# Patient Record
Sex: FEMALE | Race: BLACK OR AFRICAN AMERICAN | Employment: UNEMPLOYED | URBAN - METROPOLITAN AREA
[De-identification: names, ages, dates, MRNs, and addresses within clinical notes are randomized per-mention and may not be internally consistent; named-entity substitution may affect disease eponyms.]

---

## 2017-10-08 ENCOUNTER — ED HISTORICAL/CONVERTED ENCOUNTER (OUTPATIENT)
Dept: OTHER | Age: 52
End: 2017-10-08

## 2017-10-31 ENCOUNTER — ED HISTORICAL/CONVERTED ENCOUNTER (OUTPATIENT)
Dept: OTHER | Age: 52
End: 2017-10-31

## 2019-02-26 ENCOUNTER — OP HISTORICAL/CONVERTED ENCOUNTER (OUTPATIENT)
Dept: OTHER | Age: 54
End: 2019-02-26

## 2019-05-26 ENCOUNTER — ED HISTORICAL/CONVERTED ENCOUNTER (OUTPATIENT)
Dept: OTHER | Age: 54
End: 2019-05-26

## 2020-03-14 ENCOUNTER — ED HISTORICAL/CONVERTED ENCOUNTER (OUTPATIENT)
Dept: OTHER | Age: 55
End: 2020-03-14

## 2020-08-04 ENCOUNTER — OFFICE VISIT (OUTPATIENT)
Dept: ENDOCRINOLOGY | Age: 55
End: 2020-08-04
Payer: COMMERCIAL

## 2020-08-04 VITALS
RESPIRATION RATE: 16 BRPM | WEIGHT: 260 LBS | HEART RATE: 81 BPM | SYSTOLIC BLOOD PRESSURE: 145 MMHG | TEMPERATURE: 96.6 F | HEIGHT: 65 IN | DIASTOLIC BLOOD PRESSURE: 80 MMHG | OXYGEN SATURATION: 97 % | BODY MASS INDEX: 43.32 KG/M2

## 2020-08-04 DIAGNOSIS — E04.9 NODULAR GOITER: ICD-10-CM

## 2020-08-04 DIAGNOSIS — E03.9 ACQUIRED HYPOTHYROIDISM: Primary | ICD-10-CM

## 2020-08-04 PROCEDURE — 99204 OFFICE O/P NEW MOD 45 MIN: CPT | Performed by: INTERNAL MEDICINE

## 2020-08-04 RX ORDER — AMLODIPINE BESYLATE 5 MG/1
5 TABLET ORAL DAILY
COMMUNITY

## 2020-08-04 RX ORDER — ERGOCALCIFEROL 1.25 MG/1
50000 CAPSULE ORAL
COMMUNITY

## 2020-08-04 RX ORDER — ARIPIPRAZOLE 5 MG/1
TABLET ORAL DAILY
COMMUNITY

## 2020-08-04 RX ORDER — CYANOCOBALAMIN 1000 UG/ML
1000 INJECTION, SOLUTION INTRAMUSCULAR; SUBCUTANEOUS
COMMUNITY

## 2020-08-04 RX ORDER — TRAZODONE HYDROCHLORIDE 100 MG/1
100 TABLET ORAL
COMMUNITY

## 2020-08-04 RX ORDER — FERROUS SULFATE 325(65) MG
325 TABLET, DELAYED RELEASE (ENTERIC COATED) ORAL
COMMUNITY

## 2020-08-04 RX ORDER — DEXTROAMPHETAMINE SACCHARATE, AMPHETAMINE ASPARTATE, DEXTROAMPHETAMINE SULFATE AND AMPHETAMINE SULFATE 2.5; 2.5; 2.5; 2.5 MG/1; MG/1; MG/1; MG/1
10 TABLET ORAL DAILY
COMMUNITY

## 2020-08-04 RX ORDER — OMEPRAZOLE 20 MG/1
20 CAPSULE, DELAYED RELEASE ORAL DAILY
COMMUNITY

## 2020-08-04 NOTE — PROGRESS NOTES
Maxine Ying MD        Patient Information  Date:8/4/2020  Name : La Nena Landaverde 48 y.o.     YOB: 1965         Referred by: Sherice Castillo NP         Chief Complaint   Patient presents with   [address] [place] [address] Patient     referred for Thyroid       History of present illness    La Nena Landaverde is a 48 y.o. female  here for evaluation of thyroid nodule. A year ago she was told to have thyroid nodule on CT scan/MRI of the neck and no follow-up , several years ago she was diagnosed with hypothyroidism and was on levothyroxine which was discontinued later. 9 years ago had gastric bypass lost 50 pounds and now has been gradually gaining weight. She saw Dr. Miguel Ángel Faustin who did the gastric bypass, was recommended to quit smoking so he could do revision? .  She is continuing to smoke, complains of constipation, hot flashes  Limited activity due to severe shortness of breath    No dysphagia,dysphonia or dyspnea. No FH of thyroid cancer     Past Medical History:   Diagnosis Date    Anxiety     Arthritis     Asthma     COPD     Depression     GERD (gastroesophageal reflux disease)     Hypothyroidism     Obesity     Smoker        Current Outpatient Medications   Medication Sig    amLODIPine (NORVASC) 5 mg tablet Take 5 mg by mouth daily.  omeprazole (PRILOSEC) 20 mg capsule Take 20 mg by mouth daily.  ARIPiprazole (Abilify) 5 mg tablet Take  by mouth daily.  traZODone (DESYREL) 100 mg tablet Take 100 mg by mouth nightly.  cyanocobalamin (Vitamin B-12) 1,000 mcg/mL injection 1,000 mcg by IntraMUSCular route every thirty (30) days.  ferrous sulfate (IRON) 325 mg (65 mg iron) EC tablet Take 325 mg by mouth three (3) times daily (with meals).  ergocalciferol (Vitamin D2) 1,250 mcg (50,000 unit) capsule Take 50,000 Units by mouth every seven (7) days.  dextroamphetamine-amphetamine (AdderalL) 10 mg tablet Take 10 mg by mouth daily.  oxyCODONE-acetaminophen (PERCOCET) 5-325 mg per tablet Take 1-2 Tabs by mouth every four (4) hours as needed for Pain.  SERTRALINE HCL (ZOLOFT PO) Take  by mouth daily. 200MG    MOMETASONE/FORMOTEROL (DULERA IN) Take  by inhalation. 2 PUFFS DAILY     LEVOTHYROXINE SODIUM (LEVOTHYROXINE PO) Take  by mouth daily.  ESOMEPRAZOLE MAGNESIUM (NEXIUM PO) Take  by mouth.  ALPRAZOLAM (XANAX PO) Take  by mouth. 2 MG 3 TIMES DAILY AS NEEDED    RISPERIDONE (RISPERDAL PO) Take  by mouth nightly. No current facility-administered medications for this visit. Review of Systems:  - Constitutional Symptoms: no fevers, chills, weight loss  - Eyes: no blurry vision or double vision  - Cardiovascular: no chest pain or palpitations  - Respiratory: no cough + shortness of breath  - Gastrointestinal: no  abdominal pain  - Musculoskeletal: + Joint pains + weakness  - Integumentary: no rashes  - Neurological: no numbness, tingling, + headaches  - Psychiatric: + Depression or anxiety  - Endocrine: no cold intolerance, no polyuria or polydipsia    Physical Examination:  Blood pressure 145/80, pulse 81, temperature (!) 96.6 °F (35.9 °C), temperature source Oral, resp. rate 16, height 5' 5\" (1.651 m), weight 260 lb (117.9 kg), SpO2 97 %. - Body mass index is 43.27 kg/m².   - General: pleasant, no distress, good eye contact  - HEENT: no exopthalmos, no periorbital edema, no scleral/conjunctival injection, EOMI, no lid lag or stare  - Neck: supple, no thyromegaly, nodules, lymph nodes,   - Cardiovascular: regular,  normal S1 and S2, no murmurs  - Respiratory: clear to auscultation bilaterally  - Gastrointestinal: soft, nontender, nondistended, BS +  - Musculoskeletal: no proximal muscle weakness in upper or lower extremities  - Integumentary: no tremors, no edema  - Neurological: alert and oriented   - Psychiatric: normal mood and affect  - Skin - normal turgor      Assessment/Plan:     Nodular goiter  We discussed the natural history of thyroid nodules,most of the nodules are benign and a small percentage ( 5%) can be malignant . Clinically could not appreciate any nodule  Thyroid ultrasound ordered    Hypothyroidism: Not on replacement  Check TSH    Morbid obesity: Prior history of gastric bypass  Behavioral modification and lifestyle changes stressed          Thank you for allowing me to participate in the care of this patient.     Ronel Yun MD    Patient verbalized understanding

## 2020-08-04 NOTE — PROGRESS NOTES
Mis Walker is a 48 y.o. female here for   Chief Complaint   Patient presents with    New Patient     referred for Thyroid       1. Have you been to the ER, urgent care clinic since your last visit? Hospitalized since your last visit? -n/a    2. Have you seen or consulted any other health care providers outside of the [address] [place] [address] since your last visit?   Include any pap smears or colon screening.-n/a

## 2020-08-04 NOTE — LETTER
8/5/20 Patient: Rhea Beltrán YOB: 1965 Date of Visit: 8/4/2020 Bertram Adam, LEXI 
[id number] [id number] VIA Facsimile: [phone] Dear Bertram Adam, LEXI, Thank you for referring Ms. Rebecca Rajan to [facility] for evaluation. My notes for this consultation are attached. If you have questions, please do not hesitate to call me. I look forward to following your patient along with you. Sincerely, Arabella Aguilera MD

## 2020-08-05 DIAGNOSIS — E03.9 ACQUIRED HYPOTHYROIDISM: ICD-10-CM

## 2020-08-05 DIAGNOSIS — E04.9 NODULAR GOITER: ICD-10-CM

## 2020-08-09 LAB
T4 FREE SERPL-MCNC: 1.16 NG/DL (ref 0.82–1.77)
TSH SERPL DL<=0.005 MIU/L-ACNC: 0.59 UIU/ML (ref 0.45–4.5)

## 2020-08-12 ENCOUNTER — HOSPITAL ENCOUNTER (OUTPATIENT)
Dept: ULTRASOUND IMAGING | Age: 55
Discharge: HOME | End: 2020-08-12
Attending: INTERNAL MEDICINE
Payer: MEDICAID

## 2020-08-12 DIAGNOSIS — E04.9 NODULAR GOITER: ICD-10-CM

## 2020-08-12 PROCEDURE — 76536 US EXAM OF HEAD AND NECK: CPT

## 2020-08-13 ENCOUNTER — TELEPHONE (OUTPATIENT)
Dept: ENDOCRINOLOGY | Age: 55
End: 2020-08-13

## 2020-08-13 NOTE — TELEPHONE ENCOUNTER
Per Dr. Tino Sarabia, informed pt of result note, as noted above. Transferred pt to the  to schedule.

## 2020-08-13 NOTE — TELEPHONE ENCOUNTER
----- Message from Jorge L Hebert MD sent at 8/13/2020  3:32 PM EDT -----  She has thyroid nodules, left measuring 1.7 cm.   Need left thyroid needle biopsy,

## 2020-08-20 ENCOUNTER — OFFICE VISIT (OUTPATIENT)
Dept: ENDOCRINOLOGY | Age: 55
End: 2020-08-20
Payer: COMMERCIAL

## 2020-08-20 VITALS
BODY MASS INDEX: 44.15 KG/M2 | HEART RATE: 91 BPM | HEIGHT: 65 IN | WEIGHT: 265 LBS | OXYGEN SATURATION: 93 % | DIASTOLIC BLOOD PRESSURE: 103 MMHG | SYSTOLIC BLOOD PRESSURE: 139 MMHG | RESPIRATION RATE: 18 BRPM | TEMPERATURE: 97.5 F

## 2020-08-20 DIAGNOSIS — E04.9 NODULAR GOITER: Primary | ICD-10-CM

## 2020-08-20 PROCEDURE — 99213 OFFICE O/P EST LOW 20 MIN: CPT | Performed by: INTERNAL MEDICINE

## 2020-08-20 RX ORDER — LEVOFLOXACIN 500 MG/1
TABLET, FILM COATED ORAL
COMMUNITY
Start: 2020-08-13 | End: 2021-02-16 | Stop reason: ALTCHOICE

## 2020-08-20 RX ORDER — KETOCONAZOLE 200 MG/1
TABLET ORAL
Status: ON HOLD | COMMUNITY
Start: 2020-08-19 | End: 2022-05-27

## 2020-08-20 NOTE — PATIENT INSTRUCTIONS
I have ordered scan/test and if you do not hear from the hospital in 3- 4 business days  please call the number [phone] to speak with the scheduling team directly.

## 2020-08-20 NOTE — PROGRESS NOTES
Mis Walker is a 48 y.o. female here for   Chief Complaint   Patient presents with    Thyroid Problem    Biopsy       1. Have you been to the ER, urgent care clinic since your last visit? Hospitalized since your last visit? -no    2. Have you seen or consulted any other health care providers outside of the [address] [place] [address] since your last visit? Include any pap smears or colon screening. -PCP on Wed - WBC high

## 2020-08-22 NOTE — PROGRESS NOTES
Irma Moss MD           Patient Information  Date:8/22/2020  Name : Gosia Lafleur 48 y.o.     YOB: 1965         Referred by: Jean Martines NP         Chief Complaint   Patient presents with    Thyroid Problem    Biopsy       History of present illness    Gosia Lafleur is a 48 y.o. female   She had ultrasound thyroid  Multinodular goiter  Biopsy of the left 1.7 cm nodule was planned and attempted    She has posterior nodule. Thick neck, due to the positioning of the nodule we will arrange for FNA at the hospital  Discussed the procedure      Past Medical History:   Diagnosis Date    Anxiety     Arthritis     Asthma     COPD     Depression     GERD (gastroesophageal reflux disease)     Hypothyroidism     Obesity     MEL (obstructive sleep apnea)     Smoker        Current Outpatient Medications   Medication Sig    levoFLOXacin (LEVAQUIN) 500 mg tablet TAKE ONE TABLET BY MOUTH EVERY DAY FOR 10 DAYS    ketoconazole (NIZORAL) 200 mg tablet     amLODIPine (NORVASC) 5 mg tablet Take 5 mg by mouth daily.  omeprazole (PRILOSEC) 20 mg capsule Take 20 mg by mouth daily.  ARIPiprazole (Abilify) 5 mg tablet Take  by mouth daily.  traZODone (DESYREL) 100 mg tablet Take 100 mg by mouth nightly.  cyanocobalamin (Vitamin B-12) 1,000 mcg/mL injection 1,000 mcg by IntraMUSCular route every thirty (30) days.  ferrous sulfate (IRON) 325 mg (65 mg iron) EC tablet Take 325 mg by mouth three (3) times daily (with meals).  ergocalciferol (Vitamin D2) 1,250 mcg (50,000 unit) capsule Take 50,000 Units by mouth every seven (7) days.  dextroamphetamine-amphetamine (AdderalL) 10 mg tablet Take 10 mg by mouth daily.  oxyCODONE-acetaminophen (PERCOCET) 5-325 mg per tablet Take 1-2 Tabs by mouth every four (4) hours as needed for Pain.  SERTRALINE HCL (ZOLOFT PO) Take  by mouth daily.  200MG     No current facility-administered medications for this visit. Review of Systems:  -     Physical Examination:  Blood pressure (!) 139/103, pulse 91, temperature 97.5 °F (36.4 °C), temperature source Oral, resp. rate 18, height 5' 5\" (1.651 m), weight 265 lb (120.2 kg), SpO2 93 %. - Body mass index is 44.1 kg/m². Data Reviewed:       Assessment/Plan:     Multinodular goiter      We discussed the natural history of thyroid nodules,most of the nodules are benign and a small percentage ( 5%) can be malignant and thyroid biopsy is the next step to exclude the possibility. We will schedule for ultrasound guided FNA biopsy. Explained the procedure to the patient. If FNA biopsy is normal the nodule can be followed by serial neck exams and TSH . If there any compressive symptoms then surgery to be considered. Follow-up and Dispositions    · Return in about 6 months (around 2/20/2021) for [address] [place] [address],[address] and f/u. Thank you for allowing me to participate in the care of this patient.     Padilla Goldberg MD

## 2020-08-28 ENCOUNTER — HOSPITAL ENCOUNTER (OUTPATIENT)
Dept: ULTRASOUND IMAGING | Age: 55
Discharge: HOME | End: 2020-08-28
Attending: INTERNAL MEDICINE
Payer: COMMERCIAL

## 2020-08-28 DIAGNOSIS — E04.9 NODULAR GOITER: ICD-10-CM

## 2020-08-28 PROCEDURE — 77030014115

## 2020-08-28 PROCEDURE — 88172 CYTP DX EVAL FNA 1ST EA SITE: CPT

## 2020-08-28 PROCEDURE — 88173 CYTOPATH EVAL FNA REPORT: CPT

## 2020-08-28 PROCEDURE — 10005 FNA BX W/US GDN 1ST LES: CPT

## 2020-08-28 RX ORDER — LIDOCAINE HYDROCHLORIDE 10 MG/ML
10 INJECTION, SOLUTION EPIDURAL; INFILTRATION; INTRACAUDAL; PERINEURAL
Status: COMPLETED | OUTPATIENT
Start: 2020-08-28 | End: 2020-08-28

## 2020-08-28 RX ADMIN — LIDOCAINE HYDROCHLORIDE 5 ML: 10 INJECTION, SOLUTION EPIDURAL; INFILTRATION; INTRACAUDAL; PERINEURAL at 09:01

## 2020-09-01 ENCOUNTER — TELEPHONE (OUTPATIENT)
Dept: ENDOCRINOLOGY | Age: 55
End: 2020-09-01

## 2020-09-01 NOTE — TELEPHONE ENCOUNTER
----- Message from Brayden Sarabia sent at 9/1/2020  4:10 PM EDT -----  Regarding: Dr. Saleem Rodríguez  Pt would like a call back regarding her test results.  Contract [phone]

## 2020-09-02 NOTE — TELEPHONE ENCOUNTER
Pt came by the office for results of biopsy, explained that once reviewed by the physician, a nurse will contact her with results.

## 2020-09-03 NOTE — TELEPHONE ENCOUNTER
Informed pt of the results below.  Pt verbalized understanding.       ----- Message from Patel Lisa MD sent at 9/2/2020  5:13 PM EDT -----  No thyroid cancer seen

## 2020-11-05 ENCOUNTER — TRANSCRIBE ORDER (OUTPATIENT)
Dept: SCHEDULING | Age: 55
End: 2020-11-05

## 2020-11-05 DIAGNOSIS — R10.9 STOMACH ACHE: ICD-10-CM

## 2020-11-05 DIAGNOSIS — R06.02 SHORTNESS OF BREATH: Primary | ICD-10-CM

## 2020-11-05 DIAGNOSIS — R53.83 FATIGUE: ICD-10-CM

## 2020-12-22 ENCOUNTER — HOSPITAL ENCOUNTER (OUTPATIENT)
Dept: CT IMAGING | Age: 55
Discharge: HOME | End: 2020-12-22
Attending: INTERNAL MEDICINE
Payer: COMMERCIAL

## 2020-12-22 DIAGNOSIS — R06.02 SOB (SHORTNESS OF BREATH): ICD-10-CM

## 2020-12-22 DIAGNOSIS — R10.9 STOMACH ACHE: ICD-10-CM

## 2020-12-22 DIAGNOSIS — R53.83 FATIGUE: ICD-10-CM

## 2020-12-22 DIAGNOSIS — R06.02 SHORTNESS OF BREATH: ICD-10-CM

## 2020-12-22 LAB
BUN SERPL-MCNC: 15 MG/DL (ref 6–20)
CREAT SERPL-MCNC: 0.31 MG/DL (ref 0.55–1.02)

## 2020-12-22 PROCEDURE — 84520 ASSAY OF UREA NITROGEN: CPT

## 2020-12-22 PROCEDURE — 36415 COLL VENOUS BLD VENIPUNCTURE: CPT

## 2020-12-22 PROCEDURE — 74011000636 HC RX REV CODE- 636: Performed by: INTERNAL MEDICINE

## 2020-12-22 PROCEDURE — 74177 CT ABD & PELVIS W/CONTRAST: CPT

## 2020-12-22 PROCEDURE — 71260 CT THORAX DX C+: CPT

## 2020-12-22 PROCEDURE — 82565 ASSAY OF CREATININE: CPT

## 2020-12-22 RX ADMIN — IOPAMIDOL 100 ML: 755 INJECTION, SOLUTION INTRAVENOUS at 08:40

## 2021-02-16 ENCOUNTER — OFFICE VISIT (OUTPATIENT)
Dept: ENDOCRINOLOGY | Age: 56
End: 2021-02-16
Payer: COMMERCIAL

## 2021-02-16 VITALS
WEIGHT: 276 LBS | SYSTOLIC BLOOD PRESSURE: 133 MMHG | OXYGEN SATURATION: 96 % | BODY MASS INDEX: 45.98 KG/M2 | DIASTOLIC BLOOD PRESSURE: 77 MMHG | RESPIRATION RATE: 16 BRPM | HEART RATE: 80 BPM | TEMPERATURE: 96.7 F | HEIGHT: 65 IN

## 2021-02-16 DIAGNOSIS — E04.2 MULTINODULAR GOITER: Primary | ICD-10-CM

## 2021-02-16 PROCEDURE — 99213 OFFICE O/P EST LOW 20 MIN: CPT | Performed by: INTERNAL MEDICINE

## 2021-02-16 PROCEDURE — 76536 US EXAM OF HEAD AND NECK: CPT | Performed by: INTERNAL MEDICINE

## 2021-02-16 RX ORDER — MONTELUKAST SODIUM 4 MG/1
1 TABLET, CHEWABLE ORAL DAILY
COMMUNITY

## 2021-02-16 NOTE — PROGRESS NOTES
Calvin Duque MD           Patient Information  Name : Chloe Hamman 50 y.o.   YOB: 1965         Referred by: Miguel Ferris NP         Chief Complaint   Patient presents with    Thyroid Problem    Ultrasound       History of present illness    Chloe Hamman is a 50 y.o. female here for follow-up  Multinodular goiter: She was found to have left 1.7 cm thyroid nodule, s/p FNA 2020 which was benign  No change in the size    Generalized pain, has gained weight        Past Medical History:   Diagnosis Date    Anxiety     Arthritis     Asthma     COPD     Depression     GERD (gastroesophageal reflux disease)     Hypothyroidism     Obesity     EML (obstructive sleep apnea)     Smoker        Current Outpatient Medications   Medication Sig    colestipoL (Colestid) 1 gram tablet Take 1 g by mouth daily.  ketoconazole (NIZORAL) 200 mg tablet     amLODIPine (NORVASC) 5 mg tablet Take 5 mg by mouth daily.  omeprazole (PRILOSEC) 20 mg capsule Take 20 mg by mouth daily.  ARIPiprazole (Abilify) 5 mg tablet Take  by mouth daily.  traZODone (DESYREL) 100 mg tablet Take 100 mg by mouth nightly.  cyanocobalamin (Vitamin B-12) 1,000 mcg/mL injection 1,000 mcg by IntraMUSCular route every thirty (30) days.  ferrous sulfate (IRON) 325 mg (65 mg iron) EC tablet Take 325 mg by mouth three (3) times daily (with meals).  ergocalciferol (Vitamin D2) 1,250 mcg (50,000 unit) capsule Take 50,000 Units by mouth every seven (7) days.  dextroamphetamine-amphetamine (AdderalL) 10 mg tablet Take 10 mg by mouth daily.  oxyCODONE-acetaminophen (PERCOCET) 5-325 mg per tablet Take 1-2 Tabs by mouth every four (4) hours as needed for Pain.  SERTRALINE HCL (ZOLOFT PO) Take  by mouth daily. 200MG     No current facility-administered medications for this visit.           Review of Systems:  -     Physical Examination:  Blood pressure 133/77, pulse 80, temperature (!) 96.7 °F (35.9 °C), temperature source Oral, resp. rate 16, height 5' 5\" (1.651 m), weight 276 lb (125.2 kg), SpO2 96 %. Body mass index is 45.93 kg/m². General: pleasant, no distress, good eye contact  HEENT: no exophthalmos, no periorbital edema, EOMI    RS: Normal respiratory effort  Musculoskeletal: no tremors  Neurological: alert and oriented  Psychiatric: normal mood and affect  Skin: Normal color      Data Reviewed:       Assessment/Plan:     Multinodular goiter  Left thyroid nodule 1.7 cm, s/p FNA 2020, benign          Ultrasound thyroid February 2021  Right lobe measures 3.9 x 1.9 x 1.4 cm. Left lobe measures 3.7 x 1.5 x 1.5 cm. Isthmus 0.3 cm. Right lobe has 2 nodules measuring 1.2 x 0.8 x 1.1 cm and another nodule measuring 0.9 x 0.7 x 0.6 cm with peripheral vascularization, no microcalcifications. Left lobe has a dominant nodule measuring 1.6 x 1.1 x 1.1 cm with no microcalcifications    Impression  Multinodular goiter. Left dominant nodule measuring 1.6 cm which has not changed in size. This nodule was biopsied in the past and was benign      Thank you for allowing me to participate in the care of this patient.     Roman Lewis MD

## 2021-02-16 NOTE — LETTER
2/20/2021 Patient: Ariella Hylton YOB: 1965 Date of Visit: 2/16/2021 Clarisa Crowe NP 
[address] [phone] Via Fax: [phone] Dear Clarisa Fong., NP, Thank you for referring Ms. Tacos Riley to [facility] for evaluation. My notes for this consultation are attached. If you have questions, please do not hesitate to call me. I look forward to following your patient along with you. Sincerely, Mckenzie Oh MD

## 2021-02-16 NOTE — PROGRESS NOTES
Ariel Harding is a 50 y.o. female here for   Chief Complaint   Patient presents with    Thyroid Problem    Ultrasound       1. Have you been to the ER, urgent care clinic since your last visit? Hospitalized since your last visit? -no    2. Have you seen or consulted any other health care providers outside of the [address] [place] [address] since your last visit?   Include any pap smears or colon screening.-oncologist

## 2022-03-29 ENCOUNTER — APPOINTMENT (OUTPATIENT)
Dept: GENERAL RADIOLOGY | Age: 57
End: 2022-03-29
Attending: STUDENT IN AN ORGANIZED HEALTH CARE EDUCATION/TRAINING PROGRAM
Payer: MEDICAID

## 2022-03-29 ENCOUNTER — HOSPITAL ENCOUNTER (EMERGENCY)
Age: 57
Discharge: HOME | End: 2022-03-29
Attending: STUDENT IN AN ORGANIZED HEALTH CARE EDUCATION/TRAINING PROGRAM
Payer: MEDICAID

## 2022-03-29 VITALS
RESPIRATION RATE: 22 BRPM | WEIGHT: 250 LBS | TEMPERATURE: 97.6 F | SYSTOLIC BLOOD PRESSURE: 148 MMHG | DIASTOLIC BLOOD PRESSURE: 100 MMHG | HEIGHT: 65 IN | HEART RATE: 81 BPM | OXYGEN SATURATION: 98 % | BODY MASS INDEX: 41.65 KG/M2

## 2022-03-29 DIAGNOSIS — M54.50 ACUTE RIGHT-SIDED LOW BACK PAIN, UNSPECIFIED WHETHER SCIATICA PRESENT: Primary | ICD-10-CM

## 2022-03-29 DIAGNOSIS — J44.1 COPD EXACERBATION (HCC): ICD-10-CM

## 2022-03-29 LAB
ATRIAL RATE: 74 BPM
CALCULATED P AXIS, ECG09: 53 DEGREES
CALCULATED R AXIS, ECG10: -10 DEGREES
CALCULATED T AXIS, ECG11: 20 DEGREES
DIAGNOSIS, 93000: NORMAL
P-R INTERVAL, ECG05: 162 MS
Q-T INTERVAL, ECG07: 386 MS
QRS DURATION, ECG06: 86 MS
QTC CALCULATION (BEZET), ECG08: 428 MS
VENTRICULAR RATE, ECG03: 74 BPM

## 2022-03-29 PROCEDURE — 74011000250 HC RX REV CODE- 250: Performed by: STUDENT IN AN ORGANIZED HEALTH CARE EDUCATION/TRAINING PROGRAM

## 2022-03-29 PROCEDURE — 94640 AIRWAY INHALATION TREATMENT: CPT

## 2022-03-29 PROCEDURE — 74011250637 HC RX REV CODE- 250/637: Performed by: STUDENT IN AN ORGANIZED HEALTH CARE EDUCATION/TRAINING PROGRAM

## 2022-03-29 PROCEDURE — 99284 EMERGENCY DEPT VISIT MOD MDM: CPT

## 2022-03-29 PROCEDURE — 71045 X-RAY EXAM CHEST 1 VIEW: CPT

## 2022-03-29 PROCEDURE — 93005 ELECTROCARDIOGRAM TRACING: CPT

## 2022-03-29 PROCEDURE — 74011636637 HC RX REV CODE- 636/637: Performed by: STUDENT IN AN ORGANIZED HEALTH CARE EDUCATION/TRAINING PROGRAM

## 2022-03-29 RX ORDER — ACETAMINOPHEN 325 MG/1
650 TABLET ORAL
Qty: 20 TABLET | Refills: 0 | Status: SHIPPED | OUTPATIENT
Start: 2022-03-29 | End: 2022-03-29 | Stop reason: SDUPTHER

## 2022-03-29 RX ORDER — ACETAMINOPHEN 325 MG/1
650 TABLET ORAL
Qty: 20 TABLET | Refills: 0 | Status: SHIPPED | OUTPATIENT
Start: 2022-03-29

## 2022-03-29 RX ORDER — DOXYCYCLINE HYCLATE 100 MG
100 TABLET ORAL 2 TIMES DAILY
Qty: 10 TABLET | Refills: 0 | Status: SHIPPED | OUTPATIENT
Start: 2022-03-29 | End: 2022-03-29 | Stop reason: SDUPTHER

## 2022-03-29 RX ORDER — METHOCARBAMOL 500 MG/1
1500 TABLET, FILM COATED ORAL ONCE
Status: COMPLETED | OUTPATIENT
Start: 2022-03-29 | End: 2022-03-29

## 2022-03-29 RX ORDER — METHOCARBAMOL 750 MG/1
750 TABLET, FILM COATED ORAL
Qty: 20 TABLET | Refills: 0 | Status: ON HOLD | OUTPATIENT
Start: 2022-03-29 | End: 2022-05-27

## 2022-03-29 RX ORDER — PREDNISONE 20 MG/1
40 TABLET ORAL DAILY
Qty: 8 TABLET | Refills: 0 | Status: SHIPPED | OUTPATIENT
Start: 2022-03-30 | End: 2022-03-29 | Stop reason: SDUPTHER

## 2022-03-29 RX ORDER — ACETAMINOPHEN 500 MG
1000 TABLET ORAL ONCE
Status: COMPLETED | OUTPATIENT
Start: 2022-03-29 | End: 2022-03-29

## 2022-03-29 RX ORDER — DOXYCYCLINE HYCLATE 100 MG
100 TABLET ORAL 2 TIMES DAILY
Qty: 10 TABLET | Refills: 0 | Status: SHIPPED | OUTPATIENT
Start: 2022-03-29 | End: 2022-04-03

## 2022-03-29 RX ORDER — METHOCARBAMOL 750 MG/1
750 TABLET, FILM COATED ORAL
Qty: 20 TABLET | Refills: 0 | Status: SHIPPED | OUTPATIENT
Start: 2022-03-29 | End: 2022-03-29 | Stop reason: SDUPTHER

## 2022-03-29 RX ORDER — PREDNISONE 20 MG/1
40 TABLET ORAL ONCE
Status: COMPLETED | OUTPATIENT
Start: 2022-03-29 | End: 2022-03-29

## 2022-03-29 RX ORDER — IPRATROPIUM BROMIDE AND ALBUTEROL SULFATE 2.5; .5 MG/3ML; MG/3ML
3 SOLUTION RESPIRATORY (INHALATION)
Status: COMPLETED | OUTPATIENT
Start: 2022-03-29 | End: 2022-03-29

## 2022-03-29 RX ORDER — PREDNISONE 20 MG/1
40 TABLET ORAL DAILY
Qty: 8 TABLET | Refills: 0 | Status: SHIPPED | OUTPATIENT
Start: 2022-03-30 | End: 2022-04-03

## 2022-03-29 RX ADMIN — METHOCARBAMOL TABLETS 1500 MG: 500 TABLET, COATED ORAL at 03:03

## 2022-03-29 RX ADMIN — PREDNISONE 40 MG: 20 TABLET ORAL at 03:03

## 2022-03-29 RX ADMIN — IPRATROPIUM BROMIDE AND ALBUTEROL SULFATE 3 ML: .5; 3 SOLUTION RESPIRATORY (INHALATION) at 03:04

## 2022-03-29 RX ADMIN — IPRATROPIUM BROMIDE AND ALBUTEROL SULFATE 3 ML: .5; 3 SOLUTION RESPIRATORY (INHALATION) at 03:06

## 2022-03-29 RX ADMIN — IPRATROPIUM BROMIDE AND ALBUTEROL SULFATE 3 ML: .5; 3 SOLUTION RESPIRATORY (INHALATION) at 03:03

## 2022-03-29 RX ADMIN — ACETAMINOPHEN 1000 MG: 500 TABLET ORAL at 03:04

## 2022-03-29 NOTE — ED PROVIDER NOTES
[id number]  EMERGENCY DEPARTMENT ENCOUNTER NOTE    Date: 3/29/2022  Patient Name: Eris Vora    History of Presenting Illness     Chief Complaint   Patient presents with    Shortness of Breath    Back Pain     HPI: Eris Vora, 49 y.o. female with a past medical history and outpatient medications as listed and reviewed below  presents for shortness of breath. The patient has a history of COPD and asthma and reports that she has been feeling more short of breath for the past few days. She got 1 dose of prednisone and breathing treatments without improvement. She also reports chronic lower back pain that exacerbates that she fell down at a doctor's office. She had an MRI afterwards that showed a bulging disc but no fractures. The pain right now is in the lower paravertebral right lumbar area. No weakness or numbness in the lower extremity and the patient is ambulating freely.     Medical History   I reviewed the medical, surgical, family, and social history, as well as allergies:    PCP: Alfredo Silva NP    Past Medical History:  Past Medical History:   Diagnosis Date    Anxiety     Arthritis     Asthma     COPD     Depression     GERD (gastroesophageal reflux disease)     Hypothyroidism     Obesity     MEL (obstructive sleep apnea)     Smoker      Past Surgical History:  Past Surgical History:   Procedure Laterality Date    HX GYN      LAPAROSCOPY    HX OTHER SURGICAL      lapband removal    HX TUBAL LIGATION      KS LAP GASTRIC BYPASS/SAEED-EN-Y      KS LAP, PLACE ADJUST GASTR BAND       Current Outpatient Medications:  Current Outpatient Medications   Medication Instructions    acetaminophen (TYLENOL) 650 mg, Oral, EVERY 6 HOURS AS NEEDED    albuterol sulfate 90 mcg/actuation aebs 1-2 Puffs, Inhalation, EVERY 4 HOURS AS NEEDED    amLODIPine (NORVASC) 5 mg, Oral, DAILY    ARIPiprazole (Abilify) 5 mg tablet Oral, DAILY    colestipoL (COLESTID) 1 g, Oral, DAILY    cyanocobalamin (VITAMIN B-12) 1,000 mcg, IntraMUSCular, EVERY 30 DAYS    dextroamphetamine-amphetamine (AdderalL) 10 mg tablet 10 mg, Oral, DAILY    doxycycline (VIBRA-TABS) 100 mg, Oral, 2 TIMES DAILY    ergocalciferol (VITAMIN D2) 50,000 Units, Oral, EVERY 7 DAYS    ferrous sulfate (IRON) 325 mg, Oral, 3 TIMES DAILY WITH MEALS    ketoconazole (NIZORAL) 200 mg tablet No dose, route, or frequency recorded.  methocarbamoL (ROBAXIN-750) 750 mg, Oral, 3 TIMES DAILY AS NEEDED    omeprazole (PRILOSEC) 20 mg, Oral, DAILY    oxyCODONE-acetaminophen (PERCOCET) 5-325 mg per tablet 1-2 Tablets, Oral, EVERY 4 HOURS AS NEEDED    [START ON 3/30/2022] predniSONE (DELTASONE) 40 mg, Oral, DAILY, With Breakfast    SERTRALINE HCL (ZOLOFT PO) DAILY    traZODone (DESYREL) 100 mg, Oral, EVERY BEDTIME      Family History:  Family History   Problem Relation Age of Onset    Cancer Mother         LUNG    Cancer Father         COLON    Heart Disease Brother      Social History:  Social History     Tobacco Use    Smoking status: Current Every Day Smoker     Packs/day: 0.50    Smokeless tobacco: Never Used   Substance Use Topics    Alcohol use: Not Currently     Comment: RARE    Drug use: No     Allergies: Allergies   Allergen Reactions    Adhesive Other (comments)     BURNS SKIN    Nsaids (Non-Steroidal Anti-Inflammatory Drug) Nausea and Vomiting and Other (comments)     BURNS STOMACH       Review of Systems     Review of Systems  Negative: All other systems negative. Physical Exam and Vital Signs   Vital Signs - Reviewed the patient's vital signs.     Patient Vitals for the past 12 hrs:   Temp Pulse Resp BP SpO2   03/29/22 0313     98 %   03/29/22 0249 97.6 °F (36.4 °C) 81 22 (!) 148/100 98 %     Physical Exam:    GENERAL: awake, alert, cooperative, not in distress  HEENT:  * Pupils equal, EOMI  * Head atraumatic  CV:  * audible heart sounds  * warm and perfused extremities bilaterally  PULMONARY: Good air movement, noted wheezes, no crackles  ABDOMEN/: soft, no distension, no guarding, no suprapubic tenderness, no abdominal tenderness  EXTREMITIES/BACK: warm and perfused, no tenderness, no edema  BACK  * No obvious trauma to the back, no ecchymosis, scoliosis, lacerations, or abrasions  * No midline back tenderness  * Noted R lumbar paravertebral back tenderness  * No stepoffs or deformities  * Normal power and sensation in bilateral lower extremities  * Normal bilateral hip and knee ROM. No tenderness. SKIN: no rashes or signs of trauma  NEURO:  * Speech clear  * Moves U&LE to command    Medical Decision Making   - I am the first and primary provider for this patient and am the primary provider of record. - I reviewed the vital signs, available nursing notes, past medical history, past surgical history, family history and social history. - Initial assessment performed. The patients presenting problems have been discussed, and the staff are in agreement with the care plan formulated and outlined with them. I have encouraged them to ask questions as they arise throughout their visit. - Available medical records, nursing notes, old EKGs, and EMS run sheets (if patient was EMS transported) were reviewed    MDM:   Patient is a 49 y.o. female presenting for wheezing. Vitals reveal no significant abnormalities and physical exam reveals wheezes. Based on the history, physical exam, risk factors, and vitals signs, I favor the following differential diagnoses: COPD exacerbation, pneumonia, chronic back pain, musculoskeletal pain. I doubt cauda equina syndrome due to lack of neurologic findings or red flags. I doubt UTI or pyelonephritis due to absence of urinary symptoms. I doubt aortic pathology due to stable vitals, no red flags on history, and exam consistent with muscular back pain. I doubt ureterolithiasis due to non-suggestive history and examination.     Will provider symptomatic treatment in the ED. Imaging studies for back pain are not indicated given the absence of any red flags on history taking or physical examination. Will give symptomatic treatment and reassess. Will initiate workup and symptomatic treatment. See ED Course and Reassessment for results and interpretations. Results     Labs:  Recent Results (from the past 12 hour(s))   EKG, 12 LEAD, INITIAL    Collection Time: 03/29/22  3:01 AM   Result Value Ref Range    Ventricular Rate 74 BPM    Atrial Rate 74 BPM    P-R Interval 162 ms    QRS Duration 86 ms    Q-T Interval 386 ms    QTC Calculation (Bezet) 428 ms    Calculated P Axis 53 degrees    Calculated R Axis -10 degrees    Calculated T Axis 20 degrees    Diagnosis       Normal sinus rhythm  Possible Anterior infarct (cited on or before 29-MAR-2022)  Abnormal ECG  When compared with ECG of 14-MAR-2020 11:24,  No significant change was found       Radiologic Studies:  CT Results  (Last 48 hours)    None        CXR Results  (Last 48 hours)    None        Medications ordered:  Medications   albuterol-ipratropium (DUO-NEB) 2.5 MG-0.5 MG/3 ML (3 mL Nebulization Given 3/29/22 0306)   predniSONE (DELTASONE) tablet 40 mg (40 mg Oral Given 3/29/22 0303)   acetaminophen (TYLENOL) tablet 1,000 mg (1,000 mg Oral Given 3/29/22 0304)   methocarbamoL (ROBAXIN) tablet 1,500 mg (1,500 mg Oral Given 3/29/22 0303)     ED Course and Reassessment     ED Course:          Reassessment:    Patient presents with apparent COPD exacerbation. The workup was not significant for any other malignant process as per abovementioned workup. I doubt the patient has any other process requiring admission and further management including pneumonia, pneumothorax, pulmonary edema, or significant pleural effusion. The patient had good symptomatic relief in the emergency department and there is no evidence for a more malignant underlying infectious or other process.  Reexamination demonstrates improvement of wheezing and vitals have improved. The patient is a good candidate for outpatient therapy with no respiratory distress upon discharge. The patient will be treated for COPD exacerbation. After completion of evaluation and discussion of findings with the patient, all the patient's questions were answered. If required, all follow up appointments and treatments were discussed and explained. The patient sounded understanding and agrees with the plan. The patient understands that at this time there is no evidence for a more malignant underlying process, but the patient also understands that early in the process of an illness, an emergency department workup can be falsely reassuring. Routine discharge counseling was given to the patient and the patient understands that worsening, changing or persistent symptoms should prompt an immediate call or follow up with their primary physician or the emergency department. The importance of appropriate follow up was also discussed with the patient. More extensive discharge instructions were given in the patient's discharge paperwork. Final Disposition     Discharge: DISCHARGED FROM EMERGENCY DEPARTMENT    Patient will be discharged from the Emergency Department in stable condition. All of the diagnostic tests were reviewed and any questions were answered. Diagnosis, results, follow up if applicable, and return precautions were discussed. I have also put together printed discharge instructions for them that include: 1) educational information regarding their diagnosis, 2) how to care for their diagnosis at home, as well a 3) list of reasons why they would want to return to the ED prior to their follow-up appointment, should their condition change. Any labs or imaging done in the ED will be either printed with the discharge paperwork or available through [address] DISCHARGE PLAN:  1.    Current Discharge Medication List      START taking these medications Details   acetaminophen (TYLENOL) 325 mg tablet Take 2 Tablets by mouth every six (6) hours as needed for Pain. Qty: 20 Tablet, Refills: 0      predniSONE (DELTASONE) 20 mg tablet Take 40 mg by mouth daily for 4 days. With Breakfast  Qty: 8 Tablet, Refills: 0      methocarbamoL (Robaxin-750) 750 mg tablet Take 1 Tablet by mouth three (3) times daily as needed for Muscle Spasm(s). Qty: 20 Tablet, Refills: 0      albuterol sulfate 90 mcg/actuation aebs Take 1-2 Puffs by inhalation every four (4) hours as needed for Wheezing or Shortness of Breath. Qty: 1 Each, Refills: 0      doxycycline (VIBRA-TABS) 100 mg tablet Take 1 Tablet by mouth two (2) times a day for 5 days. Qty: 10 Tablet, Refills: 0         CONTINUE these medications which have NOT CHANGED    Details   colestipoL (Colestid) 1 gram tablet Take 1 g by mouth daily. ketoconazole (NIZORAL) 200 mg tablet       amLODIPine (NORVASC) 5 mg tablet Take 5 mg by mouth daily. omeprazole (PRILOSEC) 20 mg capsule Take 20 mg by mouth daily. ARIPiprazole (Abilify) 5 mg tablet Take  by mouth daily. traZODone (DESYREL) 100 mg tablet Take 100 mg by mouth nightly. cyanocobalamin (Vitamin B-12) 1,000 mcg/mL injection 1,000 mcg by IntraMUSCular route every thirty (30) days. ferrous sulfate (IRON) 325 mg (65 mg iron) EC tablet Take 325 mg by mouth three (3) times daily (with meals). ergocalciferol (Vitamin D2) 1,250 mcg (50,000 unit) capsule Take 50,000 Units by mouth every seven (7) days. dextroamphetamine-amphetamine (AdderalL) 10 mg tablet Take 10 mg by mouth daily. oxyCODONE-acetaminophen (PERCOCET) 5-325 mg per tablet Take 1-2 Tabs by mouth every four (4) hours as needed for Pain. Qty: 30 Tab, Refills: 0      SERTRALINE HCL (ZOLOFT PO) Take  by mouth daily. 200MG            2.    Follow-up Information     Follow up With Specialties Details Why Contact Info    Clement Rider., NP Nurse Practitioner Schedule an appointment as soon as possible for a visit in 3 days  [address] [facility] [address]      [address] [facility] Emergency Medicine Go to  If symptoms worsen [address]  [phone]        3. Return to ED if worse    4. Current Discharge Medication List      START taking these medications    Details   acetaminophen (TYLENOL) 325 mg tablet Take 2 Tablets by mouth every six (6) hours as needed for Pain. Qty: 20 Tablet, Refills: 0  Start date: 3/29/2022      predniSONE (DELTASONE) 20 mg tablet Take 40 mg by mouth daily for 4 days. With Breakfast  Qty: 8 Tablet, Refills: 0  Start date: 3/30/2022, End date: 4/3/2022      methocarbamoL (Robaxin-750) 750 mg tablet Take 1 Tablet by mouth three (3) times daily as needed for Muscle Spasm(s). Qty: 20 Tablet, Refills: 0  Start date: 3/29/2022      albuterol sulfate 90 mcg/actuation aebs Take 1-2 Puffs by inhalation every four (4) hours as needed for Wheezing or Shortness of Breath. Qty: 1 Each, Refills: 0  Start date: 3/29/2022      doxycycline (VIBRA-TABS) 100 mg tablet Take 1 Tablet by mouth two (2) times a day for 5 days. Qty: 10 Tablet, Refills: 0  Start date: 3/29/2022, End date: 4/3/2022             ED Procedures   Performed by: Jazzmine Reeves MD  Procedures     EKG interpretation (Preliminary):  Rhythm: normal sinus rhythm; and regular . Rate (approx.): 74. Axis: normal;  DE interval: normal;  QRS interval: normal ;  ST/T wave: normal;  Other findings: normal.    Diagnosis     Clinical Impression:   1. Acute right-sided low back pain, unspecified whether sciatica present    2. COPD exacerbation ([facility] [address]      Attestations:    Jazzmine Reeves MD    Please note that this dictation was completed with [email or web address], the [employer] voice recognition software. Quite often unanticipated grammatical, syntax, homophones, and other interpretive errors are inadvertently transcribed by the computer software.   Please disregard these errors. Please excuse any errors that have escaped final proofreading. Thank you.

## 2022-03-29 NOTE — ED TRIAGE NOTES
Pt here for shortness of breath and a cough  Also here for back pain secondary to a fall over a week ago, that she has already been evaluated for and treated for. States her Percocet is not working. Audible wheeze heard from the doorway  Ambulates without assistance.   AO x's 4

## 2022-03-29 NOTE — DISCHARGE INSTRUCTIONS
Thank you! Thank you for allowing me to care for you in the emergency department. I sincerely hope that you are satisfied with your visit today. It is my goal to provide you with excellent care. Below you will find a list of your labs and imaging from your visit today. Should you have any questions regarding these results please do not hesitate to call the emergency department. Labs -     Recent Results (from the past 12 hour(s))   EKG, 12 LEAD, INITIAL    Collection Time: 03/29/22  3:01 AM   Result Value Ref Range    Ventricular Rate 74 BPM    Atrial Rate 74 BPM    P-R Interval 162 ms    QRS Duration 86 ms    Q-T Interval 386 ms    QTC Calculation (Bezet) 428 ms    Calculated P Axis 53 degrees    Calculated R Axis -10 degrees    Calculated T Axis 20 degrees    Diagnosis       Normal sinus rhythm  Possible Anterior infarct (cited on or before 29-MAR-2022)  Abnormal ECG  When compared with ECG of 14-MAR-2020 11:24,  No significant change was found         Radiologic Studies -   XR CHEST PORT    (Results Pending)     CT Results  (Last 48 hours)      None          CXR Results  (Last 48 hours)      None               If you feel that you have not received excellent quality care or timely care, please ask to speak to the nurse manager. Please choose us in the future for your continued health care needs. ------------------------------------------------------------------------------------------------------------  The exam and treatment you received in the Emergency Department were for an urgent problem and are not intended as complete care. It is important that you follow-up with a doctor, nurse practitioner, or physician assistant to:  (1) confirm your diagnosis,  (2) re-evaluation of changes in your illness and treatment, and  (3) for ongoing care.   If your symptoms become worse or you do not improve as expected and you are unable to reach your usual health care provider, you should return to the Emergency Department. We are available 24 hours a day. Please take your discharge instructions with you when you go to your follow-up appointment. If a prescription has been provided, please have it filled as soon as possible to prevent a delay in treatment. Read the entire medication instruction sheet provided to you by the pharmacy. If you have any questions or reservations about taking the medication due to side effects or interactions with other medications, please call your primary care physician or contact the ER to speak with the charge nurse. Make an appointment with your family doctor or the physician you were referred to for follow-up of this visit as instructed on your discharge paperwork, as this is a mandatory follow-up. Return to the ER if you are unable to be seen or if you are unable to be seen in a timely manner. If you have any problem arranging the follow-up visit, contact the Emergency Department immediately.

## 2022-05-24 RX ORDER — FAMOTIDINE 20 MG/1
20 TABLET, FILM COATED ORAL DAILY
COMMUNITY

## 2022-05-24 RX ORDER — SUCRALFATE 1 G/1
1 TABLET ORAL 3 TIMES DAILY
COMMUNITY

## 2022-05-27 ENCOUNTER — ANESTHESIA EVENT (OUTPATIENT)
Dept: ENDOSCOPY | Age: 57
End: 2022-05-27
Payer: MEDICAID

## 2022-05-27 ENCOUNTER — APPOINTMENT (OUTPATIENT)
Dept: ENDOSCOPY | Age: 57
End: 2022-05-27
Attending: INTERNAL MEDICINE
Payer: MEDICAID

## 2022-05-27 ENCOUNTER — ANESTHESIA (OUTPATIENT)
Dept: ENDOSCOPY | Age: 57
End: 2022-05-27
Payer: MEDICAID

## 2022-05-27 ENCOUNTER — HOSPITAL ENCOUNTER (OUTPATIENT)
Age: 57
Setting detail: OUTPATIENT SURGERY
Discharge: HOME | End: 2022-05-27
Attending: INTERNAL MEDICINE | Admitting: INTERNAL MEDICINE
Payer: MEDICAID

## 2022-05-27 VITALS
RESPIRATION RATE: 16 BRPM | TEMPERATURE: 97 F | HEIGHT: 65 IN | BODY MASS INDEX: 41.65 KG/M2 | DIASTOLIC BLOOD PRESSURE: 84 MMHG | WEIGHT: 250 LBS | OXYGEN SATURATION: 99 % | SYSTOLIC BLOOD PRESSURE: 136 MMHG | HEART RATE: 84 BPM

## 2022-05-27 PROCEDURE — 88305 TISSUE EXAM BY PATHOLOGIST: CPT

## 2022-05-27 PROCEDURE — 2709999900 HC NON-CHARGEABLE SUPPLY: Performed by: INTERNAL MEDICINE

## 2022-05-27 PROCEDURE — 77030021593 HC FCPS BIOP ENDOSC BSC -A: Performed by: INTERNAL MEDICINE

## 2022-05-27 PROCEDURE — 74011000250 HC RX REV CODE- 250: Performed by: NURSE ANESTHETIST, CERTIFIED REGISTERED

## 2022-05-27 PROCEDURE — 76060000033 HC ANESTHESIA 1 TO 1.5 HR: Performed by: INTERNAL MEDICINE

## 2022-05-27 PROCEDURE — 74011250636 HC RX REV CODE- 250/636: Performed by: NURSE ANESTHETIST, CERTIFIED REGISTERED

## 2022-05-27 PROCEDURE — 76040000008: Performed by: INTERNAL MEDICINE

## 2022-05-27 PROCEDURE — 74011250636 HC RX REV CODE- 250/636: Performed by: INTERNAL MEDICINE

## 2022-05-27 PROCEDURE — 77030009426 HC FCPS BIOP ENDOSC BSC -B: Performed by: INTERNAL MEDICINE

## 2022-05-27 RX ORDER — PROPOFOL 10 MG/ML
INJECTION, EMULSION INTRAVENOUS
Status: COMPLETED
Start: 2022-05-27 | End: 2022-05-27

## 2022-05-27 RX ORDER — PROPOFOL 10 MG/ML
INJECTION, EMULSION INTRAVENOUS AS NEEDED
Status: DISCONTINUED | OUTPATIENT
Start: 2022-05-27 | End: 2022-05-27 | Stop reason: HOSPADM

## 2022-05-27 RX ORDER — GLYCOPYRROLATE 0.2 MG/ML
INJECTION INTRAMUSCULAR; INTRAVENOUS
Status: COMPLETED
Start: 2022-05-27 | End: 2022-05-27

## 2022-05-27 RX ORDER — LIDOCAINE HYDROCHLORIDE 20 MG/ML
INJECTION, SOLUTION EPIDURAL; INFILTRATION; INTRACAUDAL; PERINEURAL AS NEEDED
Status: DISCONTINUED | OUTPATIENT
Start: 2022-05-27 | End: 2022-05-27 | Stop reason: HOSPADM

## 2022-05-27 RX ORDER — SODIUM CHLORIDE, SODIUM LACTATE, POTASSIUM CHLORIDE, CALCIUM CHLORIDE 600; 310; 30; 20 MG/100ML; MG/100ML; MG/100ML; MG/100ML
50 INJECTION, SOLUTION INTRAVENOUS CONTINUOUS
Status: DISCONTINUED | OUTPATIENT
Start: 2022-05-27 | End: 2022-05-27 | Stop reason: HOSPADM

## 2022-05-27 RX ORDER — SODIUM CHLORIDE, SODIUM LACTATE, POTASSIUM CHLORIDE, CALCIUM CHLORIDE 600; 310; 30; 20 MG/100ML; MG/100ML; MG/100ML; MG/100ML
INJECTION, SOLUTION INTRAVENOUS
Status: DISCONTINUED | OUTPATIENT
Start: 2022-05-27 | End: 2022-05-27 | Stop reason: HOSPADM

## 2022-05-27 RX ORDER — LIDOCAINE HCL/PF 100 MG/5ML
SYRINGE (ML) INTRAVENOUS
Status: DISCONTINUED
Start: 2022-05-27 | End: 2022-05-27 | Stop reason: HOSPADM

## 2022-05-27 RX ORDER — GLYCOPYRROLATE 0.2 MG/ML
INJECTION INTRAMUSCULAR; INTRAVENOUS AS NEEDED
Status: DISCONTINUED | OUTPATIENT
Start: 2022-05-27 | End: 2022-05-27 | Stop reason: HOSPADM

## 2022-05-27 RX ADMIN — SODIUM CHLORIDE, POTASSIUM CHLORIDE, SODIUM LACTATE AND CALCIUM CHLORIDE 50 ML/HR: 600; 310; 30; 20 INJECTION, SOLUTION INTRAVENOUS at 07:14

## 2022-05-27 RX ADMIN — LIDOCAINE HYDROCHLORIDE 60 MG: 20 INJECTION, SOLUTION EPIDURAL; INFILTRATION; INTRACAUDAL; PERINEURAL at 08:46

## 2022-05-27 RX ADMIN — PROPOFOL 100 MG: 10 INJECTION, EMULSION INTRAVENOUS at 08:53

## 2022-05-27 RX ADMIN — PROPOFOL 100 MG: 10 INJECTION, EMULSION INTRAVENOUS at 08:59

## 2022-05-27 RX ADMIN — SODIUM CHLORIDE, POTASSIUM CHLORIDE, SODIUM LACTATE AND CALCIUM CHLORIDE: 600; 310; 30; 20 INJECTION, SOLUTION INTRAVENOUS at 08:42

## 2022-05-27 RX ADMIN — GLYCOPYRROLATE 0.2 MG: 0.2 INJECTION INTRAMUSCULAR; INTRAVENOUS at 08:47

## 2022-05-27 RX ADMIN — PROPOFOL 100 MG: 10 INJECTION, EMULSION INTRAVENOUS at 08:49

## 2022-05-27 RX ADMIN — PROPOFOL 100 MG: 10 INJECTION, EMULSION INTRAVENOUS at 09:04

## 2022-05-27 RX ADMIN — PROPOFOL 100 MG: 10 INJECTION, EMULSION INTRAVENOUS at 08:46

## 2022-05-27 RX ADMIN — PROPOFOL 100 MG: 10 INJECTION, EMULSION INTRAVENOUS at 09:07

## 2022-05-27 NOTE — ANESTHESIA POSTPROCEDURE EVALUATION
Procedure(s):  COLONOSCOPY, EGD  ESOPHAGOGASTRODUODENOSCOPY (EGD)  ESOPHAGOGASTRODUODENAL (EGD) BIOPSY.     MAC    Anesthesia Post Evaluation      Multimodal analgesia: multimodal analgesia used between 6 hours prior to anesthesia start to PACU discharge  Patient location during evaluation: PACU  Patient participation: complete - patient participated  Level of consciousness: awake and alert  Pain score: 0  Pain management: adequate  Airway patency: patent  Anesthetic complications: no  Cardiovascular status: acceptable  Respiratory status: acceptable  Hydration status: acceptable  Post anesthesia nausea and vomiting:  none  Final Post Anesthesia Temperature Assessment:  Normothermia (36.0-37.5 degrees C)      INITIAL Post-op Vital signs:   Vitals Value Taken Time   /91 05/27/22 0908   Temp 36 °C (96.8 °F) 05/27/22 0908   Pulse 81 05/27/22 0908   Resp 21 05/27/22 0908   SpO2 99 % 05/27/22 0908

## 2022-05-27 NOTE — ROUTINE PROCESS
Pt  States \"need to leave  Ride is  Here  Advised  Would  Like  Her to wait  For md   States \"gotta  Go \" , alert and oriented  ,  Discharge  Instructions  Given  Verbal and  Written  Call for  Follow up  Appointment for  2 weeks

## 2022-05-27 NOTE — ANESTHESIA PREPROCEDURE EVALUATION
Relevant Problems   No relevant active problems       Anesthetic History   No history of anesthetic complications            Review of Systems / Medical History  Patient summary reviewed, nursing notes reviewed and pertinent labs reviewed    Pulmonary    COPD    Sleep apnea: CPAP  Smoker  Asthma        Neuro/Psych         Psychiatric history     Cardiovascular    Hypertension          CAD, PAD and hyperlipidemia    Exercise tolerance: >4 METS     GI/Hepatic/Renal     GERD           Endo/Other        Morbid obesity and arthritis     Other Findings              Physical Exam    Airway  Mallampati: II  TM Distance: 4 - 6 cm  Neck ROM: normal range of motion        Cardiovascular    Rhythm: regular  Rate: normal         Dental    Dentition: Edentulous     Pulmonary  Breath sounds clear to auscultation               Abdominal         Other Findings            Anesthetic Plan    ASA: 4  Anesthesia type: MAC          Induction: Intravenous  Anesthetic plan and risks discussed with: Patient

## 2023-06-22 ENCOUNTER — HOSPITAL ENCOUNTER (OUTPATIENT)
Facility: HOSPITAL | Age: 58
Discharge: HOME | End: 2023-06-22
Payer: MEDICAID

## 2023-06-22 DIAGNOSIS — M50.30 DDD (DEGENERATIVE DISC DISEASE), CERVICAL: ICD-10-CM

## 2023-06-22 DIAGNOSIS — M54.12 CERVICAL RADICULOPATHY: ICD-10-CM

## 2023-06-22 PROCEDURE — 72141 MRI NECK SPINE W/O DYE: CPT

## 2023-10-30 ENCOUNTER — HOSPITAL ENCOUNTER (EMERGENCY)
Facility: HOSPITAL | Age: 58
Discharge: ANOTHER HOSPITAL | End: 2023-10-30
Attending: EMERGENCY MEDICINE
Payer: MEDICAID

## 2023-10-30 ENCOUNTER — ANESTHESIA (OUTPATIENT)
Facility: HOSPITAL | Age: 58
End: 2023-10-30
Payer: MEDICAID

## 2023-10-30 ENCOUNTER — ANESTHESIA EVENT (OUTPATIENT)
Facility: HOSPITAL | Age: 58
End: 2023-10-30
Payer: MEDICAID

## 2023-10-30 ENCOUNTER — HOSPITAL ENCOUNTER (INPATIENT)
Facility: HOSPITAL | Age: 58
LOS: 27 days | Discharge: NURSING, REHAB OR LONG-TERM CARE | DRG: 021 | End: 2023-11-26
Attending: STUDENT IN AN ORGANIZED HEALTH CARE EDUCATION/TRAINING PROGRAM | Admitting: PSYCHIATRY & NEUROLOGY
Payer: MEDICAID

## 2023-10-30 ENCOUNTER — APPOINTMENT (OUTPATIENT)
Facility: HOSPITAL | Age: 58
DRG: 021 | End: 2023-10-30
Payer: MEDICAID

## 2023-10-30 ENCOUNTER — APPOINTMENT (OUTPATIENT)
Facility: HOSPITAL | Age: 58
End: 2023-10-30
Payer: MEDICAID

## 2023-10-30 ENCOUNTER — HOSPITAL ENCOUNTER (INPATIENT)
Facility: HOSPITAL | Age: 58
Discharge: HOME | DRG: 021 | End: 2023-11-02
Payer: MEDICAID

## 2023-10-30 VITALS
BODY MASS INDEX: 51.91 KG/M2 | HEART RATE: 124 BPM | WEIGHT: 293 LBS | HEIGHT: 63 IN | TEMPERATURE: 97.8 F | SYSTOLIC BLOOD PRESSURE: 184 MMHG | DIASTOLIC BLOOD PRESSURE: 95 MMHG | OXYGEN SATURATION: 96 % | RESPIRATION RATE: 20 BRPM

## 2023-10-30 DIAGNOSIS — I60.9 SUBARACHNOID HEMORRHAGE (HCC): ICD-10-CM

## 2023-10-30 DIAGNOSIS — I73.9 VASOSPASM (HCC): ICD-10-CM

## 2023-10-30 DIAGNOSIS — J96.00 ACUTE RESPIRATORY FAILURE, UNSPECIFIED WHETHER WITH HYPOXIA OR HYPERCAPNIA (HCC): ICD-10-CM

## 2023-10-30 DIAGNOSIS — F44.4: ICD-10-CM

## 2023-10-30 DIAGNOSIS — I60.9 SAH (SUBARACHNOID HEMORRHAGE) (HCC): Primary | ICD-10-CM

## 2023-10-30 DIAGNOSIS — R60.9 SWELLING: ICD-10-CM

## 2023-10-30 DIAGNOSIS — I60.9 SUBARACHNOID HEMORRHAGE (HCC): Primary | ICD-10-CM

## 2023-10-30 DIAGNOSIS — I16.1 HYPERTENSIVE EMERGENCY: ICD-10-CM

## 2023-10-30 LAB
ALBUMIN SERPL-MCNC: 3.3 G/DL (ref 3.5–5)
ALBUMIN/GLOB SERPL: 0.9 (ref 1.1–2.2)
ALP SERPL-CCNC: 68 U/L (ref 45–117)
ALT SERPL-CCNC: 25 U/L (ref 12–78)
AMPHET UR QL SCN: NEGATIVE
ANION GAP SERPL CALC-SCNC: 7 MMOL/L (ref 5–15)
APPEARANCE UR: CLEAR
APTT PPP: 31.2 SEC (ref 21.2–34.1)
ARTERIAL PATENCY WRIST A: POSITIVE
ARTERIAL PATENCY WRIST A: YES
AST SERPL W P-5'-P-CCNC: 21 U/L (ref 15–37)
BACTERIA URNS QL MICRO: NEGATIVE /HPF
BARBITURATES UR QL SCN: NEGATIVE
BASE DEFICIT BLD-SCNC: 1.6 MMOL/L
BASE DEFICIT BLDA-SCNC: 1.8 MMOL/L
BASOPHILS # BLD: 0 K/UL (ref 0–0.1)
BASOPHILS NFR BLD: 0 % (ref 0–1)
BDY SITE: ABNORMAL
BDY SITE: ABNORMAL
BENZODIAZ UR QL: POSITIVE
BILIRUB SERPL-MCNC: 0.2 MG/DL (ref 0.2–1)
BILIRUB UR QL: NEGATIVE
BODY TEMPERATURE: 98.7
BUN SERPL-MCNC: 10 MG/DL (ref 6–20)
BUN/CREAT SERPL: 16 (ref 12–20)
CA-I BLD-MCNC: 9.2 MG/DL (ref 8.5–10.1)
CANNABINOIDS UR QL SCN: NEGATIVE
CHLORIDE SERPL-SCNC: 106 MMOL/L (ref 97–108)
CO2 SERPL-SCNC: 26 MMOL/L (ref 21–32)
COCAINE UR QL SCN: NEGATIVE
COHGB MFR BLD: 2.6 % (ref 1–2)
COLOR UR: ABNORMAL
CREAT SERPL-MCNC: 0.63 MG/DL (ref 0.55–1.02)
DIFFERENTIAL METHOD BLD: ABNORMAL
EKG ATRIAL RATE: 101 BPM
EKG DIAGNOSIS: NORMAL
EKG P AXIS: 47 DEGREES
EKG P-R INTERVAL: 176 MS
EKG Q-T INTERVAL: 380 MS
EKG QRS DURATION: 82 MS
EKG QTC CALCULATION (BAZETT): 550 MS
EKG R AXIS: -9 DEGREES
EKG T AXIS: 45 DEGREES
EKG VENTRICULAR RATE: 126 BPM
EOSINOPHIL # BLD: 0.1 K/UL (ref 0–0.4)
EOSINOPHIL NFR BLD: 1 % (ref 0–7)
EPITH CASTS URNS QL MICRO: ABNORMAL /LPF
ERYTHROCYTE [DISTWIDTH] IN BLOOD BY AUTOMATED COUNT: 15.7 % (ref 11.5–14.5)
ERYTHROCYTE [SEDIMENTATION RATE] IN BLOOD: 39 MM/HR (ref 0–30)
FIO2 ON VENT: 70 %
GAS FLOW.O2 O2 DELIVERY SYS: ABNORMAL
GAS FLOW.O2 SETTING OXYMISER: 14 BPM
GAS FLOW.O2 SETTING OXYMISER: 20
GLOBULIN SER CALC-MCNC: 3.8 G/DL (ref 2–4)
GLUCOSE SERPL-MCNC: 153 MG/DL (ref 65–100)
GLUCOSE UR STRIP.AUTO-MCNC: >500 MG/DL
HCO3 BLD-SCNC: 23.6 MMOL/L (ref 22–26)
HCO3 BLDA-SCNC: 24 MMOL/L (ref 22–26)
HCT VFR BLD AUTO: 37.2 % (ref 35–47)
HGB BLD-MCNC: 11.8 G/DL (ref 11.5–16)
HGB UR QL STRIP: ABNORMAL
IMM GRANULOCYTES # BLD AUTO: 0.1 K/UL (ref 0–0.04)
IMM GRANULOCYTES NFR BLD AUTO: 1 % (ref 0–0.5)
INR PPP: 1 (ref 0.9–1.1)
KETONES UR QL STRIP.AUTO: NEGATIVE MG/DL
LEUKOCYTE ESTERASE UR QL STRIP.AUTO: NEGATIVE
LYMPHOCYTES # BLD: 1.9 K/UL (ref 0.8–3.5)
LYMPHOCYTES NFR BLD: 20 % (ref 12–49)
Lab: ABNORMAL
MAGNESIUM SERPL-MCNC: 2 MG/DL (ref 1.6–2.4)
MCH RBC QN AUTO: 26.8 PG (ref 26–34)
MCHC RBC AUTO-ENTMCNC: 31.7 G/DL (ref 30–36.5)
MCV RBC AUTO: 84.4 FL (ref 80–99)
METHADONE UR QL: NEGATIVE
METHGB MFR BLD: 0.3 % (ref 0–1.4)
MONOCYTES # BLD: 0.7 K/UL (ref 0–1)
MONOCYTES NFR BLD: 8 % (ref 5–13)
NEUTS SEG # BLD: 6.7 K/UL (ref 1.8–8)
NEUTS SEG NFR BLD: 70 % (ref 32–75)
NITRITE UR QL STRIP.AUTO: NEGATIVE
NRBC # BLD: 0 K/UL (ref 0–0.01)
NRBC BLD-RTO: 0 PER 100 WBC
O2/TOTAL GAS SETTING VFR VENT: 100 %
OPIATES UR QL: POSITIVE
OXYHGB MFR BLD: 92.2 % (ref 95–99)
PCO2 BLD: 40.5 MMHG (ref 35–45)
PCO2 BLDA: 44 MMHG (ref 35–45)
PCP UR QL: NEGATIVE
PEEP RESPIRATORY: 7
PERFORMED BY:: ABNORMAL
PH BLD: 7.37 (ref 7.35–7.45)
PH BLDA: 7.35 (ref 7.35–7.45)
PH UR STRIP: 7 (ref 5–8)
PLATELET # BLD AUTO: 400 K/UL (ref 150–400)
PMV BLD AUTO: 11 FL (ref 8.9–12.9)
PO2 BLD: 111 MMHG (ref 80–100)
PO2 BLDA: 77 MMHG (ref 80–100)
POTASSIUM SERPL-SCNC: 3.6 MMOL/L (ref 3.5–5.1)
PROT SERPL-MCNC: 7.1 G/DL (ref 6.4–8.2)
PROT UR STRIP-MCNC: 30 MG/DL
PROTHROMBIN TIME: 13.2 SEC (ref 11.9–14.6)
RBC # BLD AUTO: 4.41 M/UL (ref 3.8–5.2)
RBC #/AREA URNS HPF: ABNORMAL /HPF (ref 0–5)
SAO2 % BLD: 95 % (ref 95–99)
SAO2 % BLD: 98.2 % (ref 92–97)
SAO2% DEVICE SAO2% SENSOR NAME: ABNORMAL
SODIUM SERPL-SCNC: 139 MMOL/L (ref 136–145)
SP GR UR REFRACTOMETRY: 1.01 (ref 1–1.03)
SPECIMEN SITE: ABNORMAL
SPECIMEN TYPE: ABNORMAL
THERAPEUTIC RANGE: NORMAL SEC (ref 82–109)
TROPONIN I SERPL HS-MCNC: 12 NG/L (ref 0–51)
TSH SERPL DL<=0.05 MIU/L-ACNC: 0.94 UIU/ML (ref 0.36–3.74)
URINE CULTURE IF INDICATED: ABNORMAL
UROBILINOGEN UR QL STRIP.AUTO: 0.1 EU/DL (ref 0.1–1)
VENTILATION MODE VENT: ABNORMAL
VT SETTING VENT: 450 ML
WBC # BLD AUTO: 9.6 K/UL (ref 3.6–11)
WBC URNS QL MICRO: ABNORMAL /HPF (ref 0–4)

## 2023-10-30 PROCEDURE — 96365 THER/PROPH/DIAG IV INF INIT: CPT

## 2023-10-30 PROCEDURE — 71045 X-RAY EXAM CHEST 1 VIEW: CPT

## 2023-10-30 PROCEDURE — 6370000000 HC RX 637 (ALT 250 FOR IP): Performed by: NURSE PRACTITIONER

## 2023-10-30 PROCEDURE — 2580000003 HC RX 258: Performed by: PSYCHIATRY & NEUROLOGY

## 2023-10-30 PROCEDURE — 94762 N-INVAS EAR/PLS OXIMTRY CONT: CPT

## 2023-10-30 PROCEDURE — 5A1945Z RESPIRATORY VENTILATION, 24-96 CONSECUTIVE HOURS: ICD-10-PCS | Performed by: FAMILY MEDICINE

## 2023-10-30 PROCEDURE — 93005 ELECTROCARDIOGRAM TRACING: CPT | Performed by: EMERGENCY MEDICINE

## 2023-10-30 PROCEDURE — 99285 EMERGENCY DEPT VISIT HI MDM: CPT

## 2023-10-30 PROCEDURE — 83735 ASSAY OF MAGNESIUM: CPT

## 2023-10-30 PROCEDURE — 84484 ASSAY OF TROPONIN QUANT: CPT

## 2023-10-30 PROCEDURE — 2500000003 HC RX 250 WO HCPCS: Performed by: STUDENT IN AN ORGANIZED HEALTH CARE EDUCATION/TRAINING PROGRAM

## 2023-10-30 PROCEDURE — 2580000003 HC RX 258: Performed by: NURSE ANESTHETIST, CERTIFIED REGISTERED

## 2023-10-30 PROCEDURE — 03LG3DZ OCCLUSION OF INTRACRANIAL ARTERY WITH INTRALUMINAL DEVICE, PERCUTANEOUS APPROACH: ICD-10-PCS | Performed by: PSYCHIATRY & NEUROLOGY

## 2023-10-30 PROCEDURE — 86900 BLOOD TYPING SEROLOGIC ABO: CPT

## 2023-10-30 PROCEDURE — 80053 COMPREHEN METABOLIC PANEL: CPT

## 2023-10-30 PROCEDURE — 31500 INSERT EMERGENCY AIRWAY: CPT | Performed by: ANESTHESIOLOGY

## 2023-10-30 PROCEDURE — 82803 BLOOD GASES ANY COMBINATION: CPT

## 2023-10-30 PROCEDURE — 6360000002 HC RX W HCPCS

## 2023-10-30 PROCEDURE — 96366 THER/PROPH/DIAG IV INF ADDON: CPT

## 2023-10-30 PROCEDURE — 36600 WITHDRAWAL OF ARTERIAL BLOOD: CPT

## 2023-10-30 PROCEDURE — 6360000002 HC RX W HCPCS: Performed by: PSYCHIATRY & NEUROLOGY

## 2023-10-30 PROCEDURE — 96375 TX/PRO/DX INJ NEW DRUG ADDON: CPT

## 2023-10-30 PROCEDURE — 2580000003 HC RX 258: Performed by: NURSE PRACTITIONER

## 2023-10-30 PROCEDURE — 96374 THER/PROPH/DIAG INJ IV PUSH: CPT

## 2023-10-30 PROCEDURE — 2709999900 IR ARCH UNI CAR CERV CEREBRAL

## 2023-10-30 PROCEDURE — 2580000003 HC RX 258: Performed by: STUDENT IN AN ORGANIZED HEALTH CARE EDUCATION/TRAINING PROGRAM

## 2023-10-30 PROCEDURE — 2500000003 HC RX 250 WO HCPCS: Performed by: ANESTHESIOLOGY

## 2023-10-30 PROCEDURE — 86901 BLOOD TYPING SEROLOGIC RH(D): CPT

## 2023-10-30 PROCEDURE — 70450 CT HEAD/BRAIN W/O DYE: CPT

## 2023-10-30 PROCEDURE — B3141ZZ FLUOROSCOPY OF LEFT COMMON CAROTID ARTERY USING LOW OSMOLAR CONTRAST: ICD-10-PCS | Performed by: PSYCHIATRY & NEUROLOGY

## 2023-10-30 PROCEDURE — B3171ZZ FLUOROSCOPY OF LEFT INTERNAL CAROTID ARTERY USING LOW OSMOLAR CONTRAST: ICD-10-PCS | Performed by: PSYCHIATRY & NEUROLOGY

## 2023-10-30 PROCEDURE — 84443 ASSAY THYROID STIM HORMONE: CPT

## 2023-10-30 PROCEDURE — 86850 RBC ANTIBODY SCREEN: CPT

## 2023-10-30 PROCEDURE — 6360000002 HC RX W HCPCS: Performed by: EMERGENCY MEDICINE

## 2023-10-30 PROCEDURE — 94002 VENT MGMT INPAT INIT DAY: CPT

## 2023-10-30 PROCEDURE — 2580000003 HC RX 258: Performed by: EMERGENCY MEDICINE

## 2023-10-30 PROCEDURE — 31500 INSERT EMERGENCY AIRWAY: CPT

## 2023-10-30 PROCEDURE — 99291 CRITICAL CARE FIRST HOUR: CPT | Performed by: PSYCHIATRY & NEUROLOGY

## 2023-10-30 PROCEDURE — 6360000002 HC RX W HCPCS: Performed by: NURSE PRACTITIONER

## 2023-10-30 PROCEDURE — 74018 RADEX ABDOMEN 1 VIEW: CPT

## 2023-10-30 PROCEDURE — 2000000000 HC ICU R&B

## 2023-10-30 PROCEDURE — 36215 PLACE CATHETER IN ARTERY: CPT

## 2023-10-30 PROCEDURE — APPNB15 APP NON BILLABLE TIME 0-15 MINS: Performed by: NURSE PRACTITIONER

## 2023-10-30 PROCEDURE — 2500000003 HC RX 250 WO HCPCS: Performed by: EMERGENCY MEDICINE

## 2023-10-30 PROCEDURE — 85730 THROMBOPLASTIN TIME PARTIAL: CPT

## 2023-10-30 PROCEDURE — 6360000002 HC RX W HCPCS: Performed by: STUDENT IN AN ORGANIZED HEALTH CARE EDUCATION/TRAINING PROGRAM

## 2023-10-30 PROCEDURE — 81001 URINALYSIS AUTO W/SCOPE: CPT

## 2023-10-30 PROCEDURE — 2500000003 HC RX 250 WO HCPCS: Performed by: PSYCHIATRY & NEUROLOGY

## 2023-10-30 PROCEDURE — 2500000003 HC RX 250 WO HCPCS: Performed by: NURSE ANESTHETIST, CERTIFIED REGISTERED

## 2023-10-30 PROCEDURE — 36415 COLL VENOUS BLD VENIPUNCTURE: CPT

## 2023-10-30 PROCEDURE — 85610 PROTHROMBIN TIME: CPT

## 2023-10-30 PROCEDURE — 80307 DRUG TEST PRSMV CHEM ANLYZR: CPT

## 2023-10-30 PROCEDURE — 009630Z DRAINAGE OF CEREBRAL VENTRICLE WITH DRAINAGE DEVICE, PERCUTANEOUS APPROACH: ICD-10-PCS | Performed by: NEUROLOGICAL SURGERY

## 2023-10-30 PROCEDURE — 6360000002 HC RX W HCPCS: Performed by: NURSE ANESTHETIST, CERTIFIED REGISTERED

## 2023-10-30 PROCEDURE — 6360000004 HC RX CONTRAST MEDICATION: Performed by: PSYCHIATRY & NEUROLOGY

## 2023-10-30 PROCEDURE — 85652 RBC SED RATE AUTOMATED: CPT

## 2023-10-30 PROCEDURE — 6360000002 HC RX W HCPCS: Performed by: ANESTHESIOLOGY

## 2023-10-30 PROCEDURE — 99291 CRITICAL CARE FIRST HOUR: CPT

## 2023-10-30 PROCEDURE — 85025 COMPLETE CBC W/AUTO DIFF WBC: CPT

## 2023-10-30 RX ORDER — ONDANSETRON 4 MG/1
4 TABLET, ORALLY DISINTEGRATING ORAL EVERY 8 HOURS PRN
Status: DISCONTINUED | OUTPATIENT
Start: 2023-10-30 | End: 2023-11-26 | Stop reason: HOSPADM

## 2023-10-30 RX ORDER — ACETAMINOPHEN 325 MG/1
650 TABLET ORAL EVERY 6 HOURS PRN
Status: DISCONTINUED | OUTPATIENT
Start: 2023-10-30 | End: 2023-10-30 | Stop reason: SDUPTHER

## 2023-10-30 RX ORDER — FENTANYL CITRATE 50 UG/ML
INJECTION, SOLUTION INTRAMUSCULAR; INTRAVENOUS PRN
Status: DISCONTINUED | OUTPATIENT
Start: 2023-10-30 | End: 2023-10-30 | Stop reason: SDUPTHER

## 2023-10-30 RX ORDER — LABETALOL HYDROCHLORIDE 5 MG/ML
20 INJECTION, SOLUTION INTRAVENOUS EVERY 4 HOURS PRN
Status: DISCONTINUED | OUTPATIENT
Start: 2023-10-30 | End: 2023-10-31

## 2023-10-30 RX ORDER — SODIUM CHLORIDE 0.9 % (FLUSH) 0.9 %
5-40 SYRINGE (ML) INJECTION PRN
Status: DISCONTINUED | OUTPATIENT
Start: 2023-10-30 | End: 2023-11-26 | Stop reason: HOSPADM

## 2023-10-30 RX ORDER — FENTANYL CITRATE 50 UG/ML
INJECTION, SOLUTION INTRAMUSCULAR; INTRAVENOUS PRN
Status: DISCONTINUED | OUTPATIENT
Start: 2023-10-30 | End: 2023-10-30

## 2023-10-30 RX ORDER — SODIUM CHLORIDE 0.9 % (FLUSH) 0.9 %
5-40 SYRINGE (ML) INJECTION EVERY 12 HOURS SCHEDULED
Status: DISCONTINUED | OUTPATIENT
Start: 2023-10-30 | End: 2023-11-26 | Stop reason: HOSPADM

## 2023-10-30 RX ORDER — PROPOFOL 10 MG/ML
INJECTION, EMULSION INTRAVENOUS
Status: COMPLETED
Start: 2023-10-30 | End: 2023-10-30

## 2023-10-30 RX ORDER — LEVETIRACETAM 500 MG/5ML
1000 INJECTION, SOLUTION, CONCENTRATE INTRAVENOUS
Status: COMPLETED | OUTPATIENT
Start: 2023-10-30 | End: 2023-10-30

## 2023-10-30 RX ORDER — HEPARIN SODIUM 1000 [USP'U]/ML
INJECTION, SOLUTION INTRAVENOUS; SUBCUTANEOUS PRN
Status: COMPLETED | OUTPATIENT
Start: 2023-10-30 | End: 2023-10-30

## 2023-10-30 RX ORDER — SODIUM BICARBONATE 42 MG/ML
INJECTION, SOLUTION INTRAVENOUS PRN
Status: COMPLETED | OUTPATIENT
Start: 2023-10-30 | End: 2023-10-30

## 2023-10-30 RX ORDER — ETOMIDATE 2 MG/ML
10 INJECTION INTRAVENOUS
Status: COMPLETED | OUTPATIENT
Start: 2023-10-30 | End: 2023-10-30

## 2023-10-30 RX ORDER — MANNITOL 20 G/100ML
1 INJECTION, SOLUTION INTRAVENOUS ONCE
Status: DISCONTINUED | OUTPATIENT
Start: 2023-10-30 | End: 2023-10-30 | Stop reason: HOSPADM

## 2023-10-30 RX ORDER — ONDANSETRON 2 MG/ML
4 INJECTION INTRAMUSCULAR; INTRAVENOUS EVERY 6 HOURS PRN
Status: DISCONTINUED | OUTPATIENT
Start: 2023-10-30 | End: 2023-11-26 | Stop reason: HOSPADM

## 2023-10-30 RX ORDER — MAGNESIUM SULFATE 1 G/100ML
1000 INJECTION INTRAVENOUS PRN
Status: DISCONTINUED | OUTPATIENT
Start: 2023-10-30 | End: 2023-11-26 | Stop reason: HOSPADM

## 2023-10-30 RX ORDER — VECURONIUM BROMIDE 1 MG/ML
10 INJECTION, POWDER, LYOPHILIZED, FOR SOLUTION INTRAVENOUS
Status: COMPLETED | OUTPATIENT
Start: 2023-10-30 | End: 2023-10-30

## 2023-10-30 RX ORDER — SODIUM CHLORIDE 9 MG/ML
INJECTION, SOLUTION INTRAVENOUS CONTINUOUS PRN
Status: DISCONTINUED | OUTPATIENT
Start: 2023-10-30 | End: 2023-10-30 | Stop reason: SDUPTHER

## 2023-10-30 RX ORDER — LEVETIRACETAM 500 MG/5ML
500 INJECTION, SOLUTION, CONCENTRATE INTRAVENOUS EVERY 12 HOURS
Status: DISCONTINUED | OUTPATIENT
Start: 2023-10-30 | End: 2023-11-01 | Stop reason: ALTCHOICE

## 2023-10-30 RX ORDER — SODIUM CHLORIDE 0.9 % (FLUSH) 0.9 %
5-40 SYRINGE (ML) INJECTION PRN
Status: DISCONTINUED | OUTPATIENT
Start: 2023-10-30 | End: 2023-10-30 | Stop reason: SDUPTHER

## 2023-10-30 RX ORDER — POLYETHYLENE GLYCOL 3350 17 G/17G
17 POWDER, FOR SOLUTION ORAL DAILY PRN
Status: DISCONTINUED | OUTPATIENT
Start: 2023-10-30 | End: 2023-11-26 | Stop reason: HOSPADM

## 2023-10-30 RX ORDER — PROPOFOL 10 MG/ML
5-50 INJECTION, EMULSION INTRAVENOUS CONTINUOUS
Status: DISCONTINUED | OUTPATIENT
Start: 2023-10-30 | End: 2023-10-30 | Stop reason: HOSPADM

## 2023-10-30 RX ORDER — SODIUM CHLORIDE 9 MG/ML
INJECTION, SOLUTION INTRAVENOUS CONTINUOUS
Status: DISCONTINUED | OUTPATIENT
Start: 2023-10-30 | End: 2023-11-09

## 2023-10-30 RX ORDER — MIDAZOLAM IN NACL,ISO-OSMOT/PF 50 MG/50ML
0-10 INFUSION BOTTLE (ML) INTRAVENOUS
Status: DISCONTINUED | OUTPATIENT
Start: 2023-10-30 | End: 2023-10-30 | Stop reason: HOSPADM

## 2023-10-30 RX ORDER — CEFAZOLIN SODIUM 1 G/3ML
INJECTION, POWDER, FOR SOLUTION INTRAMUSCULAR; INTRAVENOUS PRN
Status: DISCONTINUED | OUTPATIENT
Start: 2023-10-30 | End: 2023-10-30 | Stop reason: SDUPTHER

## 2023-10-30 RX ORDER — FENTANYL CITRATE 50 UG/ML
100 INJECTION, SOLUTION INTRAMUSCULAR; INTRAVENOUS
Status: COMPLETED | OUTPATIENT
Start: 2023-10-30 | End: 2023-10-30

## 2023-10-30 RX ORDER — MANNITOL 250 MG/ML
1 INJECTION, SOLUTION INTRAVENOUS ONCE
Status: DISCONTINUED | OUTPATIENT
Start: 2023-10-30 | End: 2023-10-30 | Stop reason: HOSPADM

## 2023-10-30 RX ORDER — MANNITOL 20 G/100ML
150 INJECTION, SOLUTION INTRAVENOUS ONCE
Status: COMPLETED | OUTPATIENT
Start: 2023-10-30 | End: 2023-10-30

## 2023-10-30 RX ORDER — FENTANYL CITRATE 50 UG/ML
INJECTION, SOLUTION INTRAMUSCULAR; INTRAVENOUS
Status: COMPLETED
Start: 2023-10-30 | End: 2023-10-30

## 2023-10-30 RX ORDER — MIDAZOLAM HYDROCHLORIDE 2 MG/2ML
4 INJECTION, SOLUTION INTRAMUSCULAR; INTRAVENOUS ONCE
Status: COMPLETED | OUTPATIENT
Start: 2023-10-30 | End: 2023-10-30

## 2023-10-30 RX ORDER — ONDANSETRON 2 MG/ML
4 INJECTION INTRAMUSCULAR; INTRAVENOUS EVERY 6 HOURS PRN
Status: DISCONTINUED | OUTPATIENT
Start: 2023-10-30 | End: 2023-10-30 | Stop reason: SDUPTHER

## 2023-10-30 RX ORDER — ATORVASTATIN CALCIUM 20 MG/1
20 TABLET, FILM COATED ORAL NIGHTLY
Status: DISCONTINUED | OUTPATIENT
Start: 2023-10-30 | End: 2023-11-23

## 2023-10-30 RX ORDER — ONDANSETRON 4 MG/1
4 TABLET, ORALLY DISINTEGRATING ORAL EVERY 8 HOURS PRN
Status: DISCONTINUED | OUTPATIENT
Start: 2023-10-30 | End: 2023-10-30 | Stop reason: SDUPTHER

## 2023-10-30 RX ORDER — SODIUM CHLORIDE 9 MG/ML
INJECTION, SOLUTION INTRAVENOUS PRN
Status: DISCONTINUED | OUTPATIENT
Start: 2023-10-30 | End: 2023-11-26 | Stop reason: HOSPADM

## 2023-10-30 RX ORDER — FENTANYL CITRATE-0.9 % NACL/PF 10 MCG/ML
25-200 PLASTIC BAG, INJECTION (ML) INTRAVENOUS CONTINUOUS
Status: DISCONTINUED | OUTPATIENT
Start: 2023-10-30 | End: 2023-10-30

## 2023-10-30 RX ORDER — ONDANSETRON 2 MG/ML
INJECTION INTRAMUSCULAR; INTRAVENOUS
Status: COMPLETED
Start: 2023-10-30 | End: 2023-10-30

## 2023-10-30 RX ORDER — ACETAMINOPHEN 650 MG/1
650 SUPPOSITORY RECTAL EVERY 6 HOURS PRN
Status: DISCONTINUED | OUTPATIENT
Start: 2023-10-30 | End: 2023-11-26 | Stop reason: HOSPADM

## 2023-10-30 RX ORDER — NIMODIPINE 30 MG/1
60 CAPSULE, LIQUID FILLED ORAL
Status: DISCONTINUED | OUTPATIENT
Start: 2023-10-30 | End: 2023-10-30

## 2023-10-30 RX ORDER — MIDAZOLAM HYDROCHLORIDE 2 MG/2ML
5 INJECTION, SOLUTION INTRAMUSCULAR; INTRAVENOUS ONCE
Status: COMPLETED | OUTPATIENT
Start: 2023-10-30 | End: 2023-10-30

## 2023-10-30 RX ORDER — MIDAZOLAM HYDROCHLORIDE 2 MG/2ML
2 INJECTION, SOLUTION INTRAMUSCULAR; INTRAVENOUS ONCE
Status: COMPLETED | OUTPATIENT
Start: 2023-10-30 | End: 2023-10-30

## 2023-10-30 RX ORDER — MIDAZOLAM HYDROCHLORIDE 1 MG/ML
INJECTION INTRAMUSCULAR; INTRAVENOUS
Status: COMPLETED
Start: 2023-10-30 | End: 2023-10-30

## 2023-10-30 RX ORDER — FENTANYL CITRATE 50 UG/ML
50 INJECTION, SOLUTION INTRAMUSCULAR; INTRAVENOUS
Status: DISCONTINUED | OUTPATIENT
Start: 2023-10-30 | End: 2023-10-31

## 2023-10-30 RX ORDER — LIDOCAINE HYDROCHLORIDE 10 MG/ML
INJECTION, SOLUTION EPIDURAL; INFILTRATION; INTRACAUDAL; PERINEURAL PRN
Status: COMPLETED | OUTPATIENT
Start: 2023-10-30 | End: 2023-10-30

## 2023-10-30 RX ORDER — ROCURONIUM BROMIDE 10 MG/ML
INJECTION, SOLUTION INTRAVENOUS PRN
Status: DISCONTINUED | OUTPATIENT
Start: 2023-10-30 | End: 2023-10-30 | Stop reason: SDUPTHER

## 2023-10-30 RX ORDER — PROPOFOL 10 MG/ML
0-50 INJECTION, EMULSION INTRAVENOUS CONTINUOUS
Status: DISCONTINUED | OUTPATIENT
Start: 2023-10-30 | End: 2023-10-31

## 2023-10-30 RX ORDER — MORPHINE SULFATE 2 MG/ML
2 INJECTION, SOLUTION INTRAMUSCULAR; INTRAVENOUS
Status: COMPLETED | OUTPATIENT
Start: 2023-10-30 | End: 2023-10-30

## 2023-10-30 RX ORDER — VERAPAMIL HYDROCHLORIDE 2.5 MG/ML
INJECTION, SOLUTION INTRAVENOUS PRN
Status: COMPLETED | OUTPATIENT
Start: 2023-10-30 | End: 2023-10-30

## 2023-10-30 RX ORDER — ROCURONIUM BROMIDE 10 MG/ML
50 INJECTION, SOLUTION INTRAVENOUS
Status: COMPLETED | OUTPATIENT
Start: 2023-10-30 | End: 2023-10-30

## 2023-10-30 RX ORDER — ACETAMINOPHEN 325 MG/1
650 TABLET ORAL EVERY 4 HOURS PRN
Status: DISCONTINUED | OUTPATIENT
Start: 2023-10-30 | End: 2023-11-26 | Stop reason: HOSPADM

## 2023-10-30 RX ORDER — SODIUM CHLORIDE 9 MG/ML
INJECTION, SOLUTION INTRAVENOUS PRN
Status: DISCONTINUED | OUTPATIENT
Start: 2023-10-30 | End: 2023-10-30 | Stop reason: SDUPTHER

## 2023-10-30 RX ADMIN — PROPOFOL 50 MCG/KG/MIN: 10 INJECTION, EMULSION INTRAVENOUS at 10:51

## 2023-10-30 RX ADMIN — HEPARIN SODIUM 2000 UNITS: 1000 INJECTION INTRAVENOUS; SUBCUTANEOUS at 14:28

## 2023-10-30 RX ADMIN — SODIUM BICARBONATE 0.5 MEQ: 42 INJECTION, SOLUTION INTRAVENOUS at 14:28

## 2023-10-30 RX ADMIN — Medication 60 MG: at 19:10

## 2023-10-30 RX ADMIN — FENTANYL CITRATE 50 MCG: 50 INJECTION, SOLUTION INTRAMUSCULAR; INTRAVENOUS at 13:54

## 2023-10-30 RX ADMIN — VERAPAMIL HYDROCHLORIDE 2.5 MG: 2.5 INJECTION, SOLUTION INTRAVENOUS at 14:28

## 2023-10-30 RX ADMIN — FENTANYL CITRATE 100 MCG: 0.05 INJECTION, SOLUTION INTRAMUSCULAR; INTRAVENOUS at 11:07

## 2023-10-30 RX ADMIN — IOPAMIDOL 82 ML: 612 INJECTION, SOLUTION INTRAVENOUS at 15:13

## 2023-10-30 RX ADMIN — CEFAZOLIN 3 G: 330 INJECTION, POWDER, FOR SOLUTION INTRAMUSCULAR; INTRAVENOUS at 13:37

## 2023-10-30 RX ADMIN — MIDAZOLAM 2 MG: 1 INJECTION INTRAMUSCULAR; INTRAVENOUS at 12:05

## 2023-10-30 RX ADMIN — ROCURONIUM BROMIDE 50 MG: 10 SOLUTION INTRAVENOUS at 13:34

## 2023-10-30 RX ADMIN — PHENYLEPHRINE HYDROCHLORIDE 40 MCG/MIN: 10 INJECTION INTRAVENOUS at 13:56

## 2023-10-30 RX ADMIN — MIDAZOLAM HYDROCHLORIDE 5 MG: 1 INJECTION, SOLUTION INTRAMUSCULAR; INTRAVENOUS at 09:36

## 2023-10-30 RX ADMIN — SODIUM CHLORIDE, PRESERVATIVE FREE 10 ML: 5 INJECTION INTRAVENOUS at 20:49

## 2023-10-30 RX ADMIN — FENTANYL CITRATE 25 MCG: 50 INJECTION, SOLUTION INTRAMUSCULAR; INTRAVENOUS at 14:34

## 2023-10-30 RX ADMIN — SODIUM CHLORIDE 5 MG/HR: 9 INJECTION, SOLUTION INTRAVENOUS at 08:53

## 2023-10-30 RX ADMIN — PROPOFOL 40 MG: 10 INJECTION, EMULSION INTRAVENOUS at 14:34

## 2023-10-30 RX ADMIN — Medication 100 MCG: at 14:28

## 2023-10-30 RX ADMIN — LEVETIRACETAM 500 MG: 100 INJECTION, SOLUTION INTRAVENOUS at 20:48

## 2023-10-30 RX ADMIN — FENTANYL CITRATE 25 MCG: 50 INJECTION, SOLUTION INTRAMUSCULAR; INTRAVENOUS at 15:36

## 2023-10-30 RX ADMIN — PROPOFOL 20 MCG/KG/MIN: 10 INJECTION, EMULSION INTRAVENOUS at 09:07

## 2023-10-30 RX ADMIN — VECURONIUM BROMIDE 10 MG: 1 INJECTION, POWDER, LYOPHILIZED, FOR SOLUTION INTRAVENOUS at 09:42

## 2023-10-30 RX ADMIN — HEPARIN SODIUM: 1000 INJECTION INTRAVENOUS; SUBCUTANEOUS at 14:24

## 2023-10-30 RX ADMIN — ROCURONIUM BROMIDE 20 MG: 10 SOLUTION INTRAVENOUS at 14:26

## 2023-10-30 RX ADMIN — ATORVASTATIN CALCIUM 20 MG: 20 TABLET, FILM COATED ORAL at 20:49

## 2023-10-30 RX ADMIN — MIDAZOLAM HYDROCHLORIDE 2 MG: 2 INJECTION, SOLUTION INTRAMUSCULAR; INTRAVENOUS at 12:05

## 2023-10-30 RX ADMIN — LEVETIRACETAM 1000 MG: 100 INJECTION, SOLUTION INTRAVENOUS at 09:22

## 2023-10-30 RX ADMIN — ONDANSETRON: 2 INJECTION INTRAMUSCULAR; INTRAVENOUS at 08:25

## 2023-10-30 RX ADMIN — SODIUM CHLORIDE 5 MG/HR: 9 INJECTION, SOLUTION INTRAVENOUS at 14:33

## 2023-10-30 RX ADMIN — SODIUM CHLORIDE: 9 INJECTION, SOLUTION INTRAVENOUS at 16:27

## 2023-10-30 RX ADMIN — Medication 60 MG: at 23:00

## 2023-10-30 RX ADMIN — MIDAZOLAM HYDROCHLORIDE 4 MG: 1 INJECTION, SOLUTION INTRAMUSCULAR; INTRAVENOUS at 08:35

## 2023-10-30 RX ADMIN — FENTANYL CITRATE 100 MCG: 50 INJECTION, SOLUTION INTRAMUSCULAR; INTRAVENOUS at 11:07

## 2023-10-30 RX ADMIN — PROPOFOL 50 MCG/KG/MIN: 10 INJECTION, EMULSION INTRAVENOUS at 16:46

## 2023-10-30 RX ADMIN — PROPOFOL 50 MCG/KG/MIN: 10 INJECTION, EMULSION INTRAVENOUS at 23:10

## 2023-10-30 RX ADMIN — PROPOFOL 50 MCG/KG/MIN: 10 INJECTION, EMULSION INTRAVENOUS at 13:16

## 2023-10-30 RX ADMIN — LIDOCAINE HYDROCHLORIDE 4 ML: 10 INJECTION, SOLUTION EPIDURAL; INFILTRATION; INTRACAUDAL; PERINEURAL at 14:23

## 2023-10-30 RX ADMIN — SODIUM CHLORIDE 3000 MG: 900 INJECTION INTRAVENOUS at 21:58

## 2023-10-30 RX ADMIN — ETOMIDATE INJECTION 10 MG: 2 SOLUTION INTRAVENOUS at 08:35

## 2023-10-30 RX ADMIN — PROPOFOL 80 MG: 10 INJECTION, EMULSION INTRAVENOUS at 13:53

## 2023-10-30 RX ADMIN — ROCURONIUM BROMIDE 30 MG: 10 SOLUTION INTRAVENOUS at 15:18

## 2023-10-30 RX ADMIN — Medication 1 MG/HR: at 10:44

## 2023-10-30 RX ADMIN — SODIUM CHLORIDE: 9 INJECTION, SOLUTION INTRAVENOUS at 13:28

## 2023-10-30 RX ADMIN — PROPOFOL 50 MCG/KG/MIN: 10 INJECTION, EMULSION INTRAVENOUS at 20:56

## 2023-10-30 RX ADMIN — MANNITOL 150 G: 20 INJECTION, SOLUTION INTRAVENOUS at 12:21

## 2023-10-30 RX ADMIN — SODIUM CHLORIDE: 9 INJECTION, SOLUTION INTRAVENOUS at 21:57

## 2023-10-30 RX ADMIN — PROPOFOL 50 MCG/KG/MIN: 10 INJECTION, EMULSION INTRAVENOUS at 18:50

## 2023-10-30 RX ADMIN — Medication 50 MCG/HR: at 12:40

## 2023-10-30 RX ADMIN — MIDAZOLAM HYDROCHLORIDE 5 MG: 2 INJECTION, SOLUTION INTRAMUSCULAR; INTRAVENOUS at 09:36

## 2023-10-30 RX ADMIN — ROCURONIUM BROMIDE 50 MG: 10 INJECTION, SOLUTION INTRAVENOUS at 08:35

## 2023-10-30 RX ADMIN — MORPHINE SULFATE 2 MG: 2 INJECTION, SOLUTION INTRAMUSCULAR; INTRAVENOUS at 09:19

## 2023-10-30 ASSESSMENT — PULMONARY FUNCTION TESTS
PIF_VALUE: 30
PIF_VALUE: 33
PIF_VALUE: 29
PIF_VALUE: 34

## 2023-10-30 ASSESSMENT — LIFESTYLE VARIABLES
HOW OFTEN DO YOU HAVE A DRINK CONTAINING ALCOHOL: NEVER
HOW MANY STANDARD DRINKS CONTAINING ALCOHOL DO YOU HAVE ON A TYPICAL DAY: PATIENT DOES NOT DRINK

## 2023-10-30 ASSESSMENT — PAIN DESCRIPTION - LOCATION: LOCATION: HEAD;NECK

## 2023-10-30 ASSESSMENT — PAIN SCALES - GENERAL
PAINLEVEL_OUTOF10: 0
PAINLEVEL_OUTOF10: 9

## 2023-10-30 ASSESSMENT — PAIN - FUNCTIONAL ASSESSMENT: PAIN_FUNCTIONAL_ASSESSMENT: 0-10

## 2023-10-30 NOTE — BRIEF OP NOTE
Brief Postoperative Note      Patient: Mane Whitlock  YOB: 1965  MRN: [id number]    Date of Procedure: 10/30/2023    Pre-Op Diagnosis: SAH with hydrocephalus    Post-Op Diagnosis: Same       Procedure:  Intraventricular catheter placement    Surgeon:  Bo Lockhart MD, PhD    Assistant:  Kim Bain RN    Anesthesia: General    Estimated Blood Loss (mL): Minimal    Complications: None    Specimens:   none    Implants:  none      Drains:   Urinary Catheter 10/30/23 Arnold (Active)   Output (mL) 150 mL 10/30/23 1248       Findings: blood ti=nged CSF under pressure      Electronically signed by Trace Clark MD on 10/30/2023 at 2:48 PM

## 2023-10-30 NOTE — BRIEF OP NOTE
Brief Procedure Note      Patient: Kiara Carcamo MRN: [id number]     YOB: 1965  Age: 56 y.o.   Sex: female      Service: Neurointerventional Surgery    Date of Procedure: 10/30/2023    Pre-Procedure Diagnosis: SAH    Post-Procedure Diagnosis: SAME    : Renetta Horne DO    Assistant(s): N/A    Procedure(s):   Diagnostic cerebral angiogram  Aneurysm embolization    Vessels Studied:   Left CCA  Left ICA      Puncture Site: Right radial artery--> TR band    Preliminary Findings:   6mm L PCOM aneurysm--> coil embo    Plan:   - SBP<160, CPP>60  - CTH in am  - PICC line      Specimens: None    Implants: see op report    Drains: None    Anesthesia: General    Estimated Blood Loss: 5 cc      Apparent Intraoperative Complications: None immediate    Patient Condition: Stable    Disposition: ICU      Signed:   Renetta Horne DO  [facility] [address] [facility]

## 2023-10-30 NOTE — OR NURSING
1352 10mls of 1% lido with epi to right side of head by LUIS ANGEL Gonzalez MD    [phone] EVD in place. Apox 10mls drained by LUIS ANGEL Gonzalez MD.     4537 EVD procedure completed. Patient being prepped and draped for cerebral angiogram with coil embo.

## 2023-10-30 NOTE — ANESTHESIA PRE PROCEDURE
Answered  Counseling given: Not Answered      Vital Signs (Current): There were no vitals filed for this visit. BP Readings from Last 3 Encounters:   10/30/23 126/82   10/30/23 (!) 184/95       NPO Status:                                                                                 BMI:   Wt Readings from Last 3 Encounters:   10/30/23 (!) 154.4 kg (340 lb 6.2 oz)   10/30/23 (!) 154.2 kg (340 lb)     There is no height or weight on file to calculate BMI.    CBC:   Lab Results   Component Value Date/Time    WBC 9.6 10/30/2023 08:21 AM    RBC 4.41 10/30/2023 08:21 AM    HGB 11.8 10/30/2023 08:21 AM    HCT 37.2 10/30/2023 08:21 AM    MCV 84.4 10/30/2023 08:21 AM    RDW 15.7 10/30/2023 08:21 AM     10/30/2023 08:21 AM       CMP:   Lab Results   Component Value Date/Time     10/30/2023 08:21 AM    K 3.6 10/30/2023 08:21 AM     10/30/2023 08:21 AM    CO2 26 10/30/2023 08:21 AM    BUN 10 10/30/2023 08:21 AM    CREATININE 0.63 10/30/2023 08:21 AM    GFRAA >60 12/22/2020 07:50 AM    LABGLOM >60 10/30/2023 08:21 AM    GLUCOSE 153 10/30/2023 08:21 AM    PROT 7.1 10/30/2023 08:21 AM    CALCIUM 9.2 10/30/2023 08:21 AM    BILITOT 0.2 10/30/2023 08:21 AM    ALKPHOS 68 10/30/2023 08:21 AM    AST 21 10/30/2023 08:21 AM    ALT 25 10/30/2023 08:21 AM       POC Tests: No results for input(s): \"POCGLU\", \"POCNA\", \"POCK\", \"POCCL\", \"POCBUN\", \"POCHEMO\", \"POCHCT\" in the last 72 hours.     Coags:   Lab Results   Component Value Date/Time    PROTIME 13.2 10/30/2023 08:19 AM    INR 1.0 10/30/2023 08:19 AM    APTT 31.2 10/30/2023 08:19 AM       HCG (If Applicable): No results found for: \"PREGTESTUR\", \"PREGSERUM\", \"HCG\", \"HCGQUANT\"     ABGs:   Lab Results   Component Value Date/Time    PHART 7.35 10/30/2023 09:53 AM    PO2ART 77 10/30/2023 09:53 AM    HEO2WNI 44 10/30/2023 09:53 AM    QTN4KRK 24 10/30/2023 09:53 AM    Q5BNQAPA 95 10/30/2023 09:53 AM        Type & Screen (If

## 2023-10-30 NOTE — OR NURSING
Patient placed on angio table with assistance. IR Staff and anesthesia present at bedside. Anesthesia to remain at bedside to manage pt airway, medications, VS and pt status.

## 2023-10-30 NOTE — OP NOTE
[facility]  OPERATIVE REPORT    Name:  Una Martinez  MR#:  [id number]  :  1965  ACCOUNT #:  [de-identified]  DATE OF SERVICE:  10/30/2023    PREOPERATIVE DIAGNOSIS:  Subarachnoid hemorrhage with hydrocephalus. POSTOPERATIVE DIAGNOSIS:  Subarachnoid hemorrhage with hydrocephalus. PROCEDURE PERFORMED:  intraventricular catheter placement. SURGEON:  Doris Polo MD    ASSISTANT:  Ghislaine Henry RN    ANESTHESIA:  General.    COMPLICATIONS:  None. SPECIMENS REMOVED:  None. IMPLANTS:  None. ESTIMATED BLOOD LOSS:  Minimal.    DRAINS:  Intraventricular catheter. FINDINGS:  Blood-tinged CSF under pressure. INDICATIONS:  This is a 72-year-old woman who called EMS this morning when she felt that she was having a stroke. She then called her sister, but was unable to speak. Initially she was awake and following some commands, but she became obtunded at the outside hospital.  She was emergently intubated. Head CT revealed diffuse subarachnoid hemorrhage predominantly on the left. She was transported emergently to [facility].  Risks and benefits of the procedure were discussed with the patient's son and other family members and they agreed to proceed. PROCEDURE:  The patient was in the angio suite preparing for angiogram.  I shaved the right side of her head. I cleaned her head with alcohol and marked a 3 cm incision in midpupillary line approximately 5 cm off the midline. She was sterilely prepped and draped in standard fashion. I infiltrated the proposed incision with lidocaine with epinephrine. I used a 10-blade to incise the skin. Hemostasis was obtained with Bovie cautery. I placed a self-retaining retractor. I used a handheld drill and made a bur hole. I incised the dura and then passed an intraventricular catheter to 6 cm with brisk flow of CSF. Initially it was clear and then it turned blood tinged. It was under significant pressure.   The

## 2023-10-30 NOTE — ANESTHESIA PROCEDURE NOTES
Arterial Line:    An arterial line was placed using surface landmarks, in the pre-op for the following indication(s): continuous blood pressure monitoring and blood sampling needed. A 20 gauge (size) (length), Arrow (type) catheter was placed, Seldinger technique used, into the left radial artery, secured by Tegaderm. Anesthesia type: Local  Local infiltration: Injection    Events:  patient tolerated procedure well with no complications.     Additional notes:  US guided  Anesthesiologist: Aleisha Lee MD  Performed: Anesthesiologist   Preanesthetic Checklist  Completed: patient identified, IV checked, site marked, risks and benefits discussed, surgical/procedural consents, equipment checked, pre-op evaluation, timeout performed, anesthesia consent given, oxygen available, monitors applied/VS acknowledged and fire risk safety assessment completed and verbalized

## 2023-10-30 NOTE — ED NOTES
TRANSFER - OUT REPORT:    Verbal report given to CATIE Pool on Philippe Danvers  being transferred to ICU/Angio for urgent transfer       Report consisted of patient's Situation, Background, Assessment and   Recommendations(SBAR). Information from the following report(s) ED SBAR was reviewed with the receiving nurse. [place] Fall Assessment:                           Lines:   Peripheral IV 10/30/23 Right;Ventral Cephalic (Active)       Peripheral IV 10/30/23 Left;Posterior Hand (Active)       Peripheral IV 10/30/23 Left;Posterior Hand (Active)        Opportunity for questions and clarification was provided.       Patient transported with:  Monitor and Registered Lynn Santos RN  10/30/23 0016

## 2023-10-30 NOTE — ANESTHESIA POSTPROCEDURE EVALUATION
Post-Anesthesia Evaluation and Assessment    Patient: Ravinder Slater MRN: [id number]  SSN: [id number]    YOB: 1965  Age: 56 y.o. Sex: female      I have evaluated the patient and they are stable in the ICU. Cardiovascular Function/Vital Signs  There were no vitals taken for this visit. Patient is status post * No anesthesia type entered * anesthesia for * No procedures listed *. Nausea/Vomiting: None    Postoperative hydration reviewed and adequate. Pain:  Managed    Neurological Status:   Intubated and sedated     Pulmonary Status:   Intubated with adequate ventilation and oxygenation     Complications related to anesthesia: None    Post-anesthesia assessment completed.  No concerns    Signed By: Karley Arriola MD     October 30, 2023

## 2023-10-30 NOTE — ED PROVIDER NOTES
[facility] EMERGENCY [address] ENCOUNTER      Patient Name:  Mane Whitlock  MRN:   [id number]  :   1965  Date of Evaluation: 10/30/2023  Physician:  Nanette Fam MD    Chief Complaint   Patient presents with    Stroke       HISTORY OF PRESENT ILLNESS    This is a 79-year-old female with obesity and asthma presenting as a transfer from [facility] for a subarachnoid hemorrhage suspected due to aneurysmal rupture. Apparently she had reported pain behind her right eye for the last 3-4 days and did not report a headache at the time of arrival, while in the department she was observed to have a dilated left pupil and was sent for a head CT which indicated diffuse subarachnoid hemorrhage in the basal cisterns and sylvian fissures. Due to worsening encephalopathy she was intubated for airway protection, received a dose of Keppra for seizure prophylaxis, and started on a Cardene infusion. REVIEW OF SYSTEMS   A review of systems was performed and was negative except as documented in the HPI.      PAST MEDICAL HISTORY     Past Medical History:   Diagnosis Date    Anxiety     Arthritis     Asthma     Depression     GERD (gastroesophageal reflux disease)     Obesity     SONIA (obstructive sleep apnea)     Smoker        PAST SURGICAL HISTORY     Past Surgical History:   Procedure Laterality Date    COLONOSCOPY N/A 2022    COLONOSCOPY, EGD performed by Catie Larsen MD at [address]    LAP GASTRIC BYPASS/PHAN-EN-Y      LAP, PLACE ADJUST GASTR BAND      OTHER SURGICAL HISTORY      lapband removal    TUBAL LIGATION         ALLERGIES   Adhesive tape and Nsaids    FAMILY HISTORY     Family History   Problem Relation Age of Onset    Cancer Father         COLON    Cancer Mother         LUNG    Heart Disease Brother        SOCIAL HISTORY     Social History     Socioeconomic History    Marital status: Single   Tobacco Use    Smoking status: Every Day     Packs/day: .5

## 2023-10-30 NOTE — ED TRIAGE NOTES
Summoned EMS for HA which began last Friday. Reports on Friday her L eye socket began to hurt  while she was on the toilet. Reports she went to eye doctor on Friday who gave meds but not better. Also reports the entire back of neck and head is hurting. Denies injury.  Denies f/c, no n/v.

## 2023-10-30 NOTE — ED TRIAGE NOTES
Patient arrives to ED as transfer from [facility] for head bleed. Patient intubated, not fully sedated up on arrival to ED.

## 2023-10-30 NOTE — ANESTHESIA PROCEDURE NOTES
Airway  Date/Time: 10/30/2023 8:40 AM  Urgency: emergent    Airway not difficult    General Information and Staff    Patient location during procedure: ED  Anesthesiologist: Chucky Tomas MD  Performed: anesthesiologist   Performed by: Chucky Tomas MD  Authorized by: Chucky Tomas MD      Consent for Airway (if performed for an anesthetic, see related documentation for consents)  Patient identity confirmed: per hospital policy  Consent: The procedure was performed in an emergent situation. Verbal consent not obtained. Written consent not obtained.   Risks and benefits: risks, benefits and alternatives were not discussed  Consent given by: other (add comment)    no  Indications and Patient Condition  Indications for airway management: anesthesia, airway protection, cardio/pulmonary arrest, hypercapnia, hypoxemia, respiratory failure and cardiovascular instability  Spontaneous Ventilation: absent  Sedation level: no sedation  Preoxygenated: yes  Patient position: sniffing  MILS maintained throughout  Mask difficulty assessment: vent by bag mask    Final Airway Details  Final airway type: endotracheal airway      Successful airway: ETT  Cuffed: yes   Successful intubation technique: direct laryngoscopy  Endotracheal tube insertion site: oral  Blade: Bao  Blade size: #4  ETT size (mm): 7.5  Cormack-Lehane Classification: grade I - full view of glottis  Placement verified by: chest auscultation, capnometry and radiography   Inital cuff pressure (cm H2O): 2  Measured from: gums  ETT to gums (cm): 23  Number of attempts at approach: 1  Ventilation between attempts: bag mask  Number of other approaches attempted: 0

## 2023-10-30 NOTE — ED PROVIDER NOTES
ITA Anthony    DISPOSITION/PLAN   DISPOSITION Decision To Transfer 10/30/2023 09:18:18 AM      Transferred to [facility] ER. PATIENT REFERRED TO:  No follow-up provider specified. DISCHARGE MEDICATIONS:     Medication List        ASK your doctor about these medications      acetaminophen 325 MG tablet  Commonly known as: TYLENOL     amLODIPine 5 MG tablet  Commonly known as: NORVASC     amphetamine-dextroamphetamine 10 MG tablet  Commonly known as: ADDERALL     ARIPiprazole 5 MG tablet  Commonly known as: ABILIFY     colestipol 1 g tablet  Commonly known as: COLESTID     cyanocobalamin 1000 MCG/ML injection     ergocalciferol 1.25 MG (24083 UT) capsule  Commonly known as: ERGOCALCIFEROL     famotidine 20 MG tablet  Commonly known as: PEPCID     ferrous sulfate 325 (65 Fe) MG EC tablet  Commonly known as: FE TABS 325     omeprazole 20 MG delayed release capsule  Commonly known as: PRILOSEC     sucralfate 1 GM tablet  Commonly known as: CARAFATE     traZODone 100 MG tablet  Commonly known as: DESYREL              DISCONTINUED MEDICATIONS:  Current Discharge Medication List          ED FINAL IMPRESSION     1. Subarachnoid hemorrhage ([address])    2. Acute respiratory failure, unspecified whether with hypoxia or hypercapnia ([address])    3. Hypertensive emergency         I am the primary provider of record. Livan Sandhu DO (electronically signed)    (Please note that parts of this dictation were completed with voice recognition software. Quite often unanticipated grammatical, syntax, homophones, and other interpretive errors are inadvertently transcribed by the computer software. Please disregards these errors.  Please excuse any errors that have escaped final proofreading.)        Livan Sandhu DO  10/30/23 8231

## 2023-10-30 NOTE — CONSULTS
[address]    Name:  Homer Albrecht  MR#:  [id number]  :  1965  ACCOUNT #:  [de-identified]  DATE OF SERVICE:  10/30/2023    NEUROSURGERY CONSULTATION    REASON FOR CONSULTATION:  Subarachnoid hemorrhage. HISTORY OF PRESENT ILLNESS:  This is a 56-year-old woman who has been feeling well early this morning according to her boyfriend with whom she lives. Later that day, she had acute onset of severe headache. She called emergency medical services and then called her sister. She was taken directly to an outside hospital.  She deteriorated there and was emergently intubated. Head CT revealed diffuse subarachnoid hemorrhage. According to her family member, she has been complaining of headache and right eye pain for several days. She did see an ophthalmologist who did not find any abnormality on exam.  She has never had any history of stroke or hemorrhage in the past.    PAST MEDICAL HISTORY:  1. Anxiety. 2.  Arthritis. 3.  Asthma. 4.  Depression. 5.  GERD. 6.  Morbid obesity. PAST SURGICAL HISTORY:  Gastric bypass, Lap-Band placement and removal of Lap-Band, colonoscopy, and tubal ligation. CURRENT MEDICATIONS:  1. Cardene drip for blood pressure control. 2.  Propofol infusion. 3.  She did receive mannitol 154 g at the outside hospital.  4.  She received vecuronium 10 mg for intubation. HOME MEDICATIONS:  Include:  1. Norvasc 5 mg daily. 2.  Adderall 10 mg daily. 3.  Abilify 5 mg daily. 4.  Colestid 1 g daily. 5.  B12 injections monthly. 6.  Pepcid 20 mg daily. 7.  Iron six times daily with meals. 8.  Prilosec 20 mg daily. 9.  Carafate 1 g three times a day. ALLERGIES:  NSAIDS - STOMACH PAIN. ADHESIVE TAPE - SKIN BURNS. SOCIAL HISTORY:  She smokes half a pack of cigarettes a day. She is not . She does have a significant other with whom she lives. She is not employed outside home.     FAMILY HISTORY:  Positive for colon cancer,

## 2023-10-31 ENCOUNTER — APPOINTMENT (OUTPATIENT)
Facility: HOSPITAL | Age: 58
DRG: 021 | End: 2023-10-31
Attending: PSYCHIATRY & NEUROLOGY
Payer: MEDICAID

## 2023-10-31 ENCOUNTER — APPOINTMENT (OUTPATIENT)
Facility: HOSPITAL | Age: 58
DRG: 021 | End: 2023-10-31
Payer: MEDICAID

## 2023-10-31 LAB
ABO + RH BLD: NORMAL
ANION GAP SERPL CALC-SCNC: 4 MMOL/L (ref 5–15)
ANION GAP SERPL CALC-SCNC: 6 MMOL/L (ref 5–15)
BLOOD GROUP ANTIBODIES SERPL: NORMAL
BUN SERPL-MCNC: 5 MG/DL (ref 6–20)
BUN SERPL-MCNC: 5 MG/DL (ref 6–20)
BUN/CREAT SERPL: 10 (ref 12–20)
BUN/CREAT SERPL: 10 (ref 12–20)
CALCIUM SERPL-MCNC: 7.9 MG/DL (ref 8.5–10.1)
CALCIUM SERPL-MCNC: 8 MG/DL (ref 8.5–10.1)
CHLORIDE SERPL-SCNC: 106 MMOL/L (ref 97–108)
CHLORIDE SERPL-SCNC: 109 MMOL/L (ref 97–108)
CHOLEST SERPL-MCNC: 170 MG/DL
CO2 SERPL-SCNC: 26 MMOL/L (ref 21–32)
CO2 SERPL-SCNC: 27 MMOL/L (ref 21–32)
CREAT SERPL-MCNC: 0.48 MG/DL (ref 0.55–1.02)
CREAT SERPL-MCNC: 0.5 MG/DL (ref 0.55–1.02)
ECHO AO ROOT DIAM: 3 CM
ECHO AO ROOT INDEX: 1.27 CM/M2
ECHO AV MEAN GRADIENT: 9 MMHG
ECHO AV MEAN VELOCITY: 1.5 M/S
ECHO AV PEAK GRADIENT: 14 MMHG
ECHO AV PEAK VELOCITY: 1.9 M/S
ECHO AV VELOCITY RATIO: 0.84
ECHO AV VTI: 37.2 CM
ECHO BSA: 2.56 M2
ECHO LA DIAMETER INDEX: 1.81 CM/M2
ECHO LA DIAMETER: 4.3 CM
ECHO LA TO AORTIC ROOT RATIO: 1.43
ECHO LA VOL A-L A2C: 28 ML (ref 22–52)
ECHO LA VOL A-L A2C: 30 ML (ref 22–52)
ECHO LA VOL A-L A4C: 57 ML (ref 22–52)
ECHO LA VOL A-L A4C: 59 ML (ref 22–52)
ECHO LA VOLUME AREA LENGTH: 45 ML
ECHO LA VOLUME INDEX AREA LENGTH: 19 ML/M2 (ref 16–34)
ECHO LV E' LATERAL VELOCITY: 10 CM/S
ECHO LV E' SEPTAL VELOCITY: 8 CM/S
ECHO LV FRACTIONAL SHORTENING: 40 % (ref 28–44)
ECHO LV INTERNAL DIMENSION DIASTOLE INDEX: 1.77 CM/M2
ECHO LV INTERNAL DIMENSION DIASTOLIC: 4.2 CM (ref 3.9–5.3)
ECHO LV INTERNAL DIMENSION SYSTOLIC INDEX: 1.05 CM/M2
ECHO LV INTERNAL DIMENSION SYSTOLIC: 2.5 CM
ECHO LV IVSD: 1.3 CM (ref 0.6–0.9)
ECHO LV MASS 2D: 200.6 G (ref 67–162)
ECHO LV MASS INDEX 2D: 84.6 G/M2 (ref 43–95)
ECHO LV POSTERIOR WALL DIASTOLIC: 1.3 CM (ref 0.6–0.9)
ECHO LV RELATIVE WALL THICKNESS RATIO: 0.62
ECHO LVOT PEAK GRADIENT: 10 MMHG
ECHO LVOT PEAK VELOCITY: 1.6 M/S
ECHO MV A VELOCITY: 1 M/S
ECHO MV AREA PHT: 2.4 CM2
ECHO MV E DECELERATION TIME (DT): 316.1 MS
ECHO MV E VELOCITY: 0.79 M/S
ECHO MV E/A RATIO: 0.79
ECHO MV E/E' LATERAL: 7.9
ECHO MV E/E' RATIO (AVERAGED): 8.89
ECHO MV E/E' SEPTAL: 9.88
ECHO MV PRESSURE HALF TIME (PHT): 91.7 MS
ECHO PV MAX VELOCITY: 1.3 M/S
ECHO PV PEAK GRADIENT: 7 MMHG
ECHO RV TAPSE: 1.8 CM (ref 1.7–?)
ECHO TV REGURGITANT MAX VELOCITY: 3.1 M/S
ECHO TV REGURGITANT PEAK GRADIENT: 38 MMHG
ERYTHROCYTE [DISTWIDTH] IN BLOOD BY AUTOMATED COUNT: 16.1 % (ref 11.5–14.5)
EST. AVERAGE GLUCOSE BLD GHB EST-MCNC: 131 MG/DL
GLUCOSE SERPL-MCNC: 132 MG/DL (ref 65–100)
GLUCOSE SERPL-MCNC: 148 MG/DL (ref 65–100)
HBA1C MFR BLD: 6.2 % (ref 4–5.6)
HCT VFR BLD AUTO: 33.2 % (ref 35–47)
HDLC SERPL-MCNC: 84 MG/DL
HDLC SERPL: 2 (ref 0–5)
HGB BLD-MCNC: 10.2 G/DL (ref 11.5–16)
LDLC SERPL CALC-MCNC: 66.8 MG/DL (ref 0–100)
MAGNESIUM SERPL-MCNC: 1.9 MG/DL (ref 1.6–2.4)
MCH RBC QN AUTO: 26.6 PG (ref 26–34)
MCHC RBC AUTO-ENTMCNC: 30.7 G/DL (ref 30–36.5)
MCV RBC AUTO: 86.5 FL (ref 80–99)
NRBC # BLD: 0 K/UL (ref 0–0.01)
NRBC BLD-RTO: 0 PER 100 WBC
PHOSPHATE SERPL-MCNC: 3.1 MG/DL (ref 2.6–4.7)
PLATELET # BLD AUTO: 351 K/UL (ref 150–400)
PMV BLD AUTO: 10 FL (ref 8.9–12.9)
POTASSIUM SERPL-SCNC: 3.1 MMOL/L (ref 3.5–5.1)
POTASSIUM SERPL-SCNC: 3.6 MMOL/L (ref 3.5–5.1)
RBC # BLD AUTO: 3.84 M/UL (ref 3.8–5.2)
SODIUM SERPL-SCNC: 137 MMOL/L (ref 136–145)
SODIUM SERPL-SCNC: 141 MMOL/L (ref 136–145)
SPECIMEN EXP DATE BLD: NORMAL
TRIGL SERPL-MCNC: 96 MG/DL
VLDLC SERPL CALC-MCNC: 19.2 MG/DL
WBC # BLD AUTO: 12 K/UL (ref 3.6–11)

## 2023-10-31 PROCEDURE — 70450 CT HEAD/BRAIN W/O DYE: CPT

## 2023-10-31 PROCEDURE — 93888 INTRACRANIAL LIMITED STUDY: CPT

## 2023-10-31 PROCEDURE — 2580000003 HC RX 258: Performed by: NURSE PRACTITIONER

## 2023-10-31 PROCEDURE — 85027 COMPLETE CBC AUTOMATED: CPT

## 2023-10-31 PROCEDURE — 6360000002 HC RX W HCPCS: Performed by: INTERNAL MEDICINE

## 2023-10-31 PROCEDURE — 6370000000 HC RX 637 (ALT 250 FOR IP): Performed by: NURSE PRACTITIONER

## 2023-10-31 PROCEDURE — 6370000000 HC RX 637 (ALT 250 FOR IP): Performed by: INTERNAL MEDICINE

## 2023-10-31 PROCEDURE — 6360000002 HC RX W HCPCS: Performed by: NURSE PRACTITIONER

## 2023-10-31 PROCEDURE — 93306 TTE W/DOPPLER COMPLETE: CPT

## 2023-10-31 PROCEDURE — A4216 STERILE WATER/SALINE, 10 ML: HCPCS | Performed by: NURSE PRACTITIONER

## 2023-10-31 PROCEDURE — C1769 GUIDE WIRE: HCPCS

## 2023-10-31 PROCEDURE — 94002 VENT MGMT INPAT INIT DAY: CPT

## 2023-10-31 PROCEDURE — 02HV33Z INSERTION OF INFUSION DEVICE INTO SUPERIOR VENA CAVA, PERCUTANEOUS APPROACH: ICD-10-PCS | Performed by: FAMILY MEDICINE

## 2023-10-31 PROCEDURE — 99233 SBSQ HOSP IP/OBS HIGH 50: CPT | Performed by: NURSE PRACTITIONER

## 2023-10-31 PROCEDURE — 2580000003 HC RX 258: Performed by: PSYCHIATRY & NEUROLOGY

## 2023-10-31 PROCEDURE — 2580000003 HC RX 258: Performed by: INTERNAL MEDICINE

## 2023-10-31 PROCEDURE — 83036 HEMOGLOBIN GLYCOSYLATED A1C: CPT

## 2023-10-31 PROCEDURE — 6360000002 HC RX W HCPCS: Performed by: PSYCHIATRY & NEUROLOGY

## 2023-10-31 PROCEDURE — 83735 ASSAY OF MAGNESIUM: CPT

## 2023-10-31 PROCEDURE — 99024 POSTOP FOLLOW-UP VISIT: CPT | Performed by: NURSE PRACTITIONER

## 2023-10-31 PROCEDURE — 93886 INTRACRANIAL COMPLETE STUDY: CPT

## 2023-10-31 PROCEDURE — 94640 AIRWAY INHALATION TREATMENT: CPT

## 2023-10-31 PROCEDURE — 80048 BASIC METABOLIC PNL TOTAL CA: CPT

## 2023-10-31 PROCEDURE — 2000000000 HC ICU R&B

## 2023-10-31 PROCEDURE — C1751 CATH, INF, PER/CENT/MIDLINE: HCPCS

## 2023-10-31 PROCEDURE — 36415 COLL VENOUS BLD VENIPUNCTURE: CPT

## 2023-10-31 PROCEDURE — 6360000002 HC RX W HCPCS: Performed by: STUDENT IN AN ORGANIZED HEALTH CARE EDUCATION/TRAINING PROGRAM

## 2023-10-31 PROCEDURE — 2709999900 HC NON-CHARGEABLE SUPPLY

## 2023-10-31 PROCEDURE — 80061 LIPID PANEL: CPT

## 2023-10-31 PROCEDURE — C9113 INJ PANTOPRAZOLE SODIUM, VIA: HCPCS | Performed by: NURSE PRACTITIONER

## 2023-10-31 PROCEDURE — 84100 ASSAY OF PHOSPHORUS: CPT

## 2023-10-31 RX ORDER — LABETALOL HYDROCHLORIDE 5 MG/ML
20 INJECTION, SOLUTION INTRAVENOUS EVERY 4 HOURS PRN
Status: DISCONTINUED | OUTPATIENT
Start: 2023-10-31 | End: 2023-11-26 | Stop reason: HOSPADM

## 2023-10-31 RX ORDER — IPRATROPIUM BROMIDE AND ALBUTEROL SULFATE 2.5; .5 MG/3ML; MG/3ML
1 SOLUTION RESPIRATORY (INHALATION) EVERY 4 HOURS PRN
Status: DISCONTINUED | OUTPATIENT
Start: 2023-10-31 | End: 2023-11-01

## 2023-10-31 RX ORDER — HEPARIN SODIUM 5000 [USP'U]/ML
5000 INJECTION, SOLUTION INTRAVENOUS; SUBCUTANEOUS EVERY 8 HOURS SCHEDULED
Status: DISCONTINUED | OUTPATIENT
Start: 2023-10-31 | End: 2023-11-26 | Stop reason: HOSPADM

## 2023-10-31 RX ORDER — MORPHINE SULFATE 2 MG/ML
2 INJECTION, SOLUTION INTRAMUSCULAR; INTRAVENOUS
Status: COMPLETED | OUTPATIENT
Start: 2023-10-31 | End: 2023-10-31

## 2023-10-31 RX ORDER — BUTALBITAL, ACETAMINOPHEN AND CAFFEINE 50; 325; 40 MG/1; MG/1; MG/1
1 TABLET ORAL EVERY 4 HOURS PRN
Status: DISCONTINUED | OUTPATIENT
Start: 2023-10-31 | End: 2023-11-26 | Stop reason: HOSPADM

## 2023-10-31 RX ORDER — LANOLIN ALCOHOL/MO/W.PET/CERES
400 CREAM (GRAM) TOPICAL ONCE
Status: COMPLETED | OUTPATIENT
Start: 2023-10-31 | End: 2023-10-31

## 2023-10-31 RX ORDER — MORPHINE SULFATE 2 MG/ML
2 INJECTION, SOLUTION INTRAMUSCULAR; INTRAVENOUS
Status: DISCONTINUED | OUTPATIENT
Start: 2023-10-31 | End: 2023-11-06

## 2023-10-31 RX ORDER — CALCIUM GLUCONATE 94 MG/ML
1000 INJECTION, SOLUTION INTRAVENOUS ONCE
Status: DISCONTINUED | OUTPATIENT
Start: 2023-10-31 | End: 2023-10-31

## 2023-10-31 RX ORDER — CHLORHEXIDINE GLUCONATE ORAL RINSE 1.2 MG/ML
15 SOLUTION DENTAL 2 TIMES DAILY
Status: DISCONTINUED | OUTPATIENT
Start: 2023-10-31 | End: 2023-11-02

## 2023-10-31 RX ORDER — FENTANYL CITRATE-0.9 % NACL/PF 10 MCG/ML
0-200 PLASTIC BAG, INJECTION (ML) INTRAVENOUS CONTINUOUS
Status: DISCONTINUED | OUTPATIENT
Start: 2023-10-31 | End: 2023-10-31

## 2023-10-31 RX ADMIN — HEPARIN SODIUM 5000 UNITS: 5000 INJECTION INTRAVENOUS; SUBCUTANEOUS at 13:33

## 2023-10-31 RX ADMIN — FENTANYL CITRATE 75 MCG/HR: 0.05 INJECTION, SOLUTION INTRAMUSCULAR; INTRAVENOUS at 09:42

## 2023-10-31 RX ADMIN — SODIUM CHLORIDE 3000 MG: 900 INJECTION INTRAVENOUS at 14:12

## 2023-10-31 RX ADMIN — SODIUM CHLORIDE, PRESERVATIVE FREE 10 ML: 5 INJECTION INTRAVENOUS at 07:55

## 2023-10-31 RX ADMIN — LEVETIRACETAM 500 MG: 100 INJECTION, SOLUTION INTRAVENOUS at 20:10

## 2023-10-31 RX ADMIN — MORPHINE SULFATE 2 MG: 2 INJECTION, SOLUTION INTRAMUSCULAR; INTRAVENOUS at 14:46

## 2023-10-31 RX ADMIN — PROPOFOL 50 MCG/KG/MIN: 10 INJECTION, EMULSION INTRAVENOUS at 01:08

## 2023-10-31 RX ADMIN — CHLORHEXIDINE GLUCONATE 15 ML: 1.2 RINSE ORAL at 20:08

## 2023-10-31 RX ADMIN — ATORVASTATIN CALCIUM 20 MG: 20 TABLET, FILM COATED ORAL at 20:09

## 2023-10-31 RX ADMIN — CHLORHEXIDINE GLUCONATE 15 ML: 1.2 RINSE ORAL at 12:29

## 2023-10-31 RX ADMIN — SODIUM CHLORIDE, PRESERVATIVE FREE 10 ML: 5 INJECTION INTRAVENOUS at 20:09

## 2023-10-31 RX ADMIN — IPRATROPIUM BROMIDE AND ALBUTEROL SULFATE 1 DOSE: .5; 3 SOLUTION RESPIRATORY (INHALATION) at 13:27

## 2023-10-31 RX ADMIN — SODIUM CHLORIDE 3000 MG: 900 INJECTION INTRAVENOUS at 06:03

## 2023-10-31 RX ADMIN — FENTANYL CITRATE 50 MCG: 0.05 INJECTION, SOLUTION INTRAMUSCULAR; INTRAVENOUS at 00:41

## 2023-10-31 RX ADMIN — MAGNESIUM OXIDE TAB 400 MG (241.3 MG ELEMENTAL MG) 400 MG: 400 (241.3 MG) TAB at 06:03

## 2023-10-31 RX ADMIN — SODIUM CHLORIDE, PRESERVATIVE FREE 10 ML: 5 INJECTION INTRAVENOUS at 07:54

## 2023-10-31 RX ADMIN — Medication 60 MG: at 14:20

## 2023-10-31 RX ADMIN — SODIUM CHLORIDE 3000 MG: 900 INJECTION INTRAVENOUS at 21:53

## 2023-10-31 RX ADMIN — Medication 60 MG: at 02:49

## 2023-10-31 RX ADMIN — PROPOFOL 50 MCG/KG/MIN: 10 INJECTION, EMULSION INTRAVENOUS at 05:30

## 2023-10-31 RX ADMIN — SODIUM CHLORIDE: 9 INJECTION, SOLUTION INTRAVENOUS at 08:02

## 2023-10-31 RX ADMIN — HEPARIN SODIUM 5000 UNITS: 5000 INJECTION INTRAVENOUS; SUBCUTANEOUS at 21:53

## 2023-10-31 RX ADMIN — Medication 60 MG: at 10:12

## 2023-10-31 RX ADMIN — MORPHINE SULFATE 2 MG: 2 INJECTION, SOLUTION INTRAMUSCULAR; INTRAVENOUS at 19:01

## 2023-10-31 RX ADMIN — PROPOFOL 50 MCG/KG/MIN: 10 INJECTION, EMULSION INTRAVENOUS at 07:08

## 2023-10-31 RX ADMIN — PROPOFOL 50 MCG/KG/MIN: 10 INJECTION, EMULSION INTRAVENOUS at 03:12

## 2023-10-31 RX ADMIN — Medication 60 MG: at 06:53

## 2023-10-31 RX ADMIN — PROPOFOL 45 MCG/KG/MIN: 10 INJECTION, EMULSION INTRAVENOUS at 11:12

## 2023-10-31 RX ADMIN — Medication 60 MG: at 17:54

## 2023-10-31 RX ADMIN — MORPHINE SULFATE 2 MG: 2 INJECTION, SOLUTION INTRAMUSCULAR; INTRAVENOUS at 23:07

## 2023-10-31 RX ADMIN — BUTALBITAL, ACETAMINOPHEN, AND CAFFEINE 1 TABLET: 325; 50; 40 TABLET ORAL at 17:14

## 2023-10-31 RX ADMIN — ACETAMINOPHEN 650 MG: 325 TABLET ORAL at 16:07

## 2023-10-31 RX ADMIN — LEVETIRACETAM 500 MG: 100 INJECTION, SOLUTION INTRAVENOUS at 08:30

## 2023-10-31 RX ADMIN — ACETAMINOPHEN 650 MG: 325 TABLET ORAL at 03:48

## 2023-10-31 RX ADMIN — BUTALBITAL, ACETAMINOPHEN, AND CAFFEINE 1 TABLET: 325; 50; 40 TABLET ORAL at 13:34

## 2023-10-31 RX ADMIN — PANTOPRAZOLE SODIUM 40 MG: 40 INJECTION, POWDER, FOR SOLUTION INTRAVENOUS at 07:54

## 2023-10-31 RX ADMIN — Medication 60 MG: at 22:48

## 2023-10-31 RX ADMIN — PHENOL 1 SPRAY: 1.4 SPRAY ORAL at 14:09

## 2023-10-31 RX ADMIN — FENTANYL CITRATE 50 MCG/HR: 0.05 INJECTION, SOLUTION INTRAMUSCULAR; INTRAVENOUS at 02:29

## 2023-10-31 RX ADMIN — POTASSIUM BICARBONATE 20 MEQ: 782 TABLET, EFFERVESCENT ORAL at 06:03

## 2023-10-31 RX ADMIN — ACETAMINOPHEN 650 MG: 325 TABLET ORAL at 20:19

## 2023-10-31 RX ADMIN — POTASSIUM BICARBONATE 40 MEQ: 782 TABLET, EFFERVESCENT ORAL at 17:02

## 2023-10-31 ASSESSMENT — PULMONARY FUNCTION TESTS
PIF_VALUE: 39
PIF_VALUE: 11
PIF_VALUE: 32
PIF_VALUE: 15

## 2023-10-31 ASSESSMENT — PAIN DESCRIPTION - LOCATION
LOCATION: HEAD

## 2023-10-31 ASSESSMENT — PAIN SCALES - GENERAL
PAINLEVEL_OUTOF10: 7
PAINLEVEL_OUTOF10: 6
PAINLEVEL_OUTOF10: 7
PAINLEVEL_OUTOF10: 9
PAINLEVEL_OUTOF10: 0
PAINLEVEL_OUTOF10: 3

## 2023-10-31 NOTE — CARE COORDINATION
Care Management Initial Assessment       RUR: 15% Moderate   Readmission? No     10/31/23 1519   Service Assessment   Patient Orientation Unable to Assess  (intubated)   Cognition Other (see comment)  (intubated)   History Provided By Child/Family  (Son Thersia Seip [phone])   [address] [facility] [place] is: Legal Next of Kin  (Son Thersia Seip)   PCP Verified by CM Yes  (Dr Rachel Paz)   Last Visit to PCP Within last 3 months   Prior Functional Level Independent in ADLs/IADLs   Current Functional Level Assistance with the following:;Bathing;Dressing; Toileting;Feeding;Mobility   Can patient return to prior living arrangement Unknown at present   Ability to make needs known: Unable   Family able to assist with home care needs: Yes   Would you like for me to discuss the discharge plan with any other family members/significant others, and if so, who? Yes  (Son)   Financial Resources [employer] Resources None   Social/Functional History   Lives With Significant other   Type of [address] [facility] One level   Home Access Level entry   [address] [facility] Cane;Walker, standard;Oxygen   Receives Help From Family   ADL Assistance Independent   Homemaking Assistance Independent   Ambulation Assistance Independent   Transfer Assistance Independent   Active  Yes   Mode of Transportation Car   Occupation On disability   Discharge Planning   Living Arrangements Spouse/Significant Other   Current Services Prior To Admission [facility]. Medications No   One/Two Story Residence One story   History of falls? 0     To ED with right eye pain, headache. CT: diffuse SAH r/t ruptured Cerebral aneurysm. To OR for EVD, remains intubated on Cardene. Care management spoke with son Thersia Seip via phone to introduce self and explain role.    Patient lives independently with her SO in a ground level

## 2023-11-01 ENCOUNTER — APPOINTMENT (OUTPATIENT)
Facility: HOSPITAL | Age: 58
DRG: 021 | End: 2023-11-01
Payer: MEDICAID

## 2023-11-01 LAB
ANION GAP SERPL CALC-SCNC: 6 MMOL/L (ref 5–15)
BUN SERPL-MCNC: 4 MG/DL (ref 6–20)
BUN/CREAT SERPL: 11 (ref 12–20)
CALCIUM SERPL-MCNC: 8 MG/DL (ref 8.5–10.1)
CHLORIDE SERPL-SCNC: 110 MMOL/L (ref 97–108)
CO2 SERPL-SCNC: 28 MMOL/L (ref 21–32)
CREAT SERPL-MCNC: 0.36 MG/DL (ref 0.55–1.02)
ERYTHROCYTE [DISTWIDTH] IN BLOOD BY AUTOMATED COUNT: 16.1 % (ref 11.5–14.5)
GLUCOSE SERPL-MCNC: 118 MG/DL (ref 65–100)
HCT VFR BLD AUTO: 33.6 % (ref 35–47)
HGB BLD-MCNC: 10.3 G/DL (ref 11.5–16)
MAGNESIUM SERPL-MCNC: 2.2 MG/DL (ref 1.6–2.4)
MCH RBC QN AUTO: 26.3 PG (ref 26–34)
MCHC RBC AUTO-ENTMCNC: 30.7 G/DL (ref 30–36.5)
MCV RBC AUTO: 85.9 FL (ref 80–99)
NRBC # BLD: 0 K/UL (ref 0–0.01)
NRBC BLD-RTO: 0 PER 100 WBC
PLATELET # BLD AUTO: 324 K/UL (ref 150–400)
PMV BLD AUTO: 9.8 FL (ref 8.9–12.9)
POTASSIUM SERPL-SCNC: 3.2 MMOL/L (ref 3.5–5.1)
POTASSIUM SERPL-SCNC: 3.4 MMOL/L (ref 3.5–5.1)
RBC # BLD AUTO: 3.91 M/UL (ref 3.8–5.2)
SODIUM SERPL-SCNC: 144 MMOL/L (ref 136–145)
WBC # BLD AUTO: 12.2 K/UL (ref 3.6–11)

## 2023-11-01 PROCEDURE — 94660 CPAP INITIATION&MGMT: CPT

## 2023-11-01 PROCEDURE — 6370000000 HC RX 637 (ALT 250 FOR IP): Performed by: NURSE PRACTITIONER

## 2023-11-01 PROCEDURE — 84132 ASSAY OF SERUM POTASSIUM: CPT

## 2023-11-01 PROCEDURE — 92610 EVALUATE SWALLOWING FUNCTION: CPT

## 2023-11-01 PROCEDURE — 97162 PT EVAL MOD COMPLEX 30 MIN: CPT

## 2023-11-01 PROCEDURE — C9113 INJ PANTOPRAZOLE SODIUM, VIA: HCPCS | Performed by: NURSE PRACTITIONER

## 2023-11-01 PROCEDURE — 80048 BASIC METABOLIC PNL TOTAL CA: CPT

## 2023-11-01 PROCEDURE — 71045 X-RAY EXAM CHEST 1 VIEW: CPT

## 2023-11-01 PROCEDURE — 6360000002 HC RX W HCPCS: Performed by: NURSE PRACTITIONER

## 2023-11-01 PROCEDURE — 2580000003 HC RX 258: Performed by: NURSE PRACTITIONER

## 2023-11-01 PROCEDURE — 2000000000 HC ICU R&B

## 2023-11-01 PROCEDURE — 2700000000 HC OXYGEN THERAPY PER DAY

## 2023-11-01 PROCEDURE — 6360000002 HC RX W HCPCS: Performed by: INTERNAL MEDICINE

## 2023-11-01 PROCEDURE — 0BH17EZ INSERTION OF ENDOTRACHEAL AIRWAY INTO TRACHEA, VIA NATURAL OR ARTIFICIAL OPENING: ICD-10-PCS | Performed by: FAMILY MEDICINE

## 2023-11-01 PROCEDURE — 99024 POSTOP FOLLOW-UP VISIT: CPT | Performed by: NURSE PRACTITIONER

## 2023-11-01 PROCEDURE — 83735 ASSAY OF MAGNESIUM: CPT

## 2023-11-01 PROCEDURE — 2580000003 HC RX 258: Performed by: INTERNAL MEDICINE

## 2023-11-01 PROCEDURE — 5A1955Z RESPIRATORY VENTILATION, GREATER THAN 96 CONSECUTIVE HOURS: ICD-10-PCS | Performed by: FAMILY MEDICINE

## 2023-11-01 PROCEDURE — 99233 SBSQ HOSP IP/OBS HIGH 50: CPT | Performed by: NURSE PRACTITIONER

## 2023-11-01 PROCEDURE — 97166 OT EVAL MOD COMPLEX 45 MIN: CPT

## 2023-11-01 PROCEDURE — 6370000000 HC RX 637 (ALT 250 FOR IP): Performed by: INTERNAL MEDICINE

## 2023-11-01 PROCEDURE — 92522 EVALUATE SPEECH PRODUCTION: CPT

## 2023-11-01 PROCEDURE — 85027 COMPLETE CBC AUTOMATED: CPT

## 2023-11-01 PROCEDURE — 36415 COLL VENOUS BLD VENIPUNCTURE: CPT

## 2023-11-01 PROCEDURE — 6360000002 HC RX W HCPCS: Performed by: PSYCHIATRY & NEUROLOGY

## 2023-11-01 PROCEDURE — 97110 THERAPEUTIC EXERCISES: CPT

## 2023-11-01 PROCEDURE — 2580000003 HC RX 258: Performed by: PSYCHIATRY & NEUROLOGY

## 2023-11-01 PROCEDURE — A4216 STERILE WATER/SALINE, 10 ML: HCPCS | Performed by: NURSE PRACTITIONER

## 2023-11-01 RX ORDER — 0.9 % SODIUM CHLORIDE 0.9 %
1000 INTRAVENOUS SOLUTION INTRAVENOUS ONCE
Status: COMPLETED | OUTPATIENT
Start: 2023-11-01 | End: 2023-11-01

## 2023-11-01 RX ORDER — POTASSIUM CHLORIDE 750 MG/1
40 TABLET, FILM COATED, EXTENDED RELEASE ORAL ONCE
Status: COMPLETED | OUTPATIENT
Start: 2023-11-01 | End: 2023-11-01

## 2023-11-01 RX ORDER — LORAZEPAM 0.5 MG/1
0.5 TABLET ORAL 2 TIMES DAILY PRN
Status: DISCONTINUED | OUTPATIENT
Start: 2023-11-01 | End: 2023-11-02

## 2023-11-01 RX ORDER — TRAZODONE HYDROCHLORIDE 50 MG/1
50 TABLET ORAL NIGHTLY
Status: DISCONTINUED | OUTPATIENT
Start: 2023-11-01 | End: 2023-11-26 | Stop reason: HOSPADM

## 2023-11-01 RX ORDER — IPRATROPIUM BROMIDE AND ALBUTEROL SULFATE 2.5; .5 MG/3ML; MG/3ML
1 SOLUTION RESPIRATORY (INHALATION) EVERY 4 HOURS PRN
Status: DISCONTINUED | OUTPATIENT
Start: 2023-11-01 | End: 2023-11-26 | Stop reason: HOSPADM

## 2023-11-01 RX ORDER — SODIUM CHLORIDE, SODIUM LACTATE, POTASSIUM CHLORIDE, CALCIUM CHLORIDE 600; 310; 30; 20 MG/100ML; MG/100ML; MG/100ML; MG/100ML
INJECTION, SOLUTION INTRAVENOUS CONTINUOUS
Status: DISCONTINUED | OUTPATIENT
Start: 2023-11-01 | End: 2023-11-01

## 2023-11-01 RX ORDER — SODIUM CHLORIDE, SODIUM LACTATE, POTASSIUM CHLORIDE, AND CALCIUM CHLORIDE .6; .31; .03; .02 G/100ML; G/100ML; G/100ML; G/100ML
1000 INJECTION, SOLUTION INTRAVENOUS ONCE
Status: COMPLETED | OUTPATIENT
Start: 2023-11-01 | End: 2023-11-01

## 2023-11-01 RX ORDER — PANTOPRAZOLE SODIUM 40 MG/1
40 TABLET, DELAYED RELEASE ORAL
Status: DISCONTINUED | OUTPATIENT
Start: 2023-11-02 | End: 2023-11-05

## 2023-11-01 RX ORDER — NIMODIPINE 30 MG/1
60 CAPSULE, LIQUID FILLED ORAL
Status: DISCONTINUED | OUTPATIENT
Start: 2023-11-01 | End: 2023-11-04

## 2023-11-01 RX ORDER — SENNA AND DOCUSATE SODIUM 50; 8.6 MG/1; MG/1
2 TABLET, FILM COATED ORAL 2 TIMES DAILY
Status: DISCONTINUED | OUTPATIENT
Start: 2023-11-01 | End: 2023-11-02

## 2023-11-01 RX ORDER — LEVETIRACETAM 500 MG/1
500 TABLET ORAL 2 TIMES DAILY
Status: DISCONTINUED | OUTPATIENT
Start: 2023-11-01 | End: 2023-11-03

## 2023-11-01 RX ADMIN — SODIUM CHLORIDE: 9 INJECTION, SOLUTION INTRAVENOUS at 08:46

## 2023-11-01 RX ADMIN — LEVETIRACETAM 500 MG: 100 INJECTION, SOLUTION INTRAVENOUS at 08:30

## 2023-11-01 RX ADMIN — LORAZEPAM 0.5 MG: 0.5 TABLET ORAL at 21:01

## 2023-11-01 RX ADMIN — BUTALBITAL, ACETAMINOPHEN, AND CAFFEINE 1 TABLET: 325; 50; 40 TABLET ORAL at 09:07

## 2023-11-01 RX ADMIN — SENNOSIDES AND DOCUSATE SODIUM 2 TABLET: 50; 8.6 TABLET ORAL at 21:00

## 2023-11-01 RX ADMIN — SODIUM CHLORIDE 3000 MG: 900 INJECTION INTRAVENOUS at 05:48

## 2023-11-01 RX ADMIN — HEPARIN SODIUM 5000 UNITS: 5000 INJECTION INTRAVENOUS; SUBCUTANEOUS at 05:48

## 2023-11-01 RX ADMIN — HEPARIN SODIUM 5000 UNITS: 5000 INJECTION INTRAVENOUS; SUBCUTANEOUS at 22:15

## 2023-11-01 RX ADMIN — SODIUM CHLORIDE, PRESERVATIVE FREE 10 ML: 5 INJECTION INTRAVENOUS at 08:25

## 2023-11-01 RX ADMIN — SODIUM CHLORIDE 3000 MG: 900 INJECTION INTRAVENOUS at 15:24

## 2023-11-01 RX ADMIN — MORPHINE SULFATE 2 MG: 2 INJECTION, SOLUTION INTRAMUSCULAR; INTRAVENOUS at 05:48

## 2023-11-01 RX ADMIN — BUTALBITAL, ACETAMINOPHEN, AND CAFFEINE 1 TABLET: 325; 50; 40 TABLET ORAL at 17:58

## 2023-11-01 RX ADMIN — BUTALBITAL, ACETAMINOPHEN, AND CAFFEINE 1 TABLET: 325; 50; 40 TABLET ORAL at 22:15

## 2023-11-01 RX ADMIN — NIMODIPINE 60 MG: 30 CAPSULE ORAL at 15:24

## 2023-11-01 RX ADMIN — SODIUM CHLORIDE 1000 ML: 9 INJECTION, SOLUTION INTRAVENOUS at 03:25

## 2023-11-01 RX ADMIN — PANTOPRAZOLE SODIUM 40 MG: 40 INJECTION, POWDER, FOR SOLUTION INTRAVENOUS at 08:23

## 2023-11-01 RX ADMIN — Medication 60 MG: at 06:18

## 2023-11-01 RX ADMIN — SODIUM CHLORIDE 3000 MG: 900 INJECTION INTRAVENOUS at 22:22

## 2023-11-01 RX ADMIN — POTASSIUM CHLORIDE 40 MEQ: 750 TABLET, FILM COATED, EXTENDED RELEASE ORAL at 06:18

## 2023-11-01 RX ADMIN — CHLORHEXIDINE GLUCONATE 15 ML: 1.2 RINSE ORAL at 21:47

## 2023-11-01 RX ADMIN — Medication 60 MG: at 02:39

## 2023-11-01 RX ADMIN — HEPARIN SODIUM 5000 UNITS: 5000 INJECTION INTRAVENOUS; SUBCUTANEOUS at 13:58

## 2023-11-01 RX ADMIN — Medication 60 MG: at 10:58

## 2023-11-01 RX ADMIN — SODIUM CHLORIDE, PRESERVATIVE FREE 10 ML: 5 INJECTION INTRAVENOUS at 08:24

## 2023-11-01 RX ADMIN — ATORVASTATIN CALCIUM 20 MG: 20 TABLET, FILM COATED ORAL at 21:01

## 2023-11-01 RX ADMIN — POTASSIUM CHLORIDE 40 MEQ: 750 TABLET, FILM COATED, EXTENDED RELEASE ORAL at 20:56

## 2023-11-01 RX ADMIN — TRAZODONE HYDROCHLORIDE 50 MG: 50 TABLET ORAL at 03:25

## 2023-11-01 RX ADMIN — NIMODIPINE 60 MG: 30 CAPSULE ORAL at 19:02

## 2023-11-01 RX ADMIN — BUTALBITAL, ACETAMINOPHEN, AND CAFFEINE 1 TABLET: 325; 50; 40 TABLET ORAL at 12:56

## 2023-11-01 RX ADMIN — SODIUM CHLORIDE: 9 INJECTION, SOLUTION INTRAVENOUS at 15:18

## 2023-11-01 RX ADMIN — MORPHINE SULFATE 2 MG: 2 INJECTION, SOLUTION INTRAMUSCULAR; INTRAVENOUS at 02:39

## 2023-11-01 RX ADMIN — LEVETIRACETAM 500 MG: 500 TABLET, FILM COATED ORAL at 20:59

## 2023-11-01 RX ADMIN — SODIUM CHLORIDE, POTASSIUM CHLORIDE, SODIUM LACTATE AND CALCIUM CHLORIDE 1000 ML: 600; 310; 30; 20 INJECTION, SOLUTION INTRAVENOUS at 08:46

## 2023-11-01 RX ADMIN — SODIUM CHLORIDE: 9 INJECTION, SOLUTION INTRAVENOUS at 01:14

## 2023-11-01 RX ADMIN — POTASSIUM CHLORIDE 40 MEQ: 750 TABLET, FILM COATED, EXTENDED RELEASE ORAL at 10:58

## 2023-11-01 RX ADMIN — SENNOSIDES AND DOCUSATE SODIUM 2 TABLET: 50; 8.6 TABLET ORAL at 15:25

## 2023-11-01 RX ADMIN — SODIUM CHLORIDE, PRESERVATIVE FREE 10 ML: 5 INJECTION INTRAVENOUS at 21:48

## 2023-11-01 RX ADMIN — NIMODIPINE 60 MG: 30 CAPSULE ORAL at 22:15

## 2023-11-01 ASSESSMENT — PAIN DESCRIPTION - ORIENTATION
ORIENTATION: ANTERIOR
ORIENTATION: MID
ORIENTATION: RIGHT
ORIENTATION: MID
ORIENTATION: RIGHT
ORIENTATION: ANTERIOR
ORIENTATION: MID

## 2023-11-01 ASSESSMENT — PAIN SCALES - GENERAL
PAINLEVEL_OUTOF10: 7
PAINLEVEL_OUTOF10: 0
PAINLEVEL_OUTOF10: 4
PAINLEVEL_OUTOF10: 0
PAINLEVEL_OUTOF10: 5
PAINLEVEL_OUTOF10: 8
PAINLEVEL_OUTOF10: 1
PAINLEVEL_OUTOF10: 0
PAINLEVEL_OUTOF10: 7
PAINLEVEL_OUTOF10: 4
PAINLEVEL_OUTOF10: 5
PAINLEVEL_OUTOF10: 8
PAINLEVEL_OUTOF10: 5

## 2023-11-01 ASSESSMENT — PAIN DESCRIPTION - LOCATION
LOCATION: HEAD
LOCATION: HEAD
LOCATION: ABDOMEN
LOCATION: HEAD

## 2023-11-01 ASSESSMENT — PAIN DESCRIPTION - DESCRIPTORS
DESCRIPTORS: ACHING
DESCRIPTORS: ACHING
DESCRIPTORS: SORE
DESCRIPTORS: THROBBING
DESCRIPTORS: DULL
DESCRIPTORS: CRAMPING

## 2023-11-02 ENCOUNTER — APPOINTMENT (OUTPATIENT)
Facility: HOSPITAL | Age: 58
DRG: 021 | End: 2023-11-02
Payer: MEDICAID

## 2023-11-02 PROBLEM — G91.0 COMMUNICATING HYDROCEPHALUS (HCC): Status: ACTIVE | Noted: 2023-11-02

## 2023-11-02 LAB
ANION GAP SERPL CALC-SCNC: 5 MMOL/L (ref 5–15)
BUN SERPL-MCNC: 4 MG/DL (ref 6–20)
BUN/CREAT SERPL: 8 (ref 12–20)
CALCIUM SERPL-MCNC: 8.6 MG/DL (ref 8.5–10.1)
CHLORIDE SERPL-SCNC: 113 MMOL/L (ref 97–108)
CO2 SERPL-SCNC: 24 MMOL/L (ref 21–32)
CREAT SERPL-MCNC: 0.5 MG/DL (ref 0.55–1.02)
ECHO BSA: 2.56 M2
ECHO BSA: 2.62 M2
ERYTHROCYTE [DISTWIDTH] IN BLOOD BY AUTOMATED COUNT: 15.9 % (ref 11.5–14.5)
GLUCOSE SERPL-MCNC: 154 MG/DL (ref 65–100)
HCT VFR BLD AUTO: 31.7 % (ref 35–47)
HGB BLD-MCNC: 9.7 G/DL (ref 11.5–16)
MAGNESIUM SERPL-MCNC: 2.1 MG/DL (ref 1.6–2.4)
MCH RBC QN AUTO: 26.6 PG (ref 26–34)
MCHC RBC AUTO-ENTMCNC: 30.6 G/DL (ref 30–36.5)
MCV RBC AUTO: 86.8 FL (ref 80–99)
NRBC # BLD: 0 K/UL (ref 0–0.01)
NRBC BLD-RTO: 0 PER 100 WBC
PHOSPHATE SERPL-MCNC: 1.5 MG/DL (ref 2.6–4.7)
PLATELET # BLD AUTO: 305 K/UL (ref 150–400)
PMV BLD AUTO: 10.1 FL (ref 8.9–12.9)
POTASSIUM SERPL-SCNC: 3.6 MMOL/L (ref 3.5–5.1)
RBC # BLD AUTO: 3.65 M/UL (ref 3.8–5.2)
SODIUM SERPL-SCNC: 142 MMOL/L (ref 136–145)
VAS BASILAR ARTERY EDV: 26.1 CM/S
VAS BASILAR ARTERY EDV: 38.4 CM/S
VAS BASILAR ARTERY MEAN VEL: 42 CM/S
VAS BASILAR ARTERY MEAN VEL: 61 CM/S
VAS BASILAR ARTERY PSV: 108.4 CM/S
VAS BASILAR ARTERY PSV: 72.7 CM/S
VAS LEFT ACA EDV: 32.8 CM/S
VAS LEFT ACA EDV: 75.8 CM/S
VAS LEFT ACA MEAN VEL: 109.9 CM/S
VAS LEFT ACA MEAN VEL: 51.7 CM/S
VAS LEFT ACA PSV: 178 CM/S
VAS LEFT ACA PSV: 89.4 CM/S
VAS LEFT EX ICA MEAN VEL: 29 CM/S
VAS LEFT EX ICA MEAN VEL: 32 CM/S
VAS LEFT ICA DIST EDV: 17.9 CM/S
VAS LEFT ICA DIST EDV: 23.1 CM/S
VAS LEFT ICA DIST PSV: 49.6 CM/S
VAS LEFT ICA DIST PSV: 51.7 CM/S
VAS LEFT ICA EDV: 28.9 CM/S
VAS LEFT ICA MEAN VEL: 48.3 CM/S
VAS LEFT ICA PSV: 87.2 CM/S
VAS LEFT LINDEGAARD RATIO: 1.9
VAS LEFT LINDEGAARD RATIO: 9.1
VAS LEFT MCA 1 EDV: 214.6 CM/S
VAS LEFT MCA 1 EDV: 34.9 CM/S
VAS LEFT MCA 1 MEAN VEL: 293.1 CM/S
VAS LEFT MCA 1 MEAN VEL: 54.9 CM/S
VAS LEFT MCA 1 PSV: 450 CM/S
VAS LEFT MCA 1 PSV: 94.8 CM/S
VAS LEFT PCA 1 EDV: 23.2 CM/S
VAS LEFT PCA 1 EDV: 64.1 CM/S
VAS LEFT PCA 1 MEAN VEL: 35.3 CM/S
VAS LEFT PCA 1 MEAN VEL: 90.4 CM/S
VAS LEFT PCA 1 PSV: 143 CM/S
VAS LEFT PCA 1 PSV: 59.5 CM/S
VAS LEFT VERTEBRAL EDV TCD: 26.1 CM/S
VAS LEFT VERTEBRAL MEAN VEL: 38.9 CM/S
VAS LEFT VERTEBRAL PSV TCD: 64.5 CM/S
VAS RIGHT EX ICA MEAN VEL: 29 CM/S
VAS RIGHT EX ICA MEAN VEL: 38 CM/S
VAS RIGHT ICA DIST EDV: 17.3 CM/S
VAS RIGHT ICA DIST EDV: 25.6 CM/S
VAS RIGHT ICA DIST PSV: 52.9 CM/S
VAS RIGHT ICA DIST PSV: 61.8 CM/S
VAS RIGHT PCA 1 EDV: 39.8 CM/S
VAS RIGHT PCA 1 MEAN VEL: 58.1 CM/S
VAS RIGHT PCA 1 PSV: 94.8 CM/S
VAS RIGHT VERTEBRAL EDV TCD: 25.2 CM/S
VAS RIGHT VERTEBRAL MEAN VEL: 39.2 CM/S
VAS RIGHT VERTEBRAL PSV TCD: 67.2 CM/S
WBC # BLD AUTO: 11.4 K/UL (ref 3.6–11)

## 2023-11-02 PROCEDURE — 6360000002 HC RX W HCPCS: Performed by: PSYCHIATRY & NEUROLOGY

## 2023-11-02 PROCEDURE — 94660 CPAP INITIATION&MGMT: CPT

## 2023-11-02 PROCEDURE — 6370000000 HC RX 637 (ALT 250 FOR IP): Performed by: NURSE PRACTITIONER

## 2023-11-02 PROCEDURE — 2580000003 HC RX 258: Performed by: NURSE PRACTITIONER

## 2023-11-02 PROCEDURE — 84100 ASSAY OF PHOSPHORUS: CPT

## 2023-11-02 PROCEDURE — 6360000002 HC RX W HCPCS: Performed by: NURSE PRACTITIONER

## 2023-11-02 PROCEDURE — 97530 THERAPEUTIC ACTIVITIES: CPT

## 2023-11-02 PROCEDURE — 36415 COLL VENOUS BLD VENIPUNCTURE: CPT

## 2023-11-02 PROCEDURE — 6370000000 HC RX 637 (ALT 250 FOR IP): Performed by: INTERNAL MEDICINE

## 2023-11-02 PROCEDURE — 99024 POSTOP FOLLOW-UP VISIT: CPT | Performed by: NURSE PRACTITIONER

## 2023-11-02 PROCEDURE — 2580000003 HC RX 258: Performed by: PSYCHIATRY & NEUROLOGY

## 2023-11-02 PROCEDURE — 99233 SBSQ HOSP IP/OBS HIGH 50: CPT

## 2023-11-02 PROCEDURE — 80048 BASIC METABOLIC PNL TOTAL CA: CPT

## 2023-11-02 PROCEDURE — 93886 INTRACRANIAL COMPLETE STUDY: CPT

## 2023-11-02 PROCEDURE — 97110 THERAPEUTIC EXERCISES: CPT

## 2023-11-02 PROCEDURE — 2000000000 HC ICU R&B

## 2023-11-02 PROCEDURE — 85027 COMPLETE CBC AUTOMATED: CPT

## 2023-11-02 PROCEDURE — 2500000003 HC RX 250 WO HCPCS: Performed by: NURSE PRACTITIONER

## 2023-11-02 PROCEDURE — 93888 INTRACRANIAL LIMITED STUDY: CPT

## 2023-11-02 PROCEDURE — 6360000002 HC RX W HCPCS: Performed by: INTERNAL MEDICINE

## 2023-11-02 PROCEDURE — 83735 ASSAY OF MAGNESIUM: CPT

## 2023-11-02 PROCEDURE — 2700000000 HC OXYGEN THERAPY PER DAY

## 2023-11-02 RX ORDER — NICOTINE 21 MG/24HR
1 PATCH, TRANSDERMAL 24 HOURS TRANSDERMAL DAILY
Status: DISCONTINUED | OUTPATIENT
Start: 2023-11-02 | End: 2023-11-15

## 2023-11-02 RX ORDER — ALPRAZOLAM 0.5 MG/1
1 TABLET ORAL EVERY 8 HOURS PRN
Status: DISCONTINUED | OUTPATIENT
Start: 2023-11-02 | End: 2023-11-26 | Stop reason: HOSPADM

## 2023-11-02 RX ORDER — HYDRALAZINE HYDROCHLORIDE 20 MG/ML
20 INJECTION INTRAMUSCULAR; INTRAVENOUS EVERY 4 HOURS PRN
Status: DISCONTINUED | OUTPATIENT
Start: 2023-11-02 | End: 2023-11-13

## 2023-11-02 RX ORDER — 0.9 % SODIUM CHLORIDE 0.9 %
500 INTRAVENOUS SOLUTION INTRAVENOUS ONCE
Status: COMPLETED | OUTPATIENT
Start: 2023-11-02 | End: 2023-11-02

## 2023-11-02 RX ORDER — CLONIDINE HYDROCHLORIDE 0.1 MG/1
0.1 TABLET ORAL 2 TIMES DAILY
Status: DISCONTINUED | OUTPATIENT
Start: 2023-11-02 | End: 2023-11-03

## 2023-11-02 RX ORDER — OXYCODONE HYDROCHLORIDE 5 MG/1
5 TABLET ORAL ONCE
Status: COMPLETED | OUTPATIENT
Start: 2023-11-02 | End: 2023-11-02

## 2023-11-02 RX ADMIN — NIMODIPINE 60 MG: 30 CAPSULE ORAL at 21:03

## 2023-11-02 RX ADMIN — BUTALBITAL, ACETAMINOPHEN, AND CAFFEINE 1 TABLET: 325; 50; 40 TABLET ORAL at 14:31

## 2023-11-02 RX ADMIN — SODIUM CHLORIDE, PRESERVATIVE FREE 10 ML: 5 INJECTION INTRAVENOUS at 09:26

## 2023-11-02 RX ADMIN — LABETALOL HYDROCHLORIDE 20 MG: 5 INJECTION INTRAVENOUS at 14:49

## 2023-11-02 RX ADMIN — TRAZODONE HYDROCHLORIDE 50 MG: 50 TABLET ORAL at 20:04

## 2023-11-02 RX ADMIN — MORPHINE SULFATE 2 MG: 2 INJECTION, SOLUTION INTRAMUSCULAR; INTRAVENOUS at 21:33

## 2023-11-02 RX ADMIN — BUTALBITAL, ACETAMINOPHEN, AND CAFFEINE 1 TABLET: 325; 50; 40 TABLET ORAL at 18:36

## 2023-11-02 RX ADMIN — BUTALBITAL, ACETAMINOPHEN, AND CAFFEINE 1 TABLET: 325; 50; 40 TABLET ORAL at 02:01

## 2023-11-02 RX ADMIN — NIMODIPINE 60 MG: 30 CAPSULE ORAL at 15:04

## 2023-11-02 RX ADMIN — NIMODIPINE 60 MG: 30 CAPSULE ORAL at 18:36

## 2023-11-02 RX ADMIN — LABETALOL HYDROCHLORIDE 20 MG: 5 INJECTION INTRAVENOUS at 21:36

## 2023-11-02 RX ADMIN — LABETALOL HYDROCHLORIDE 20 MG: 5 INJECTION INTRAVENOUS at 18:36

## 2023-11-02 RX ADMIN — SENNOSIDES AND DOCUSATE SODIUM 2 TABLET: 50; 8.6 TABLET ORAL at 09:26

## 2023-11-02 RX ADMIN — POTASSIUM PHOSPHATE, MONOBASIC POTASSIUM PHOSPHATE, DIBASIC 20 MMOL: 224; 236 INJECTION, SOLUTION, CONCENTRATE INTRAVENOUS at 09:26

## 2023-11-02 RX ADMIN — BUTALBITAL, ACETAMINOPHEN, AND CAFFEINE 1 TABLET: 325; 50; 40 TABLET ORAL at 06:00

## 2023-11-02 RX ADMIN — SODIUM CHLORIDE 3000 MG: 900 INJECTION INTRAVENOUS at 14:29

## 2023-11-02 RX ADMIN — SODIUM CHLORIDE, PRESERVATIVE FREE 10 ML: 5 INJECTION INTRAVENOUS at 20:06

## 2023-11-02 RX ADMIN — PANTOPRAZOLE SODIUM 40 MG: 40 TABLET, DELAYED RELEASE ORAL at 06:00

## 2023-11-02 RX ADMIN — NIMODIPINE 60 MG: 30 CAPSULE ORAL at 06:00

## 2023-11-02 RX ADMIN — BUTALBITAL, ACETAMINOPHEN, AND CAFFEINE 1 TABLET: 325; 50; 40 TABLET ORAL at 22:31

## 2023-11-02 RX ADMIN — BUTALBITAL, ACETAMINOPHEN, AND CAFFEINE 1 TABLET: 325; 50; 40 TABLET ORAL at 09:53

## 2023-11-02 RX ADMIN — HYDRALAZINE HYDROCHLORIDE 20 MG: 20 INJECTION, SOLUTION INTRAMUSCULAR; INTRAVENOUS at 22:22

## 2023-11-02 RX ADMIN — NIMODIPINE 60 MG: 30 CAPSULE ORAL at 02:01

## 2023-11-02 RX ADMIN — ATORVASTATIN CALCIUM 20 MG: 20 TABLET, FILM COATED ORAL at 20:04

## 2023-11-02 RX ADMIN — LEVETIRACETAM 500 MG: 500 TABLET, FILM COATED ORAL at 21:03

## 2023-11-02 RX ADMIN — SODIUM CHLORIDE, PRESERVATIVE FREE 10 ML: 5 INJECTION INTRAVENOUS at 09:27

## 2023-11-02 RX ADMIN — LEVETIRACETAM 500 MG: 500 TABLET, FILM COATED ORAL at 09:26

## 2023-11-02 RX ADMIN — CLONIDINE HYDROCHLORIDE 0.1 MG: 0.1 TABLET ORAL at 22:31

## 2023-11-02 RX ADMIN — HEPARIN SODIUM 5000 UNITS: 5000 INJECTION INTRAVENOUS; SUBCUTANEOUS at 14:29

## 2023-11-02 RX ADMIN — HEPARIN SODIUM 5000 UNITS: 5000 INJECTION INTRAVENOUS; SUBCUTANEOUS at 06:00

## 2023-11-02 RX ADMIN — SODIUM CHLORIDE 500 ML: 9 INJECTION, SOLUTION INTRAVENOUS at 19:54

## 2023-11-02 RX ADMIN — OXYCODONE HYDROCHLORIDE 5 MG: 5 TABLET ORAL at 20:04

## 2023-11-02 RX ADMIN — NIMODIPINE 60 MG: 30 CAPSULE ORAL at 10:56

## 2023-11-02 RX ADMIN — HEPARIN SODIUM 5000 UNITS: 5000 INJECTION INTRAVENOUS; SUBCUTANEOUS at 21:33

## 2023-11-02 RX ADMIN — SODIUM CHLORIDE: 9 INJECTION, SOLUTION INTRAVENOUS at 11:54

## 2023-11-02 RX ADMIN — SODIUM CHLORIDE 3000 MG: 900 INJECTION INTRAVENOUS at 21:33

## 2023-11-02 RX ADMIN — LABETALOL HYDROCHLORIDE 20 MG: 5 INJECTION INTRAVENOUS at 09:53

## 2023-11-02 RX ADMIN — SODIUM CHLORIDE: 9 INJECTION, SOLUTION INTRAVENOUS at 18:07

## 2023-11-02 RX ADMIN — SODIUM CHLORIDE 3000 MG: 900 INJECTION INTRAVENOUS at 06:30

## 2023-11-02 RX ADMIN — ALPRAZOLAM 1 MG: 0.5 TABLET ORAL at 16:53

## 2023-11-02 ASSESSMENT — PAIN SCALES - GENERAL
PAINLEVEL_OUTOF10: 7
PAINLEVEL_OUTOF10: 4
PAINLEVEL_OUTOF10: 7
PAINLEVEL_OUTOF10: 4
PAINLEVEL_OUTOF10: 4
PAINLEVEL_OUTOF10: 5

## 2023-11-02 ASSESSMENT — PAIN DESCRIPTION - LOCATION
LOCATION: HEAD

## 2023-11-02 ASSESSMENT — PAIN DESCRIPTION - ORIENTATION
ORIENTATION: MID
ORIENTATION: ANTERIOR
ORIENTATION: ANTERIOR
ORIENTATION: MID
ORIENTATION: ANTERIOR

## 2023-11-02 ASSESSMENT — PAIN DESCRIPTION - DESCRIPTORS
DESCRIPTORS: ACHING
DESCRIPTORS: ACHING
DESCRIPTORS: DULL
DESCRIPTORS: ACHING
DESCRIPTORS: ACHING

## 2023-11-02 ASSESSMENT — ENCOUNTER SYMPTOMS
ABDOMINAL PAIN: 0
ABDOMINAL DISTENTION: 0
EYES NEGATIVE: 1
CHEST TIGHTNESS: 0
SHORTNESS OF BREATH: 0

## 2023-11-02 NOTE — CARE COORDINATION
Transition of Care Plan:    RUR: 15% Moderate   Prior Level of Functioning: Independent  Disposition: Rehab  If SNF or IPR: Date FOC offered:   Date FOC received:   Accepting facility:   Date authorization started with reference number:   Follow up appointments: Neurologist  PCP  DME needed: TBD  Transportation at discharge: BLS  Is patient a  and connected with [facility]? No  Caregiver Contact: Son Aislinn Sotelo   Discharge Caregiver contacted prior to discharge? Yes  Care Conference needed?  No  Barriers to discharge:    Patient with a SAH s/p coil embo L PCOM aneurysm  EVD placement for Hydrocephalus   Day 4/21 of [facility]  Daily TCD's   Care management is continuing to follow   Rikki Shay RN,Care Management

## 2023-11-03 ENCOUNTER — APPOINTMENT (OUTPATIENT)
Facility: HOSPITAL | Age: 58
DRG: 021 | End: 2023-11-03
Payer: MEDICAID

## 2023-11-03 LAB
ANION GAP SERPL CALC-SCNC: 5 MMOL/L (ref 5–15)
APPEARANCE UR: CLEAR
BACTERIA URNS QL MICRO: NEGATIVE /HPF
BILIRUB UR QL: NEGATIVE
BUN SERPL-MCNC: 6 MG/DL (ref 6–20)
BUN/CREAT SERPL: 12 (ref 12–20)
CALCIUM SERPL-MCNC: 8.4 MG/DL (ref 8.5–10.1)
CHLORIDE SERPL-SCNC: 108 MMOL/L (ref 97–108)
CO2 SERPL-SCNC: 26 MMOL/L (ref 21–32)
COLOR UR: ABNORMAL
CREAT SERPL-MCNC: 0.51 MG/DL (ref 0.55–1.02)
EPITH CASTS URNS QL MICRO: ABNORMAL /LPF
ERYTHROCYTE [DISTWIDTH] IN BLOOD BY AUTOMATED COUNT: 16 % (ref 11.5–14.5)
GLUCOSE SERPL-MCNC: 124 MG/DL (ref 65–100)
GLUCOSE UR STRIP.AUTO-MCNC: NEGATIVE MG/DL
HCT VFR BLD AUTO: 34.2 % (ref 35–47)
HGB BLD-MCNC: 10.2 G/DL (ref 11.5–16)
HGB UR QL STRIP: ABNORMAL
HYALINE CASTS URNS QL MICRO: ABNORMAL /LPF (ref 0–5)
KETONES UR QL STRIP.AUTO: NEGATIVE MG/DL
LEUKOCYTE ESTERASE UR QL STRIP.AUTO: NEGATIVE
MAGNESIUM SERPL-MCNC: 2 MG/DL (ref 1.6–2.4)
MCH RBC QN AUTO: 26.6 PG (ref 26–34)
MCHC RBC AUTO-ENTMCNC: 29.8 G/DL (ref 30–36.5)
MCV RBC AUTO: 89.1 FL (ref 80–99)
NITRITE UR QL STRIP.AUTO: NEGATIVE
NRBC # BLD: 0 K/UL (ref 0–0.01)
NRBC BLD-RTO: 0 PER 100 WBC
PH UR STRIP: 7 (ref 5–8)
PHOSPHATE SERPL-MCNC: 3.1 MG/DL (ref 2.6–4.7)
PLATELET # BLD AUTO: 322 K/UL (ref 150–400)
PMV BLD AUTO: 10.3 FL (ref 8.9–12.9)
POTASSIUM SERPL-SCNC: 3.5 MMOL/L (ref 3.5–5.1)
PROCALCITONIN SERPL-MCNC: 0.28 NG/ML
PROT UR STRIP-MCNC: NEGATIVE MG/DL
RBC # BLD AUTO: 3.84 M/UL (ref 3.8–5.2)
RBC #/AREA URNS HPF: ABNORMAL /HPF (ref 0–5)
SODIUM SERPL-SCNC: 139 MMOL/L (ref 136–145)
SP GR UR REFRACTOMETRY: 1.03 (ref 1–1.03)
URINE CULTURE IF INDICATED: ABNORMAL
UROBILINOGEN UR QL STRIP.AUTO: 0.2 EU/DL (ref 0.2–1)
WBC # BLD AUTO: 11.7 K/UL (ref 3.6–11)
WBC URNS QL MICRO: ABNORMAL /HPF (ref 0–4)

## 2023-11-03 PROCEDURE — B31D1ZZ FLUOROSCOPY OF RIGHT VERTEBRAL ARTERY USING LOW OSMOLAR CONTRAST: ICD-10-PCS | Performed by: PSYCHIATRY & NEUROLOGY

## 2023-11-03 PROCEDURE — 6370000000 HC RX 637 (ALT 250 FOR IP): Performed by: NURSE PRACTITIONER

## 2023-11-03 PROCEDURE — 83735 ASSAY OF MAGNESIUM: CPT

## 2023-11-03 PROCEDURE — 81001 URINALYSIS AUTO W/SCOPE: CPT

## 2023-11-03 PROCEDURE — 36225 PLACE CATH SUBCLAVIAN ART: CPT | Performed by: PSYCHIATRY & NEUROLOGY

## 2023-11-03 PROCEDURE — 76942 ECHO GUIDE FOR BIOPSY: CPT

## 2023-11-03 PROCEDURE — 6360000004 HC RX CONTRAST MEDICATION: Performed by: PSYCHIATRY & NEUROLOGY

## 2023-11-03 PROCEDURE — 2580000003 HC RX 258: Performed by: NURSE PRACTITIONER

## 2023-11-03 PROCEDURE — B3181ZZ FLUOROSCOPY OF BILATERAL INTERNAL CAROTID ARTERIES USING LOW OSMOLAR CONTRAST: ICD-10-PCS | Performed by: PSYCHIATRY & NEUROLOGY

## 2023-11-03 PROCEDURE — 6360000002 HC RX W HCPCS: Performed by: PSYCHIATRY & NEUROLOGY

## 2023-11-03 PROCEDURE — 84100 ASSAY OF PHOSPHORUS: CPT

## 2023-11-03 PROCEDURE — 2700000000 HC OXYGEN THERAPY PER DAY

## 2023-11-03 PROCEDURE — 99152 MOD SED SAME PHYS/QHP 5/>YRS: CPT | Performed by: PSYCHIATRY & NEUROLOGY

## 2023-11-03 PROCEDURE — 2580000003 HC RX 258: Performed by: PSYCHIATRY & NEUROLOGY

## 2023-11-03 PROCEDURE — 61650 EVASC PRLNG ADMN RX AGNT 1ST: CPT | Performed by: PSYCHIATRY & NEUROLOGY

## 2023-11-03 PROCEDURE — 61651 EVASC PRLNG ADMN RX AGNT ADD: CPT | Performed by: PSYCHIATRY & NEUROLOGY

## 2023-11-03 PROCEDURE — 36224 PLACE CATH CAROTD ART: CPT | Performed by: PSYCHIATRY & NEUROLOGY

## 2023-11-03 PROCEDURE — B3151ZZ FLUOROSCOPY OF BILATERAL COMMON CAROTID ARTERIES USING LOW OSMOLAR CONTRAST: ICD-10-PCS | Performed by: PSYCHIATRY & NEUROLOGY

## 2023-11-03 PROCEDURE — 6370000000 HC RX 637 (ALT 250 FOR IP): Performed by: INTERNAL MEDICINE

## 2023-11-03 PROCEDURE — 6360000002 HC RX W HCPCS: Performed by: NURSE PRACTITIONER

## 2023-11-03 PROCEDURE — 76937 US GUIDE VASCULAR ACCESS: CPT | Performed by: PSYCHIATRY & NEUROLOGY

## 2023-11-03 PROCEDURE — 6360000002 HC RX W HCPCS: Performed by: INTERNAL MEDICINE

## 2023-11-03 PROCEDURE — 3E0Q3GC INTRODUCTION OF OTHER THERAPEUTIC SUBSTANCE INTO CRANIAL CAVITY AND BRAIN, PERCUTANEOUS APPROACH: ICD-10-PCS | Performed by: PSYCHIATRY & NEUROLOGY

## 2023-11-03 PROCEDURE — 80048 BASIC METABOLIC PNL TOTAL CA: CPT

## 2023-11-03 PROCEDURE — 6370000000 HC RX 637 (ALT 250 FOR IP): Performed by: PSYCHIATRY & NEUROLOGY

## 2023-11-03 PROCEDURE — 99232 SBSQ HOSP IP/OBS MODERATE 35: CPT

## 2023-11-03 PROCEDURE — 99024 POSTOP FOLLOW-UP VISIT: CPT | Performed by: NURSE PRACTITIONER

## 2023-11-03 PROCEDURE — 2500000003 HC RX 250 WO HCPCS: Performed by: PSYCHIATRY & NEUROLOGY

## 2023-11-03 PROCEDURE — 36415 COLL VENOUS BLD VENIPUNCTURE: CPT

## 2023-11-03 PROCEDURE — 84145 PROCALCITONIN (PCT): CPT

## 2023-11-03 PROCEDURE — 94660 CPAP INITIATION&MGMT: CPT

## 2023-11-03 PROCEDURE — 85027 COMPLETE CBC AUTOMATED: CPT

## 2023-11-03 PROCEDURE — 2000000000 HC ICU R&B

## 2023-11-03 RX ORDER — HEPARIN SODIUM 1000 [USP'U]/ML
INJECTION, SOLUTION INTRAVENOUS; SUBCUTANEOUS PRN
Status: COMPLETED | OUTPATIENT
Start: 2023-11-03 | End: 2023-11-03

## 2023-11-03 RX ORDER — MIDAZOLAM HYDROCHLORIDE 2 MG/2ML
INJECTION, SOLUTION INTRAMUSCULAR; INTRAVENOUS PRN
Status: COMPLETED | OUTPATIENT
Start: 2023-11-03 | End: 2023-11-03

## 2023-11-03 RX ORDER — CILOSTAZOL 50 MG/1
50 TABLET ORAL 2 TIMES DAILY
Status: DISCONTINUED | OUTPATIENT
Start: 2023-11-03 | End: 2023-11-26 | Stop reason: HOSPADM

## 2023-11-03 RX ORDER — VERAPAMIL HYDROCHLORIDE 2.5 MG/ML
INJECTION, SOLUTION INTRAVENOUS PRN
Status: COMPLETED | OUTPATIENT
Start: 2023-11-03 | End: 2023-11-03

## 2023-11-03 RX ORDER — CLONIDINE HYDROCHLORIDE 0.1 MG/1
0.1 TABLET ORAL 3 TIMES DAILY
Status: DISCONTINUED | OUTPATIENT
Start: 2023-11-03 | End: 2023-11-05

## 2023-11-03 RX ORDER — LEVETIRACETAM 500 MG/1
1000 TABLET ORAL 2 TIMES DAILY
Status: DISCONTINUED | OUTPATIENT
Start: 2023-11-03 | End: 2023-11-04

## 2023-11-03 RX ORDER — LIDOCAINE HYDROCHLORIDE 10 MG/ML
INJECTION, SOLUTION EPIDURAL; INFILTRATION; INTRACAUDAL; PERINEURAL PRN
Status: COMPLETED | OUTPATIENT
Start: 2023-11-03 | End: 2023-11-03

## 2023-11-03 RX ORDER — LEVETIRACETAM 500 MG/5ML
1500 INJECTION, SOLUTION, CONCENTRATE INTRAVENOUS ONCE
Status: COMPLETED | OUTPATIENT
Start: 2023-11-03 | End: 2023-11-03

## 2023-11-03 RX ORDER — POTASSIUM CHLORIDE 750 MG/1
40 TABLET, FILM COATED, EXTENDED RELEASE ORAL ONCE
Status: COMPLETED | OUTPATIENT
Start: 2023-11-03 | End: 2023-11-03

## 2023-11-03 RX ORDER — FENTANYL CITRATE 50 UG/ML
INJECTION, SOLUTION INTRAMUSCULAR; INTRAVENOUS PRN
Status: COMPLETED | OUTPATIENT
Start: 2023-11-03 | End: 2023-11-03

## 2023-11-03 RX ADMIN — SODIUM CHLORIDE 3000 MG: 900 INJECTION INTRAVENOUS at 21:54

## 2023-11-03 RX ADMIN — LABETALOL HYDROCHLORIDE 20 MG: 5 INJECTION INTRAVENOUS at 07:56

## 2023-11-03 RX ADMIN — VERAPAMIL HYDROCHLORIDE 2.5 MG: 2.5 INJECTION, SOLUTION INTRAVENOUS at 09:01

## 2023-11-03 RX ADMIN — MORPHINE SULFATE 2 MG: 2 INJECTION, SOLUTION INTRAMUSCULAR; INTRAVENOUS at 23:47

## 2023-11-03 RX ADMIN — SODIUM CHLORIDE 3000 MG: 900 INJECTION INTRAVENOUS at 05:57

## 2023-11-03 RX ADMIN — ALPRAZOLAM 1 MG: 0.5 TABLET ORAL at 19:11

## 2023-11-03 RX ADMIN — FENTANYL CITRATE 50 MCG: 0.05 INJECTION, SOLUTION INTRAMUSCULAR; INTRAVENOUS at 08:55

## 2023-11-03 RX ADMIN — PANTOPRAZOLE SODIUM 40 MG: 40 TABLET, DELAYED RELEASE ORAL at 05:07

## 2023-11-03 RX ADMIN — NIMODIPINE 60 MG: 30 CAPSULE ORAL at 05:07

## 2023-11-03 RX ADMIN — NIMODIPINE 60 MG: 30 CAPSULE ORAL at 23:47

## 2023-11-03 RX ADMIN — MIDAZOLAM HYDROCHLORIDE 1 MG: 1 INJECTION, SOLUTION INTRAMUSCULAR; INTRAVENOUS at 09:22

## 2023-11-03 RX ADMIN — CILOSTAZOL 50 MG: 50 TABLET ORAL at 21:53

## 2023-11-03 RX ADMIN — SODIUM CHLORIDE, PRESERVATIVE FREE 10 ML: 5 INJECTION INTRAVENOUS at 21:20

## 2023-11-03 RX ADMIN — SODIUM CHLORIDE 3000 MG: 900 INJECTION INTRAVENOUS at 14:04

## 2023-11-03 RX ADMIN — BUTALBITAL, ACETAMINOPHEN, AND CAFFEINE 1 TABLET: 325; 50; 40 TABLET ORAL at 14:13

## 2023-11-03 RX ADMIN — SODIUM CHLORIDE: 9 INJECTION, SOLUTION INTRAVENOUS at 10:00

## 2023-11-03 RX ADMIN — Medication 100 MCG: at 09:01

## 2023-11-03 RX ADMIN — HEPARIN SODIUM 5000 UNITS: 5000 INJECTION INTRAVENOUS; SUBCUTANEOUS at 14:08

## 2023-11-03 RX ADMIN — SODIUM CHLORIDE: 9 INJECTION, SOLUTION INTRAVENOUS at 06:30

## 2023-11-03 RX ADMIN — Medication 200 MCG: at 09:15

## 2023-11-03 RX ADMIN — BUTALBITAL, ACETAMINOPHEN, AND CAFFEINE 1 TABLET: 325; 50; 40 TABLET ORAL at 02:20

## 2023-11-03 RX ADMIN — MORPHINE SULFATE 2 MG: 2 INJECTION, SOLUTION INTRAMUSCULAR; INTRAVENOUS at 04:39

## 2023-11-03 RX ADMIN — MORPHINE SULFATE 2 MG: 2 INJECTION, SOLUTION INTRAMUSCULAR; INTRAVENOUS at 00:49

## 2023-11-03 RX ADMIN — MORPHINE SULFATE 2 MG: 2 INJECTION, SOLUTION INTRAMUSCULAR; INTRAVENOUS at 16:07

## 2023-11-03 RX ADMIN — SODIUM CHLORIDE, PRESERVATIVE FREE 10 ML: 5 INJECTION INTRAVENOUS at 10:06

## 2023-11-03 RX ADMIN — BUTALBITAL, ACETAMINOPHEN, AND CAFFEINE 1 TABLET: 325; 50; 40 TABLET ORAL at 21:53

## 2023-11-03 RX ADMIN — LIDOCAINE HYDROCHLORIDE 2 ML: 10 INJECTION, SOLUTION EPIDURAL; INFILTRATION; INTRACAUDAL; PERINEURAL at 08:59

## 2023-11-03 RX ADMIN — BUTALBITAL, ACETAMINOPHEN, AND CAFFEINE 1 TABLET: 325; 50; 40 TABLET ORAL at 08:15

## 2023-11-03 RX ADMIN — HYDRALAZINE HYDROCHLORIDE 20 MG: 20 INJECTION, SOLUTION INTRAMUSCULAR; INTRAVENOUS at 02:22

## 2023-11-03 RX ADMIN — ATORVASTATIN CALCIUM 20 MG: 20 TABLET, FILM COATED ORAL at 21:42

## 2023-11-03 RX ADMIN — SODIUM CHLORIDE, PRESERVATIVE FREE 10 ML: 5 INJECTION INTRAVENOUS at 21:21

## 2023-11-03 RX ADMIN — NIMODIPINE 60 MG: 30 CAPSULE ORAL at 02:20

## 2023-11-03 RX ADMIN — ALPRAZOLAM 1 MG: 0.5 TABLET ORAL at 00:49

## 2023-11-03 RX ADMIN — TRAZODONE HYDROCHLORIDE 50 MG: 50 TABLET ORAL at 21:42

## 2023-11-03 RX ADMIN — ACETAMINOPHEN 650 MG: 325 TABLET ORAL at 12:01

## 2023-11-03 RX ADMIN — HEPARIN SODIUM 4000 ML: 1000 INJECTION INTRAVENOUS; SUBCUTANEOUS at 09:30

## 2023-11-03 RX ADMIN — HEPARIN SODIUM 2000 UNITS: 1000 INJECTION INTRAVENOUS; SUBCUTANEOUS at 09:01

## 2023-11-03 RX ADMIN — HEPARIN SODIUM 5000 UNITS: 5000 INJECTION INTRAVENOUS; SUBCUTANEOUS at 05:57

## 2023-11-03 RX ADMIN — POTASSIUM CHLORIDE 40 MEQ: 750 TABLET, FILM COATED, EXTENDED RELEASE ORAL at 10:25

## 2023-11-03 RX ADMIN — NIMODIPINE 60 MG: 30 CAPSULE ORAL at 14:08

## 2023-11-03 RX ADMIN — NIMODIPINE 60 MG: 30 CAPSULE ORAL at 18:03

## 2023-11-03 RX ADMIN — NIMODIPINE 60 MG: 30 CAPSULE ORAL at 10:25

## 2023-11-03 RX ADMIN — ACETAMINOPHEN 650 MG: 325 TABLET ORAL at 05:07

## 2023-11-03 RX ADMIN — LABETALOL HYDROCHLORIDE 20 MG: 5 INJECTION INTRAVENOUS at 01:26

## 2023-11-03 RX ADMIN — IOPAMIDOL 57 ML: 612 INJECTION, SOLUTION INTRAVENOUS at 09:25

## 2023-11-03 RX ADMIN — VERAPAMIL HYDROCHLORIDE 80 MG: 2.5 INJECTION, SOLUTION INTRAVENOUS at 09:15

## 2023-11-03 RX ADMIN — HEPARIN SODIUM 5000 UNITS: 5000 INJECTION INTRAVENOUS; SUBCUTANEOUS at 21:41

## 2023-11-03 RX ADMIN — ACETAMINOPHEN 650 MG: 325 TABLET ORAL at 16:07

## 2023-11-03 RX ADMIN — LEVETIRACETAM 1000 MG: 500 TABLET, FILM COATED ORAL at 21:42

## 2023-11-03 RX ADMIN — SODIUM CHLORIDE, PRESERVATIVE FREE 10 ML: 5 INJECTION INTRAVENOUS at 10:23

## 2023-11-03 RX ADMIN — SODIUM CHLORIDE: 9 INJECTION, SOLUTION INTRAVENOUS at 22:00

## 2023-11-03 RX ADMIN — CILOSTAZOL 50 MG: 50 TABLET ORAL at 11:15

## 2023-11-03 RX ADMIN — MIDAZOLAM HYDROCHLORIDE 1 MG: 1 INJECTION, SOLUTION INTRAMUSCULAR; INTRAVENOUS at 09:11

## 2023-11-03 RX ADMIN — SODIUM BICARBONATE 0.5 MEQ: 84 INJECTION INTRAVENOUS at 09:01

## 2023-11-03 RX ADMIN — LEVETIRACETAM 1500 MG: 100 INJECTION, SOLUTION INTRAVENOUS at 09:56

## 2023-11-03 ASSESSMENT — PAIN SCALES - GENERAL
PAINLEVEL_OUTOF10: 6
PAINLEVEL_OUTOF10: 8
PAINLEVEL_OUTOF10: 5
PAINLEVEL_OUTOF10: 8
PAINLEVEL_OUTOF10: 7
PAINLEVEL_OUTOF10: 8
PAINLEVEL_OUTOF10: 8

## 2023-11-03 ASSESSMENT — PAIN DESCRIPTION - LOCATION
LOCATION: HEAD

## 2023-11-03 ASSESSMENT — ENCOUNTER SYMPTOMS
CHEST TIGHTNESS: 0
ABDOMINAL PAIN: 0
EYES NEGATIVE: 1
SHORTNESS OF BREATH: 0
ABDOMINAL DISTENTION: 0

## 2023-11-03 ASSESSMENT — PAIN DESCRIPTION - ORIENTATION
ORIENTATION: POSTERIOR
ORIENTATION: POSTERIOR

## 2023-11-03 ASSESSMENT — PAIN DESCRIPTION - DESCRIPTORS
DESCRIPTORS: ACHING
DESCRIPTORS: ACHING

## 2023-11-03 NOTE — CARE COORDINATION
Transition of Care Plan:     RUR: 15% Moderate   Prior Level of Functioning: Independent  Disposition: Rehab  If SNF or IPR: Date FOC offered:   Date FOC received:   Accepting facility:   Date authorization started with reference number:   Follow up appointments: Neurologist  PCP  DME needed: TBD  Transportation at discharge: BLS  Is patient a [place] and connected with [facility]? No  Caregiver Contact: Son Helene Cruz   Discharge Caregiver contacted prior to discharge? Yes  Care Conference needed? No  Barriers to discharge:    Patient with a SAH s/p coil embo L PCOM aneurysm  EVD placement for Hydrocephalus   [facility]  NS @[facility]  TCD'sQD  Patient is s/p Diagnostic cerebral angiogram  Cerebral infusion therapy.    Bruna Jose RN Care Management

## 2023-11-03 NOTE — BRIEF OP NOTE
Brief Procedure Note      Patient: Courtney Agarwal MRN: [id number]     YOB: 1965  Age: 56 y.o.   Sex: female      Service: Neurointerventional Surgery    Date of Procedure: 11/3/2023    Pre-Procedure Diagnosis: Cerebral vasospasm    Post-Procedure Diagnosis: SAME    : Negra Harrison DO    Assistant(s): N/A    Procedure(s):   Diagnostic cerebral angiogram  Cerebral infusion therapy    Vessels Studied:   Right CCA  Right ICA  Left CCA  Left ICA  Right vertebral artery    Puncture Site: Right radial artery    Preliminary Findings:   Diffuse vasospasm  IA therapy x 3 territories    Plan:   - -200, CPP>70  - pletal bid      Specimens: None    Implants: None    Drains: None    Anesthesia: Moderate Sedation    Estimated Blood Loss: 5 cc    Apparent Intraoperative Complications: None immediate    Patient Condition: Stable    Disposition: ICU      Signed:   Negra Harrison DO  [facility] [address] [facility]

## 2023-11-04 ENCOUNTER — APPOINTMENT (OUTPATIENT)
Facility: HOSPITAL | Age: 58
DRG: 021 | End: 2023-11-04
Payer: MEDICAID

## 2023-11-04 ENCOUNTER — ANESTHESIA (OUTPATIENT)
Facility: HOSPITAL | Age: 58
End: 2023-11-04
Payer: MEDICAID

## 2023-11-04 ENCOUNTER — APPOINTMENT (OUTPATIENT)
Facility: HOSPITAL | Age: 58
DRG: 021 | End: 2023-11-04
Attending: PSYCHIATRY & NEUROLOGY
Payer: MEDICAID

## 2023-11-04 ENCOUNTER — ANESTHESIA EVENT (OUTPATIENT)
Facility: HOSPITAL | Age: 58
End: 2023-11-04
Payer: MEDICAID

## 2023-11-04 LAB
ANION GAP SERPL CALC-SCNC: 7 MMOL/L (ref 5–15)
ARTERIAL PATENCY WRIST A: YES
BASE EXCESS BLDA CALC-SCNC: 1.7 MMOL/L
BDY SITE: ABNORMAL
BUN SERPL-MCNC: 8 MG/DL (ref 6–20)
BUN/CREAT SERPL: 17 (ref 12–20)
CALCIUM SERPL-MCNC: 8.6 MG/DL (ref 8.5–10.1)
CHLORIDE SERPL-SCNC: 104 MMOL/L (ref 97–108)
CO2 SERPL-SCNC: 27 MMOL/L (ref 21–32)
CREAT SERPL-MCNC: 0.47 MG/DL (ref 0.55–1.02)
ECHO BSA: 2.56 M2
ERYTHROCYTE [DISTWIDTH] IN BLOOD BY AUTOMATED COUNT: 16 % (ref 11.5–14.5)
FIO2 ON VENT: 60 %
GLUCOSE SERPL-MCNC: 120 MG/DL (ref 65–100)
HCO3 BLDA-SCNC: 25 MMOL/L (ref 22–26)
HCT VFR BLD AUTO: 33 % (ref 35–47)
HGB BLD-MCNC: 10.1 G/DL (ref 11.5–16)
IPAP/PIP: 14
MAGNESIUM SERPL-MCNC: 2 MG/DL (ref 1.6–2.4)
MCH RBC QN AUTO: 26.3 PG (ref 26–34)
MCHC RBC AUTO-ENTMCNC: 30.6 G/DL (ref 30–36.5)
MCV RBC AUTO: 85.9 FL (ref 80–99)
NRBC # BLD: 0 K/UL (ref 0–0.01)
NRBC BLD-RTO: 0 PER 100 WBC
PCO2 BLDA: 37 MMHG (ref 35–45)
PEEP RESPIRATORY: 8
PH BLDA: 7.45 (ref 7.35–7.45)
PHOSPHATE SERPL-MCNC: 3 MG/DL (ref 2.6–4.7)
PLATELET # BLD AUTO: 315 K/UL (ref 150–400)
PMV BLD AUTO: 10.9 FL (ref 8.9–12.9)
PO2 BLDA: 228 MMHG (ref 80–100)
POTASSIUM SERPL-SCNC: 3.9 MMOL/L (ref 3.5–5.1)
RBC # BLD AUTO: 3.84 M/UL (ref 3.8–5.2)
SAO2 % BLD: 100 % (ref 92–97)
SAO2% DEVICE SAO2% SENSOR NAME: ABNORMAL
SODIUM SERPL-SCNC: 138 MMOL/L (ref 136–145)
SPECIMEN SITE: ABNORMAL
VAS BASILAR ARTERY EDV: 40.2 CM/S
VAS BASILAR ARTERY MEAN VEL: 61 CM/S
VAS BASILAR ARTERY PSV: 101.6 CM/S
VAS LEFT ACA EDV: 58.5 CM/S
VAS LEFT ACA MEAN VEL: 77.1 CM/S
VAS LEFT ACA PSV: 114.3 CM/S
VAS LEFT EX ICA MEAN VEL: 32 CM/S
VAS LEFT ICA DIST EDV: 22.3 CM/S
VAS LEFT ICA DIST PSV: 50.8 CM/S
VAS LEFT LINDEGAARD RATIO: 3.6
VAS LEFT MCA 1 EDV: 71.8 CM/S
VAS LEFT MCA 1 MEAN VEL: 114.5 CM/S
VAS LEFT MCA 1 PSV: 200 CM/S
VAS RIGHT EX ICA MEAN VEL: 31 CM/S
VAS RIGHT ICA DIST EDV: 19.7 CM/S
VAS RIGHT ICA DIST PSV: 50.8 CM/S
VENTILATION MODE VENT: ABNORMAL
WBC # BLD AUTO: 10.9 K/UL (ref 3.6–11)

## 2023-11-04 PROCEDURE — 6360000002 HC RX W HCPCS: Performed by: NURSE PRACTITIONER

## 2023-11-04 PROCEDURE — 99152 MOD SED SAME PHYS/QHP 5/>YRS: CPT | Performed by: PSYCHIATRY & NEUROLOGY

## 2023-11-04 PROCEDURE — 87205 SMEAR GRAM STAIN: CPT

## 2023-11-04 PROCEDURE — 87070 CULTURE OTHR SPECIMN AEROBIC: CPT

## 2023-11-04 PROCEDURE — 36415 COLL VENOUS BLD VENIPUNCTURE: CPT

## 2023-11-04 PROCEDURE — 2500000003 HC RX 250 WO HCPCS: Performed by: PSYCHIATRY & NEUROLOGY

## 2023-11-04 PROCEDURE — 2500000003 HC RX 250 WO HCPCS

## 2023-11-04 PROCEDURE — 2000000000 HC ICU R&B

## 2023-11-04 PROCEDURE — 84100 ASSAY OF PHOSPHORUS: CPT

## 2023-11-04 PROCEDURE — 74018 RADEX ABDOMEN 1 VIEW: CPT

## 2023-11-04 PROCEDURE — 6370000000 HC RX 637 (ALT 250 FOR IP): Performed by: NURSE PRACTITIONER

## 2023-11-04 PROCEDURE — C1887 CATHETER, GUIDING: HCPCS

## 2023-11-04 PROCEDURE — 6360000002 HC RX W HCPCS: Performed by: NURSE ANESTHETIST, CERTIFIED REGISTERED

## 2023-11-04 PROCEDURE — 6360000002 HC RX W HCPCS: Performed by: PSYCHIATRY & NEUROLOGY

## 2023-11-04 PROCEDURE — B31D1ZZ FLUOROSCOPY OF RIGHT VERTEBRAL ARTERY USING LOW OSMOLAR CONTRAST: ICD-10-PCS | Performed by: PSYCHIATRY & NEUROLOGY

## 2023-11-04 PROCEDURE — 85027 COMPLETE CBC AUTOMATED: CPT

## 2023-11-04 PROCEDURE — 3E0Q3GC INTRODUCTION OF OTHER THERAPEUTIC SUBSTANCE INTO CRANIAL CAVITY AND BRAIN, PERCUTANEOUS APPROACH: ICD-10-PCS | Performed by: PSYCHIATRY & NEUROLOGY

## 2023-11-04 PROCEDURE — 2580000003 HC RX 258: Performed by: PSYCHIATRY & NEUROLOGY

## 2023-11-04 PROCEDURE — 82803 BLOOD GASES ANY COMBINATION: CPT

## 2023-11-04 PROCEDURE — B3151ZZ FLUOROSCOPY OF BILATERAL COMMON CAROTID ARTERIES USING LOW OSMOLAR CONTRAST: ICD-10-PCS | Performed by: PSYCHIATRY & NEUROLOGY

## 2023-11-04 PROCEDURE — 93886 INTRACRANIAL COMPLETE STUDY: CPT

## 2023-11-04 PROCEDURE — 36600 WITHDRAWAL OF ARTERIAL BLOOD: CPT

## 2023-11-04 PROCEDURE — 71045 X-RAY EXAM CHEST 1 VIEW: CPT

## 2023-11-04 PROCEDURE — 6360000004 HC RX CONTRAST MEDICATION: Performed by: PSYCHIATRY & NEUROLOGY

## 2023-11-04 PROCEDURE — 61651 EVASC PRLNG ADMN RX AGNT ADD: CPT | Performed by: PSYCHIATRY & NEUROLOGY

## 2023-11-04 PROCEDURE — 6370000000 HC RX 637 (ALT 250 FOR IP): Performed by: PSYCHIATRY & NEUROLOGY

## 2023-11-04 PROCEDURE — 2580000003 HC RX 258: Performed by: NURSE PRACTITIONER

## 2023-11-04 PROCEDURE — 94002 VENT MGMT INPAT INIT DAY: CPT

## 2023-11-04 PROCEDURE — 94640 AIRWAY INHALATION TREATMENT: CPT

## 2023-11-04 PROCEDURE — 2580000003 HC RX 258

## 2023-11-04 PROCEDURE — 61650 EVASC PRLNG ADMN RX AGNT 1ST: CPT | Performed by: PSYCHIATRY & NEUROLOGY

## 2023-11-04 PROCEDURE — 6360000002 HC RX W HCPCS

## 2023-11-04 PROCEDURE — 037G3ZZ DILATION OF INTRACRANIAL ARTERY, PERCUTANEOUS APPROACH: ICD-10-PCS | Performed by: PSYCHIATRY & NEUROLOGY

## 2023-11-04 PROCEDURE — 2580000003 HC RX 258: Performed by: NURSE ANESTHETIST, CERTIFIED REGISTERED

## 2023-11-04 PROCEDURE — 2500000003 HC RX 250 WO HCPCS: Performed by: NURSE ANESTHETIST, CERTIFIED REGISTERED

## 2023-11-04 PROCEDURE — 37799 UNLISTED PX VASCULAR SURGERY: CPT

## 2023-11-04 PROCEDURE — 36225 PLACE CATH SUBCLAVIAN ART: CPT | Performed by: PSYCHIATRY & NEUROLOGY

## 2023-11-04 PROCEDURE — 99233 SBSQ HOSP IP/OBS HIGH 50: CPT | Performed by: NURSE PRACTITIONER

## 2023-11-04 PROCEDURE — 87040 BLOOD CULTURE FOR BACTERIA: CPT

## 2023-11-04 PROCEDURE — 70450 CT HEAD/BRAIN W/O DYE: CPT

## 2023-11-04 PROCEDURE — 2700000000 HC OXYGEN THERAPY PER DAY

## 2023-11-04 PROCEDURE — 83735 ASSAY OF MAGNESIUM: CPT

## 2023-11-04 PROCEDURE — 94660 CPAP INITIATION&MGMT: CPT

## 2023-11-04 PROCEDURE — 76937 US GUIDE VASCULAR ACCESS: CPT | Performed by: PSYCHIATRY & NEUROLOGY

## 2023-11-04 PROCEDURE — 80048 BASIC METABOLIC PNL TOTAL CA: CPT

## 2023-11-04 PROCEDURE — 36224 PLACE CATH CAROTD ART: CPT | Performed by: PSYCHIATRY & NEUROLOGY

## 2023-11-04 PROCEDURE — B3181ZZ FLUOROSCOPY OF BILATERAL INTERNAL CAROTID ARTERIES USING LOW OSMOLAR CONTRAST: ICD-10-PCS | Performed by: PSYCHIATRY & NEUROLOGY

## 2023-11-04 PROCEDURE — 6370000000 HC RX 637 (ALT 250 FOR IP): Performed by: INTERNAL MEDICINE

## 2023-11-04 PROCEDURE — 03HY32Z INSERTION OF MONITORING DEVICE INTO UPPER ARTERY, PERCUTANEOUS APPROACH: ICD-10-PCS | Performed by: FAMILY MEDICINE

## 2023-11-04 RX ORDER — FENTANYL CITRATE 50 UG/ML
INJECTION, SOLUTION INTRAMUSCULAR; INTRAVENOUS PRN
Status: DISCONTINUED | OUTPATIENT
Start: 2023-11-04 | End: 2023-11-04 | Stop reason: SDUPTHER

## 2023-11-04 RX ORDER — LIDOCAINE HYDROCHLORIDE 10 MG/ML
INJECTION, SOLUTION EPIDURAL; INFILTRATION; INTRACAUDAL; PERINEURAL PRN
Status: COMPLETED | OUTPATIENT
Start: 2023-11-04 | End: 2023-11-04

## 2023-11-04 RX ORDER — PROPOFOL 10 MG/ML
INJECTION, EMULSION INTRAVENOUS
Status: COMPLETED
Start: 2023-11-04 | End: 2023-11-04

## 2023-11-04 RX ORDER — ROCURONIUM BROMIDE 10 MG/ML
INJECTION, SOLUTION INTRAVENOUS PRN
Status: DISCONTINUED | OUTPATIENT
Start: 2023-11-04 | End: 2023-11-04 | Stop reason: SDUPTHER

## 2023-11-04 RX ORDER — VERAPAMIL HYDROCHLORIDE 2.5 MG/ML
INJECTION, SOLUTION INTRAVENOUS PRN
Status: COMPLETED | OUTPATIENT
Start: 2023-11-04 | End: 2023-11-04

## 2023-11-04 RX ORDER — PROPOFOL 10 MG/ML
5-50 INJECTION, EMULSION INTRAVENOUS CONTINUOUS
Status: DISCONTINUED | OUTPATIENT
Start: 2023-11-04 | End: 2023-11-06

## 2023-11-04 RX ORDER — DEXAMETHASONE SODIUM PHOSPHATE 4 MG/ML
INJECTION, SOLUTION INTRA-ARTICULAR; INTRALESIONAL; INTRAMUSCULAR; INTRAVENOUS; SOFT TISSUE PRN
Status: DISCONTINUED | OUTPATIENT
Start: 2023-11-04 | End: 2023-11-04 | Stop reason: SDUPTHER

## 2023-11-04 RX ORDER — MIDAZOLAM HYDROCHLORIDE 2 MG/2ML
2 INJECTION, SOLUTION INTRAMUSCULAR; INTRAVENOUS ONCE
Status: COMPLETED | OUTPATIENT
Start: 2023-11-04 | End: 2023-11-04

## 2023-11-04 RX ORDER — PHENYLEPHRINE HCL IN 0.9% NACL 0.4MG/10ML
SYRINGE (ML) INTRAVENOUS PRN
Status: DISCONTINUED | OUTPATIENT
Start: 2023-11-04 | End: 2023-11-04 | Stop reason: SDUPTHER

## 2023-11-04 RX ORDER — NITROGLYCERIN 20 MG/100ML
INJECTION INTRAVENOUS PRN
Status: COMPLETED | OUTPATIENT
Start: 2023-11-04 | End: 2023-11-04

## 2023-11-04 RX ORDER — SODIUM CHLORIDE 9 MG/ML
INJECTION, SOLUTION INTRAVENOUS CONTINUOUS PRN
Status: DISCONTINUED | OUTPATIENT
Start: 2023-11-04 | End: 2023-11-04 | Stop reason: SDUPTHER

## 2023-11-04 RX ORDER — ONDANSETRON 2 MG/ML
INJECTION INTRAMUSCULAR; INTRAVENOUS PRN
Status: DISCONTINUED | OUTPATIENT
Start: 2023-11-04 | End: 2023-11-04 | Stop reason: SDUPTHER

## 2023-11-04 RX ORDER — LEVETIRACETAM 500 MG/1
1000 TABLET ORAL 2 TIMES DAILY
Status: DISCONTINUED | OUTPATIENT
Start: 2023-11-04 | End: 2023-11-04

## 2023-11-04 RX ORDER — LEVETIRACETAM 500 MG/5ML
1000 INJECTION, SOLUTION, CONCENTRATE INTRAVENOUS EVERY 12 HOURS
Status: DISCONTINUED | OUTPATIENT
Start: 2023-11-04 | End: 2023-11-08

## 2023-11-04 RX ORDER — MIDAZOLAM HYDROCHLORIDE 1 MG/ML
INJECTION INTRAMUSCULAR; INTRAVENOUS
Status: COMPLETED
Start: 2023-11-04 | End: 2023-11-04

## 2023-11-04 RX ORDER — NOREPINEPHRINE BITARTRATE 0.06 MG/ML
INJECTION, SOLUTION INTRAVENOUS
Status: COMPLETED
Start: 2023-11-04 | End: 2023-11-04

## 2023-11-04 RX ORDER — FENTANYL CITRATE-0.9 % NACL/PF 10 MCG/ML
25-300 PLASTIC BAG, INJECTION (ML) INTRAVENOUS CONTINUOUS
Status: DISCONTINUED | OUTPATIENT
Start: 2023-11-04 | End: 2023-11-06

## 2023-11-04 RX ORDER — MIDAZOLAM HYDROCHLORIDE 2 MG/2ML
2 INJECTION, SOLUTION INTRAMUSCULAR; INTRAVENOUS
Status: DISCONTINUED | OUTPATIENT
Start: 2023-11-04 | End: 2023-11-10

## 2023-11-04 RX ORDER — HEPARIN SODIUM 1000 [USP'U]/ML
INJECTION, SOLUTION INTRAVENOUS; SUBCUTANEOUS PRN
Status: COMPLETED | OUTPATIENT
Start: 2023-11-04 | End: 2023-11-04

## 2023-11-04 RX ORDER — MIDAZOLAM HYDROCHLORIDE 2 MG/2ML
3 INJECTION, SOLUTION INTRAMUSCULAR; INTRAVENOUS ONCE
Status: COMPLETED | OUTPATIENT
Start: 2023-11-04 | End: 2023-11-04

## 2023-11-04 RX ORDER — 0.9 % SODIUM CHLORIDE 0.9 %
500 INTRAVENOUS SOLUTION INTRAVENOUS ONCE
Status: COMPLETED | OUTPATIENT
Start: 2023-11-04 | End: 2023-11-04

## 2023-11-04 RX ORDER — MILRINONE LACTATE 0.2 MG/ML
0.25 INJECTION, SOLUTION INTRAVENOUS CONTINUOUS
Status: DISCONTINUED | OUTPATIENT
Start: 2023-11-04 | End: 2023-11-09

## 2023-11-04 RX ORDER — LIDOCAINE HYDROCHLORIDE 20 MG/ML
INJECTION, SOLUTION EPIDURAL; INFILTRATION; INTRACAUDAL; PERINEURAL PRN
Status: DISCONTINUED | OUTPATIENT
Start: 2023-11-04 | End: 2023-11-04 | Stop reason: SDUPTHER

## 2023-11-04 RX ORDER — SUCCINYLCHOLINE/SOD CL,ISO/PF 200MG/10ML
SYRINGE (ML) INTRAVENOUS PRN
Status: DISCONTINUED | OUTPATIENT
Start: 2023-11-04 | End: 2023-11-04 | Stop reason: SDUPTHER

## 2023-11-04 RX ADMIN — Medication 2500 MG: at 00:58

## 2023-11-04 RX ADMIN — PROPOFOL 40 MCG/KG/MIN: 10 INJECTION, EMULSION INTRAVENOUS at 14:39

## 2023-11-04 RX ADMIN — NIMODIPINE 60 MG: 30 CAPSULE ORAL at 06:32

## 2023-11-04 RX ADMIN — PROPOFOL 125 MG: 10 INJECTION, EMULSION INTRAVENOUS at 12:04

## 2023-11-04 RX ADMIN — PROPOFOL 50 MCG/KG/MIN: 10 INJECTION, EMULSION INTRAVENOUS at 18:27

## 2023-11-04 RX ADMIN — HEPARIN SODIUM: 1000 INJECTION INTRAVENOUS; SUBCUTANEOUS at 12:20

## 2023-11-04 RX ADMIN — NOREPINEPHRINE BITARTRATE 16 MG: 1 INJECTION, SOLUTION, CONCENTRATE INTRAVENOUS at 10:19

## 2023-11-04 RX ADMIN — MILRINONE LACTATE 0.25 MCG/KG/MIN: 0.2 INJECTION, SOLUTION INTRAVENOUS at 15:15

## 2023-11-04 RX ADMIN — VERAPAMIL HYDROCHLORIDE 20 MG: 2.5 INJECTION, SOLUTION INTRAVENOUS at 12:40

## 2023-11-04 RX ADMIN — SODIUM CHLORIDE, PRESERVATIVE FREE 10 ML: 5 INJECTION INTRAVENOUS at 21:38

## 2023-11-04 RX ADMIN — IPRATROPIUM BROMIDE AND ALBUTEROL SULFATE 1 DOSE: 2.5; .5 SOLUTION RESPIRATORY (INHALATION) at 14:20

## 2023-11-04 RX ADMIN — DEXAMETHASONE SODIUM PHOSPHATE 4 MG: 4 INJECTION, SOLUTION INTRAMUSCULAR; INTRAVENOUS at 12:15

## 2023-11-04 RX ADMIN — MIDAZOLAM 3 MG: 1 INJECTION INTRAMUSCULAR; INTRAVENOUS at 15:26

## 2023-11-04 RX ADMIN — FENTANYL CITRATE 150 MCG/HR: 0.05 INJECTION, SOLUTION INTRAMUSCULAR; INTRAVENOUS at 17:13

## 2023-11-04 RX ADMIN — Medication 120 MCG: at 12:52

## 2023-11-04 RX ADMIN — SODIUM CHLORIDE, PRESERVATIVE FREE 10 ML: 5 INJECTION INTRAVENOUS at 09:10

## 2023-11-04 RX ADMIN — ROCURONIUM BROMIDE 20 MG: 10 SOLUTION INTRAVENOUS at 13:24

## 2023-11-04 RX ADMIN — ROCURONIUM BROMIDE 35 MG: 10 SOLUTION INTRAVENOUS at 12:15

## 2023-11-04 RX ADMIN — Medication 100 MCG: at 12:42

## 2023-11-04 RX ADMIN — MIDAZOLAM 2 MG: 1 INJECTION INTRAMUSCULAR; INTRAVENOUS at 14:05

## 2023-11-04 RX ADMIN — IOPAMIDOL 44 ML: 612 INJECTION, SOLUTION INTRAVENOUS at 13:03

## 2023-11-04 RX ADMIN — PROPOFOL 50 MCG/KG/MIN: 10 INJECTION, EMULSION INTRAVENOUS at 21:40

## 2023-11-04 RX ADMIN — VANCOMYCIN HYDROCHLORIDE 1500 MG: 1.5 INJECTION, POWDER, LYOPHILIZED, FOR SOLUTION INTRAVENOUS at 23:28

## 2023-11-04 RX ADMIN — ATORVASTATIN CALCIUM 20 MG: 20 TABLET, FILM COATED ORAL at 23:24

## 2023-11-04 RX ADMIN — NITROGLYCERIN 100 MCG: 20 INJECTION INTRAVENOUS at 12:31

## 2023-11-04 RX ADMIN — LIDOCAINE HYDROCHLORIDE 100 MG: 20 INJECTION, SOLUTION EPIDURAL; INFILTRATION; INTRACAUDAL; PERINEURAL at 12:04

## 2023-11-04 RX ADMIN — SODIUM CHLORIDE: 9 INJECTION, SOLUTION INTRAVENOUS at 02:49

## 2023-11-04 RX ADMIN — ONDANSETRON HYDROCHLORIDE 4 MG: 2 INJECTION, SOLUTION INTRAMUSCULAR; INTRAVENOUS at 12:15

## 2023-11-04 RX ADMIN — HEPARIN SODIUM 2000 UNITS: 1000 INJECTION INTRAVENOUS; SUBCUTANEOUS at 12:21

## 2023-11-04 RX ADMIN — SODIUM BICARBONATE 1 MEQ: 84 INJECTION INTRAVENOUS at 12:21

## 2023-11-04 RX ADMIN — ROCURONIUM BROMIDE 10 MG: 10 SOLUTION INTRAVENOUS at 12:50

## 2023-11-04 RX ADMIN — PIPERACILLIN AND TAZOBACTAM 4500 MG: 4; .5 INJECTION, POWDER, LYOPHILIZED, FOR SOLUTION INTRAVENOUS at 02:45

## 2023-11-04 RX ADMIN — PROPOFOL 50 MCG/KG/MIN: 10 INJECTION, EMULSION INTRAVENOUS at 23:18

## 2023-11-04 RX ADMIN — NIMODIPINE 60 MG: 30 CAPSULE ORAL at 04:00

## 2023-11-04 RX ADMIN — VANCOMYCIN HYDROCHLORIDE 1500 MG: 1.5 INJECTION, POWDER, LYOPHILIZED, FOR SOLUTION INTRAVENOUS at 14:57

## 2023-11-04 RX ADMIN — MIDAZOLAM 2 MG: 1 INJECTION INTRAMUSCULAR; INTRAVENOUS at 17:29

## 2023-11-04 RX ADMIN — SODIUM CHLORIDE 500 ML: 9 INJECTION, SOLUTION INTRAVENOUS at 09:58

## 2023-11-04 RX ADMIN — VERAPAMIL HYDROCHLORIDE 20 MG: 2.5 INJECTION, SOLUTION INTRAVENOUS at 12:31

## 2023-11-04 RX ADMIN — SODIUM CHLORIDE, PRESERVATIVE FREE 10 ML: 5 INJECTION INTRAVENOUS at 09:11

## 2023-11-04 RX ADMIN — ACETAMINOPHEN 650 MG: 650 SUPPOSITORY RECTAL at 21:38

## 2023-11-04 RX ADMIN — VERAPAMIL HYDROCHLORIDE 2.5 MG: 2.5 INJECTION, SOLUTION INTRAVENOUS at 12:21

## 2023-11-04 RX ADMIN — PIPERACILLIN AND TAZOBACTAM 4500 MG: 4; .5 INJECTION, POWDER, LYOPHILIZED, FOR SOLUTION INTRAVENOUS at 23:15

## 2023-11-04 RX ADMIN — SODIUM CHLORIDE: 9 INJECTION, SOLUTION INTRAVENOUS at 09:59

## 2023-11-04 RX ADMIN — PROPOFOL 30 MCG/KG/MIN: 10 INJECTION, EMULSION INTRAVENOUS at 13:24

## 2023-11-04 RX ADMIN — ROCURONIUM BROMIDE 5 MG: 10 SOLUTION INTRAVENOUS at 12:04

## 2023-11-04 RX ADMIN — PIPERACILLIN AND TAZOBACTAM 4500 MG: 4; .5 INJECTION, POWDER, LYOPHILIZED, FOR SOLUTION INTRAVENOUS at 06:32

## 2023-11-04 RX ADMIN — Medication 100 MCG: at 12:49

## 2023-11-04 RX ADMIN — NITROGLYCERIN 100 MCG: 20 INJECTION INTRAVENOUS at 12:21

## 2023-11-04 RX ADMIN — SODIUM CHLORIDE: 9 INJECTION, SOLUTION INTRAVENOUS at 21:40

## 2023-11-04 RX ADMIN — FENTANYL CITRATE 50 MCG: 50 INJECTION, SOLUTION INTRAMUSCULAR; INTRAVENOUS at 12:15

## 2023-11-04 RX ADMIN — SODIUM CHLORIDE, PRESERVATIVE FREE 10 ML: 5 INJECTION INTRAVENOUS at 21:37

## 2023-11-04 RX ADMIN — HEPARIN SODIUM 5000 UNITS: 5000 INJECTION INTRAVENOUS; SUBCUTANEOUS at 15:26

## 2023-11-04 RX ADMIN — PANTOPRAZOLE SODIUM 40 MG: 40 TABLET, DELAYED RELEASE ORAL at 06:32

## 2023-11-04 RX ADMIN — CILOSTAZOL 50 MG: 50 TABLET ORAL at 23:27

## 2023-11-04 RX ADMIN — LEVETIRACETAM 1000 MG: 100 INJECTION, SOLUTION INTRAVENOUS at 09:52

## 2023-11-04 RX ADMIN — FENTANYL CITRATE 150 MCG/HR: 0.05 INJECTION, SOLUTION INTRAMUSCULAR; INTRAVENOUS at 23:14

## 2023-11-04 RX ADMIN — LIDOCAINE HYDROCHLORIDE 3 ML: 10 INJECTION, SOLUTION EPIDURAL; INFILTRATION; INTRACAUDAL; PERINEURAL at 12:17

## 2023-11-04 RX ADMIN — Medication 160 MCG: at 12:56

## 2023-11-04 RX ADMIN — ROCURONIUM BROMIDE 10 MG: 10 SOLUTION INTRAVENOUS at 12:35

## 2023-11-04 RX ADMIN — Medication 140 MG: at 12:05

## 2023-11-04 RX ADMIN — ACETAMINOPHEN 650 MG: 325 TABLET ORAL at 06:19

## 2023-11-04 RX ADMIN — MIDAZOLAM HYDROCHLORIDE 2 MG: 2 INJECTION, SOLUTION INTRAMUSCULAR; INTRAVENOUS at 14:05

## 2023-11-04 RX ADMIN — SODIUM CHLORIDE: 9 INJECTION, SOLUTION INTRAVENOUS at 11:52

## 2023-11-04 RX ADMIN — MILRINONE LACTATE 0.25 MCG/KG/MIN: 0.2 INJECTION, SOLUTION INTRAVENOUS at 23:27

## 2023-11-04 RX ADMIN — HEPARIN SODIUM 5000 UNITS: 5000 INJECTION INTRAVENOUS; SUBCUTANEOUS at 06:18

## 2023-11-04 RX ADMIN — PIPERACILLIN AND TAZOBACTAM 4500 MG: 4; .5 INJECTION, POWDER, LYOPHILIZED, FOR SOLUTION INTRAVENOUS at 15:37

## 2023-11-04 RX ADMIN — PROPOFOL 45 MCG/KG/MIN: 10 INJECTION, EMULSION INTRAVENOUS at 16:25

## 2023-11-04 RX ADMIN — LEVETIRACETAM 1000 MG: 100 INJECTION, SOLUTION INTRAVENOUS at 21:37

## 2023-11-04 RX ADMIN — FENTANYL CITRATE 50 MCG: 50 INJECTION, SOLUTION INTRAMUSCULAR; INTRAVENOUS at 12:04

## 2023-11-04 RX ADMIN — VERAPAMIL HYDROCHLORIDE 20 MG: 2.5 INJECTION, SOLUTION INTRAVENOUS at 12:26

## 2023-11-04 RX ADMIN — Medication 60 MG: at 23:59

## 2023-11-04 RX ADMIN — VERAPAMIL HYDROCHLORIDE 20 MG: 2.5 INJECTION, SOLUTION INTRAVENOUS at 12:55

## 2023-11-04 ASSESSMENT — PAIN SCALES - GENERAL
PAINLEVEL_OUTOF10: 5
PAINLEVEL_OUTOF10: 8
PAINLEVEL_OUTOF10: 0
PAINLEVEL_OUTOF10: 0

## 2023-11-04 ASSESSMENT — PULMONARY FUNCTION TESTS
PIF_VALUE: 14
PIF_VALUE: 24
PIF_VALUE: 27

## 2023-11-04 ASSESSMENT — LIFESTYLE VARIABLES: SMOKING_STATUS: 1

## 2023-11-04 NOTE — ANESTHESIA PRE PROCEDURE
(If Applicable):  No results found for: \"LABABO\", \"LABRH\"    Drug/Infectious Status (If Applicable):  No results found for: \"HIV\", \"HEPCAB\"    COVID-19 Screening (If Applicable): No results found for: \"COVID19\"        Anesthesia Evaluation  Patient summary reviewed and Nursing notes reviewed  Airway: Mallampati: III  TM distance: >3 FB   Neck ROM: full  Mouth opening: > = 3 FB   Dental: normal exam         Pulmonary:normal exam    (+) sleep apnea:  asthma: current smoker                          ROS comment: On bipap   Cardiovascular:  Exercise tolerance: poor (<4 METS),   (+) hypertension:,                   Neuro/Psych:   (+) seizures:, CVA:, headaches:, psychiatric history:             ROS comment: SAH s/p coil now with spasm  Hydrocephalus s/p EVD  Sz GI/Hepatic/Renal:   (+) GERD:, morbid obesity          Endo/Other:    (+) blood dyscrasia: anemia:., .                 Abdominal: normal exam            Vascular: Other Findings:           Anesthesia Plan      general     ASA 4 - emergent       Induction: intravenous. MIPS: Prophylactic antiemetics administered. Anesthetic plan and risks discussed with child/children. Plan discussed with CRNA.     Attending anesthesiologist reviewed and agrees with Preprocedure content                Parveen Solomon MD   11/4/2023

## 2023-11-04 NOTE — BRIEF OP NOTE
Brief Procedure Note      Patient: Hunter Bell MRN: [id number]     YOB: 1965  Age: 56 y.o.   Sex: female      Service: Neurointerventional Surgery    Date of Procedure: 11/4/2023    Pre-Procedure Diagnosis: Cerebral vasospasm    Post-Procedure Diagnosis: SAME    : Merrianne Bernheim, DO    Assistant(s): N/A    Procedure(s):   Diagnostic cerebral angiogram  Infusion therapy  Intracranial angioplasty    Vessels Studied:   Right CCA  Right ICA  Left CCA  Left ICA  Right vertebral artery    Puncture Site: Right radial artery--> left into place    Preliminary Findings:   Diffuse vasospasm; L MCA and branches worse today  IA therapy x 3 territories  Angioplasty L MCA with significant improvement    Plan:   - -220  - CTH today  - iv milrinone      Specimens: None    Implants: None    Drains: None    Anesthesia: General    Estimated Blood Loss: 5 cc    Apparent Intraoperative Complications: None immediate    Patient Condition: Stable    Disposition: ICU      Signed:   Merrianne Bernheim, DO  [facility] [id number] [place] [address]

## 2023-11-05 ENCOUNTER — APPOINTMENT (OUTPATIENT)
Facility: HOSPITAL | Age: 58
DRG: 021 | End: 2023-11-05
Payer: MEDICAID

## 2023-11-05 ENCOUNTER — ANESTHESIA EVENT (OUTPATIENT)
Facility: HOSPITAL | Age: 58
End: 2023-11-05
Payer: MEDICAID

## 2023-11-05 ENCOUNTER — ANESTHESIA (OUTPATIENT)
Facility: HOSPITAL | Age: 58
End: 2023-11-05
Payer: MEDICAID

## 2023-11-05 LAB
ANION GAP SERPL CALC-SCNC: 4 MMOL/L (ref 5–15)
ARTERIAL PATENCY WRIST A: YES
ARTERIAL PATENCY WRIST A: YES
BACTERIA SPEC CULT: NORMAL
BASE EXCESS BLDA CALC-SCNC: 0 MMOL/L
BASE EXCESS BLDA CALC-SCNC: 0 MMOL/L
BDY SITE: ABNORMAL
BDY SITE: NORMAL
BUN SERPL-MCNC: 10 MG/DL (ref 6–20)
BUN/CREAT SERPL: 17 (ref 12–20)
CALCIUM SERPL-MCNC: 7.9 MG/DL (ref 8.5–10.1)
CHLORIDE SERPL-SCNC: 110 MMOL/L (ref 97–108)
CO2 SERPL-SCNC: 26 MMOL/L (ref 21–32)
CREAT SERPL-MCNC: 0.58 MG/DL (ref 0.55–1.02)
ECHO BSA: 2.56 M2
ERYTHROCYTE [DISTWIDTH] IN BLOOD BY AUTOMATED COUNT: 16 % (ref 11.5–14.5)
GLUCOSE SERPL-MCNC: 143 MG/DL (ref 65–100)
GRAM STN SPEC: NORMAL
HCO3 BLDA-SCNC: 24 MMOL/L (ref 22–26)
HCO3 BLDA-SCNC: 24 MMOL/L (ref 22–26)
HCT VFR BLD AUTO: 33.2 % (ref 35–47)
HGB BLD-MCNC: 10.2 G/DL (ref 11.5–16)
MAGNESIUM SERPL-MCNC: 2 MG/DL (ref 1.6–2.4)
MCH RBC QN AUTO: 26.6 PG (ref 26–34)
MCHC RBC AUTO-ENTMCNC: 30.7 G/DL (ref 30–36.5)
MCV RBC AUTO: 86.5 FL (ref 80–99)
NRBC # BLD: 0 K/UL (ref 0–0.01)
NRBC BLD-RTO: 0 PER 100 WBC
PCO2 BLDA: 38 MMHG (ref 35–45)
PCO2 BLDA: 39 MMHG (ref 35–45)
PH BLDA: 7.42 (ref 7.35–7.45)
PH BLDA: 7.43 (ref 7.35–7.45)
PHOSPHATE SERPL-MCNC: 2.5 MG/DL (ref 2.6–4.7)
PLATELET # BLD AUTO: 332 K/UL (ref 150–400)
PMV BLD AUTO: 10.4 FL (ref 8.9–12.9)
PO2 BLDA: 100 MMHG (ref 80–100)
PO2 BLDA: 86 MMHG (ref 80–100)
POTASSIUM SERPL-SCNC: 3.4 MMOL/L (ref 3.5–5.1)
RBC # BLD AUTO: 3.84 M/UL (ref 3.8–5.2)
SAO2 % BLD: 97 % (ref 92–97)
SAO2 % BLD: 98 % (ref 92–97)
SAO2% DEVICE SAO2% SENSOR NAME: ABNORMAL
SAO2% DEVICE SAO2% SENSOR NAME: NORMAL
SERVICE CMNT-IMP: NORMAL
SERVICE CMNT-IMP: NORMAL
SODIUM SERPL-SCNC: 140 MMOL/L (ref 136–145)
SPECIMEN SITE: ABNORMAL
SPECIMEN SITE: NORMAL
TRIGL SERPL-MCNC: 153 MG/DL
VANCOMYCIN SERPL-MCNC: 14.5 UG/ML
VAS LEFT ACA EDV: 80.5 CM/S
VAS LEFT ACA MEAN VEL: 111.5 CM/S
VAS LEFT ACA PSV: 173.4 CM/S
VAS LEFT EX ICA MEAN VEL: 41 CM/S
VAS LEFT ICA DIST EDV: 28.9 CM/S
VAS LEFT ICA DIST PSV: 66.2 CM/S
VAS LEFT LINDEGAARD RATIO: 4
VAS LEFT MCA 1 EDV: 112.5 CM/S
VAS LEFT MCA 1 MEAN VEL: 165.1 CM/S
VAS LEFT MCA 1 PSV: 270.2 CM/S
VAS LEFT PCA 1 EDV: 49.6 CM/S
VAS LEFT PCA 1 MEAN VEL: 72.5 CM/S
VAS LEFT PCA 1 PSV: 118.3 CM/S
VAS RIGHT EX ICA MEAN VEL: 39 CM/S
VAS RIGHT ICA DIST EDV: 24.5 CM/S
VAS RIGHT ICA DIST PSV: 67.3 CM/S
VAS RIGHT PCA 1 EDV: 42 CM/S
VAS RIGHT PCA 1 MEAN VEL: 64 CM/S
VAS RIGHT PCA 1 PSV: 107.9 CM/S
WBC # BLD AUTO: 14.2 K/UL (ref 3.6–11)

## 2023-11-05 PROCEDURE — 2580000003 HC RX 258: Performed by: PSYCHIATRY & NEUROLOGY

## 2023-11-05 PROCEDURE — 6370000000 HC RX 637 (ALT 250 FOR IP): Performed by: INTERNAL MEDICINE

## 2023-11-05 PROCEDURE — 6360000002 HC RX W HCPCS: Performed by: EMERGENCY MEDICINE

## 2023-11-05 PROCEDURE — 2500000003 HC RX 250 WO HCPCS: Performed by: PSYCHIATRY & NEUROLOGY

## 2023-11-05 PROCEDURE — 037G3ZZ DILATION OF INTRACRANIAL ARTERY, PERCUTANEOUS APPROACH: ICD-10-PCS | Performed by: PSYCHIATRY & NEUROLOGY

## 2023-11-05 PROCEDURE — 36225 PLACE CATH SUBCLAVIAN ART: CPT | Performed by: PSYCHIATRY & NEUROLOGY

## 2023-11-05 PROCEDURE — 36600 WITHDRAWAL OF ARTERIAL BLOOD: CPT

## 2023-11-05 PROCEDURE — 83735 ASSAY OF MAGNESIUM: CPT

## 2023-11-05 PROCEDURE — B31D1ZZ FLUOROSCOPY OF RIGHT VERTEBRAL ARTERY USING LOW OSMOLAR CONTRAST: ICD-10-PCS | Performed by: PSYCHIATRY & NEUROLOGY

## 2023-11-05 PROCEDURE — 71045 X-RAY EXAM CHEST 1 VIEW: CPT

## 2023-11-05 PROCEDURE — 6360000002 HC RX W HCPCS: Performed by: PSYCHIATRY & NEUROLOGY

## 2023-11-05 PROCEDURE — 70450 CT HEAD/BRAIN W/O DYE: CPT

## 2023-11-05 PROCEDURE — 6370000000 HC RX 637 (ALT 250 FOR IP): Performed by: NURSE PRACTITIONER

## 2023-11-05 PROCEDURE — 84100 ASSAY OF PHOSPHORUS: CPT

## 2023-11-05 PROCEDURE — 82803 BLOOD GASES ANY COMBINATION: CPT

## 2023-11-05 PROCEDURE — 99233 SBSQ HOSP IP/OBS HIGH 50: CPT | Performed by: NURSE PRACTITIONER

## 2023-11-05 PROCEDURE — 36224 PLACE CATH CAROTD ART: CPT | Performed by: PSYCHIATRY & NEUROLOGY

## 2023-11-05 PROCEDURE — B3181ZZ FLUOROSCOPY OF BILATERAL INTERNAL CAROTID ARTERIES USING LOW OSMOLAR CONTRAST: ICD-10-PCS | Performed by: PSYCHIATRY & NEUROLOGY

## 2023-11-05 PROCEDURE — 61651 EVASC PRLNG ADMN RX AGNT ADD: CPT | Performed by: PSYCHIATRY & NEUROLOGY

## 2023-11-05 PROCEDURE — 6360000004 HC RX CONTRAST MEDICATION: Performed by: PSYCHIATRY & NEUROLOGY

## 2023-11-05 PROCEDURE — 6360000002 HC RX W HCPCS: Performed by: NURSE PRACTITIONER

## 2023-11-05 PROCEDURE — 84478 ASSAY OF TRIGLYCERIDES: CPT

## 2023-11-05 PROCEDURE — 2580000003 HC RX 258: Performed by: NURSE PRACTITIONER

## 2023-11-05 PROCEDURE — 3E0Q3GC INTRODUCTION OF OTHER THERAPEUTIC SUBSTANCE INTO CRANIAL CAVITY AND BRAIN, PERCUTANEOUS APPROACH: ICD-10-PCS | Performed by: PSYCHIATRY & NEUROLOGY

## 2023-11-05 PROCEDURE — B3151ZZ FLUOROSCOPY OF BILATERAL COMMON CAROTID ARTERIES USING LOW OSMOLAR CONTRAST: ICD-10-PCS | Performed by: PSYCHIATRY & NEUROLOGY

## 2023-11-05 PROCEDURE — 2500000003 HC RX 250 WO HCPCS

## 2023-11-05 PROCEDURE — C1894 INTRO/SHEATH, NON-LASER: HCPCS

## 2023-11-05 PROCEDURE — 0B918ZZ DRAINAGE OF TRACHEA, VIA NATURAL OR ARTIFICIAL OPENING ENDOSCOPIC: ICD-10-PCS | Performed by: INTERNAL MEDICINE

## 2023-11-05 PROCEDURE — 2000000000 HC ICU R&B

## 2023-11-05 PROCEDURE — 61650 EVASC PRLNG ADMN RX AGNT 1ST: CPT | Performed by: PSYCHIATRY & NEUROLOGY

## 2023-11-05 PROCEDURE — B31N1ZZ FLUOROSCOPY OF OTHER UPPER ARTERIES USING LOW OSMOLAR CONTRAST: ICD-10-PCS | Performed by: PSYCHIATRY & NEUROLOGY

## 2023-11-05 PROCEDURE — A4216 STERILE WATER/SALINE, 10 ML: HCPCS | Performed by: NURSE PRACTITIONER

## 2023-11-05 PROCEDURE — 6360000002 HC RX W HCPCS: Performed by: INTERNAL MEDICINE

## 2023-11-05 PROCEDURE — 99152 MOD SED SAME PHYS/QHP 5/>YRS: CPT | Performed by: PSYCHIATRY & NEUROLOGY

## 2023-11-05 PROCEDURE — 94003 VENT MGMT INPAT SUBQ DAY: CPT

## 2023-11-05 PROCEDURE — 80048 BASIC METABOLIC PNL TOTAL CA: CPT

## 2023-11-05 PROCEDURE — 2580000003 HC RX 258: Performed by: EMERGENCY MEDICINE

## 2023-11-05 PROCEDURE — 85027 COMPLETE CBC AUTOMATED: CPT

## 2023-11-05 PROCEDURE — 76937 US GUIDE VASCULAR ACCESS: CPT | Performed by: PSYCHIATRY & NEUROLOGY

## 2023-11-05 PROCEDURE — 94640 AIRWAY INHALATION TREATMENT: CPT

## 2023-11-05 PROCEDURE — 36415 COLL VENOUS BLD VENIPUNCTURE: CPT

## 2023-11-05 PROCEDURE — 80202 ASSAY OF VANCOMYCIN: CPT

## 2023-11-05 PROCEDURE — C9113 INJ PANTOPRAZOLE SODIUM, VIA: HCPCS | Performed by: NURSE PRACTITIONER

## 2023-11-05 PROCEDURE — C1769 GUIDE WIRE: HCPCS

## 2023-11-05 PROCEDURE — 2500000003 HC RX 250 WO HCPCS: Performed by: NURSE ANESTHETIST, CERTIFIED REGISTERED

## 2023-11-05 PROCEDURE — 93886 INTRACRANIAL COMPLETE STUDY: CPT

## 2023-11-05 PROCEDURE — 6370000000 HC RX 637 (ALT 250 FOR IP): Performed by: PSYCHIATRY & NEUROLOGY

## 2023-11-05 RX ORDER — NITROGLYCERIN 20 MG/100ML
INJECTION INTRAVENOUS PRN
Status: COMPLETED | OUTPATIENT
Start: 2023-11-05 | End: 2023-11-05

## 2023-11-05 RX ORDER — VERAPAMIL HYDROCHLORIDE 2.5 MG/ML
INJECTION, SOLUTION INTRAVENOUS PRN
Status: COMPLETED | OUTPATIENT
Start: 2023-11-05 | End: 2023-11-05

## 2023-11-05 RX ORDER — CALCIUM GLUCONATE 20 MG/ML
2000 INJECTION, SOLUTION INTRAVENOUS ONCE
Status: COMPLETED | OUTPATIENT
Start: 2023-11-05 | End: 2023-11-05

## 2023-11-05 RX ORDER — MIDAZOLAM HYDROCHLORIDE 2 MG/2ML
2 INJECTION, SOLUTION INTRAMUSCULAR; INTRAVENOUS ONCE
Status: COMPLETED | OUTPATIENT
Start: 2023-11-05 | End: 2023-11-06

## 2023-11-05 RX ORDER — ROCURONIUM BROMIDE 10 MG/ML
0.6 INJECTION, SOLUTION INTRAVENOUS ONCE
Status: COMPLETED | OUTPATIENT
Start: 2023-11-05 | End: 2023-11-05

## 2023-11-05 RX ORDER — MAGNESIUM SULFATE 1 G/100ML
1000 INJECTION INTRAVENOUS ONCE
Status: COMPLETED | OUTPATIENT
Start: 2023-11-05 | End: 2023-11-05

## 2023-11-05 RX ORDER — ROCURONIUM BROMIDE 10 MG/ML
INJECTION, SOLUTION INTRAVENOUS
Status: COMPLETED
Start: 2023-11-05 | End: 2023-11-05

## 2023-11-05 RX ORDER — LIDOCAINE HYDROCHLORIDE 10 MG/ML
INJECTION, SOLUTION EPIDURAL; INFILTRATION; INTRACAUDAL; PERINEURAL PRN
Status: COMPLETED | OUTPATIENT
Start: 2023-11-05 | End: 2023-11-05

## 2023-11-05 RX ORDER — NOREPINEPHRINE BITARTRATE 0.06 MG/ML
INJECTION, SOLUTION INTRAVENOUS
Status: DISPENSED
Start: 2023-11-05 | End: 2023-11-06

## 2023-11-05 RX ORDER — ROCURONIUM BROMIDE 10 MG/ML
INJECTION, SOLUTION INTRAVENOUS PRN
Status: DISCONTINUED | OUTPATIENT
Start: 2023-11-05 | End: 2023-11-05 | Stop reason: SDUPTHER

## 2023-11-05 RX ORDER — HEPARIN SODIUM 1000 [USP'U]/ML
INJECTION, SOLUTION INTRAVENOUS; SUBCUTANEOUS PRN
Status: COMPLETED | OUTPATIENT
Start: 2023-11-05 | End: 2023-11-05

## 2023-11-05 RX ADMIN — HEPARIN SODIUM 5000 UNITS: 5000 INJECTION INTRAVENOUS; SUBCUTANEOUS at 14:21

## 2023-11-05 RX ADMIN — NITROGLYCERIN 100 MCG: 20 INJECTION INTRAVENOUS at 09:14

## 2023-11-05 RX ADMIN — POTASSIUM BICARBONATE 40 MEQ: 782 TABLET, EFFERVESCENT ORAL at 08:06

## 2023-11-05 RX ADMIN — NITROGLYCERIN 100 MCG: 20 INJECTION INTRAVENOUS at 09:50

## 2023-11-05 RX ADMIN — ROCURONIUM BROMIDE 88 MG: 10 INJECTION, SOLUTION INTRAVENOUS at 19:08

## 2023-11-05 RX ADMIN — SODIUM CHLORIDE, PRESERVATIVE FREE 10 ML: 5 INJECTION INTRAVENOUS at 22:04

## 2023-11-05 RX ADMIN — Medication 60 MG: at 16:17

## 2023-11-05 RX ADMIN — VERAPAMIL HYDROCHLORIDE 10 MG: 2.5 INJECTION, SOLUTION INTRAVENOUS at 09:18

## 2023-11-05 RX ADMIN — IOPAMIDOL 78 ML: 612 INJECTION, SOLUTION INTRAVENOUS at 09:58

## 2023-11-05 RX ADMIN — LEVETIRACETAM 1000 MG: 100 INJECTION, SOLUTION INTRAVENOUS at 22:03

## 2023-11-05 RX ADMIN — MILRINONE LACTATE 0.25 MCG/KG/MIN: 0.2 INJECTION, SOLUTION INTRAVENOUS at 06:31

## 2023-11-05 RX ADMIN — CILOSTAZOL 50 MG: 50 TABLET ORAL at 08:07

## 2023-11-05 RX ADMIN — CALCIUM GLUCONATE 2000 MG: 20 INJECTION, SOLUTION INTRAVENOUS at 08:14

## 2023-11-05 RX ADMIN — HEPARIN SODIUM 5000 UNITS: 5000 INJECTION INTRAVENOUS; SUBCUTANEOUS at 22:38

## 2023-11-05 RX ADMIN — PROPOFOL 50 MCG/KG/MIN: 10 INJECTION, EMULSION INTRAVENOUS at 01:37

## 2023-11-05 RX ADMIN — MORPHINE SULFATE 2 MG: 2 INJECTION, SOLUTION INTRAMUSCULAR; INTRAVENOUS at 18:52

## 2023-11-05 RX ADMIN — PROPOFOL 50 MCG/KG/MIN: 10 INJECTION, EMULSION INTRAVENOUS at 00:12

## 2023-11-05 RX ADMIN — LIDOCAINE HYDROCHLORIDE 10 ML: 10 INJECTION, SOLUTION EPIDURAL; INFILTRATION; INTRACAUDAL; PERINEURAL at 09:11

## 2023-11-05 RX ADMIN — SODIUM CHLORIDE, PRESERVATIVE FREE 40 MG: 5 INJECTION INTRAVENOUS at 08:07

## 2023-11-05 RX ADMIN — SODIUM CHLORIDE, PRESERVATIVE FREE 10 ML: 5 INJECTION INTRAVENOUS at 08:08

## 2023-11-05 RX ADMIN — VERAPAMIL HYDROCHLORIDE 2.5 MG: 2.5 INJECTION, SOLUTION INTRAVENOUS at 09:14

## 2023-11-05 RX ADMIN — Medication 60 MG: at 08:07

## 2023-11-05 RX ADMIN — ACETAMINOPHEN 650 MG: 325 TABLET ORAL at 18:36

## 2023-11-05 RX ADMIN — VANCOMYCIN HYDROCHLORIDE 1750 MG: 10 INJECTION, POWDER, LYOPHILIZED, FOR SOLUTION INTRAVENOUS at 12:39

## 2023-11-05 RX ADMIN — MIDAZOLAM 2 MG: 1 INJECTION INTRAMUSCULAR; INTRAVENOUS at 18:40

## 2023-11-05 RX ADMIN — SODIUM CHLORIDE, PRESERVATIVE FREE 10 ML: 5 INJECTION INTRAVENOUS at 08:07

## 2023-11-05 RX ADMIN — Medication 60 MG: at 22:03

## 2023-11-05 RX ADMIN — MAGNESIUM SULFATE HEPTAHYDRATE 1000 MG: 1 INJECTION, SOLUTION INTRAVENOUS at 08:06

## 2023-11-05 RX ADMIN — MILRINONE LACTATE 0.25 MCG/KG/MIN: 0.2 INJECTION, SOLUTION INTRAVENOUS at 19:04

## 2023-11-05 RX ADMIN — Medication 60 MG: at 04:51

## 2023-11-05 RX ADMIN — FENTANYL CITRATE 100 MCG/HR: 0.05 INJECTION, SOLUTION INTRAMUSCULAR; INTRAVENOUS at 18:59

## 2023-11-05 RX ADMIN — SODIUM CHLORIDE: 9 INJECTION, SOLUTION INTRAVENOUS at 01:36

## 2023-11-05 RX ADMIN — HEPARIN SODIUM 2000 UNITS: 1000 INJECTION INTRAVENOUS; SUBCUTANEOUS at 09:14

## 2023-11-05 RX ADMIN — ATORVASTATIN CALCIUM 20 MG: 20 TABLET, FILM COATED ORAL at 22:03

## 2023-11-05 RX ADMIN — PIPERACILLIN AND TAZOBACTAM 4500 MG: 4; .5 INJECTION, POWDER, LYOPHILIZED, FOR SOLUTION INTRAVENOUS at 14:30

## 2023-11-05 RX ADMIN — HEPARIN SODIUM: 1000 INJECTION INTRAVENOUS; SUBCUTANEOUS at 09:08

## 2023-11-05 RX ADMIN — ROCURONIUM BROMIDE 50 MG: 10 SOLUTION INTRAVENOUS at 09:26

## 2023-11-05 RX ADMIN — PROPOFOL 40 MCG/KG/MIN: 10 INJECTION, EMULSION INTRAVENOUS at 10:56

## 2023-11-05 RX ADMIN — PROPOFOL 25 MCG/KG/MIN: 10 INJECTION, EMULSION INTRAVENOUS at 14:22

## 2023-11-05 RX ADMIN — SODIUM BICARBONATE 1 MEQ: 84 INJECTION INTRAVENOUS at 09:20

## 2023-11-05 RX ADMIN — VASOPRESSIN 0.03 UNITS/MIN: 20 INJECTION INTRAVENOUS at 22:36

## 2023-11-05 RX ADMIN — VERAPAMIL HYDROCHLORIDE 10 MG: 2.5 INJECTION, SOLUTION INTRAVENOUS at 09:28

## 2023-11-05 RX ADMIN — IPRATROPIUM BROMIDE AND ALBUTEROL SULFATE 1 DOSE: 2.5; .5 SOLUTION RESPIRATORY (INHALATION) at 19:25

## 2023-11-05 RX ADMIN — NITROGLYCERIN 100 MCG: 20 INJECTION INTRAVENOUS at 09:22

## 2023-11-05 RX ADMIN — PROPOFOL 50 MCG/KG/MIN: 10 INJECTION, EMULSION INTRAVENOUS at 19:14

## 2023-11-05 RX ADMIN — ROCURONIUM BROMIDE 50 MG: 10 SOLUTION INTRAVENOUS at 08:52

## 2023-11-05 RX ADMIN — NITROGLYCERIN 100 MCG: 20 INJECTION INTRAVENOUS at 09:38

## 2023-11-05 RX ADMIN — NOREPINEPHRINE BITARTRATE 15 MCG/MIN: 1 INJECTION, SOLUTION, CONCENTRATE INTRAVENOUS at 19:19

## 2023-11-05 RX ADMIN — PIPERACILLIN AND TAZOBACTAM 4500 MG: 4; .5 INJECTION, POWDER, LYOPHILIZED, FOR SOLUTION INTRAVENOUS at 22:03

## 2023-11-05 RX ADMIN — HEPARIN SODIUM 5000 UNITS: 5000 INJECTION INTRAVENOUS; SUBCUTANEOUS at 05:40

## 2023-11-05 RX ADMIN — DIBASIC SODIUM PHOSPHATE, MONOBASIC POTASSIUM PHOSPHATE AND MONOBASIC SODIUM PHOSPHATE 2 TABLET: 852; 155; 130 TABLET ORAL at 08:06

## 2023-11-05 RX ADMIN — PIPERACILLIN AND TAZOBACTAM 4500 MG: 4; .5 INJECTION, POWDER, LYOPHILIZED, FOR SOLUTION INTRAVENOUS at 05:37

## 2023-11-05 RX ADMIN — LEVETIRACETAM 1000 MG: 100 INJECTION, SOLUTION INTRAVENOUS at 10:53

## 2023-11-05 RX ADMIN — VERAPAMIL HYDROCHLORIDE 20 MG: 2.5 INJECTION, SOLUTION INTRAVENOUS at 09:21

## 2023-11-05 RX ADMIN — Medication 60 MG: at 11:59

## 2023-11-05 ASSESSMENT — PULMONARY FUNCTION TESTS
PIF_VALUE: 28
PIF_VALUE: 29
PIF_VALUE: 21
PIF_VALUE: 25
PIF_VALUE: 22

## 2023-11-05 ASSESSMENT — PAIN SCALES - GENERAL
PAINLEVEL_OUTOF10: 0

## 2023-11-05 ASSESSMENT — LIFESTYLE VARIABLES: SMOKING_STATUS: 1

## 2023-11-05 NOTE — BRIEF OP NOTE
Brief Procedure Note      Patient: Oliva Jacques MRN: [id number]     YOB: 1965  Age: 56 y.o.   Sex: female      Service: Neurointerventional Surgery    Date of Procedure: 11/5/2023    Pre-Procedure Diagnosis: Cerebral vasospasm    Post-Procedure Diagnosis: SAME    : Kavon Neal DO    Assistant(s): N/A    Procedure(s):   Diagnostic cerebral angiogram  Infusion therapy  Intracranial angioplasty    Vessels Studied:   Right CCA  Right ICA  Left CCA  Left ICA  Left CHRISTOPHER  Right vertebral artery    Puncture Site: Right radial artery--> TR band    Preliminary Findings:   Overall improved exam  Infusion therapy x 3 territories  Angioplasty R MCA significant improvement    Plan:   -220    Specimens: None    Implants: None    Drains: None    Anesthesia: General    Estimated Blood Loss: 5 cc      Apparent Intraoperative Complications: None immediate    Patient Condition: Stable    Disposition: ICU      Signed:   Kavon Neal DO  [facility] [address] [facility]

## 2023-11-06 ENCOUNTER — APPOINTMENT (OUTPATIENT)
Facility: HOSPITAL | Age: 58
DRG: 021 | End: 2023-11-06
Payer: MEDICAID

## 2023-11-06 LAB
ANION GAP SERPL CALC-SCNC: 12 MMOL/L (ref 5–15)
ANION GAP SERPL CALC-SCNC: 6 MMOL/L (ref 5–15)
BUN SERPL-MCNC: 5 MG/DL (ref 6–20)
BUN SERPL-MCNC: 7 MG/DL (ref 6–20)
BUN/CREAT SERPL: 14 (ref 12–20)
BUN/CREAT SERPL: 17 (ref 12–20)
CALCIUM SERPL-MCNC: 7.6 MG/DL (ref 8.5–10.1)
CALCIUM SERPL-MCNC: 8.1 MG/DL (ref 8.5–10.1)
CHLORIDE SERPL-SCNC: 103 MMOL/L (ref 97–108)
CHLORIDE SERPL-SCNC: 105 MMOL/L (ref 97–108)
CO2 SERPL-SCNC: 23 MMOL/L (ref 21–32)
CO2 SERPL-SCNC: 28 MMOL/L (ref 21–32)
CREAT SERPL-MCNC: 0.37 MG/DL (ref 0.55–1.02)
CREAT SERPL-MCNC: 0.41 MG/DL (ref 0.55–1.02)
ECHO BSA: 2.56 M2
ERYTHROCYTE [DISTWIDTH] IN BLOOD BY AUTOMATED COUNT: 15.9 % (ref 11.5–14.5)
GLUCOSE SERPL-MCNC: 133 MG/DL (ref 65–100)
GLUCOSE SERPL-MCNC: 183 MG/DL (ref 65–100)
HCT VFR BLD AUTO: 32.8 % (ref 35–47)
HGB BLD-MCNC: 10.1 G/DL (ref 11.5–16)
MAGNESIUM SERPL-MCNC: 2 MG/DL (ref 1.6–2.4)
MCH RBC QN AUTO: 26.3 PG (ref 26–34)
MCHC RBC AUTO-ENTMCNC: 30.8 G/DL (ref 30–36.5)
MCV RBC AUTO: 85.4 FL (ref 80–99)
NRBC # BLD: 0 K/UL (ref 0–0.01)
NRBC BLD-RTO: 0 PER 100 WBC
PHOSPHATE SERPL-MCNC: 1.9 MG/DL (ref 2.6–4.7)
PLATELET # BLD AUTO: 324 K/UL (ref 150–400)
PMV BLD AUTO: 10.7 FL (ref 8.9–12.9)
POTASSIUM SERPL-SCNC: 3 MMOL/L (ref 3.5–5.1)
POTASSIUM SERPL-SCNC: 3.3 MMOL/L (ref 3.5–5.1)
RBC # BLD AUTO: 3.84 M/UL (ref 3.8–5.2)
SODIUM SERPL-SCNC: 138 MMOL/L (ref 136–145)
SODIUM SERPL-SCNC: 139 MMOL/L (ref 136–145)
VAS LEFT EX ICA MEAN VEL: 36 CM/S
VAS LEFT ICA DIST EDV: 22.5 CM/S
VAS LEFT ICA DIST PSV: 60.8 CM/S
VAS LEFT LINDEGAARD RATIO: 4.8
VAS LEFT MCA 1 EDV: 125.4 CM/S
VAS LEFT MCA 1 MEAN VEL: 174.3 CM/S
VAS LEFT MCA 1 PSV: 272 CM/S
VAS LEFT PCA 1 EDV: 55.5 CM/S
VAS LEFT PCA 1 MEAN VEL: 75.8 CM/S
VAS LEFT PCA 1 PSV: 116.3 CM/S
VAS RIGHT EX ICA MEAN VEL: 31 CM/S
VAS RIGHT ICA DIST EDV: 17 CM/S
VAS RIGHT ICA DIST PSV: 58.1 CM/S
VAS RIGHT PCA 1 EDV: 94.2 CM/S
VAS RIGHT PCA 1 MEAN VEL: 128.3 CM/S
VAS RIGHT PCA 1 PSV: 196.4 CM/S
WBC # BLD AUTO: 20.8 K/UL (ref 3.6–11)

## 2023-11-06 PROCEDURE — 6360000002 HC RX W HCPCS: Performed by: EMERGENCY MEDICINE

## 2023-11-06 PROCEDURE — 2580000003 HC RX 258: Performed by: NURSE PRACTITIONER

## 2023-11-06 PROCEDURE — 6360000002 HC RX W HCPCS: Performed by: NURSE PRACTITIONER

## 2023-11-06 PROCEDURE — 6370000000 HC RX 637 (ALT 250 FOR IP): Performed by: INTERNAL MEDICINE

## 2023-11-06 PROCEDURE — 85027 COMPLETE CBC AUTOMATED: CPT

## 2023-11-06 PROCEDURE — 6370000000 HC RX 637 (ALT 250 FOR IP): Performed by: NURSE PRACTITIONER

## 2023-11-06 PROCEDURE — 94003 VENT MGMT INPAT SUBQ DAY: CPT

## 2023-11-06 PROCEDURE — 6370000000 HC RX 637 (ALT 250 FOR IP): Performed by: PSYCHIATRY & NEUROLOGY

## 2023-11-06 PROCEDURE — 6360000002 HC RX W HCPCS: Performed by: PSYCHIATRY & NEUROLOGY

## 2023-11-06 PROCEDURE — 2500000003 HC RX 250 WO HCPCS: Performed by: PSYCHIATRY & NEUROLOGY

## 2023-11-06 PROCEDURE — 36415 COLL VENOUS BLD VENIPUNCTURE: CPT

## 2023-11-06 PROCEDURE — 99024 POSTOP FOLLOW-UP VISIT: CPT | Performed by: NURSE PRACTITIONER

## 2023-11-06 PROCEDURE — 83735 ASSAY OF MAGNESIUM: CPT

## 2023-11-06 PROCEDURE — 80048 BASIC METABOLIC PNL TOTAL CA: CPT

## 2023-11-06 PROCEDURE — 2500000003 HC RX 250 WO HCPCS: Performed by: NURSE PRACTITIONER

## 2023-11-06 PROCEDURE — A4216 STERILE WATER/SALINE, 10 ML: HCPCS | Performed by: NURSE PRACTITIONER

## 2023-11-06 PROCEDURE — C9113 INJ PANTOPRAZOLE SODIUM, VIA: HCPCS | Performed by: NURSE PRACTITIONER

## 2023-11-06 PROCEDURE — 36620 INSERTION CATHETER ARTERY: CPT

## 2023-11-06 PROCEDURE — 2580000003 HC RX 258: Performed by: EMERGENCY MEDICINE

## 2023-11-06 PROCEDURE — 2000000000 HC ICU R&B

## 2023-11-06 PROCEDURE — 71045 X-RAY EXAM CHEST 1 VIEW: CPT

## 2023-11-06 PROCEDURE — 93886 INTRACRANIAL COMPLETE STUDY: CPT

## 2023-11-06 PROCEDURE — 2580000003 HC RX 258: Performed by: PSYCHIATRY & NEUROLOGY

## 2023-11-06 PROCEDURE — 99233 SBSQ HOSP IP/OBS HIGH 50: CPT | Performed by: NURSE PRACTITIONER

## 2023-11-06 PROCEDURE — 84100 ASSAY OF PHOSPHORUS: CPT

## 2023-11-06 PROCEDURE — 6360000002 HC RX W HCPCS: Performed by: INTERNAL MEDICINE

## 2023-11-06 RX ORDER — POTASSIUM CHLORIDE 29.8 MG/ML
20 INJECTION INTRAVENOUS
Status: COMPLETED | OUTPATIENT
Start: 2023-11-06 | End: 2023-11-06

## 2023-11-06 RX ORDER — FENTANYL CITRATE 50 UG/ML
100 INJECTION, SOLUTION INTRAMUSCULAR; INTRAVENOUS ONCE
Status: COMPLETED | OUTPATIENT
Start: 2023-11-06 | End: 2023-11-06

## 2023-11-06 RX ORDER — DEXMEDETOMIDINE HYDROCHLORIDE 4 UG/ML
.1-1.5 INJECTION, SOLUTION INTRAVENOUS CONTINUOUS
Status: DISCONTINUED | OUTPATIENT
Start: 2023-11-06 | End: 2023-11-08

## 2023-11-06 RX ORDER — FENTANYL CITRATE 50 UG/ML
50 INJECTION, SOLUTION INTRAMUSCULAR; INTRAVENOUS ONCE
Status: COMPLETED | OUTPATIENT
Start: 2023-11-06 | End: 2023-11-06

## 2023-11-06 RX ORDER — PROPOFOL 10 MG/ML
5-50 INJECTION, EMULSION INTRAVENOUS CONTINUOUS
Status: DISCONTINUED | OUTPATIENT
Start: 2023-11-06 | End: 2023-11-07

## 2023-11-06 RX ORDER — FENTANYL CITRATE 50 UG/ML
50 INJECTION, SOLUTION INTRAMUSCULAR; INTRAVENOUS
Status: DISCONTINUED | OUTPATIENT
Start: 2023-11-06 | End: 2023-11-10

## 2023-11-06 RX ORDER — CHLORHEXIDINE GLUCONATE ORAL RINSE 1.2 MG/ML
15 SOLUTION DENTAL 2 TIMES DAILY
Status: DISCONTINUED | OUTPATIENT
Start: 2023-11-06 | End: 2023-11-13

## 2023-11-06 RX ADMIN — PROPOFOL 5 MCG/KG/MIN: 10 INJECTION, EMULSION INTRAVENOUS at 22:56

## 2023-11-06 RX ADMIN — Medication 60 MG: at 22:05

## 2023-11-06 RX ADMIN — POTASSIUM CHLORIDE 20 MEQ: 29.8 INJECTION, SOLUTION INTRAVENOUS at 17:06

## 2023-11-06 RX ADMIN — ACETAMINOPHEN 650 MG: 325 TABLET ORAL at 16:01

## 2023-11-06 RX ADMIN — Medication 60 MG: at 15:54

## 2023-11-06 RX ADMIN — SODIUM CHLORIDE, PRESERVATIVE FREE 10 ML: 5 INJECTION INTRAVENOUS at 08:54

## 2023-11-06 RX ADMIN — LEVETIRACETAM 1000 MG: 100 INJECTION, SOLUTION INTRAVENOUS at 22:05

## 2023-11-06 RX ADMIN — MIDAZOLAM 2 MG: 1 INJECTION INTRAMUSCULAR; INTRAVENOUS at 20:23

## 2023-11-06 RX ADMIN — HEPARIN SODIUM 5000 UNITS: 5000 INJECTION INTRAVENOUS; SUBCUTANEOUS at 15:54

## 2023-11-06 RX ADMIN — VANCOMYCIN HYDROCHLORIDE 1750 MG: 10 INJECTION, POWDER, LYOPHILIZED, FOR SOLUTION INTRAVENOUS at 00:31

## 2023-11-06 RX ADMIN — MILRINONE LACTATE 0.25 MCG/KG/MIN: 0.2 INJECTION, SOLUTION INTRAVENOUS at 18:22

## 2023-11-06 RX ADMIN — MORPHINE SULFATE 2 MG: 2 INJECTION, SOLUTION INTRAMUSCULAR; INTRAVENOUS at 08:52

## 2023-11-06 RX ADMIN — SODIUM CHLORIDE, PRESERVATIVE FREE 40 MG: 5 INJECTION INTRAVENOUS at 08:53

## 2023-11-06 RX ADMIN — Medication 60 MG: at 01:21

## 2023-11-06 RX ADMIN — MILRINONE LACTATE 0.25 MCG/KG/MIN: 0.2 INJECTION, SOLUTION INTRAVENOUS at 09:58

## 2023-11-06 RX ADMIN — ATORVASTATIN CALCIUM 20 MG: 20 TABLET, FILM COATED ORAL at 20:15

## 2023-11-06 RX ADMIN — Medication 60 MG: at 05:20

## 2023-11-06 RX ADMIN — MILRINONE LACTATE 0.25 MCG/KG/MIN: 0.2 INJECTION, SOLUTION INTRAVENOUS at 00:26

## 2023-11-06 RX ADMIN — SODIUM CHLORIDE, PRESERVATIVE FREE 10 ML: 5 INJECTION INTRAVENOUS at 20:07

## 2023-11-06 RX ADMIN — VANCOMYCIN HYDROCHLORIDE 1750 MG: 10 INJECTION, POWDER, LYOPHILIZED, FOR SOLUTION INTRAVENOUS at 11:50

## 2023-11-06 RX ADMIN — PIPERACILLIN AND TAZOBACTAM 4500 MG: 4; .5 INJECTION, POWDER, LYOPHILIZED, FOR SOLUTION INTRAVENOUS at 06:15

## 2023-11-06 RX ADMIN — POTASSIUM CHLORIDE 20 MEQ: 29.8 INJECTION, SOLUTION INTRAVENOUS at 18:15

## 2023-11-06 RX ADMIN — PIPERACILLIN AND TAZOBACTAM 4500 MG: 4; .5 INJECTION, POWDER, LYOPHILIZED, FOR SOLUTION INTRAVENOUS at 23:03

## 2023-11-06 RX ADMIN — CHLORHEXIDINE GLUCONATE 15 ML: 1.2 RINSE ORAL at 11:44

## 2023-11-06 RX ADMIN — VASOPRESSIN 0.03 UNITS/MIN: 20 INJECTION INTRAVENOUS at 08:40

## 2023-11-06 RX ADMIN — LEVETIRACETAM 1000 MG: 100 INJECTION, SOLUTION INTRAVENOUS at 08:52

## 2023-11-06 RX ADMIN — HEPARIN SODIUM 5000 UNITS: 5000 INJECTION INTRAVENOUS; SUBCUTANEOUS at 06:15

## 2023-11-06 RX ADMIN — CHLORHEXIDINE GLUCONATE 15 ML: 1.2 RINSE ORAL at 20:15

## 2023-11-06 RX ADMIN — Medication 60 MG: at 09:56

## 2023-11-06 RX ADMIN — CILOSTAZOL 50 MG: 50 TABLET ORAL at 20:15

## 2023-11-06 RX ADMIN — NOREPINEPHRINE BITARTRATE 30 MCG/MIN: 1 INJECTION, SOLUTION, CONCENTRATE INTRAVENOUS at 22:05

## 2023-11-06 RX ADMIN — POTASSIUM BICARBONATE 40 MEQ: 782 TABLET, EFFERVESCENT ORAL at 15:54

## 2023-11-06 RX ADMIN — NOREPINEPHRINE BITARTRATE 35 MCG/MIN: 1 INJECTION, SOLUTION, CONCENTRATE INTRAVENOUS at 05:45

## 2023-11-06 RX ADMIN — ACETAMINOPHEN 650 MG: 325 TABLET ORAL at 20:15

## 2023-11-06 RX ADMIN — MIDAZOLAM 2 MG: 1 INJECTION INTRAMUSCULAR; INTRAVENOUS at 22:15

## 2023-11-06 RX ADMIN — CILOSTAZOL 50 MG: 50 TABLET ORAL at 08:53

## 2023-11-06 RX ADMIN — CILOSTAZOL 50 MG: 50 TABLET ORAL at 00:13

## 2023-11-06 RX ADMIN — DEXMEDETOMIDINE HYDROCHLORIDE 0.2 MCG/KG/HR: 400 INJECTION, SOLUTION INTRAVENOUS at 09:53

## 2023-11-06 RX ADMIN — FENTANYL CITRATE 50 MCG: 0.05 INJECTION, SOLUTION INTRAMUSCULAR; INTRAVENOUS at 02:24

## 2023-11-06 RX ADMIN — FENTANYL CITRATE 100 MCG: 0.05 INJECTION, SOLUTION INTRAMUSCULAR; INTRAVENOUS at 22:50

## 2023-11-06 RX ADMIN — DEXMEDETOMIDINE HYDROCHLORIDE 0.2 MCG/KG/HR: 400 INJECTION, SOLUTION INTRAVENOUS at 18:22

## 2023-11-06 RX ADMIN — POTASSIUM PHOSPHATE, MONOBASIC POTASSIUM PHOSPHATE, DIBASIC 30 MMOL: 224; 236 INJECTION, SOLUTION, CONCENTRATE INTRAVENOUS at 11:35

## 2023-11-06 RX ADMIN — NOREPINEPHRINE BITARTRATE 15 MCG/MIN: 1 INJECTION, SOLUTION, CONCENTRATE INTRAVENOUS at 17:47

## 2023-11-06 RX ADMIN — HEPARIN SODIUM 5000 UNITS: 5000 INJECTION INTRAVENOUS; SUBCUTANEOUS at 22:05

## 2023-11-06 RX ADMIN — PIPERACILLIN AND TAZOBACTAM 4500 MG: 4; .5 INJECTION, POWDER, LYOPHILIZED, FOR SOLUTION INTRAVENOUS at 15:54

## 2023-11-06 RX ADMIN — Medication 60 MG: at 18:21

## 2023-11-06 ASSESSMENT — PULMONARY FUNCTION TESTS
PIF_VALUE: 19
PIF_VALUE: 16
PIF_VALUE: 19
PIF_VALUE: 17
PIF_VALUE: 17

## 2023-11-06 ASSESSMENT — PAIN SCALES - GENERAL
PAINLEVEL_OUTOF10: 0

## 2023-11-06 NOTE — PROCEDURES
CTSP for difficulty with ventilation  Very little air mov't, agitation  Sedation increased but problem persisted  Unable to pass suction catheter  Pt paralyzed  Emergent bronchoscopy performed - no consent obtained    Bronchoscopy passed via ett:    Debris ett -suctioned, scope removed and cleared  Trachea : plugs of dried mucus/blood obstructing trachea and end of tube, lavaged and suctioned with removal of scope to complete clearing  Ett in good position mid trachea  Ejssica nl   Left side airways patent both lobes  Right side airways patent 3 lobes    Trachea rinsed clear and suctioned  Air mov't improved    Tolerated well -sat over 90%  No ebl

## 2023-11-06 NOTE — PROCEDURES
Procedure Note - Arterial Venous Access:   Performed by SHARON Fletcher NP     Diagnosis: SAH  Insertion Date: 11/06/23  Time:9:39 AM   Obtained Consent? yes; informed   Procedure Location:  ICU. Immediately prior to the procedure, the patient was reevaluated and found suitable for the planned procedure and any planned medications. Immediately prior to the procedure a time out was called to verify the correct patient, procedure, equipment, staff, and marking as appropriate. Saleem test to check for collateral flow. Line Bundle:  Full sterile barrier precautions used. 5 mL 1% Lidocaine placed at insertion site. The site was prepped with ChloraPrep. Catheter inserted into a new site. Using Seldinger technique a arterial catheter was placed in the right axillary artery, with 1 number of attempts for hemodynamic monitoring. Ultrasound Guidance was utilized. There was good red pulsatile blood return with waveform on monitor. Femoral Site? no. If Yes, reason femoral site was chosen: n/a  Catheter secured. Biopatch/CHG bio-occlusive dressing in place? yes. Complications encountered? no. The procedure was tolerated well.     SHARON Fletcher NP  Critical Care Medicine  [facility]

## 2023-11-07 ENCOUNTER — APPOINTMENT (OUTPATIENT)
Facility: HOSPITAL | Age: 58
DRG: 021 | End: 2023-11-07
Payer: MEDICAID

## 2023-11-07 LAB
ANION GAP SERPL CALC-SCNC: 10 MMOL/L (ref 5–15)
ARTERIAL PATENCY WRIST A: YES
BASE EXCESS BLDA CALC-SCNC: 4 MMOL/L
BDY SITE: ABNORMAL
BUN SERPL-MCNC: 4 MG/DL (ref 6–20)
BUN/CREAT SERPL: 12 (ref 12–20)
CALCIUM SERPL-MCNC: 8.6 MG/DL (ref 8.5–10.1)
CHLORIDE SERPL-SCNC: 105 MMOL/L (ref 97–108)
CO2 SERPL-SCNC: 25 MMOL/L (ref 21–32)
CREAT SERPL-MCNC: 0.33 MG/DL (ref 0.55–1.02)
ECHO BSA: 2.56 M2
ERYTHROCYTE [DISTWIDTH] IN BLOOD BY AUTOMATED COUNT: 16.2 % (ref 11.5–14.5)
FIO2 ON VENT: 40 %
GLUCOSE BLD STRIP.AUTO-MCNC: 147 MG/DL (ref 65–117)
GLUCOSE SERPL-MCNC: 141 MG/DL (ref 65–100)
HCO3 BLDA-SCNC: 30 MMOL/L (ref 22–26)
HCT VFR BLD AUTO: 34.1 % (ref 35–47)
HGB BLD-MCNC: 10.3 G/DL (ref 11.5–16)
MAGNESIUM SERPL-MCNC: 2 MG/DL (ref 1.6–2.4)
MCH RBC QN AUTO: 26.8 PG (ref 26–34)
MCHC RBC AUTO-ENTMCNC: 30.2 G/DL (ref 30–36.5)
MCV RBC AUTO: 88.6 FL (ref 80–99)
NRBC # BLD: 0 K/UL (ref 0–0.01)
NRBC BLD-RTO: 0 PER 100 WBC
PCO2 BLDA: 52 MMHG (ref 35–45)
PEEP RESPIRATORY: 5
PH BLDA: 7.38 (ref 7.35–7.45)
PHOSPHATE SERPL-MCNC: 2.3 MG/DL (ref 2.6–4.7)
PLATELET # BLD AUTO: 284 K/UL (ref 150–400)
PMV BLD AUTO: 11.5 FL (ref 8.9–12.9)
PO2 BLDA: 80 MMHG (ref 80–100)
POTASSIUM SERPL-SCNC: 3.2 MMOL/L (ref 3.5–5.1)
PROCALCITONIN SERPL-MCNC: 0.3 NG/ML
RBC # BLD AUTO: 3.85 M/UL (ref 3.8–5.2)
SAO2 % BLD: 96 % (ref 92–97)
SAO2% DEVICE SAO2% SENSOR NAME: ABNORMAL
SERVICE CMNT-IMP: ABNORMAL
SODIUM SERPL-SCNC: 140 MMOL/L (ref 136–145)
SPECIMEN SITE: ABNORMAL
VANCOMYCIN SERPL-MCNC: 11.5 UG/ML
VAS LEFT EX ICA MEAN VEL: 37 CM/S
VAS LEFT ICA DIST EDV: 23.7 CM/S
VAS LEFT ICA DIST PSV: 64.3 CM/S
VAS LEFT PCA 1 EDV: 59.2 CM/S
VAS LEFT PCA 1 MEAN VEL: 86.4 CM/S
VAS LEFT PCA 1 PSV: 140.8 CM/S
VAS RIGHT EX ICA MEAN VEL: 35 CM/S
VAS RIGHT ICA DIST EDV: 23.7 CM/S
VAS RIGHT ICA DIST PSV: 57.7 CM/S
VAS RIGHT PCA 1 EDV: 31.4 CM/S
VAS RIGHT PCA 1 MEAN VEL: 47.9 CM/S
VAS RIGHT PCA 1 PSV: 80.8 CM/S
VENTILATION MODE VENT: ABNORMAL
WBC # BLD AUTO: 16.9 K/UL (ref 3.6–11)

## 2023-11-07 PROCEDURE — 6370000000 HC RX 637 (ALT 250 FOR IP): Performed by: PSYCHIATRY & NEUROLOGY

## 2023-11-07 PROCEDURE — 82962 GLUCOSE BLOOD TEST: CPT

## 2023-11-07 PROCEDURE — 6360000002 HC RX W HCPCS: Performed by: PSYCHIATRY & NEUROLOGY

## 2023-11-07 PROCEDURE — 85027 COMPLETE CBC AUTOMATED: CPT

## 2023-11-07 PROCEDURE — 2580000003 HC RX 258: Performed by: NURSE PRACTITIONER

## 2023-11-07 PROCEDURE — 99024 POSTOP FOLLOW-UP VISIT: CPT | Performed by: NURSE PRACTITIONER

## 2023-11-07 PROCEDURE — 2000000000 HC ICU R&B

## 2023-11-07 PROCEDURE — 6360000002 HC RX W HCPCS: Performed by: NURSE PRACTITIONER

## 2023-11-07 PROCEDURE — 82803 BLOOD GASES ANY COMBINATION: CPT

## 2023-11-07 PROCEDURE — 6370000000 HC RX 637 (ALT 250 FOR IP): Performed by: NURSE PRACTITIONER

## 2023-11-07 PROCEDURE — 80202 ASSAY OF VANCOMYCIN: CPT

## 2023-11-07 PROCEDURE — 36600 WITHDRAWAL OF ARTERIAL BLOOD: CPT

## 2023-11-07 PROCEDURE — 94003 VENT MGMT INPAT SUBQ DAY: CPT

## 2023-11-07 PROCEDURE — 84100 ASSAY OF PHOSPHORUS: CPT

## 2023-11-07 PROCEDURE — 99233 SBSQ HOSP IP/OBS HIGH 50: CPT | Performed by: NURSE PRACTITIONER

## 2023-11-07 PROCEDURE — 87070 CULTURE OTHR SPECIMN AEROBIC: CPT

## 2023-11-07 PROCEDURE — 2500000003 HC RX 250 WO HCPCS: Performed by: NURSE PRACTITIONER

## 2023-11-07 PROCEDURE — 71045 X-RAY EXAM CHEST 1 VIEW: CPT

## 2023-11-07 PROCEDURE — 6360000002 HC RX W HCPCS: Performed by: EMERGENCY MEDICINE

## 2023-11-07 PROCEDURE — 83735 ASSAY OF MAGNESIUM: CPT

## 2023-11-07 PROCEDURE — 84145 PROCALCITONIN (PCT): CPT

## 2023-11-07 PROCEDURE — 80048 BASIC METABOLIC PNL TOTAL CA: CPT

## 2023-11-07 PROCEDURE — 87205 SMEAR GRAM STAIN: CPT

## 2023-11-07 PROCEDURE — 93888 INTRACRANIAL LIMITED STUDY: CPT

## 2023-11-07 PROCEDURE — 2580000003 HC RX 258: Performed by: EMERGENCY MEDICINE

## 2023-11-07 PROCEDURE — 2500000003 HC RX 250 WO HCPCS: Performed by: PSYCHIATRY & NEUROLOGY

## 2023-11-07 PROCEDURE — 2580000003 HC RX 258: Performed by: PSYCHIATRY & NEUROLOGY

## 2023-11-07 PROCEDURE — 37799 UNLISTED PX VASCULAR SURGERY: CPT

## 2023-11-07 PROCEDURE — C9113 INJ PANTOPRAZOLE SODIUM, VIA: HCPCS | Performed by: NURSE PRACTITIONER

## 2023-11-07 PROCEDURE — 36415 COLL VENOUS BLD VENIPUNCTURE: CPT

## 2023-11-07 RX ORDER — FENTANYL CITRATE-0.9 % NACL/PF 10 MCG/ML
25-200 PLASTIC BAG, INJECTION (ML) INTRAVENOUS CONTINUOUS
Status: DISCONTINUED | OUTPATIENT
Start: 2023-11-07 | End: 2023-11-09

## 2023-11-07 RX ORDER — GUAIFENESIN 200 MG/10ML
400 LIQUID ORAL EVERY 4 HOURS
Status: DISCONTINUED | OUTPATIENT
Start: 2023-11-07 | End: 2023-11-13

## 2023-11-07 RX ORDER — PROPOFOL 10 MG/ML
5-50 INJECTION, EMULSION INTRAVENOUS CONTINUOUS
Status: DISCONTINUED | OUTPATIENT
Start: 2023-11-07 | End: 2023-11-09

## 2023-11-07 RX ORDER — NOREPINEPHRINE BITARTRATE 0.06 MG/ML
1-100 INJECTION, SOLUTION INTRAVENOUS CONTINUOUS
Status: DISCONTINUED | OUTPATIENT
Start: 2023-11-07 | End: 2023-11-13

## 2023-11-07 RX ORDER — SODIUM CHLORIDE, SODIUM LACTATE, POTASSIUM CHLORIDE, AND CALCIUM CHLORIDE .6; .31; .03; .02 G/100ML; G/100ML; G/100ML; G/100ML
1000 INJECTION, SOLUTION INTRAVENOUS ONCE
Status: COMPLETED | OUTPATIENT
Start: 2023-11-07 | End: 2023-11-07

## 2023-11-07 RX ORDER — SODIUM CHLORIDE, SODIUM LACTATE, POTASSIUM CHLORIDE, AND CALCIUM CHLORIDE .6; .31; .03; .02 G/100ML; G/100ML; G/100ML; G/100ML
250 INJECTION, SOLUTION INTRAVENOUS ONCE
Status: DISCONTINUED | OUTPATIENT
Start: 2023-11-07 | End: 2023-11-07

## 2023-11-07 RX ADMIN — GUAIFENESIN 400 MG: 100 SOLUTION ORAL at 22:21

## 2023-11-07 RX ADMIN — NOREPINEPHRINE BITARTRATE 13 MCG/MIN: 1 INJECTION, SOLUTION, CONCENTRATE INTRAVENOUS at 05:06

## 2023-11-07 RX ADMIN — SODIUM CHLORIDE, PRESERVATIVE FREE 40 MG: 5 INJECTION INTRAVENOUS at 09:01

## 2023-11-07 RX ADMIN — Medication 60 MG: at 02:05

## 2023-11-07 RX ADMIN — CILOSTAZOL 50 MG: 50 TABLET ORAL at 09:02

## 2023-11-07 RX ADMIN — MIDAZOLAM 2 MG: 1 INJECTION INTRAMUSCULAR; INTRAVENOUS at 02:28

## 2023-11-07 RX ADMIN — MILRINONE LACTATE 0.25 MCG/KG/MIN: 0.2 INJECTION, SOLUTION INTRAVENOUS at 04:57

## 2023-11-07 RX ADMIN — PROPOFOL 20 MCG/KG/MIN: 10 INJECTION, EMULSION INTRAVENOUS at 04:25

## 2023-11-07 RX ADMIN — FENTANYL CITRATE 50 MCG: 0.05 INJECTION, SOLUTION INTRAMUSCULAR; INTRAVENOUS at 17:02

## 2023-11-07 RX ADMIN — MIDAZOLAM 2 MG: 1 INJECTION INTRAMUSCULAR; INTRAVENOUS at 13:59

## 2023-11-07 RX ADMIN — ACETAMINOPHEN 650 MG: 325 TABLET ORAL at 18:46

## 2023-11-07 RX ADMIN — FENTANYL CITRATE 50 MCG: 0.05 INJECTION, SOLUTION INTRAMUSCULAR; INTRAVENOUS at 09:57

## 2023-11-07 RX ADMIN — Medication 60 MG: at 06:17

## 2023-11-07 RX ADMIN — ATORVASTATIN CALCIUM 20 MG: 20 TABLET, FILM COATED ORAL at 20:15

## 2023-11-07 RX ADMIN — LEVETIRACETAM 1000 MG: 100 INJECTION, SOLUTION INTRAVENOUS at 22:06

## 2023-11-07 RX ADMIN — CHLORHEXIDINE GLUCONATE 15 ML: 1.2 RINSE ORAL at 20:16

## 2023-11-07 RX ADMIN — MIDAZOLAM 2 MG: 1 INJECTION INTRAMUSCULAR; INTRAVENOUS at 10:58

## 2023-11-07 RX ADMIN — VANCOMYCIN HYDROCHLORIDE 1750 MG: 10 INJECTION, POWDER, LYOPHILIZED, FOR SOLUTION INTRAVENOUS at 00:06

## 2023-11-07 RX ADMIN — CILOSTAZOL 50 MG: 50 TABLET ORAL at 20:15

## 2023-11-07 RX ADMIN — HEPARIN SODIUM 5000 UNITS: 5000 INJECTION INTRAVENOUS; SUBCUTANEOUS at 22:06

## 2023-11-07 RX ADMIN — MIDAZOLAM 2 MG: 1 INJECTION INTRAMUSCULAR; INTRAVENOUS at 08:06

## 2023-11-07 RX ADMIN — DEXMEDETOMIDINE HYDROCHLORIDE 1 MCG/KG/HR: 400 INJECTION, SOLUTION INTRAVENOUS at 12:57

## 2023-11-07 RX ADMIN — POTASSIUM PHOSPHATE, MONOBASIC POTASSIUM PHOSPHATE, DIBASIC 15 MMOL: 224; 236 INJECTION, SOLUTION, CONCENTRATE INTRAVENOUS at 12:15

## 2023-11-07 RX ADMIN — DEXMEDETOMIDINE HYDROCHLORIDE 1 MCG/KG/HR: 400 INJECTION, SOLUTION INTRAVENOUS at 15:40

## 2023-11-07 RX ADMIN — GUAIFENESIN 400 MG: 100 SOLUTION ORAL at 17:33

## 2023-11-07 RX ADMIN — PIPERACILLIN AND TAZOBACTAM 4500 MG: 4; .5 INJECTION, POWDER, LYOPHILIZED, FOR SOLUTION INTRAVENOUS at 14:30

## 2023-11-07 RX ADMIN — PIPERACILLIN AND TAZOBACTAM 4500 MG: 4; .5 INJECTION, POWDER, LYOPHILIZED, FOR SOLUTION INTRAVENOUS at 07:04

## 2023-11-07 RX ADMIN — LEVETIRACETAM 1000 MG: 100 INJECTION, SOLUTION INTRAVENOUS at 09:45

## 2023-11-07 RX ADMIN — FENTANYL CITRATE 50 MCG/HR: 50 INJECTION, SOLUTION INTRAMUSCULAR; INTRAVENOUS at 18:46

## 2023-11-07 RX ADMIN — PROPOFOL 15 MCG/KG/MIN: 10 INJECTION, EMULSION INTRAVENOUS at 14:17

## 2023-11-07 RX ADMIN — FENTANYL CITRATE 50 MCG: 0.05 INJECTION, SOLUTION INTRAMUSCULAR; INTRAVENOUS at 12:26

## 2023-11-07 RX ADMIN — Medication 60 MG: at 17:33

## 2023-11-07 RX ADMIN — SODIUM CHLORIDE, POTASSIUM CHLORIDE, SODIUM LACTATE AND CALCIUM CHLORIDE 1000 ML: 600; 310; 30; 20 INJECTION, SOLUTION INTRAVENOUS at 10:05

## 2023-11-07 RX ADMIN — VANCOMYCIN HYDROCHLORIDE 1750 MG: 10 INJECTION, POWDER, LYOPHILIZED, FOR SOLUTION INTRAVENOUS at 12:16

## 2023-11-07 RX ADMIN — MILRINONE LACTATE 0.25 MCG/KG/MIN: 0.2 INJECTION, SOLUTION INTRAVENOUS at 20:36

## 2023-11-07 RX ADMIN — PIPERACILLIN AND TAZOBACTAM 4500 MG: 4; .5 INJECTION, POWDER, LYOPHILIZED, FOR SOLUTION INTRAVENOUS at 22:31

## 2023-11-07 RX ADMIN — Medication 60 MG: at 09:30

## 2023-11-07 RX ADMIN — POTASSIUM BICARBONATE 40 MEQ: 782 TABLET, EFFERVESCENT ORAL at 10:33

## 2023-11-07 RX ADMIN — VANCOMYCIN HYDROCHLORIDE 1750 MG: 10 INJECTION, POWDER, LYOPHILIZED, FOR SOLUTION INTRAVENOUS at 20:17

## 2023-11-07 RX ADMIN — MILRINONE LACTATE 0.25 MCG/KG/MIN: 0.2 INJECTION, SOLUTION INTRAVENOUS at 12:07

## 2023-11-07 RX ADMIN — Medication 60 MG: at 22:06

## 2023-11-07 RX ADMIN — HEPARIN SODIUM 5000 UNITS: 5000 INJECTION INTRAVENOUS; SUBCUTANEOUS at 13:50

## 2023-11-07 RX ADMIN — CHLORHEXIDINE GLUCONATE 15 ML: 1.2 RINSE ORAL at 09:03

## 2023-11-07 RX ADMIN — SODIUM CHLORIDE, PRESERVATIVE FREE 10 ML: 5 INJECTION INTRAVENOUS at 20:15

## 2023-11-07 RX ADMIN — Medication 60 MG: at 13:53

## 2023-11-07 RX ADMIN — PROPOFOL 25 MCG/KG/MIN: 10 INJECTION, EMULSION INTRAVENOUS at 18:25

## 2023-11-07 RX ADMIN — NOREPINEPHRINE BITARTRATE 22 MCG/MIN: 1 INJECTION, SOLUTION, CONCENTRATE INTRAVENOUS at 23:43

## 2023-11-07 RX ADMIN — SODIUM CHLORIDE, PRESERVATIVE FREE 10 ML: 5 INJECTION INTRAVENOUS at 09:39

## 2023-11-07 RX ADMIN — GUAIFENESIN 400 MG: 100 SOLUTION ORAL at 14:33

## 2023-11-07 RX ADMIN — VASOPRESSIN 0.03 UNITS/MIN: 20 INJECTION INTRAVENOUS at 21:34

## 2023-11-07 RX ADMIN — DEXMEDETOMIDINE HYDROCHLORIDE 0.4 MCG/KG/HR: 400 INJECTION, SOLUTION INTRAVENOUS at 08:09

## 2023-11-07 RX ADMIN — GUAIFENESIN 400 MG: 100 SOLUTION ORAL at 10:33

## 2023-11-07 RX ADMIN — HEPARIN SODIUM 5000 UNITS: 5000 INJECTION INTRAVENOUS; SUBCUTANEOUS at 06:07

## 2023-11-07 ASSESSMENT — PULMONARY FUNCTION TESTS
PIF_VALUE: 19
PIF_VALUE: 17
PIF_VALUE: 17
PIF_VALUE: 16
PIF_VALUE: 12
PIF_VALUE: 11

## 2023-11-07 ASSESSMENT — PAIN SCALES - GENERAL
PAINLEVEL_OUTOF10: 0

## 2023-11-08 ENCOUNTER — APPOINTMENT (OUTPATIENT)
Facility: HOSPITAL | Age: 58
DRG: 021 | End: 2023-11-08
Payer: MEDICAID

## 2023-11-08 LAB
ANION GAP SERPL CALC-SCNC: 6 MMOL/L (ref 5–15)
ANION GAP SERPL CALC-SCNC: 8 MMOL/L (ref 5–15)
BUN SERPL-MCNC: 7 MG/DL (ref 6–20)
BUN SERPL-MCNC: 7 MG/DL (ref 6–20)
BUN/CREAT SERPL: 19 (ref 12–20)
BUN/CREAT SERPL: 22 (ref 12–20)
CALCIUM SERPL-MCNC: 8.1 MG/DL (ref 8.5–10.1)
CALCIUM SERPL-MCNC: 8.5 MG/DL (ref 8.5–10.1)
CHLORIDE SERPL-SCNC: 101 MMOL/L (ref 97–108)
CHLORIDE SERPL-SCNC: 102 MMOL/L (ref 97–108)
CO2 SERPL-SCNC: 30 MMOL/L (ref 21–32)
CO2 SERPL-SCNC: 32 MMOL/L (ref 21–32)
CREAT SERPL-MCNC: 0.32 MG/DL (ref 0.55–1.02)
CREAT SERPL-MCNC: 0.37 MG/DL (ref 0.55–1.02)
ECHO BSA: 2.53 M2
ECHO BSA: 2.56 M2
ECHO LV FRACTIONAL SHORTENING: 33 % (ref 28–44)
ECHO LV INTERNAL DIMENSION DIASTOLE INDEX: 1.66 CM/M2
ECHO LV INTERNAL DIMENSION DIASTOLIC: 3.9 CM (ref 3.9–5.3)
ECHO LV INTERNAL DIMENSION SYSTOLIC INDEX: 1.11 CM/M2
ECHO LV INTERNAL DIMENSION SYSTOLIC: 2.6 CM
ECHO LV IVSD: 1.2 CM (ref 0.6–0.9)
ECHO LV MASS 2D: 179.7 G (ref 67–162)
ECHO LV MASS INDEX 2D: 76.5 G/M2 (ref 43–95)
ECHO LV POSTERIOR WALL DIASTOLIC: 1.4 CM (ref 0.6–0.9)
ECHO LV RELATIVE WALL THICKNESS RATIO: 0.72
ECHO TV REGURGITANT MAX VELOCITY: 2.72 M/S
ECHO TV REGURGITANT PEAK GRADIENT: 30 MMHG
ERYTHROCYTE [DISTWIDTH] IN BLOOD BY AUTOMATED COUNT: 16.1 % (ref 11.5–14.5)
GLUCOSE SERPL-MCNC: 161 MG/DL (ref 65–100)
GLUCOSE SERPL-MCNC: 164 MG/DL (ref 65–100)
HCT VFR BLD AUTO: 29.3 % (ref 35–47)
HGB BLD-MCNC: 9.2 G/DL (ref 11.5–16)
MAGNESIUM SERPL-MCNC: 1.9 MG/DL (ref 1.6–2.4)
MCH RBC QN AUTO: 26.7 PG (ref 26–34)
MCHC RBC AUTO-ENTMCNC: 31.4 G/DL (ref 30–36.5)
MCV RBC AUTO: 85.2 FL (ref 80–99)
NRBC # BLD: 0 K/UL (ref 0–0.01)
NRBC BLD-RTO: 0 PER 100 WBC
PHOSPHATE SERPL-MCNC: 2.6 MG/DL (ref 2.6–4.7)
PLATELET # BLD AUTO: 261 K/UL (ref 150–400)
PMV BLD AUTO: 10.8 FL (ref 8.9–12.9)
POTASSIUM SERPL-SCNC: 2.7 MMOL/L (ref 3.5–5.1)
POTASSIUM SERPL-SCNC: 3.3 MMOL/L (ref 3.5–5.1)
RBC # BLD AUTO: 3.44 M/UL (ref 3.8–5.2)
SODIUM SERPL-SCNC: 139 MMOL/L (ref 136–145)
SODIUM SERPL-SCNC: 140 MMOL/L (ref 136–145)
VAS BASILAR ARTERY EDV: 45 CM/S
VAS BASILAR ARTERY MEAN VEL: 64 CM/S
VAS BASILAR ARTERY PSV: 102.1 CM/S
VAS LEFT EX ICA MEAN VEL: 37 CM/S
VAS LEFT ICA DIST EDV: 24.4 CM/S
VAS LEFT ICA DIST PSV: 63.8 CM/S
VAS LEFT LINDEGAARD RATIO: 3.4
VAS LEFT MCA 1 EDV: 80.4 CM/S
VAS LEFT MCA 1 MEAN VEL: 126.8 CM/S
VAS LEFT MCA 1 PSV: 219.7 CM/S
VAS LEFT PCA 1 EDV: 73.3 CM/S
VAS LEFT PCA 1 MEAN VEL: 105 CM/S
VAS LEFT PCA 1 PSV: 168.4 CM/S
VAS RIGHT EX ICA MEAN VEL: 37 CM/S
VAS RIGHT ICA DIST EDV: 24.4 CM/S
VAS RIGHT ICA DIST PSV: 63.8 CM/S
VAS RIGHT PCA 1 EDV: 46.9 CM/S
VAS RIGHT PCA 1 MEAN VEL: 74 CM/S
VAS RIGHT PCA 1 PSV: 128.1 CM/S
WBC # BLD AUTO: 16.1 K/UL (ref 3.6–11)

## 2023-11-08 PROCEDURE — 6370000000 HC RX 637 (ALT 250 FOR IP): Performed by: NURSE PRACTITIONER

## 2023-11-08 PROCEDURE — 6360000002 HC RX W HCPCS: Performed by: NURSE PRACTITIONER

## 2023-11-08 PROCEDURE — 36415 COLL VENOUS BLD VENIPUNCTURE: CPT

## 2023-11-08 PROCEDURE — 83735 ASSAY OF MAGNESIUM: CPT

## 2023-11-08 PROCEDURE — 6360000004 HC RX CONTRAST MEDICATION: Performed by: INTERNAL MEDICINE

## 2023-11-08 PROCEDURE — 99024 POSTOP FOLLOW-UP VISIT: CPT | Performed by: NURSE PRACTITIONER

## 2023-11-08 PROCEDURE — 94003 VENT MGMT INPAT SUBQ DAY: CPT

## 2023-11-08 PROCEDURE — A4216 STERILE WATER/SALINE, 10 ML: HCPCS | Performed by: NURSE PRACTITIONER

## 2023-11-08 PROCEDURE — 6360000002 HC RX W HCPCS: Performed by: PSYCHIATRY & NEUROLOGY

## 2023-11-08 PROCEDURE — 37799 UNLISTED PX VASCULAR SURGERY: CPT

## 2023-11-08 PROCEDURE — C9113 INJ PANTOPRAZOLE SODIUM, VIA: HCPCS | Performed by: NURSE PRACTITIONER

## 2023-11-08 PROCEDURE — 93886 INTRACRANIAL COMPLETE STUDY: CPT

## 2023-11-08 PROCEDURE — 2580000003 HC RX 258: Performed by: NURSE PRACTITIONER

## 2023-11-08 PROCEDURE — 93325 DOPPLER ECHO COLOR FLOW MAPG: CPT | Performed by: INTERNAL MEDICINE

## 2023-11-08 PROCEDURE — 80048 BASIC METABOLIC PNL TOTAL CA: CPT

## 2023-11-08 PROCEDURE — 2000000000 HC ICU R&B

## 2023-11-08 PROCEDURE — C8924 2D TTE W OR W/O FOL W/CON,FU: HCPCS

## 2023-11-08 PROCEDURE — 93321 DOPPLER ECHO F-UP/LMTD STD: CPT | Performed by: INTERNAL MEDICINE

## 2023-11-08 PROCEDURE — 36620 INSERTION CATHETER ARTERY: CPT

## 2023-11-08 PROCEDURE — 2500000003 HC RX 250 WO HCPCS: Performed by: NURSE PRACTITIONER

## 2023-11-08 PROCEDURE — 6370000000 HC RX 637 (ALT 250 FOR IP): Performed by: PSYCHIATRY & NEUROLOGY

## 2023-11-08 PROCEDURE — 85027 COMPLETE CBC AUTOMATED: CPT

## 2023-11-08 PROCEDURE — 2580000003 HC RX 258: Performed by: PSYCHIATRY & NEUROLOGY

## 2023-11-08 PROCEDURE — 93308 TTE F-UP OR LMTD: CPT | Performed by: INTERNAL MEDICINE

## 2023-11-08 PROCEDURE — 84100 ASSAY OF PHOSPHORUS: CPT

## 2023-11-08 PROCEDURE — 99233 SBSQ HOSP IP/OBS HIGH 50: CPT | Performed by: NURSE PRACTITIONER

## 2023-11-08 RX ORDER — LEVETIRACETAM 100 MG/ML
1000 SOLUTION ORAL 2 TIMES DAILY
Status: DISCONTINUED | OUTPATIENT
Start: 2023-11-08 | End: 2023-11-10

## 2023-11-08 RX ORDER — POTASSIUM CHLORIDE 7.45 MG/ML
10 INJECTION INTRAVENOUS
Status: COMPLETED | OUTPATIENT
Start: 2023-11-08 | End: 2023-11-08

## 2023-11-08 RX ORDER — MAGNESIUM SULFATE IN WATER 40 MG/ML
2000 INJECTION, SOLUTION INTRAVENOUS ONCE
Status: COMPLETED | OUTPATIENT
Start: 2023-11-08 | End: 2023-11-08

## 2023-11-08 RX ORDER — LANSOPRAZOLE 30 MG/1
30 TABLET, ORALLY DISINTEGRATING, DELAYED RELEASE ORAL DAILY
Status: DISCONTINUED | OUTPATIENT
Start: 2023-11-09 | End: 2023-11-23

## 2023-11-08 RX ADMIN — HEPARIN SODIUM 5000 UNITS: 5000 INJECTION INTRAVENOUS; SUBCUTANEOUS at 06:10

## 2023-11-08 RX ADMIN — FENTANYL CITRATE 100 MCG/HR: 50 INJECTION, SOLUTION INTRAMUSCULAR; INTRAVENOUS at 17:35

## 2023-11-08 RX ADMIN — MILRINONE LACTATE 0.25 MCG/KG/MIN: 0.2 INJECTION, SOLUTION INTRAVENOUS at 06:05

## 2023-11-08 RX ADMIN — GUAIFENESIN 400 MG: 100 SOLUTION ORAL at 18:17

## 2023-11-08 RX ADMIN — FENTANYL CITRATE 150 MCG/HR: 50 INJECTION, SOLUTION INTRAMUSCULAR; INTRAVENOUS at 08:44

## 2023-11-08 RX ADMIN — Medication 60 MG: at 14:10

## 2023-11-08 RX ADMIN — PROPOFOL 10 MCG/KG/MIN: 10 INJECTION, EMULSION INTRAVENOUS at 15:06

## 2023-11-08 RX ADMIN — PIPERACILLIN AND TAZOBACTAM 4500 MG: 4; .5 INJECTION, POWDER, LYOPHILIZED, FOR SOLUTION INTRAVENOUS at 06:41

## 2023-11-08 RX ADMIN — HEPARIN SODIUM 5000 UNITS: 5000 INJECTION INTRAVENOUS; SUBCUTANEOUS at 22:07

## 2023-11-08 RX ADMIN — CHLORHEXIDINE GLUCONATE 15 ML: 1.2 RINSE ORAL at 10:15

## 2023-11-08 RX ADMIN — SODIUM CHLORIDE, PRESERVATIVE FREE 10 ML: 5 INJECTION INTRAVENOUS at 09:12

## 2023-11-08 RX ADMIN — LEVETIRACETAM 1000 MG: 100 INJECTION, SOLUTION INTRAVENOUS at 10:15

## 2023-11-08 RX ADMIN — HEPARIN SODIUM 5000 UNITS: 5000 INJECTION INTRAVENOUS; SUBCUTANEOUS at 14:10

## 2023-11-08 RX ADMIN — PERFLUTREN 1.5 ML: 6.52 INJECTION, SUSPENSION INTRAVENOUS at 17:03

## 2023-11-08 RX ADMIN — VASOPRESSIN 0.03 UNITS/MIN: 20 INJECTION INTRAVENOUS at 06:42

## 2023-11-08 RX ADMIN — ATORVASTATIN CALCIUM 20 MG: 20 TABLET, FILM COATED ORAL at 20:12

## 2023-11-08 RX ADMIN — GUAIFENESIN 400 MG: 100 SOLUTION ORAL at 10:15

## 2023-11-08 RX ADMIN — Medication 60 MG: at 06:10

## 2023-11-08 RX ADMIN — MAGNESIUM SULFATE HEPTAHYDRATE 2000 MG: 40 INJECTION, SOLUTION INTRAVENOUS at 09:04

## 2023-11-08 RX ADMIN — CHLORHEXIDINE GLUCONATE 15 ML: 1.2 RINSE ORAL at 20:12

## 2023-11-08 RX ADMIN — LEVETIRACETAM 1000 MG: 100 SOLUTION ORAL at 20:12

## 2023-11-08 RX ADMIN — SODIUM CHLORIDE, PRESERVATIVE FREE 40 MG: 5 INJECTION INTRAVENOUS at 08:52

## 2023-11-08 RX ADMIN — POTASSIUM CHLORIDE 10 MEQ: 7.46 INJECTION, SOLUTION INTRAVENOUS at 04:20

## 2023-11-08 RX ADMIN — Medication 60 MG: at 18:17

## 2023-11-08 RX ADMIN — GUAIFENESIN 400 MG: 100 SOLUTION ORAL at 22:07

## 2023-11-08 RX ADMIN — Medication 60 MG: at 22:07

## 2023-11-08 RX ADMIN — POTASSIUM CHLORIDE 10 MEQ: 7.46 INJECTION, SOLUTION INTRAVENOUS at 05:25

## 2023-11-08 RX ADMIN — PIPERACILLIN AND TAZOBACTAM 4500 MG: 4; .5 INJECTION, POWDER, LYOPHILIZED, FOR SOLUTION INTRAVENOUS at 15:06

## 2023-11-08 RX ADMIN — GUAIFENESIN 400 MG: 100 SOLUTION ORAL at 06:43

## 2023-11-08 RX ADMIN — MILRINONE LACTATE 0.25 MCG/KG/MIN: 0.2 INJECTION, SOLUTION INTRAVENOUS at 15:12

## 2023-11-08 RX ADMIN — FENTANYL CITRATE 150 MCG/HR: 50 INJECTION, SOLUTION INTRAMUSCULAR; INTRAVENOUS at 02:09

## 2023-11-08 RX ADMIN — CILOSTAZOL 50 MG: 50 TABLET ORAL at 20:12

## 2023-11-08 RX ADMIN — Medication 60 MG: at 02:43

## 2023-11-08 RX ADMIN — CILOSTAZOL 50 MG: 50 TABLET ORAL at 08:53

## 2023-11-08 RX ADMIN — POTASSIUM BICARBONATE 40 MEQ: 782 TABLET, EFFERVESCENT ORAL at 04:20

## 2023-11-08 RX ADMIN — VANCOMYCIN HYDROCHLORIDE 1750 MG: 10 INJECTION, POWDER, LYOPHILIZED, FOR SOLUTION INTRAVENOUS at 11:37

## 2023-11-08 RX ADMIN — PIPERACILLIN AND TAZOBACTAM 4500 MG: 4; .5 INJECTION, POWDER, LYOPHILIZED, FOR SOLUTION INTRAVENOUS at 23:00

## 2023-11-08 RX ADMIN — VANCOMYCIN HYDROCHLORIDE 1750 MG: 10 INJECTION, POWDER, LYOPHILIZED, FOR SOLUTION INTRAVENOUS at 20:12

## 2023-11-08 RX ADMIN — VANCOMYCIN HYDROCHLORIDE 1750 MG: 10 INJECTION, POWDER, LYOPHILIZED, FOR SOLUTION INTRAVENOUS at 04:11

## 2023-11-08 RX ADMIN — GUAIFENESIN 400 MG: 100 SOLUTION ORAL at 02:43

## 2023-11-08 RX ADMIN — PROPOFOL 10 MCG/KG/MIN: 10 INJECTION, EMULSION INTRAVENOUS at 04:51

## 2023-11-08 RX ADMIN — SODIUM CHLORIDE, PRESERVATIVE FREE 10 ML: 5 INJECTION INTRAVENOUS at 20:12

## 2023-11-08 RX ADMIN — GUAIFENESIN 400 MG: 100 SOLUTION ORAL at 15:06

## 2023-11-08 RX ADMIN — POTASSIUM BICARBONATE 40 MEQ: 782 TABLET, EFFERVESCENT ORAL at 16:03

## 2023-11-08 RX ADMIN — Medication 60 MG: at 10:16

## 2023-11-08 ASSESSMENT — PULMONARY FUNCTION TESTS
PIF_VALUE: 12
PIF_VALUE: 13
PIF_VALUE: 11
PIF_VALUE: 12
PIF_VALUE: 116

## 2023-11-08 ASSESSMENT — PAIN SCALES - GENERAL
PAINLEVEL_OUTOF10: 0

## 2023-11-08 NOTE — CARE COORDINATION
Transition of Care Plan:    RUR: 17%, Moderate   Prior Level of Functioning: Independent  Disposition: TBD  Follow up appointments: PCP  DME needed: TBD  Transportation at discharge: Stretcher   Is patient a  and connected with [place]? No  Caregiver Contact: Son Derick Hem   Discharge Caregiver contacted prior to discharge? No  Care Conference needed? No  Barriers to discharge:    Patient is s/p coil embolization left P-comm aneurysm for SAH.   Remains vented   EVD  Continue Daily TCD's  Day 9/21 of [place]  Milrinone kevin Rose RN,Care Management

## 2023-11-08 NOTE — PROCEDURES
Procedure Note - Arterial Venous Access:   Performed by SUE Easton     Diagnosis: SAH  Insertion Date: 11/08/23  Time:2:41 AM   Obtained Consent? yes; informed   Procedure Location:  ICU. Immediately prior to the procedure, the patient was reevaluated and found suitable for the planned procedure and any planned medications. Immediately prior to the procedure a time out was called to verify the correct patient, procedure, equipment, staff, and marking as appropriate. Saleem test to check for collateral flow. Line Bundle:  Full sterile barrier precautions used. 5 mL 1% Lidocaine placed at insertion site. The site was prepped with ChloraPrep and Sterile draping. Catheter inserted into a new site. Using Seldinger technique a arterial catheter was placed in the Left, Radial, with x1 number of attempts for hemodynamic monitoring. Ultrasound Guidance was utilized. There was good red pulsatile blood return with waveform on monitor. Femoral Site? no. If Yes, reason femoral site was chosen: NA  Catheter secured. CHG bio-occlusive dressing in place? yes. Complications encountered? no. The procedure was tolerated well.     SUE Easton  Critical Care Medicine  [facility]

## 2023-11-09 ENCOUNTER — APPOINTMENT (OUTPATIENT)
Facility: HOSPITAL | Age: 58
DRG: 021 | End: 2023-11-09
Payer: MEDICAID

## 2023-11-09 LAB
ANION GAP SERPL CALC-SCNC: 3 MMOL/L (ref 5–15)
ANION GAP SERPL CALC-SCNC: 6 MMOL/L (ref 5–15)
ANION GAP SERPL CALC-SCNC: 7 MMOL/L (ref 5–15)
BACTERIA SPEC CULT: NORMAL
BUN SERPL-MCNC: 5 MG/DL (ref 6–20)
BUN SERPL-MCNC: 8 MG/DL (ref 6–20)
BUN SERPL-MCNC: 9 MG/DL (ref 6–20)
BUN/CREAT SERPL: 12 (ref 12–20)
BUN/CREAT SERPL: 23 (ref 12–20)
BUN/CREAT SERPL: 24 (ref 12–20)
CALCIUM SERPL-MCNC: 8.4 MG/DL (ref 8.5–10.1)
CALCIUM SERPL-MCNC: 8.4 MG/DL (ref 8.5–10.1)
CALCIUM SERPL-MCNC: 8.8 MG/DL (ref 8.5–10.1)
CHLORIDE SERPL-SCNC: 104 MMOL/L (ref 97–108)
CHLORIDE SERPL-SCNC: 107 MMOL/L (ref 97–108)
CHLORIDE SERPL-SCNC: 99 MMOL/L (ref 97–108)
CO2 SERPL-SCNC: 28 MMOL/L (ref 21–32)
CO2 SERPL-SCNC: 30 MMOL/L (ref 21–32)
CO2 SERPL-SCNC: 31 MMOL/L (ref 21–32)
CREAT SERPL-MCNC: 0.35 MG/DL (ref 0.55–1.02)
CREAT SERPL-MCNC: 0.38 MG/DL (ref 0.55–1.02)
CREAT SERPL-MCNC: 0.41 MG/DL (ref 0.55–1.02)
ECHO BSA: 2.53 M2
ERYTHROCYTE [DISTWIDTH] IN BLOOD BY AUTOMATED COUNT: 16.3 % (ref 11.5–14.5)
GLUCOSE SERPL-MCNC: 133 MG/DL (ref 65–100)
GLUCOSE SERPL-MCNC: 163 MG/DL (ref 65–100)
GLUCOSE SERPL-MCNC: 165 MG/DL (ref 65–100)
GRAM STN SPEC: NORMAL
HCT VFR BLD AUTO: 28.7 % (ref 35–47)
HGB BLD-MCNC: 8.7 G/DL (ref 11.5–16)
MAGNESIUM SERPL-MCNC: 2.1 MG/DL (ref 1.6–2.4)
MCH RBC QN AUTO: 26.5 PG (ref 26–34)
MCHC RBC AUTO-ENTMCNC: 30.3 G/DL (ref 30–36.5)
MCV RBC AUTO: 87.5 FL (ref 80–99)
NRBC # BLD: 0 K/UL (ref 0–0.01)
NRBC BLD-RTO: 0 PER 100 WBC
OSMOLALITY SERPL: 301 MOSM/KG H2O
OSMOLALITY UR: 128 MOSM/KG H2O
PHOSPHATE SERPL-MCNC: 2.8 MG/DL (ref 2.6–4.7)
PLATELET # BLD AUTO: 209 K/UL (ref 150–400)
PMV BLD AUTO: 11.6 FL (ref 8.9–12.9)
POTASSIUM SERPL-SCNC: 3.4 MMOL/L (ref 3.5–5.1)
POTASSIUM SERPL-SCNC: 3.5 MMOL/L (ref 3.5–5.1)
POTASSIUM SERPL-SCNC: 3.7 MMOL/L (ref 3.5–5.1)
RBC # BLD AUTO: 3.28 M/UL (ref 3.8–5.2)
SERVICE CMNT-IMP: NORMAL
SODIUM SERPL-SCNC: 134 MMOL/L (ref 136–145)
SODIUM SERPL-SCNC: 140 MMOL/L (ref 136–145)
SODIUM SERPL-SCNC: 141 MMOL/L (ref 136–145)
SODIUM UR-SCNC: 48 MMOL/L
VANCOMYCIN SERPL-MCNC: 20.1 UG/ML
VAS BASILAR ARTERY EDV: 36.5 CM/S
VAS BASILAR ARTERY MEAN VEL: 60 CM/S
VAS BASILAR ARTERY PSV: 104.6 CM/S
VAS LEFT EX ICA MEAN VEL: 35 CM/S
VAS LEFT ICA DIST EDV: 24.3 CM/S
VAS LEFT ICA DIST PSV: 56.3 CM/S
VAS LEFT PCA 1 EDV: 50.2 CM/S
VAS LEFT PCA 1 MEAN VEL: 82.2 CM/S
VAS LEFT PCA 1 PSV: 146.3 CM/S
VAS RIGHT EX ICA MEAN VEL: 29 CM/S
VAS RIGHT ICA DIST EDV: 18.2 CM/S
VAS RIGHT ICA DIST PSV: 52 CM/S
VAS RIGHT PCA 1 EDV: 40.9 CM/S
VAS RIGHT PCA 1 MEAN VEL: 62.5 CM/S
VAS RIGHT PCA 1 PSV: 105.7 CM/S
WBC # BLD AUTO: 14.2 K/UL (ref 3.6–11)

## 2023-11-09 PROCEDURE — 80048 BASIC METABOLIC PNL TOTAL CA: CPT

## 2023-11-09 PROCEDURE — 99233 SBSQ HOSP IP/OBS HIGH 50: CPT | Performed by: NURSE PRACTITIONER

## 2023-11-09 PROCEDURE — 2500000003 HC RX 250 WO HCPCS: Performed by: NURSE PRACTITIONER

## 2023-11-09 PROCEDURE — 6360000002 HC RX W HCPCS: Performed by: NURSE PRACTITIONER

## 2023-11-09 PROCEDURE — 36592 COLLECT BLOOD FROM PICC: CPT

## 2023-11-09 PROCEDURE — 6370000000 HC RX 637 (ALT 250 FOR IP): Performed by: NURSE PRACTITIONER

## 2023-11-09 PROCEDURE — 2580000003 HC RX 258: Performed by: PSYCHIATRY & NEUROLOGY

## 2023-11-09 PROCEDURE — 6370000000 HC RX 637 (ALT 250 FOR IP): Performed by: PSYCHIATRY & NEUROLOGY

## 2023-11-09 PROCEDURE — 2000000000 HC ICU R&B

## 2023-11-09 PROCEDURE — 84100 ASSAY OF PHOSPHORUS: CPT

## 2023-11-09 PROCEDURE — 93886 INTRACRANIAL COMPLETE STUDY: CPT

## 2023-11-09 PROCEDURE — 36415 COLL VENOUS BLD VENIPUNCTURE: CPT

## 2023-11-09 PROCEDURE — 99024 POSTOP FOLLOW-UP VISIT: CPT | Performed by: NURSE PRACTITIONER

## 2023-11-09 PROCEDURE — 83735 ASSAY OF MAGNESIUM: CPT

## 2023-11-09 PROCEDURE — 2500000003 HC RX 250 WO HCPCS: Performed by: PSYCHIATRY & NEUROLOGY

## 2023-11-09 PROCEDURE — 2580000003 HC RX 258: Performed by: NURSE PRACTITIONER

## 2023-11-09 PROCEDURE — 83935 ASSAY OF URINE OSMOLALITY: CPT

## 2023-11-09 PROCEDURE — 84300 ASSAY OF URINE SODIUM: CPT

## 2023-11-09 PROCEDURE — 80202 ASSAY OF VANCOMYCIN: CPT

## 2023-11-09 PROCEDURE — 83930 ASSAY OF BLOOD OSMOLALITY: CPT

## 2023-11-09 PROCEDURE — 85027 COMPLETE CBC AUTOMATED: CPT

## 2023-11-09 PROCEDURE — 6360000002 HC RX W HCPCS: Performed by: PSYCHIATRY & NEUROLOGY

## 2023-11-09 RX ORDER — 0.9 % SODIUM CHLORIDE 0.9 %
1000 INTRAVENOUS SOLUTION INTRAVENOUS ONCE
Status: COMPLETED | OUTPATIENT
Start: 2023-11-09 | End: 2023-11-09

## 2023-11-09 RX ORDER — SODIUM CHLORIDE 9 MG/ML
INJECTION, SOLUTION INTRAVENOUS CONTINUOUS
Status: DISCONTINUED | OUTPATIENT
Start: 2023-11-09 | End: 2023-11-16

## 2023-11-09 RX ORDER — SODIUM CHLORIDE 1 G/1
2 TABLET ORAL EVERY 8 HOURS
Status: DISCONTINUED | OUTPATIENT
Start: 2023-11-09 | End: 2023-11-13

## 2023-11-09 RX ADMIN — PROPOFOL 10 MCG/KG/MIN: 10 INJECTION, EMULSION INTRAVENOUS at 01:05

## 2023-11-09 RX ADMIN — SODIUM CHLORIDE 2 G: 1 TABLET ORAL at 08:37

## 2023-11-09 RX ADMIN — HEPARIN SODIUM 5000 UNITS: 5000 INJECTION INTRAVENOUS; SUBCUTANEOUS at 21:59

## 2023-11-09 RX ADMIN — FENTANYL CITRATE 50 MCG: 0.05 INJECTION, SOLUTION INTRAMUSCULAR; INTRAVENOUS at 21:04

## 2023-11-09 RX ADMIN — LEVETIRACETAM 1000 MG: 100 SOLUTION ORAL at 08:36

## 2023-11-09 RX ADMIN — FENTANYL CITRATE 50 MCG: 0.05 INJECTION, SOLUTION INTRAMUSCULAR; INTRAVENOUS at 23:06

## 2023-11-09 RX ADMIN — VANCOMYCIN HYDROCHLORIDE 1750 MG: 10 INJECTION, POWDER, LYOPHILIZED, FOR SOLUTION INTRAVENOUS at 04:17

## 2023-11-09 RX ADMIN — ATORVASTATIN CALCIUM 20 MG: 20 TABLET, FILM COATED ORAL at 20:27

## 2023-11-09 RX ADMIN — Medication 60 MG: at 09:57

## 2023-11-09 RX ADMIN — SODIUM CHLORIDE, PRESERVATIVE FREE 10 ML: 5 INJECTION INTRAVENOUS at 20:10

## 2023-11-09 RX ADMIN — SODIUM CHLORIDE, PRESERVATIVE FREE 10 ML: 5 INJECTION INTRAVENOUS at 08:40

## 2023-11-09 RX ADMIN — SODIUM CHLORIDE 1000 ML: 9 INJECTION, SOLUTION INTRAVENOUS at 14:42

## 2023-11-09 RX ADMIN — SODIUM CHLORIDE 2 G: 1 TABLET ORAL at 17:39

## 2023-11-09 RX ADMIN — CHLORHEXIDINE GLUCONATE 15 ML: 1.2 RINSE ORAL at 08:40

## 2023-11-09 RX ADMIN — Medication 60 MG: at 06:00

## 2023-11-09 RX ADMIN — Medication 60 MG: at 02:16

## 2023-11-09 RX ADMIN — GUAIFENESIN 400 MG: 100 SOLUTION ORAL at 17:41

## 2023-11-09 RX ADMIN — MILRINONE LACTATE 0.25 MCG/KG/MIN: 0.2 INJECTION, SOLUTION INTRAVENOUS at 00:08

## 2023-11-09 RX ADMIN — GUAIFENESIN 400 MG: 100 SOLUTION ORAL at 02:16

## 2023-11-09 RX ADMIN — NOREPINEPHRINE BITARTRATE 10 MCG/MIN: 1 INJECTION, SOLUTION, CONCENTRATE INTRAVENOUS at 18:04

## 2023-11-09 RX ADMIN — GUAIFENESIN 400 MG: 100 SOLUTION ORAL at 06:00

## 2023-11-09 RX ADMIN — HEPARIN SODIUM 5000 UNITS: 5000 INJECTION INTRAVENOUS; SUBCUTANEOUS at 05:58

## 2023-11-09 RX ADMIN — FENTANYL CITRATE 125 MCG/HR: 50 INJECTION, SOLUTION INTRAMUSCULAR; INTRAVENOUS at 01:34

## 2023-11-09 RX ADMIN — POTASSIUM BICARBONATE 40 MEQ: 782 TABLET, EFFERVESCENT ORAL at 13:02

## 2023-11-09 RX ADMIN — PIPERACILLIN AND TAZOBACTAM 4500 MG: 4; .5 INJECTION, POWDER, LYOPHILIZED, FOR SOLUTION INTRAVENOUS at 22:57

## 2023-11-09 RX ADMIN — VANCOMYCIN HYDROCHLORIDE 1750 MG: 10 INJECTION, POWDER, LYOPHILIZED, FOR SOLUTION INTRAVENOUS at 12:05

## 2023-11-09 RX ADMIN — NOREPINEPHRINE BITARTRATE 11 MCG/MIN: 1 INJECTION, SOLUTION, CONCENTRATE INTRAVENOUS at 00:08

## 2023-11-09 RX ADMIN — CILOSTAZOL 50 MG: 50 TABLET ORAL at 20:27

## 2023-11-09 RX ADMIN — SODIUM CHLORIDE: 9 INJECTION, SOLUTION INTRAVENOUS at 20:39

## 2023-11-09 RX ADMIN — LANSOPRAZOLE 30 MG: 30 TABLET, ORALLY DISINTEGRATING, DELAYED RELEASE ORAL at 08:37

## 2023-11-09 RX ADMIN — GUAIFENESIN 400 MG: 100 SOLUTION ORAL at 22:00

## 2023-11-09 RX ADMIN — Medication 60 MG: at 14:11

## 2023-11-09 RX ADMIN — SODIUM CHLORIDE 1000 ML: 9 INJECTION, SOLUTION INTRAVENOUS at 18:35

## 2023-11-09 RX ADMIN — SODIUM CHLORIDE 1000 ML: 9 INJECTION, SOLUTION INTRAVENOUS at 11:44

## 2023-11-09 RX ADMIN — Medication 60 MG: at 17:39

## 2023-11-09 RX ADMIN — CILOSTAZOL 50 MG: 50 TABLET ORAL at 08:38

## 2023-11-09 RX ADMIN — HEPARIN SODIUM 5000 UNITS: 5000 INJECTION INTRAVENOUS; SUBCUTANEOUS at 14:11

## 2023-11-09 RX ADMIN — GUAIFENESIN 400 MG: 100 SOLUTION ORAL at 12:05

## 2023-11-09 RX ADMIN — VASOPRESSIN 0.01 UNITS/MIN: 20 INJECTION INTRAVENOUS at 07:11

## 2023-11-09 RX ADMIN — POTASSIUM BICARBONATE 40 MEQ: 782 TABLET, EFFERVESCENT ORAL at 03:10

## 2023-11-09 RX ADMIN — PIPERACILLIN AND TAZOBACTAM 4500 MG: 4; .5 INJECTION, POWDER, LYOPHILIZED, FOR SOLUTION INTRAVENOUS at 14:43

## 2023-11-09 RX ADMIN — Medication 60 MG: at 22:00

## 2023-11-09 RX ADMIN — LEVETIRACETAM 1000 MG: 100 SOLUTION ORAL at 20:30

## 2023-11-09 RX ADMIN — CHLORHEXIDINE GLUCONATE 15 ML: 1.2 RINSE ORAL at 20:10

## 2023-11-09 RX ADMIN — GUAIFENESIN 400 MG: 100 SOLUTION ORAL at 14:11

## 2023-11-09 RX ADMIN — PIPERACILLIN AND TAZOBACTAM 4500 MG: 4; .5 INJECTION, POWDER, LYOPHILIZED, FOR SOLUTION INTRAVENOUS at 07:03

## 2023-11-09 ASSESSMENT — PULMONARY FUNCTION TESTS
PIF_VALUE: 15
PIF_VALUE: 12

## 2023-11-09 ASSESSMENT — PAIN SCALES - GENERAL
PAINLEVEL_OUTOF10: 0

## 2023-11-10 ENCOUNTER — APPOINTMENT (OUTPATIENT)
Facility: HOSPITAL | Age: 58
DRG: 021 | End: 2023-11-10
Payer: MEDICAID

## 2023-11-10 PROBLEM — I60.8 SUBARACHNOID HEMORRHAGE DUE TO RUPTURED ANEURYSM (HCC): Status: ACTIVE | Noted: 2023-11-10

## 2023-11-10 PROBLEM — I67.848 CEREBRAL VASOSPASM: Status: ACTIVE | Noted: 2023-11-10

## 2023-11-10 LAB
ANION GAP SERPL CALC-SCNC: 7 MMOL/L (ref 5–15)
BUN SERPL-MCNC: 7 MG/DL (ref 6–20)
BUN/CREAT SERPL: 20 (ref 12–20)
CALCIUM SERPL-MCNC: 8.8 MG/DL (ref 8.5–10.1)
CHLORIDE SERPL-SCNC: 108 MMOL/L (ref 97–108)
CO2 SERPL-SCNC: 27 MMOL/L (ref 21–32)
CREAT SERPL-MCNC: 0.35 MG/DL (ref 0.55–1.02)
ERYTHROCYTE [DISTWIDTH] IN BLOOD BY AUTOMATED COUNT: 16.7 % (ref 11.5–14.5)
GLUCOSE SERPL-MCNC: 156 MG/DL (ref 65–100)
HCT VFR BLD AUTO: 32 % (ref 35–47)
HGB BLD-MCNC: 9.5 G/DL (ref 11.5–16)
MAGNESIUM SERPL-MCNC: 2.1 MG/DL (ref 1.6–2.4)
MCH RBC QN AUTO: 26.9 PG (ref 26–34)
MCHC RBC AUTO-ENTMCNC: 29.7 G/DL (ref 30–36.5)
MCV RBC AUTO: 90.7 FL (ref 80–99)
NRBC # BLD: 0 K/UL (ref 0–0.01)
NRBC BLD-RTO: 0 PER 100 WBC
PHOSPHATE SERPL-MCNC: 2.8 MG/DL (ref 2.6–4.7)
PLATELET # BLD AUTO: 252 K/UL (ref 150–400)
PMV BLD AUTO: 12.7 FL (ref 8.9–12.9)
POTASSIUM SERPL-SCNC: 3.4 MMOL/L (ref 3.5–5.1)
RBC # BLD AUTO: 3.53 M/UL (ref 3.8–5.2)
SODIUM SERPL-SCNC: 142 MMOL/L (ref 136–145)
WBC # BLD AUTO: 14 K/UL (ref 3.6–11)

## 2023-11-10 PROCEDURE — 99232 SBSQ HOSP IP/OBS MODERATE 35: CPT | Performed by: NURSE PRACTITIONER

## 2023-11-10 PROCEDURE — 94003 VENT MGMT INPAT SUBQ DAY: CPT

## 2023-11-10 PROCEDURE — 6370000000 HC RX 637 (ALT 250 FOR IP): Performed by: PSYCHIATRY & NEUROLOGY

## 2023-11-10 PROCEDURE — 6360000002 HC RX W HCPCS: Performed by: NURSE PRACTITIONER

## 2023-11-10 PROCEDURE — 6370000000 HC RX 637 (ALT 250 FOR IP): Performed by: NURSE PRACTITIONER

## 2023-11-10 PROCEDURE — 6360000002 HC RX W HCPCS: Performed by: PSYCHIATRY & NEUROLOGY

## 2023-11-10 PROCEDURE — 99024 POSTOP FOLLOW-UP VISIT: CPT | Performed by: NURSE PRACTITIONER

## 2023-11-10 PROCEDURE — 2580000003 HC RX 258: Performed by: NURSE PRACTITIONER

## 2023-11-10 PROCEDURE — 2500000003 HC RX 250 WO HCPCS: Performed by: PSYCHIATRY & NEUROLOGY

## 2023-11-10 PROCEDURE — 80048 BASIC METABOLIC PNL TOTAL CA: CPT

## 2023-11-10 PROCEDURE — 2500000003 HC RX 250 WO HCPCS: Performed by: NURSE PRACTITIONER

## 2023-11-10 PROCEDURE — 99233 SBSQ HOSP IP/OBS HIGH 50: CPT | Performed by: NURSE PRACTITIONER

## 2023-11-10 PROCEDURE — 84100 ASSAY OF PHOSPHORUS: CPT

## 2023-11-10 PROCEDURE — 85027 COMPLETE CBC AUTOMATED: CPT

## 2023-11-10 PROCEDURE — 70450 CT HEAD/BRAIN W/O DYE: CPT

## 2023-11-10 PROCEDURE — 83735 ASSAY OF MAGNESIUM: CPT

## 2023-11-10 PROCEDURE — 36415 COLL VENOUS BLD VENIPUNCTURE: CPT

## 2023-11-10 PROCEDURE — 2580000003 HC RX 258: Performed by: PSYCHIATRY & NEUROLOGY

## 2023-11-10 PROCEDURE — 2000000000 HC ICU R&B

## 2023-11-10 RX ORDER — WATER 10 ML/10ML
INJECTION INTRAMUSCULAR; INTRAVENOUS; SUBCUTANEOUS
Status: DISPENSED
Start: 2023-11-10 | End: 2023-11-10

## 2023-11-10 RX ORDER — DEXMEDETOMIDINE HYDROCHLORIDE 4 UG/ML
.1-1.5 INJECTION, SOLUTION INTRAVENOUS CONTINUOUS
Status: DISCONTINUED | OUTPATIENT
Start: 2023-11-10 | End: 2023-11-10

## 2023-11-10 RX ADMIN — SODIUM CHLORIDE, PRESERVATIVE FREE 10 ML: 5 INJECTION INTRAVENOUS at 20:33

## 2023-11-10 RX ADMIN — LEVETIRACETAM 1000 MG: 100 SOLUTION ORAL at 08:21

## 2023-11-10 RX ADMIN — HEPARIN SODIUM 5000 UNITS: 5000 INJECTION INTRAVENOUS; SUBCUTANEOUS at 13:54

## 2023-11-10 RX ADMIN — VANCOMYCIN HYDROCHLORIDE 1750 MG: 10 INJECTION, POWDER, LYOPHILIZED, FOR SOLUTION INTRAVENOUS at 12:07

## 2023-11-10 RX ADMIN — Medication 60 MG: at 02:02

## 2023-11-10 RX ADMIN — Medication 60 MG: at 10:22

## 2023-11-10 RX ADMIN — GUAIFENESIN 400 MG: 100 SOLUTION ORAL at 15:02

## 2023-11-10 RX ADMIN — PIPERACILLIN AND TAZOBACTAM 4500 MG: 4; .5 INJECTION, POWDER, LYOPHILIZED, FOR SOLUTION INTRAVENOUS at 07:13

## 2023-11-10 RX ADMIN — DEXMEDETOMIDINE HYDROCHLORIDE 0.2 MCG/KG/HR: 400 INJECTION, SOLUTION INTRAVENOUS at 01:44

## 2023-11-10 RX ADMIN — GUAIFENESIN 400 MG: 100 SOLUTION ORAL at 12:07

## 2023-11-10 RX ADMIN — NOREPINEPHRINE BITARTRATE 15 MCG/MIN: 1 INJECTION, SOLUTION, CONCENTRATE INTRAVENOUS at 13:44

## 2023-11-10 RX ADMIN — SODIUM CHLORIDE 2 G: 1 TABLET ORAL at 08:21

## 2023-11-10 RX ADMIN — SODIUM CHLORIDE: 9 INJECTION, SOLUTION INTRAVENOUS at 17:06

## 2023-11-10 RX ADMIN — POTASSIUM BICARBONATE 20 MEQ: 782 TABLET, EFFERVESCENT ORAL at 00:42

## 2023-11-10 RX ADMIN — Medication 60 MG: at 22:09

## 2023-11-10 RX ADMIN — GUAIFENESIN 400 MG: 100 SOLUTION ORAL at 17:42

## 2023-11-10 RX ADMIN — VANCOMYCIN HYDROCHLORIDE 1750 MG: 10 INJECTION, POWDER, LYOPHILIZED, FOR SOLUTION INTRAVENOUS at 00:43

## 2023-11-10 RX ADMIN — GUAIFENESIN 400 MG: 100 SOLUTION ORAL at 02:06

## 2023-11-10 RX ADMIN — Medication 60 MG: at 13:51

## 2023-11-10 RX ADMIN — GUAIFENESIN 400 MG: 100 SOLUTION ORAL at 22:08

## 2023-11-10 RX ADMIN — SODIUM CHLORIDE: 9 INJECTION, SOLUTION INTRAVENOUS at 03:42

## 2023-11-10 RX ADMIN — GUAIFENESIN 400 MG: 100 SOLUTION ORAL at 06:02

## 2023-11-10 RX ADMIN — PIPERACILLIN AND TAZOBACTAM 4500 MG: 4; .5 INJECTION, POWDER, LYOPHILIZED, FOR SOLUTION INTRAVENOUS at 23:09

## 2023-11-10 RX ADMIN — PIPERACILLIN AND TAZOBACTAM 4500 MG: 4; .5 INJECTION, POWDER, LYOPHILIZED, FOR SOLUTION INTRAVENOUS at 15:03

## 2023-11-10 RX ADMIN — DEXMEDETOMIDINE HYDROCHLORIDE 0.6 MCG/KG/HR: 400 INJECTION, SOLUTION INTRAVENOUS at 07:13

## 2023-11-10 RX ADMIN — LANSOPRAZOLE 30 MG: 30 TABLET, ORALLY DISINTEGRATING, DELAYED RELEASE ORAL at 08:22

## 2023-11-10 RX ADMIN — ATORVASTATIN CALCIUM 20 MG: 20 TABLET, FILM COATED ORAL at 20:32

## 2023-11-10 RX ADMIN — POTASSIUM BICARBONATE 40 MEQ: 782 TABLET, EFFERVESCENT ORAL at 08:31

## 2023-11-10 RX ADMIN — CILOSTAZOL 50 MG: 50 TABLET ORAL at 20:33

## 2023-11-10 RX ADMIN — Medication 60 MG: at 06:02

## 2023-11-10 RX ADMIN — HEPARIN SODIUM 5000 UNITS: 5000 INJECTION INTRAVENOUS; SUBCUTANEOUS at 22:08

## 2023-11-10 RX ADMIN — CILOSTAZOL 50 MG: 50 TABLET ORAL at 08:21

## 2023-11-10 RX ADMIN — HEPARIN SODIUM 5000 UNITS: 5000 INJECTION INTRAVENOUS; SUBCUTANEOUS at 06:02

## 2023-11-10 RX ADMIN — SODIUM CHLORIDE 2 G: 1 TABLET ORAL at 17:42

## 2023-11-10 RX ADMIN — CHLORHEXIDINE GLUCONATE 15 ML: 1.2 RINSE ORAL at 08:17

## 2023-11-10 RX ADMIN — CHLORHEXIDINE GLUCONATE 15 ML: 1.2 RINSE ORAL at 20:32

## 2023-11-10 RX ADMIN — Medication 60 MG: at 17:42

## 2023-11-10 RX ADMIN — SODIUM CHLORIDE 2 G: 1 TABLET ORAL at 00:42

## 2023-11-10 ASSESSMENT — PULMONARY FUNCTION TESTS
PIF_VALUE: 11
PIF_VALUE: 14
PIF_VALUE: 14

## 2023-11-10 ASSESSMENT — PAIN SCALES - GENERAL
PAINLEVEL_OUTOF10: 0

## 2023-11-10 NOTE — WOUND CARE
WOCN Note:     New consult for under breasts and pannus breakdown  Seen in 7106    Chart shows:  Admitted on 10/30/23. Admitted for [facility] with intraventricular drain placed 10/30. History of smoking. Assessment:   Intubated. RN and PCT at bedside cleaning her of large watery BM. Zinc cream applied and FlexiSeal discussed - this was the 4th stool this morning. Fully dependent turns required with help of 2 people. 317 lbs; 5'2\"  Arnold    Surface: MICHEAL mattress    Bilateral heels intact and without erythema. Heels offloaded with pillows. Buttocks and sacrum intact without erythema; no sacral foam due to frequency of liquid BM's. Linear skin splits under breasts and skin fold on abdomen. No rash noted. Recommend dusting with stoma powder as needed to keep dry. PI Prevention:  Turn/reposition approximately every 2 hours  Offload heels with heels hanging off end of pillow at all times while in bed. Z-guard cream to buttocks and sacrum daily and as needed with incontinence care  Low Air Loss mattress: Use only flat sheet and one incontinence pad.      Transition of Care: Plan to follow weekly and as needed while admitted to hospital.     Sonja Mcbride, BRIAN, RN, [facility]  Certified Wound, Ostomy, Continence Nurse  office [phone]  Available via [facility]

## 2023-11-11 LAB
ANION GAP SERPL CALC-SCNC: 9 MMOL/L (ref 5–15)
BUN SERPL-MCNC: 7 MG/DL (ref 6–20)
BUN/CREAT SERPL: 21 (ref 12–20)
CALCIUM SERPL-MCNC: 8.9 MG/DL (ref 8.5–10.1)
CHLORIDE SERPL-SCNC: 107 MMOL/L (ref 97–108)
CO2 SERPL-SCNC: 26 MMOL/L (ref 21–32)
CREAT SERPL-MCNC: 0.34 MG/DL (ref 0.55–1.02)
ERYTHROCYTE [DISTWIDTH] IN BLOOD BY AUTOMATED COUNT: 17.1 % (ref 11.5–14.5)
GLUCOSE SERPL-MCNC: 120 MG/DL (ref 65–100)
HCT VFR BLD AUTO: 31.7 % (ref 35–47)
HGB BLD-MCNC: 9.7 G/DL (ref 11.5–16)
MAGNESIUM SERPL-MCNC: 1.9 MG/DL (ref 1.6–2.4)
MCH RBC QN AUTO: 27 PG (ref 26–34)
MCHC RBC AUTO-ENTMCNC: 30.6 G/DL (ref 30–36.5)
MCV RBC AUTO: 88.3 FL (ref 80–99)
NRBC # BLD: 0 K/UL (ref 0–0.01)
NRBC BLD-RTO: 0 PER 100 WBC
PHOSPHATE SERPL-MCNC: 3.2 MG/DL (ref 2.6–4.7)
PLATELET # BLD AUTO: 244 K/UL (ref 150–400)
PMV BLD AUTO: 12.6 FL (ref 8.9–12.9)
POTASSIUM SERPL-SCNC: 3.4 MMOL/L (ref 3.5–5.1)
RBC # BLD AUTO: 3.59 M/UL (ref 3.8–5.2)
SODIUM SERPL-SCNC: 142 MMOL/L (ref 136–145)
WBC # BLD AUTO: 14.4 K/UL (ref 3.6–11)

## 2023-11-11 PROCEDURE — 6370000000 HC RX 637 (ALT 250 FOR IP): Performed by: NURSE PRACTITIONER

## 2023-11-11 PROCEDURE — 2580000003 HC RX 258: Performed by: PSYCHIATRY & NEUROLOGY

## 2023-11-11 PROCEDURE — 6370000000 HC RX 637 (ALT 250 FOR IP): Performed by: INTERNAL MEDICINE

## 2023-11-11 PROCEDURE — 6370000000 HC RX 637 (ALT 250 FOR IP): Performed by: PSYCHIATRY & NEUROLOGY

## 2023-11-11 PROCEDURE — 85027 COMPLETE CBC AUTOMATED: CPT

## 2023-11-11 PROCEDURE — 6360000002 HC RX W HCPCS: Performed by: NURSE PRACTITIONER

## 2023-11-11 PROCEDURE — 2500000003 HC RX 250 WO HCPCS: Performed by: NURSE PRACTITIONER

## 2023-11-11 PROCEDURE — 99232 SBSQ HOSP IP/OBS MODERATE 35: CPT | Performed by: NURSE PRACTITIONER

## 2023-11-11 PROCEDURE — 36415 COLL VENOUS BLD VENIPUNCTURE: CPT

## 2023-11-11 PROCEDURE — 6360000002 HC RX W HCPCS: Performed by: PSYCHIATRY & NEUROLOGY

## 2023-11-11 PROCEDURE — 2580000003 HC RX 258: Performed by: NURSE PRACTITIONER

## 2023-11-11 PROCEDURE — 2000000000 HC ICU R&B

## 2023-11-11 PROCEDURE — 84100 ASSAY OF PHOSPHORUS: CPT

## 2023-11-11 PROCEDURE — 83735 ASSAY OF MAGNESIUM: CPT

## 2023-11-11 PROCEDURE — 80048 BASIC METABOLIC PNL TOTAL CA: CPT

## 2023-11-11 RX ADMIN — Medication 60 MG: at 06:19

## 2023-11-11 RX ADMIN — SODIUM CHLORIDE 2 G: 1 TABLET ORAL at 16:26

## 2023-11-11 RX ADMIN — Medication 60 MG: at 14:04

## 2023-11-11 RX ADMIN — GUAIFENESIN 400 MG: 100 SOLUTION ORAL at 07:07

## 2023-11-11 RX ADMIN — LANSOPRAZOLE 30 MG: 30 TABLET, ORALLY DISINTEGRATING, DELAYED RELEASE ORAL at 07:51

## 2023-11-11 RX ADMIN — SODIUM CHLORIDE, PRESERVATIVE FREE 40 ML: 5 INJECTION INTRAVENOUS at 07:52

## 2023-11-11 RX ADMIN — WATER 2000 MG: 1 INJECTION INTRAMUSCULAR; INTRAVENOUS; SUBCUTANEOUS at 07:50

## 2023-11-11 RX ADMIN — SODIUM CHLORIDE, PRESERVATIVE FREE 10 ML: 5 INJECTION INTRAVENOUS at 20:36

## 2023-11-11 RX ADMIN — CHLORHEXIDINE GLUCONATE 15 ML: 1.2 RINSE ORAL at 07:50

## 2023-11-11 RX ADMIN — GUAIFENESIN 400 MG: 100 SOLUTION ORAL at 18:58

## 2023-11-11 RX ADMIN — Medication 60 MG: at 20:36

## 2023-11-11 RX ADMIN — SODIUM CHLORIDE: 9 INJECTION, SOLUTION INTRAVENOUS at 12:54

## 2023-11-11 RX ADMIN — GUAIFENESIN 400 MG: 100 SOLUTION ORAL at 12:00

## 2023-11-11 RX ADMIN — WATER 1 MG: 1 INJECTION INTRAMUSCULAR; INTRAVENOUS; SUBCUTANEOUS at 12:47

## 2023-11-11 RX ADMIN — HEPARIN SODIUM 5000 UNITS: 5000 INJECTION INTRAVENOUS; SUBCUTANEOUS at 13:46

## 2023-11-11 RX ADMIN — CILOSTAZOL 50 MG: 50 TABLET ORAL at 07:51

## 2023-11-11 RX ADMIN — SODIUM CHLORIDE 2 G: 1 TABLET ORAL at 07:52

## 2023-11-11 RX ADMIN — HEPARIN SODIUM 5000 UNITS: 5000 INJECTION INTRAVENOUS; SUBCUTANEOUS at 22:05

## 2023-11-11 RX ADMIN — HEPARIN SODIUM 5000 UNITS: 5000 INJECTION INTRAVENOUS; SUBCUTANEOUS at 06:17

## 2023-11-11 RX ADMIN — ALPRAZOLAM 1 MG: 0.5 TABLET ORAL at 00:27

## 2023-11-11 RX ADMIN — Medication 60 MG: at 10:19

## 2023-11-11 RX ADMIN — GUAIFENESIN 400 MG: 100 SOLUTION ORAL at 16:26

## 2023-11-11 RX ADMIN — GUAIFENESIN 400 MG: 100 SOLUTION ORAL at 03:19

## 2023-11-11 RX ADMIN — SODIUM CHLORIDE, PRESERVATIVE FREE 10 ML: 5 INJECTION INTRAVENOUS at 07:52

## 2023-11-11 RX ADMIN — WATER 2000 MG: 1 INJECTION INTRAMUSCULAR; INTRAVENOUS; SUBCUTANEOUS at 15:09

## 2023-11-11 RX ADMIN — ATORVASTATIN CALCIUM 20 MG: 20 TABLET, FILM COATED ORAL at 20:35

## 2023-11-11 RX ADMIN — Medication 60 MG: at 18:17

## 2023-11-11 RX ADMIN — BUTALBITAL, ACETAMINOPHEN, AND CAFFEINE 1 TABLET: 325; 50; 40 TABLET ORAL at 00:27

## 2023-11-11 RX ADMIN — CILOSTAZOL 50 MG: 50 TABLET ORAL at 20:35

## 2023-11-11 RX ADMIN — SODIUM CHLORIDE 2 G: 1 TABLET ORAL at 00:27

## 2023-11-11 RX ADMIN — Medication 60 MG: at 03:19

## 2023-11-11 RX ADMIN — BUTALBITAL, ACETAMINOPHEN, AND CAFFEINE 1 TABLET: 325; 50; 40 TABLET ORAL at 20:36

## 2023-11-11 RX ADMIN — POTASSIUM BICARBONATE 40 MEQ: 782 TABLET, EFFERVESCENT ORAL at 07:07

## 2023-11-11 RX ADMIN — CHLORHEXIDINE GLUCONATE 15 ML: 1.2 RINSE ORAL at 20:37

## 2023-11-11 ASSESSMENT — PAIN SCALES - GENERAL
PAINLEVEL_OUTOF10: 0

## 2023-11-12 LAB
ANION GAP SERPL CALC-SCNC: 6 MMOL/L (ref 5–15)
BUN SERPL-MCNC: 10 MG/DL (ref 6–20)
BUN/CREAT SERPL: 29 (ref 12–20)
CALCIUM SERPL-MCNC: 8.1 MG/DL (ref 8.5–10.1)
CHLORIDE SERPL-SCNC: 107 MMOL/L (ref 97–108)
CO2 SERPL-SCNC: 27 MMOL/L (ref 21–32)
CREAT SERPL-MCNC: 0.35 MG/DL (ref 0.55–1.02)
ERYTHROCYTE [DISTWIDTH] IN BLOOD BY AUTOMATED COUNT: 17.1 % (ref 11.5–14.5)
GLUCOSE SERPL-MCNC: 131 MG/DL (ref 65–100)
HCT VFR BLD AUTO: 31.8 % (ref 35–47)
HGB BLD-MCNC: 9.6 G/DL (ref 11.5–16)
MAGNESIUM SERPL-MCNC: 1.9 MG/DL (ref 1.6–2.4)
MCH RBC QN AUTO: 26.5 PG (ref 26–34)
MCHC RBC AUTO-ENTMCNC: 30.2 G/DL (ref 30–36.5)
MCV RBC AUTO: 87.8 FL (ref 80–99)
NRBC # BLD: 0 K/UL (ref 0–0.01)
NRBC BLD-RTO: 0 PER 100 WBC
PHOSPHATE SERPL-MCNC: 3 MG/DL (ref 2.6–4.7)
PLATELET # BLD AUTO: 241 K/UL (ref 150–400)
PMV BLD AUTO: 12.4 FL (ref 8.9–12.9)
POTASSIUM SERPL-SCNC: 3.4 MMOL/L (ref 3.5–5.1)
RBC # BLD AUTO: 3.62 M/UL (ref 3.8–5.2)
SODIUM SERPL-SCNC: 140 MMOL/L (ref 136–145)
WBC # BLD AUTO: 12.2 K/UL (ref 3.6–11)

## 2023-11-12 PROCEDURE — 6360000002 HC RX W HCPCS: Performed by: PSYCHIATRY & NEUROLOGY

## 2023-11-12 PROCEDURE — 6370000000 HC RX 637 (ALT 250 FOR IP): Performed by: PSYCHIATRY & NEUROLOGY

## 2023-11-12 PROCEDURE — 94640 AIRWAY INHALATION TREATMENT: CPT

## 2023-11-12 PROCEDURE — 2580000003 HC RX 258: Performed by: NURSE PRACTITIONER

## 2023-11-12 PROCEDURE — 80048 BASIC METABOLIC PNL TOTAL CA: CPT

## 2023-11-12 PROCEDURE — 6360000002 HC RX W HCPCS: Performed by: NURSE PRACTITIONER

## 2023-11-12 PROCEDURE — 36415 COLL VENOUS BLD VENIPUNCTURE: CPT

## 2023-11-12 PROCEDURE — 85027 COMPLETE CBC AUTOMATED: CPT

## 2023-11-12 PROCEDURE — 6370000000 HC RX 637 (ALT 250 FOR IP): Performed by: INTERNAL MEDICINE

## 2023-11-12 PROCEDURE — 2500000003 HC RX 250 WO HCPCS: Performed by: NURSE PRACTITIONER

## 2023-11-12 PROCEDURE — 2700000000 HC OXYGEN THERAPY PER DAY

## 2023-11-12 PROCEDURE — 83735 ASSAY OF MAGNESIUM: CPT

## 2023-11-12 PROCEDURE — 6370000000 HC RX 637 (ALT 250 FOR IP): Performed by: NURSE PRACTITIONER

## 2023-11-12 PROCEDURE — 99232 SBSQ HOSP IP/OBS MODERATE 35: CPT | Performed by: NURSE PRACTITIONER

## 2023-11-12 PROCEDURE — 84100 ASSAY OF PHOSPHORUS: CPT

## 2023-11-12 PROCEDURE — 2580000003 HC RX 258: Performed by: PSYCHIATRY & NEUROLOGY

## 2023-11-12 PROCEDURE — 2000000000 HC ICU R&B

## 2023-11-12 RX ORDER — FENTANYL CITRATE 50 UG/ML
50 INJECTION, SOLUTION INTRAMUSCULAR; INTRAVENOUS ONCE
Status: COMPLETED | OUTPATIENT
Start: 2023-11-12 | End: 2023-11-12

## 2023-11-12 RX ADMIN — GUAIFENESIN 400 MG: 100 SOLUTION ORAL at 06:01

## 2023-11-12 RX ADMIN — GUAIFENESIN 400 MG: 100 SOLUTION ORAL at 22:03

## 2023-11-12 RX ADMIN — WATER 2000 MG: 1 INJECTION INTRAMUSCULAR; INTRAVENOUS; SUBCUTANEOUS at 16:35

## 2023-11-12 RX ADMIN — CILOSTAZOL 50 MG: 50 TABLET ORAL at 20:31

## 2023-11-12 RX ADMIN — IPRATROPIUM BROMIDE AND ALBUTEROL SULFATE 1 DOSE: 2.5; .5 SOLUTION RESPIRATORY (INHALATION) at 20:19

## 2023-11-12 RX ADMIN — SODIUM CHLORIDE 2 G: 1 TABLET ORAL at 02:55

## 2023-11-12 RX ADMIN — ALPRAZOLAM 1 MG: 0.5 TABLET ORAL at 00:19

## 2023-11-12 RX ADMIN — Medication 60 MG: at 18:35

## 2023-11-12 RX ADMIN — GUAIFENESIN 400 MG: 100 SOLUTION ORAL at 02:55

## 2023-11-12 RX ADMIN — CHLORHEXIDINE GLUCONATE 15 ML: 1.2 RINSE ORAL at 08:34

## 2023-11-12 RX ADMIN — SODIUM CHLORIDE: 9 INJECTION, SOLUTION INTRAVENOUS at 08:15

## 2023-11-12 RX ADMIN — WATER 2000 MG: 1 INJECTION INTRAMUSCULAR; INTRAVENOUS; SUBCUTANEOUS at 08:22

## 2023-11-12 RX ADMIN — CILOSTAZOL 50 MG: 50 TABLET ORAL at 08:27

## 2023-11-12 RX ADMIN — Medication 60 MG: at 14:16

## 2023-11-12 RX ADMIN — SODIUM CHLORIDE, PRESERVATIVE FREE 10 ML: 5 INJECTION INTRAVENOUS at 20:32

## 2023-11-12 RX ADMIN — Medication 60 MG: at 06:02

## 2023-11-12 RX ADMIN — LANSOPRAZOLE 30 MG: 30 TABLET, ORALLY DISINTEGRATING, DELAYED RELEASE ORAL at 08:27

## 2023-11-12 RX ADMIN — SODIUM CHLORIDE 2 G: 1 TABLET ORAL at 08:27

## 2023-11-12 RX ADMIN — Medication 60 MG: at 22:03

## 2023-11-12 RX ADMIN — Medication 60 MG: at 10:06

## 2023-11-12 RX ADMIN — ALPRAZOLAM 1 MG: 0.5 TABLET ORAL at 18:51

## 2023-11-12 RX ADMIN — POTASSIUM BICARBONATE 40 MEQ: 782 TABLET, EFFERVESCENT ORAL at 06:56

## 2023-11-12 RX ADMIN — GUAIFENESIN 400 MG: 100 SOLUTION ORAL at 12:21

## 2023-11-12 RX ADMIN — WATER 2000 MG: 1 INJECTION INTRAMUSCULAR; INTRAVENOUS; SUBCUTANEOUS at 00:19

## 2023-11-12 RX ADMIN — SODIUM CHLORIDE, PRESERVATIVE FREE 10 ML: 5 INJECTION INTRAVENOUS at 08:28

## 2023-11-12 RX ADMIN — SODIUM CHLORIDE, PRESERVATIVE FREE 10 ML: 5 INJECTION INTRAVENOUS at 08:29

## 2023-11-12 RX ADMIN — ATORVASTATIN CALCIUM 20 MG: 20 TABLET, FILM COATED ORAL at 20:31

## 2023-11-12 RX ADMIN — SODIUM CHLORIDE 2 G: 1 TABLET ORAL at 16:35

## 2023-11-12 RX ADMIN — GUAIFENESIN 400 MG: 100 SOLUTION ORAL at 00:19

## 2023-11-12 RX ADMIN — CHLORHEXIDINE GLUCONATE 15 ML: 1.2 RINSE ORAL at 20:32

## 2023-11-12 RX ADMIN — HEPARIN SODIUM 5000 UNITS: 5000 INJECTION INTRAVENOUS; SUBCUTANEOUS at 22:03

## 2023-11-12 RX ADMIN — GUAIFENESIN 400 MG: 100 SOLUTION ORAL at 18:51

## 2023-11-12 RX ADMIN — Medication 60 MG: at 02:55

## 2023-11-12 RX ADMIN — GUAIFENESIN 400 MG: 100 SOLUTION ORAL at 16:35

## 2023-11-12 RX ADMIN — HEPARIN SODIUM 5000 UNITS: 5000 INJECTION INTRAVENOUS; SUBCUTANEOUS at 14:13

## 2023-11-12 RX ADMIN — HEPARIN SODIUM 5000 UNITS: 5000 INJECTION INTRAVENOUS; SUBCUTANEOUS at 06:01

## 2023-11-12 RX ADMIN — FENTANYL CITRATE 50 MCG: 50 INJECTION, SOLUTION INTRAMUSCULAR; INTRAVENOUS at 21:36

## 2023-11-12 RX ADMIN — ACETAMINOPHEN 650 MG: 325 TABLET ORAL at 20:31

## 2023-11-12 ASSESSMENT — PAIN SCALES - GENERAL
PAINLEVEL_OUTOF10: 0

## 2023-11-13 ENCOUNTER — APPOINTMENT (OUTPATIENT)
Facility: HOSPITAL | Age: 58
DRG: 021 | End: 2023-11-13
Payer: MEDICAID

## 2023-11-13 LAB
ANION GAP SERPL CALC-SCNC: 7 MMOL/L (ref 5–15)
BUN SERPL-MCNC: 11 MG/DL (ref 6–20)
BUN/CREAT SERPL: 28 (ref 12–20)
CALCIUM SERPL-MCNC: 8.6 MG/DL (ref 8.5–10.1)
CHLORIDE SERPL-SCNC: 109 MMOL/L (ref 97–108)
CO2 SERPL-SCNC: 26 MMOL/L (ref 21–32)
CREAT SERPL-MCNC: 0.4 MG/DL (ref 0.55–1.02)
ERYTHROCYTE [DISTWIDTH] IN BLOOD BY AUTOMATED COUNT: 17.3 % (ref 11.5–14.5)
GLUCOSE SERPL-MCNC: 123 MG/DL (ref 65–100)
HCT VFR BLD AUTO: 31.9 % (ref 35–47)
HGB BLD-MCNC: 9.6 G/DL (ref 11.5–16)
MAGNESIUM SERPL-MCNC: 2 MG/DL (ref 1.6–2.4)
MCH RBC QN AUTO: 26.9 PG (ref 26–34)
MCHC RBC AUTO-ENTMCNC: 30.1 G/DL (ref 30–36.5)
MCV RBC AUTO: 89.4 FL (ref 80–99)
NRBC # BLD: 0 K/UL (ref 0–0.01)
NRBC BLD-RTO: 0 PER 100 WBC
PHOSPHATE SERPL-MCNC: 3.3 MG/DL (ref 2.6–4.7)
PLATELET # BLD AUTO: 217 K/UL (ref 150–400)
PMV BLD AUTO: 12.4 FL (ref 8.9–12.9)
POTASSIUM SERPL-SCNC: 3.5 MMOL/L (ref 3.5–5.1)
RBC # BLD AUTO: 3.57 M/UL (ref 3.8–5.2)
SODIUM SERPL-SCNC: 142 MMOL/L (ref 136–145)
WBC # BLD AUTO: 12.3 K/UL (ref 3.6–11)

## 2023-11-13 PROCEDURE — 6370000000 HC RX 637 (ALT 250 FOR IP): Performed by: PSYCHIATRY & NEUROLOGY

## 2023-11-13 PROCEDURE — 6360000002 HC RX W HCPCS: Performed by: NURSE PRACTITIONER

## 2023-11-13 PROCEDURE — 83735 ASSAY OF MAGNESIUM: CPT

## 2023-11-13 PROCEDURE — 2500000003 HC RX 250 WO HCPCS: Performed by: NURSE PRACTITIONER

## 2023-11-13 PROCEDURE — 6370000000 HC RX 637 (ALT 250 FOR IP): Performed by: NURSE PRACTITIONER

## 2023-11-13 PROCEDURE — 80048 BASIC METABOLIC PNL TOTAL CA: CPT

## 2023-11-13 PROCEDURE — 2580000003 HC RX 258: Performed by: NURSE PRACTITIONER

## 2023-11-13 PROCEDURE — 84100 ASSAY OF PHOSPHORUS: CPT

## 2023-11-13 PROCEDURE — 2580000003 HC RX 258: Performed by: PSYCHIATRY & NEUROLOGY

## 2023-11-13 PROCEDURE — 99232 SBSQ HOSP IP/OBS MODERATE 35: CPT | Performed by: NURSE PRACTITIONER

## 2023-11-13 PROCEDURE — 6360000002 HC RX W HCPCS: Performed by: PSYCHIATRY & NEUROLOGY

## 2023-11-13 PROCEDURE — 2000000000 HC ICU R&B

## 2023-11-13 PROCEDURE — 70450 CT HEAD/BRAIN W/O DYE: CPT

## 2023-11-13 PROCEDURE — 85027 COMPLETE CBC AUTOMATED: CPT

## 2023-11-13 PROCEDURE — 2700000000 HC OXYGEN THERAPY PER DAY

## 2023-11-13 PROCEDURE — 36415 COLL VENOUS BLD VENIPUNCTURE: CPT

## 2023-11-13 PROCEDURE — 99024 POSTOP FOLLOW-UP VISIT: CPT | Performed by: NURSE PRACTITIONER

## 2023-11-13 RX ORDER — HYDRALAZINE HYDROCHLORIDE 20 MG/ML
10 INJECTION INTRAMUSCULAR; INTRAVENOUS EVERY 4 HOURS PRN
Status: DISCONTINUED | OUTPATIENT
Start: 2023-11-13 | End: 2023-11-26 | Stop reason: HOSPADM

## 2023-11-13 RX ORDER — FENTANYL CITRATE 50 UG/ML
50 INJECTION, SOLUTION INTRAMUSCULAR; INTRAVENOUS ONCE
Status: COMPLETED | OUTPATIENT
Start: 2023-11-13 | End: 2023-11-13

## 2023-11-13 RX ORDER — NOREPINEPHRINE BITARTRATE 0.06 MG/ML
1-100 INJECTION, SOLUTION INTRAVENOUS CONTINUOUS
Status: DISCONTINUED | OUTPATIENT
Start: 2023-11-13 | End: 2023-11-14

## 2023-11-13 RX ADMIN — WATER 2000 MG: 1 INJECTION INTRAMUSCULAR; INTRAVENOUS; SUBCUTANEOUS at 16:30

## 2023-11-13 RX ADMIN — HEPARIN SODIUM 5000 UNITS: 5000 INJECTION INTRAVENOUS; SUBCUTANEOUS at 06:03

## 2023-11-13 RX ADMIN — GUAIFENESIN 400 MG: 100 SOLUTION ORAL at 06:02

## 2023-11-13 RX ADMIN — SODIUM CHLORIDE 2 G: 1 TABLET ORAL at 01:00

## 2023-11-13 RX ADMIN — Medication 60 MG: at 18:22

## 2023-11-13 RX ADMIN — SODIUM CHLORIDE, PRESERVATIVE FREE 10 ML: 5 INJECTION INTRAVENOUS at 08:21

## 2023-11-13 RX ADMIN — Medication 60 MG: at 14:14

## 2023-11-13 RX ADMIN — SODIUM CHLORIDE: 9 INJECTION, SOLUTION INTRAVENOUS at 04:44

## 2023-11-13 RX ADMIN — Medication 60 MG: at 02:01

## 2023-11-13 RX ADMIN — WATER 2000 MG: 1 INJECTION INTRAMUSCULAR; INTRAVENOUS; SUBCUTANEOUS at 00:07

## 2023-11-13 RX ADMIN — SODIUM CHLORIDE, PRESERVATIVE FREE 10 ML: 5 INJECTION INTRAVENOUS at 20:11

## 2023-11-13 RX ADMIN — Medication 60 MG: at 22:03

## 2023-11-13 RX ADMIN — HEPARIN SODIUM 5000 UNITS: 5000 INJECTION INTRAVENOUS; SUBCUTANEOUS at 21:30

## 2023-11-13 RX ADMIN — CILOSTAZOL 50 MG: 50 TABLET ORAL at 20:16

## 2023-11-13 RX ADMIN — ATORVASTATIN CALCIUM 20 MG: 20 TABLET, FILM COATED ORAL at 20:16

## 2023-11-13 RX ADMIN — ACETAMINOPHEN 650 MG: 325 TABLET ORAL at 20:16

## 2023-11-13 RX ADMIN — WATER 2000 MG: 1 INJECTION INTRAMUSCULAR; INTRAVENOUS; SUBCUTANEOUS at 08:20

## 2023-11-13 RX ADMIN — LANSOPRAZOLE 30 MG: 30 TABLET, ORALLY DISINTEGRATING, DELAYED RELEASE ORAL at 08:20

## 2023-11-13 RX ADMIN — HYDRALAZINE HYDROCHLORIDE 10 MG: 20 INJECTION, SOLUTION INTRAMUSCULAR; INTRAVENOUS at 20:59

## 2023-11-13 RX ADMIN — GUAIFENESIN 400 MG: 100 SOLUTION ORAL at 10:25

## 2023-11-13 RX ADMIN — SODIUM CHLORIDE 2 G: 1 TABLET ORAL at 08:20

## 2023-11-13 RX ADMIN — GUAIFENESIN 400 MG: 100 SOLUTION ORAL at 02:04

## 2023-11-13 RX ADMIN — Medication 60 MG: at 06:02

## 2023-11-13 RX ADMIN — CHLORHEXIDINE GLUCONATE 15 ML: 1.2 RINSE ORAL at 08:23

## 2023-11-13 RX ADMIN — CILOSTAZOL 50 MG: 50 TABLET ORAL at 08:20

## 2023-11-13 RX ADMIN — Medication 60 MG: at 10:25

## 2023-11-13 RX ADMIN — HEPARIN SODIUM 5000 UNITS: 5000 INJECTION INTRAVENOUS; SUBCUTANEOUS at 14:15

## 2023-11-13 RX ADMIN — FENTANYL CITRATE 50 MCG: 50 INJECTION, SOLUTION INTRAMUSCULAR; INTRAVENOUS at 23:10

## 2023-11-13 RX ADMIN — POTASSIUM BICARBONATE 40 MEQ: 782 TABLET, EFFERVESCENT ORAL at 06:43

## 2023-11-13 ASSESSMENT — PAIN SCALES - GENERAL
PAINLEVEL_OUTOF10: 0

## 2023-11-14 ENCOUNTER — APPOINTMENT (OUTPATIENT)
Facility: HOSPITAL | Age: 58
DRG: 021 | End: 2023-11-14
Payer: MEDICAID

## 2023-11-14 LAB
ANION GAP SERPL CALC-SCNC: 10 MMOL/L (ref 5–15)
BUN SERPL-MCNC: 11 MG/DL (ref 6–20)
BUN/CREAT SERPL: 38 (ref 12–20)
CALCIUM SERPL-MCNC: 8.9 MG/DL (ref 8.5–10.1)
CHLORIDE SERPL-SCNC: 107 MMOL/L (ref 97–108)
CO2 SERPL-SCNC: 24 MMOL/L (ref 21–32)
CREAT SERPL-MCNC: 0.29 MG/DL (ref 0.55–1.02)
ERYTHROCYTE [DISTWIDTH] IN BLOOD BY AUTOMATED COUNT: 17.5 % (ref 11.5–14.5)
GLUCOSE SERPL-MCNC: 119 MG/DL (ref 65–100)
HCT VFR BLD AUTO: 31.6 % (ref 35–47)
HGB BLD-MCNC: 9.6 G/DL (ref 11.5–16)
MCH RBC QN AUTO: 26.5 PG (ref 26–34)
MCHC RBC AUTO-ENTMCNC: 30.4 G/DL (ref 30–36.5)
MCV RBC AUTO: 87.3 FL (ref 80–99)
NRBC # BLD: 0 K/UL (ref 0–0.01)
NRBC BLD-RTO: 0 PER 100 WBC
PHOSPHATE SERPL-MCNC: 3.6 MG/DL (ref 2.6–4.7)
PLATELET # BLD AUTO: 205 K/UL (ref 150–400)
PMV BLD AUTO: 11.6 FL (ref 8.9–12.9)
POTASSIUM SERPL-SCNC: 3.6 MMOL/L (ref 3.5–5.1)
RBC # BLD AUTO: 3.62 M/UL (ref 3.8–5.2)
SODIUM SERPL-SCNC: 141 MMOL/L (ref 136–145)
WBC # BLD AUTO: 11.8 K/UL (ref 3.6–11)

## 2023-11-14 PROCEDURE — 36410 VNPNXR 3YR/> PHY/QHP DX/THER: CPT

## 2023-11-14 PROCEDURE — 2709999900 HC NON-CHARGEABLE SUPPLY

## 2023-11-14 PROCEDURE — 6370000000 HC RX 637 (ALT 250 FOR IP): Performed by: PSYCHIATRY & NEUROLOGY

## 2023-11-14 PROCEDURE — 2000000000 HC ICU R&B

## 2023-11-14 PROCEDURE — 97530 THERAPEUTIC ACTIVITIES: CPT

## 2023-11-14 PROCEDURE — 2580000003 HC RX 258: Performed by: NURSE PRACTITIONER

## 2023-11-14 PROCEDURE — 6370000000 HC RX 637 (ALT 250 FOR IP): Performed by: NURSE PRACTITIONER

## 2023-11-14 PROCEDURE — 99232 SBSQ HOSP IP/OBS MODERATE 35: CPT | Performed by: NURSE PRACTITIONER

## 2023-11-14 PROCEDURE — 6370000000 HC RX 637 (ALT 250 FOR IP): Performed by: INTERNAL MEDICINE

## 2023-11-14 PROCEDURE — 92610 EVALUATE SWALLOWING FUNCTION: CPT | Performed by: SPEECH-LANGUAGE PATHOLOGIST

## 2023-11-14 PROCEDURE — 76937 US GUIDE VASCULAR ACCESS: CPT

## 2023-11-14 PROCEDURE — 6360000002 HC RX W HCPCS: Performed by: PSYCHIATRY & NEUROLOGY

## 2023-11-14 PROCEDURE — 84100 ASSAY OF PHOSPHORUS: CPT

## 2023-11-14 PROCEDURE — 2580000003 HC RX 258: Performed by: PSYCHIATRY & NEUROLOGY

## 2023-11-14 PROCEDURE — APPNB45 APP NON BILLABLE 31-45 MINUTES: Performed by: NURSE PRACTITIONER

## 2023-11-14 PROCEDURE — 80048 BASIC METABOLIC PNL TOTAL CA: CPT

## 2023-11-14 PROCEDURE — 97168 OT RE-EVAL EST PLAN CARE: CPT

## 2023-11-14 PROCEDURE — C1751 CATH, INF, PER/CENT/MIDLINE: HCPCS

## 2023-11-14 PROCEDURE — 36415 COLL VENOUS BLD VENIPUNCTURE: CPT

## 2023-11-14 PROCEDURE — 6360000002 HC RX W HCPCS: Performed by: NURSE PRACTITIONER

## 2023-11-14 PROCEDURE — 70450 CT HEAD/BRAIN W/O DYE: CPT

## 2023-11-14 PROCEDURE — 92523 SPEECH SOUND LANG COMPREHEN: CPT | Performed by: SPEECH-LANGUAGE PATHOLOGIST

## 2023-11-14 PROCEDURE — 97164 PT RE-EVAL EST PLAN CARE: CPT

## 2023-11-14 PROCEDURE — 85027 COMPLETE CBC AUTOMATED: CPT

## 2023-11-14 RX ORDER — GINSENG 100 MG
CAPSULE ORAL ONCE
Status: DISCONTINUED | OUTPATIENT
Start: 2023-11-14 | End: 2023-11-26 | Stop reason: HOSPADM

## 2023-11-14 RX ADMIN — WATER 2000 MG: 1 INJECTION INTRAMUSCULAR; INTRAVENOUS; SUBCUTANEOUS at 00:17

## 2023-11-14 RX ADMIN — CILOSTAZOL 50 MG: 50 TABLET ORAL at 20:57

## 2023-11-14 RX ADMIN — ACETAMINOPHEN 650 MG: 325 TABLET ORAL at 20:56

## 2023-11-14 RX ADMIN — ATORVASTATIN CALCIUM 20 MG: 20 TABLET, FILM COATED ORAL at 20:57

## 2023-11-14 RX ADMIN — SODIUM CHLORIDE, PRESERVATIVE FREE 10 ML: 5 INJECTION INTRAVENOUS at 20:57

## 2023-11-14 RX ADMIN — SODIUM CHLORIDE: 9 INJECTION, SOLUTION INTRAVENOUS at 02:32

## 2023-11-14 RX ADMIN — HEPARIN SODIUM 5000 UNITS: 5000 INJECTION INTRAVENOUS; SUBCUTANEOUS at 22:15

## 2023-11-14 RX ADMIN — BUTALBITAL, ACETAMINOPHEN, AND CAFFEINE 1 TABLET: 325; 50; 40 TABLET ORAL at 06:06

## 2023-11-14 RX ADMIN — Medication 60 MG: at 02:34

## 2023-11-14 RX ADMIN — POTASSIUM BICARBONATE 40 MEQ: 782 TABLET, EFFERVESCENT ORAL at 12:15

## 2023-11-14 RX ADMIN — Medication 60 MG: at 10:55

## 2023-11-14 RX ADMIN — Medication 60 MG: at 14:17

## 2023-11-14 RX ADMIN — SODIUM CHLORIDE, PRESERVATIVE FREE 10 ML: 5 INJECTION INTRAVENOUS at 08:04

## 2023-11-14 RX ADMIN — LANSOPRAZOLE 30 MG: 30 TABLET, ORALLY DISINTEGRATING, DELAYED RELEASE ORAL at 08:03

## 2023-11-14 RX ADMIN — HEPARIN SODIUM 5000 UNITS: 5000 INJECTION INTRAVENOUS; SUBCUTANEOUS at 14:27

## 2023-11-14 RX ADMIN — ALPRAZOLAM 1 MG: 0.5 TABLET ORAL at 00:32

## 2023-11-14 RX ADMIN — Medication 60 MG: at 19:33

## 2023-11-14 RX ADMIN — Medication 60 MG: at 06:06

## 2023-11-14 RX ADMIN — SODIUM CHLORIDE, PRESERVATIVE FREE 10 ML: 5 INJECTION INTRAVENOUS at 08:03

## 2023-11-14 RX ADMIN — HEPARIN SODIUM 5000 UNITS: 5000 INJECTION INTRAVENOUS; SUBCUTANEOUS at 06:05

## 2023-11-14 RX ADMIN — WATER 2000 MG: 1 INJECTION INTRAMUSCULAR; INTRAVENOUS; SUBCUTANEOUS at 17:31

## 2023-11-14 RX ADMIN — WATER 2000 MG: 1 INJECTION INTRAMUSCULAR; INTRAVENOUS; SUBCUTANEOUS at 08:03

## 2023-11-14 RX ADMIN — CILOSTAZOL 50 MG: 50 TABLET ORAL at 08:04

## 2023-11-14 RX ADMIN — Medication 60 MG: at 22:15

## 2023-11-14 RX ADMIN — BUTALBITAL, ACETAMINOPHEN, AND CAFFEINE 1 TABLET: 325; 50; 40 TABLET ORAL at 00:32

## 2023-11-14 ASSESSMENT — PAIN SCALES - GENERAL
PAINLEVEL_OUTOF10: 0

## 2023-11-14 NOTE — CARE COORDINATION
Transition of Care Plan:     RUR: 17%, Moderate   Prior Level of Functioning: Independent  Disposition: TBD  Follow up appointments: PCP  DME needed: TBD  Transportation at discharge: Stretcher   Is patient a  and connected with [place]? No  Caregiver Contact: Son Terell Valdez   Discharge Caregiver contacted prior to discharge? No  Care Conference needed? No  Barriers to discharge:    Patient is s/p coil embolization left P-comm aneurysm for SAH. EVD d/c'd  Continue Nimotop  Milrinone gtt  Per notes patient is following minimal commands.  Needs max assist  Working with therapy    Rodrigo Dennis RN,Care Management

## 2023-11-15 ENCOUNTER — APPOINTMENT (OUTPATIENT)
Facility: HOSPITAL | Age: 58
DRG: 021 | End: 2023-11-15
Payer: MEDICAID

## 2023-11-15 LAB
ANION GAP SERPL CALC-SCNC: 5 MMOL/L (ref 5–15)
BUN SERPL-MCNC: 14 MG/DL (ref 6–20)
BUN/CREAT SERPL: 32 (ref 12–20)
CALCIUM SERPL-MCNC: 8.5 MG/DL (ref 8.5–10.1)
CHLORIDE SERPL-SCNC: 110 MMOL/L (ref 97–108)
CO2 SERPL-SCNC: 26 MMOL/L (ref 21–32)
CREAT SERPL-MCNC: 0.44 MG/DL (ref 0.55–1.02)
ERYTHROCYTE [DISTWIDTH] IN BLOOD BY AUTOMATED COUNT: 17.8 % (ref 11.5–14.5)
GLUCOSE SERPL-MCNC: 125 MG/DL (ref 65–100)
HCT VFR BLD AUTO: 31.6 % (ref 35–47)
HGB BLD-MCNC: 9.6 G/DL (ref 11.5–16)
MAGNESIUM SERPL-MCNC: 2 MG/DL (ref 1.6–2.4)
MCH RBC QN AUTO: 26.8 PG (ref 26–34)
MCHC RBC AUTO-ENTMCNC: 30.4 G/DL (ref 30–36.5)
MCV RBC AUTO: 88.3 FL (ref 80–99)
NRBC # BLD: 0 K/UL (ref 0–0.01)
NRBC BLD-RTO: 0 PER 100 WBC
PHOSPHATE SERPL-MCNC: 3.4 MG/DL (ref 2.6–4.7)
PLATELET # BLD AUTO: 205 K/UL (ref 150–400)
PMV BLD AUTO: 12.2 FL (ref 8.9–12.9)
POTASSIUM SERPL-SCNC: 3.8 MMOL/L (ref 3.5–5.1)
RBC # BLD AUTO: 3.58 M/UL (ref 3.8–5.2)
SODIUM SERPL-SCNC: 141 MMOL/L (ref 136–145)
WBC # BLD AUTO: 11.5 K/UL (ref 3.6–11)

## 2023-11-15 PROCEDURE — 6370000000 HC RX 637 (ALT 250 FOR IP): Performed by: NURSE PRACTITIONER

## 2023-11-15 PROCEDURE — 6360000002 HC RX W HCPCS: Performed by: PSYCHIATRY & NEUROLOGY

## 2023-11-15 PROCEDURE — 85027 COMPLETE CBC AUTOMATED: CPT

## 2023-11-15 PROCEDURE — 84100 ASSAY OF PHOSPHORUS: CPT

## 2023-11-15 PROCEDURE — 97112 NEUROMUSCULAR REEDUCATION: CPT

## 2023-11-15 PROCEDURE — 36415 COLL VENOUS BLD VENIPUNCTURE: CPT

## 2023-11-15 PROCEDURE — 6370000000 HC RX 637 (ALT 250 FOR IP): Performed by: PSYCHIATRY & NEUROLOGY

## 2023-11-15 PROCEDURE — 71045 X-RAY EXAM CHEST 1 VIEW: CPT

## 2023-11-15 PROCEDURE — 71260 CT THORAX DX C+: CPT

## 2023-11-15 PROCEDURE — 83735 ASSAY OF MAGNESIUM: CPT

## 2023-11-15 PROCEDURE — 6360000004 HC RX CONTRAST MEDICATION: Performed by: FAMILY MEDICINE

## 2023-11-15 PROCEDURE — 2060000000 HC ICU INTERMEDIATE R&B

## 2023-11-15 PROCEDURE — 2580000003 HC RX 258: Performed by: NURSE PRACTITIONER

## 2023-11-15 PROCEDURE — 2580000003 HC RX 258: Performed by: PSYCHIATRY & NEUROLOGY

## 2023-11-15 PROCEDURE — 99232 SBSQ HOSP IP/OBS MODERATE 35: CPT

## 2023-11-15 PROCEDURE — 97530 THERAPEUTIC ACTIVITIES: CPT

## 2023-11-15 PROCEDURE — 80048 BASIC METABOLIC PNL TOTAL CA: CPT

## 2023-11-15 PROCEDURE — 6370000000 HC RX 637 (ALT 250 FOR IP): Performed by: INTERNAL MEDICINE

## 2023-11-15 RX ADMIN — Medication 60 MG: at 18:12

## 2023-11-15 RX ADMIN — SODIUM CHLORIDE, PRESERVATIVE FREE 10 ML: 5 INJECTION INTRAVENOUS at 08:29

## 2023-11-15 RX ADMIN — Medication 60 MG: at 22:38

## 2023-11-15 RX ADMIN — BUTALBITAL, ACETAMINOPHEN, AND CAFFEINE 1 TABLET: 325; 50; 40 TABLET ORAL at 00:13

## 2023-11-15 RX ADMIN — HEPARIN SODIUM 5000 UNITS: 5000 INJECTION INTRAVENOUS; SUBCUTANEOUS at 22:37

## 2023-11-15 RX ADMIN — ATORVASTATIN CALCIUM 20 MG: 20 TABLET, FILM COATED ORAL at 20:15

## 2023-11-15 RX ADMIN — Medication 60 MG: at 06:06

## 2023-11-15 RX ADMIN — SODIUM CHLORIDE, PRESERVATIVE FREE 10 ML: 5 INJECTION INTRAVENOUS at 20:16

## 2023-11-15 RX ADMIN — Medication 60 MG: at 14:48

## 2023-11-15 RX ADMIN — SODIUM CHLORIDE, PRESERVATIVE FREE 10 ML: 5 INJECTION INTRAVENOUS at 08:03

## 2023-11-15 RX ADMIN — LANSOPRAZOLE 30 MG: 30 TABLET, ORALLY DISINTEGRATING, DELAYED RELEASE ORAL at 08:21

## 2023-11-15 RX ADMIN — ALPRAZOLAM 1 MG: 0.5 TABLET ORAL at 00:13

## 2023-11-15 RX ADMIN — HEPARIN SODIUM 5000 UNITS: 5000 INJECTION INTRAVENOUS; SUBCUTANEOUS at 14:48

## 2023-11-15 RX ADMIN — Medication 60 MG: at 10:04

## 2023-11-15 RX ADMIN — SODIUM CHLORIDE: 9 INJECTION, SOLUTION INTRAVENOUS at 00:15

## 2023-11-15 RX ADMIN — Medication 60 MG: at 02:10

## 2023-11-15 RX ADMIN — IOPAMIDOL 100 ML: 612 INJECTION, SOLUTION INTRAVENOUS at 15:45

## 2023-11-15 RX ADMIN — CILOSTAZOL 50 MG: 50 TABLET ORAL at 20:15

## 2023-11-15 RX ADMIN — POTASSIUM BICARBONATE 40 MEQ: 782 TABLET, EFFERVESCENT ORAL at 08:21

## 2023-11-15 RX ADMIN — CILOSTAZOL 50 MG: 50 TABLET ORAL at 08:21

## 2023-11-15 RX ADMIN — HEPARIN SODIUM 5000 UNITS: 5000 INJECTION INTRAVENOUS; SUBCUTANEOUS at 06:06

## 2023-11-15 ASSESSMENT — PAIN SCALES - GENERAL
PAINLEVEL_OUTOF10: 0
PAINLEVEL_OUTOF10: 3
PAINLEVEL_OUTOF10: 0
PAINLEVEL_OUTOF10: 0

## 2023-11-15 NOTE — H&P
CONSULT           CONSULT INFORMATION:  Date of Service: 10/30/2023  Consultation Requested by: ED    PATIENT IDENTIFICATION:  Admit Date: 10/30/2023  Admission Diagnoses: SAH (subarachnoid hemorrhage) ([address]) [I60.9]  Patient Name: Jeannette Ott  : 1965      CC: SAH    HISTORY OF PRESENT ILLNESS:  56 y.o. Black /  [2]female consulted for [facility]. Pt was seen in [facility] for left eye pain and HA. CTH showed diffuse SAH mostly on the left. She was intubated in the ED for declining LOC. She received keppra there but no mannitol. She is not on blood thinners. Very hypertensive on presentation.         Past Medical History:   Diagnosis Date    Anxiety     Arthritis     Asthma     Depression     GERD (gastroesophageal reflux disease)     Obesity     SONIA (obstructive sleep apnea)     Smoker        Past Surgical History:   Procedure Laterality Date    COLONOSCOPY N/A 2022    COLONOSCOPY, EGD performed by Darshana Feldman MD at [facility] [address] GASTRIC BYPASS/PHAN-EN-Y      LAP, PLACE ADJUST GASTR BAND      OTHER SURGICAL HISTORY      lapband removal    TUBAL LIGATION         Current Facility-Administered Medications   Medication Dose Route Frequency    niCARdipine (CARDENE) 25 mg in sodium chloride 0.9 % 250 mL infusion ([id number])  2.5-15 mg/hr IntraVENous Continuous    propofol infusion  5-50 mcg/kg/min IntraVENous Continuous    fentaNYL (SUBLIMAZE) 1,000 mcg in sodium chloride 0.9% 100 mL infusion   mcg/hr IntraVENous Continuous    midazolam (VERSED) 100 mg in sodium chloride 0.9 % 100 mL infusion  1-10 mg/hr IntraVENous Continuous    sodium chloride flush 0.9 % injection 5-40 mL  5-40 mL IntraVENous 2 times per day    polyethylene glycol (GLYCOLAX) packet 17 g  17 g Oral Daily PRN    acetaminophen (TYLENOL) suppository 650 mg  650 mg Rectal Q6H PRN    sodium chloride flush 0.9 % injection 5-40 mL  5-40 mL IntraVENous 2 times per day    sodium chloride flush
CRITICAL CARE ADMISSION NOTE      Name: Philippe Branham   : 1965   MRN: [id number]   Date: 10/30/2023      REASON FOR ICU ADMISSION: [facility]    PRINCIPAL ICU DIAGNOSIS   [facility]    BRIEF PATIENT SUMMARY   Waqar Haque is a 62-year-old female with PMHx of anxiety, arthritis, asthma, depression, GERD, obesity, who presented to OSH ED with complaints of right eye pain. Reportedly pain was lasting 3 to 4 days. She also reported a headache. Left pupil was noted to be dilated on exam by ED provider. She was also hypertensive on presentation. CT head was performed which showed diffuse intracranial hemorrhage. Following the CT she became altered and then unresponsive. She was intubated by anesthesia. Dr. Fermin Hernandez with no interventional was contacted and patient was transferred to [facility]. She was given Keppra. She was sedated and placed on Cardene for hypertension. On arrival to [facility] she underwent coiling of L PCOM aneurysm followed by EVD placement by neurosurgery for hydrocephalus. She is admitted to ICU post procedures. COMPREHENSIVE ASSESSMENT & PLAN:SYSTEM BASED     24 HOUR EVENTS: As above    NEUROLOGICAL:  SAH  L PCOM aneurysm  Hydrocephalus  Hx of anxiety depression   Close neurologic monitoring, every hour neurochecks  S/p coiling of L PCOM aneurysm 10/30  S/p EVD 10/30, management per neurosurgery  NIS following, appreciate recs  Neurosurgery following, appreciate recs  CT head in a.m.   SBP goal less than 160, CPP goal greater than 60  TCD daily  Nimotop  Keppra twice daily x7 days  TTE pending  PT/OT/SLP when appropriate  Ancef while EVD in place  RASS goal 0 to -1  Sedation: Propofol  Analgesia: Fentanyl  Daily SAT  Holding home alprazolam, Adderall, Abilify, Ambien at this time    PULMONOLOGY:  Acute hypoxic respiratory failure   Intubated 10/30 for airway protection at OSH  Wean MV as tolerated  ABG/CXR as needed  DuoNeb as needed  Daily SBT    CARDIOVASCULAR:  Hypertensive emergency  Hx of
sounds and is requiring frequent NT suctioning for secretion management. It is noted that her copious secretions are dark brown in nature. She has had low grade temperature over the last 72 hours with a tmax of 100.7. She has a fecal management system in with copious liquid stools as well, thought to be related to nimotop and tube feedings per my discussion with the ICU team. The patient has severe disability related to this subarachnoid hemorrhage and will likely need long term care placement as well as PEG tube for nutrition. Currently, she is receiving bolus feedings through an NG tube. Would recommend transfer to the [facility] for continued management until respiratory status is improved. Principal Problem:    SAH (subarachnoid hemorrhage) (HCC)  Active Problems:    Communicating hydrocephalus (HCC)    Subarachnoid hemorrhage due to ruptured aneurysm (HCC)    Cerebral vasospasm  Resolved Problems:    * No resolved hospital problems.  *      Plan:     Subarachnoid Hemorrhage s/p Coil Embolization of Left PCOMM aneurysm:  Hydrocephalus   - Q4 neuro checks appropriate at this point   - s/p DSA w/ IA verapamil and angioplastry on 11/3, 11/4, 11/5   - no longer getting TCDs and Milrinone gtt discontinued   - day 16/21 nimotop 60 mg q4 hours for 21 total days post hemorrhage and then can stop  - EVD pulled out on 11/14   - PT/OT/SLP consulted     Dysphagia:   - patient currently receiving bolus tube feedings  - speech consulted first time on 11/14 and has not really been able to adequately evaluate patient due to patient factors (I.e. no command following, lethargy)   - will likely need PEG tube and general surgery consult in the next 24-48 hours     Acute Hypoxic Respiratory Failure:   - still requiring 4L NC to maintain oxygen levels > 90%   - last chest XR on 11/7, repeat today   - chest CT w/wo contrast ordered today for closer evaluation   - continue with PRN frequent NT suctioning   - intubated initially

## 2023-11-15 NOTE — CARE COORDINATION
Transition of Care Plan:     RUR: 17%, Moderate   Prior Level of Functioning: Independent  Disposition: TBD  Follow up appointments: PCP  DME needed: TBD  Transportation at discharge: Stretcher   Is patient a  and connected with [place]? No  Caregiver Contact: Edward Sotelo   Discharge Caregiver contacted prior to discharge? No  Care Conference needed? No  Barriers to discharge:    Patient is s/p coil embolization left P-comm aneurysm for SAH. EVD d/c'd  Continue Nimotop  Milrinone gtt  Per notes patient is following minimal commands. Needs max assist  Therapy is currently recommending IPR. CM met with patient's niece and per niece patient's son will visit patient later today. CM left the list of IPR facilities for son. CM unable to leave a message for son as call could not be completed. Will follow up for choice.    Rikki Shay RN,Care Management  Rikki Shay RN,Care Management

## 2023-11-16 ENCOUNTER — APPOINTMENT (OUTPATIENT)
Facility: HOSPITAL | Age: 58
DRG: 021 | End: 2023-11-16
Payer: MEDICAID

## 2023-11-16 LAB
ALBUMIN SERPL-MCNC: 2.7 G/DL (ref 3.5–5)
ALBUMIN/GLOB SERPL: 0.7 (ref 1.1–2.2)
ALP SERPL-CCNC: 50 U/L (ref 45–117)
ALT SERPL-CCNC: 24 U/L (ref 12–78)
ANION GAP SERPL CALC-SCNC: 7 MMOL/L (ref 5–15)
AST SERPL-CCNC: 21 U/L (ref 15–37)
BASOPHILS # BLD: 0.1 K/UL (ref 0–0.1)
BASOPHILS NFR BLD: 1 % (ref 0–1)
BILIRUB SERPL-MCNC: 0.3 MG/DL (ref 0.2–1)
BUN SERPL-MCNC: 11 MG/DL (ref 6–20)
BUN/CREAT SERPL: 30 (ref 12–20)
CALCIUM SERPL-MCNC: 9 MG/DL (ref 8.5–10.1)
CHLORIDE SERPL-SCNC: 110 MMOL/L (ref 97–108)
CO2 SERPL-SCNC: 23 MMOL/L (ref 21–32)
CREAT SERPL-MCNC: 0.37 MG/DL (ref 0.55–1.02)
DIFFERENTIAL METHOD BLD: ABNORMAL
EOSINOPHIL # BLD: 0.1 K/UL (ref 0–0.4)
EOSINOPHIL NFR BLD: 1 % (ref 0–7)
ERYTHROCYTE [DISTWIDTH] IN BLOOD BY AUTOMATED COUNT: 17.9 % (ref 11.5–14.5)
GLOBULIN SER CALC-MCNC: 4.1 G/DL (ref 2–4)
GLUCOSE BLD STRIP.AUTO-MCNC: 130 MG/DL (ref 65–117)
GLUCOSE SERPL-MCNC: 149 MG/DL (ref 65–100)
HCT VFR BLD AUTO: 31.8 % (ref 35–47)
HGB BLD-MCNC: 9.6 G/DL (ref 11.5–16)
IMM GRANULOCYTES # BLD AUTO: 0.1 K/UL (ref 0–0.04)
IMM GRANULOCYTES NFR BLD AUTO: 1 % (ref 0–0.5)
LYMPHOCYTES # BLD: 1.6 K/UL (ref 0.8–3.5)
LYMPHOCYTES NFR BLD: 15 % (ref 12–49)
MAGNESIUM SERPL-MCNC: 2.1 MG/DL (ref 1.6–2.4)
MCH RBC QN AUTO: 26.7 PG (ref 26–34)
MCHC RBC AUTO-ENTMCNC: 30.2 G/DL (ref 30–36.5)
MCV RBC AUTO: 88.6 FL (ref 80–99)
MONOCYTES # BLD: 1.1 K/UL (ref 0–1)
MONOCYTES NFR BLD: 11 % (ref 5–13)
NEUTS SEG # BLD: 7.2 K/UL (ref 1.8–8)
NEUTS SEG NFR BLD: 71 % (ref 32–75)
NRBC # BLD: 0 K/UL (ref 0–0.01)
NRBC BLD-RTO: 0 PER 100 WBC
PLATELET # BLD AUTO: 182 K/UL (ref 150–400)
PMV BLD AUTO: 11.6 FL (ref 8.9–12.9)
POTASSIUM SERPL-SCNC: 3.7 MMOL/L (ref 3.5–5.1)
PROT SERPL-MCNC: 6.8 G/DL (ref 6.4–8.2)
RBC # BLD AUTO: 3.59 M/UL (ref 3.8–5.2)
SERVICE CMNT-IMP: ABNORMAL
SODIUM SERPL-SCNC: 140 MMOL/L (ref 136–145)
WBC # BLD AUTO: 10.3 K/UL (ref 3.6–11)

## 2023-11-16 PROCEDURE — 83735 ASSAY OF MAGNESIUM: CPT

## 2023-11-16 PROCEDURE — 99232 SBSQ HOSP IP/OBS MODERATE 35: CPT | Performed by: NURSE PRACTITIONER

## 2023-11-16 PROCEDURE — 2700000000 HC OXYGEN THERAPY PER DAY

## 2023-11-16 PROCEDURE — 74018 RADEX ABDOMEN 1 VIEW: CPT

## 2023-11-16 PROCEDURE — 36415 COLL VENOUS BLD VENIPUNCTURE: CPT

## 2023-11-16 PROCEDURE — 97535 SELF CARE MNGMENT TRAINING: CPT

## 2023-11-16 PROCEDURE — 2580000003 HC RX 258: Performed by: PSYCHIATRY & NEUROLOGY

## 2023-11-16 PROCEDURE — 2060000000 HC ICU INTERMEDIATE R&B

## 2023-11-16 PROCEDURE — 80053 COMPREHEN METABOLIC PANEL: CPT

## 2023-11-16 PROCEDURE — 2580000003 HC RX 258: Performed by: HOSPITALIST

## 2023-11-16 PROCEDURE — 82962 GLUCOSE BLOOD TEST: CPT

## 2023-11-16 PROCEDURE — 85025 COMPLETE CBC W/AUTO DIFF WBC: CPT

## 2023-11-16 PROCEDURE — 97112 NEUROMUSCULAR REEDUCATION: CPT

## 2023-11-16 PROCEDURE — 6360000002 HC RX W HCPCS: Performed by: PSYCHIATRY & NEUROLOGY

## 2023-11-16 PROCEDURE — 6370000000 HC RX 637 (ALT 250 FOR IP): Performed by: HOSPITALIST

## 2023-11-16 PROCEDURE — 92526 ORAL FUNCTION THERAPY: CPT

## 2023-11-16 PROCEDURE — 97530 THERAPEUTIC ACTIVITIES: CPT

## 2023-11-16 PROCEDURE — 2580000003 HC RX 258: Performed by: NURSE PRACTITIONER

## 2023-11-16 PROCEDURE — 94760 N-INVAS EAR/PLS OXIMETRY 1: CPT

## 2023-11-16 RX ORDER — DEXTROSE AND SODIUM CHLORIDE 5; .45 G/100ML; G/100ML
INJECTION, SOLUTION INTRAVENOUS CONTINUOUS
Status: DISCONTINUED | OUTPATIENT
Start: 2023-11-16 | End: 2023-11-25

## 2023-11-16 RX ORDER — OXYMETAZOLINE HYDROCHLORIDE 0.05 G/100ML
2 SPRAY NASAL 2 TIMES DAILY
Status: COMPLETED | OUTPATIENT
Start: 2023-11-16 | End: 2023-11-18

## 2023-11-16 RX ADMIN — SODIUM CHLORIDE, PRESERVATIVE FREE 10 ML: 5 INJECTION INTRAVENOUS at 21:22

## 2023-11-16 RX ADMIN — OXYMETAZOLINE HCL 2 SPRAY: 0.05 SPRAY NASAL at 21:23

## 2023-11-16 RX ADMIN — SODIUM CHLORIDE, PRESERVATIVE FREE 10 ML: 5 INJECTION INTRAVENOUS at 09:00

## 2023-11-16 RX ADMIN — HEPARIN SODIUM 5000 UNITS: 5000 INJECTION INTRAVENOUS; SUBCUTANEOUS at 21:22

## 2023-11-16 RX ADMIN — DEXTROSE AND SODIUM CHLORIDE: 5; 450 INJECTION, SOLUTION INTRAVENOUS at 17:42

## 2023-11-16 RX ADMIN — HEPARIN SODIUM 5000 UNITS: 5000 INJECTION INTRAVENOUS; SUBCUTANEOUS at 06:22

## 2023-11-16 NOTE — WOUND CARE
WOCN Note:     Follow up visit for inframammary and pannus breakdown  Seen in 404    Chart shows:  Admitted on 10/30/23. Admitted for [facility] with intraventricular drain placed 10/30. History of smoking. Assessment:   Coughing and used Yankauer to clear mouth. On NC. Non-verbal.  RN reports no concerns to buttocks and was just assessed when moved to [facility] bed. FlexiSeal in place with watery stool & barrier cream in use. Fully dependent turns required with help of 2 people. 298 lbs; 5'2\"     Surface: MICHEAL mattress    Bilateral heels intact and without erythema. Lifted feet independently for assessment. Restlessly moving legs. Linear skin splits under breasts and skin fold on abdomen now much improved. No rash noted. Continue stoma powder as needed to keep dry. PI Prevention:  Turn/reposition approximately every 2 hours  Offload heels with heels hanging off end of pillow at all times while in bed. Z-guard cream to buttocks and sacrum daily and as needed with incontinence care  Low Air Loss mattress: Use only flat sheet and one incontinence pad.      Transition of Care: Plan to follow weekly and as needed while admitted to hospital.     Thomas Matt, BRIAN, RN, [facility]  Certified Wound, Ostomy, Continence Nurse  office [phone]  Available via [facility]

## 2023-11-17 ENCOUNTER — APPOINTMENT (OUTPATIENT)
Facility: HOSPITAL | Age: 58
DRG: 021 | End: 2023-11-17
Payer: MEDICAID

## 2023-11-17 LAB
ALBUMIN SERPL-MCNC: 2.8 G/DL (ref 3.5–5)
ALBUMIN/GLOB SERPL: 0.7 (ref 1.1–2.2)
ALP SERPL-CCNC: 54 U/L (ref 45–117)
ALT SERPL-CCNC: 26 U/L (ref 12–78)
ANION GAP SERPL CALC-SCNC: 6 MMOL/L (ref 5–15)
AST SERPL-CCNC: 27 U/L (ref 15–37)
BILIRUB SERPL-MCNC: 0.3 MG/DL (ref 0.2–1)
BUN SERPL-MCNC: 11 MG/DL (ref 6–20)
BUN/CREAT SERPL: 31 (ref 12–20)
CALCIUM SERPL-MCNC: 9.2 MG/DL (ref 8.5–10.1)
CHLORIDE SERPL-SCNC: 108 MMOL/L (ref 97–108)
CO2 SERPL-SCNC: 23 MMOL/L (ref 21–32)
CREAT SERPL-MCNC: 0.35 MG/DL (ref 0.55–1.02)
ERYTHROCYTE [DISTWIDTH] IN BLOOD BY AUTOMATED COUNT: 17.9 % (ref 11.5–14.5)
GLOBULIN SER CALC-MCNC: 4.2 G/DL (ref 2–4)
GLUCOSE SERPL-MCNC: 143 MG/DL (ref 65–100)
HCT VFR BLD AUTO: 32.1 % (ref 35–47)
HGB BLD-MCNC: 9.5 G/DL (ref 11.5–16)
MAGNESIUM SERPL-MCNC: 2.1 MG/DL (ref 1.6–2.4)
MCH RBC QN AUTO: 26.8 PG (ref 26–34)
MCHC RBC AUTO-ENTMCNC: 29.6 G/DL (ref 30–36.5)
MCV RBC AUTO: 90.4 FL (ref 80–99)
NRBC # BLD: 0 K/UL (ref 0–0.01)
NRBC BLD-RTO: 0 PER 100 WBC
PLATELET # BLD AUTO: 181 K/UL (ref 150–400)
PMV BLD AUTO: 12.1 FL (ref 8.9–12.9)
POTASSIUM SERPL-SCNC: 3.6 MMOL/L (ref 3.5–5.1)
PROT SERPL-MCNC: 7 G/DL (ref 6.4–8.2)
RBC # BLD AUTO: 3.55 M/UL (ref 3.8–5.2)
SODIUM SERPL-SCNC: 137 MMOL/L (ref 136–145)
WBC # BLD AUTO: 10.1 K/UL (ref 3.6–11)

## 2023-11-17 PROCEDURE — 92526 ORAL FUNCTION THERAPY: CPT

## 2023-11-17 PROCEDURE — 2700000000 HC OXYGEN THERAPY PER DAY

## 2023-11-17 PROCEDURE — 85027 COMPLETE CBC AUTOMATED: CPT

## 2023-11-17 PROCEDURE — 2060000000 HC ICU INTERMEDIATE R&B

## 2023-11-17 PROCEDURE — 2580000003 HC RX 258: Performed by: PSYCHIATRY & NEUROLOGY

## 2023-11-17 PROCEDURE — 36415 COLL VENOUS BLD VENIPUNCTURE: CPT

## 2023-11-17 PROCEDURE — 83735 ASSAY OF MAGNESIUM: CPT

## 2023-11-17 PROCEDURE — 6360000002 HC RX W HCPCS: Performed by: PSYCHIATRY & NEUROLOGY

## 2023-11-17 PROCEDURE — 2580000003 HC RX 258: Performed by: HOSPITALIST

## 2023-11-17 PROCEDURE — 94760 N-INVAS EAR/PLS OXIMETRY 1: CPT

## 2023-11-17 PROCEDURE — 99232 SBSQ HOSP IP/OBS MODERATE 35: CPT

## 2023-11-17 PROCEDURE — 97530 THERAPEUTIC ACTIVITIES: CPT

## 2023-11-17 PROCEDURE — 80053 COMPREHEN METABOLIC PANEL: CPT

## 2023-11-17 PROCEDURE — 2580000003 HC RX 258: Performed by: NURSE PRACTITIONER

## 2023-11-17 RX ADMIN — HEPARIN SODIUM 5000 UNITS: 5000 INJECTION INTRAVENOUS; SUBCUTANEOUS at 21:22

## 2023-11-17 RX ADMIN — OXYMETAZOLINE HCL 2 SPRAY: 0.05 SPRAY NASAL at 21:17

## 2023-11-17 RX ADMIN — SODIUM CHLORIDE, PRESERVATIVE FREE 10 ML: 5 INJECTION INTRAVENOUS at 21:18

## 2023-11-17 RX ADMIN — SODIUM CHLORIDE, PRESERVATIVE FREE 10 ML: 5 INJECTION INTRAVENOUS at 09:37

## 2023-11-17 RX ADMIN — OXYMETAZOLINE HCL 2 SPRAY: 0.05 SPRAY NASAL at 09:39

## 2023-11-17 RX ADMIN — SODIUM CHLORIDE, PRESERVATIVE FREE 10 ML: 5 INJECTION INTRAVENOUS at 09:38

## 2023-11-17 RX ADMIN — DEXTROSE AND SODIUM CHLORIDE: 5; 450 INJECTION, SOLUTION INTRAVENOUS at 21:16

## 2023-11-17 RX ADMIN — HEPARIN SODIUM 5000 UNITS: 5000 INJECTION INTRAVENOUS; SUBCUTANEOUS at 05:45

## 2023-11-17 RX ADMIN — HEPARIN SODIUM 5000 UNITS: 5000 INJECTION INTRAVENOUS; SUBCUTANEOUS at 14:49

## 2023-11-17 NOTE — CARE COORDINATION
Transition of Care Plan:     RUR: 17%, Moderate   Prior Level of Functioning: Independent  Disposition: TBD  Follow up appointments: PCP  DME needed: TBD  Transportation at discharge: Stretcher   Is patient a  and connected with [facility]? No  Caregiver Contact: Son Kevin Angel [phone]  Discharge Caregiver contacted prior to discharge? No  Care Conference needed? No  Barriers to discharge:      CM spoke with pt's son Hillary by phone ([phone]) to talk about IPR choice. Hillary needs more time to look into facilities but stated he would like to keep his mom in [place] area vs closer to where they live in [place] [facility]. He has IPR list from ICU CM. CM explained IPR vs SNF rehab and shared that rec was pending pt's progress with therapies. Hillary expressed understanding. SNF list left in pt's room. Son expects to be bedside around 3:30/4:00. Hillary to call CM if he arrives before 4:30. Pt's sisters were bedside when SNF list left. CM introduced self, role, and IPR vs SNF. Pt discussed in IMCU rounds. CI[facility]O is Wednesday. Per PT pt making slow progress with therapies, following commands after multiple cues. Recommendation pending progress with therapies. Family/pt choice and Kaden Zayas will be needed once rehab rec is known. Patient is s/p coil embolization left P-comm aneurysm for SAH. Per notes patient is following minimal commands. Needs max assist, yesterday PT noted pt with some command following with multiple prompts. Therapy is currently recommending IPR. Previous (ICU) CM provided IPR facilities list for son. CM to call son today to get IPR choice.    Romulo Patient, MSW   [phone]

## 2023-11-17 NOTE — CONSULTS
[address] [address]        GASTROENTEROLOGY CONSULTATION NOTE  [facility], [address] [place] [address] office      NAME:  Jeannette Ott   :   1965   MRN:   [id number]       Referring Physician: Dr. Rhonda Kinsey Date: 2023 11:17 AM    Chief Complaint: unable to obtain     History of Present Illness:  Patient is a 56 y.o. who is seen in consultation at the request of Dr. Ad Swartz for possible PEG tube placement. Patient has a past medical history significant for gastric bypass, cardiomyopathy, hypertension, COPD, asthma, and hypothyroidism. She was transferred to [facility] for admission on 10/30/23 for subarachnoid hemorrhage related to ruptured PCOM aneurysm. Patient is nonverbal.  History was obtained through chart review. Family is at the bedside. I talked to the patient's son on the phone at the bedside. SLP has been following the patient. NG tube was reportedly pulled out and has been unable to be replaced. Patient is on heparin SQ. History of gastric bypass. EGD in  by Dr. Nicole Blake showed a tight anastomotic stricture that would not allow passage of scope into the stomach body - dilated. I have reviewed the emergency room note, hospital admission note, notes by all other clinicians who have seen the patient during this hospitalization to date. I have reviewed the problem list and the reason for this hospitalization. I have reviewed the allergies and the medications the patient was taking at home prior to this hospitalization.     PMH:  Past Medical History:   Diagnosis Date    Anxiety     Arthritis     Asthma     Depression     GERD (gastroesophageal reflux disease)     Obesity     SONIA (obstructive sleep apnea)     SAH (subarachnoid hemorrhage) ([facility]) 2023    Smoker        PSH:  Past Surgical History:   Procedure Laterality Date    COLONOSCOPY N/A 2022    COLONOSCOPY, EGD performed by Darshana Feldman MD

## 2023-11-18 LAB
ALBUMIN SERPL-MCNC: 2.8 G/DL (ref 3.5–5)
ALBUMIN/GLOB SERPL: 0.6 (ref 1.1–2.2)
ALP SERPL-CCNC: 65 U/L (ref 45–117)
ALT SERPL-CCNC: 29 U/L (ref 12–78)
ANION GAP SERPL CALC-SCNC: 5 MMOL/L (ref 5–15)
AST SERPL-CCNC: 24 U/L (ref 15–37)
BILIRUB SERPL-MCNC: 0.3 MG/DL (ref 0.2–1)
BUN SERPL-MCNC: 6 MG/DL (ref 6–20)
BUN/CREAT SERPL: 14 (ref 12–20)
CALCIUM SERPL-MCNC: 9.3 MG/DL (ref 8.5–10.1)
CHLORIDE SERPL-SCNC: 107 MMOL/L (ref 97–108)
CO2 SERPL-SCNC: 26 MMOL/L (ref 21–32)
CREAT SERPL-MCNC: 0.43 MG/DL (ref 0.55–1.02)
ERYTHROCYTE [DISTWIDTH] IN BLOOD BY AUTOMATED COUNT: 17.8 % (ref 11.5–14.5)
GLOBULIN SER CALC-MCNC: 4.6 G/DL (ref 2–4)
GLUCOSE SERPL-MCNC: 144 MG/DL (ref 65–100)
HCT VFR BLD AUTO: 38.8 % (ref 35–47)
HGB BLD-MCNC: 10.8 G/DL (ref 11.5–16)
MCH RBC QN AUTO: 26.9 PG (ref 26–34)
MCHC RBC AUTO-ENTMCNC: 27.8 G/DL (ref 30–36.5)
MCV RBC AUTO: 96.8 FL (ref 80–99)
NRBC # BLD: 0 K/UL (ref 0–0.01)
NRBC BLD-RTO: 0 PER 100 WBC
PLATELET # BLD AUTO: 191 K/UL (ref 150–400)
PMV BLD AUTO: 11.9 FL (ref 8.9–12.9)
POTASSIUM SERPL-SCNC: 3.9 MMOL/L (ref 3.5–5.1)
PROT SERPL-MCNC: 7.4 G/DL (ref 6.4–8.2)
RBC # BLD AUTO: 4.01 M/UL (ref 3.8–5.2)
SODIUM SERPL-SCNC: 138 MMOL/L (ref 136–145)
WBC # BLD AUTO: 11.8 K/UL (ref 3.6–11)

## 2023-11-18 PROCEDURE — 2060000000 HC ICU INTERMEDIATE R&B

## 2023-11-18 PROCEDURE — 6360000002 HC RX W HCPCS: Performed by: PSYCHIATRY & NEUROLOGY

## 2023-11-18 PROCEDURE — 2580000003 HC RX 258: Performed by: HOSPITALIST

## 2023-11-18 PROCEDURE — 80053 COMPREHEN METABOLIC PANEL: CPT

## 2023-11-18 PROCEDURE — 36415 COLL VENOUS BLD VENIPUNCTURE: CPT

## 2023-11-18 PROCEDURE — 99232 SBSQ HOSP IP/OBS MODERATE 35: CPT

## 2023-11-18 PROCEDURE — 85027 COMPLETE CBC AUTOMATED: CPT

## 2023-11-18 PROCEDURE — 2700000000 HC OXYGEN THERAPY PER DAY

## 2023-11-18 PROCEDURE — 2580000003 HC RX 258: Performed by: PSYCHIATRY & NEUROLOGY

## 2023-11-18 RX ADMIN — HEPARIN SODIUM 5000 UNITS: 5000 INJECTION INTRAVENOUS; SUBCUTANEOUS at 14:37

## 2023-11-18 RX ADMIN — HEPARIN SODIUM 5000 UNITS: 5000 INJECTION INTRAVENOUS; SUBCUTANEOUS at 22:45

## 2023-11-18 RX ADMIN — DEXTROSE AND SODIUM CHLORIDE: 5; 450 INJECTION, SOLUTION INTRAVENOUS at 10:57

## 2023-11-18 RX ADMIN — HEPARIN SODIUM 5000 UNITS: 5000 INJECTION INTRAVENOUS; SUBCUTANEOUS at 07:25

## 2023-11-18 RX ADMIN — SODIUM CHLORIDE, PRESERVATIVE FREE 10 ML: 5 INJECTION INTRAVENOUS at 10:58

## 2023-11-18 RX ADMIN — SODIUM CHLORIDE, PRESERVATIVE FREE 10 ML: 5 INJECTION INTRAVENOUS at 22:47

## 2023-11-18 RX ADMIN — OXYMETAZOLINE HCL 2 SPRAY: 0.05 SPRAY NASAL at 10:54

## 2023-11-19 LAB
ALBUMIN SERPL-MCNC: 2.9 G/DL (ref 3.5–5)
ALBUMIN/GLOB SERPL: 0.6 (ref 1.1–2.2)
ALP SERPL-CCNC: 65 U/L (ref 45–117)
ALT SERPL-CCNC: 31 U/L (ref 12–78)
ANION GAP SERPL CALC-SCNC: 6 MMOL/L (ref 5–15)
AST SERPL-CCNC: 23 U/L (ref 15–37)
BILIRUB SERPL-MCNC: 0.3 MG/DL (ref 0.2–1)
BUN SERPL-MCNC: 7 MG/DL (ref 6–20)
BUN/CREAT SERPL: 16 (ref 12–20)
CALCIUM SERPL-MCNC: 9.6 MG/DL (ref 8.5–10.1)
CHLORIDE SERPL-SCNC: 106 MMOL/L (ref 97–108)
CO2 SERPL-SCNC: 26 MMOL/L (ref 21–32)
CREAT SERPL-MCNC: 0.45 MG/DL (ref 0.55–1.02)
ERYTHROCYTE [DISTWIDTH] IN BLOOD BY AUTOMATED COUNT: 17.3 % (ref 11.5–14.5)
GLOBULIN SER CALC-MCNC: 4.5 G/DL (ref 2–4)
GLUCOSE SERPL-MCNC: 133 MG/DL (ref 65–100)
HCT VFR BLD AUTO: 35.7 % (ref 35–47)
HGB BLD-MCNC: 10.7 G/DL (ref 11.5–16)
MAGNESIUM SERPL-MCNC: 2.1 MG/DL (ref 1.6–2.4)
MCH RBC QN AUTO: 26.7 PG (ref 26–34)
MCHC RBC AUTO-ENTMCNC: 30 G/DL (ref 30–36.5)
MCV RBC AUTO: 89 FL (ref 80–99)
NRBC # BLD: 0 K/UL (ref 0–0.01)
NRBC BLD-RTO: 0 PER 100 WBC
PHOSPHATE SERPL-MCNC: 4.8 MG/DL (ref 2.6–4.7)
PLATELET # BLD AUTO: 226 K/UL (ref 150–400)
PMV BLD AUTO: 12 FL (ref 8.9–12.9)
POTASSIUM SERPL-SCNC: 3.6 MMOL/L (ref 3.5–5.1)
PROT SERPL-MCNC: 7.4 G/DL (ref 6.4–8.2)
RBC # BLD AUTO: 4.01 M/UL (ref 3.8–5.2)
SODIUM SERPL-SCNC: 138 MMOL/L (ref 136–145)
WBC # BLD AUTO: 12.4 K/UL (ref 3.6–11)

## 2023-11-19 PROCEDURE — 85027 COMPLETE CBC AUTOMATED: CPT

## 2023-11-19 PROCEDURE — 6360000002 HC RX W HCPCS: Performed by: HOSPITALIST

## 2023-11-19 PROCEDURE — 6360000002 HC RX W HCPCS: Performed by: PSYCHIATRY & NEUROLOGY

## 2023-11-19 PROCEDURE — 36415 COLL VENOUS BLD VENIPUNCTURE: CPT

## 2023-11-19 PROCEDURE — 80053 COMPREHEN METABOLIC PANEL: CPT

## 2023-11-19 PROCEDURE — 2060000000 HC ICU INTERMEDIATE R&B

## 2023-11-19 PROCEDURE — 83735 ASSAY OF MAGNESIUM: CPT

## 2023-11-19 PROCEDURE — 2580000003 HC RX 258: Performed by: PSYCHIATRY & NEUROLOGY

## 2023-11-19 PROCEDURE — 84100 ASSAY OF PHOSPHORUS: CPT

## 2023-11-19 PROCEDURE — 2580000003 HC RX 258: Performed by: HOSPITALIST

## 2023-11-19 PROCEDURE — 2580000003 HC RX 258: Performed by: NURSE PRACTITIONER

## 2023-11-19 RX ORDER — 0.9 % SODIUM CHLORIDE 0.9 %
250 INTRAVENOUS SOLUTION INTRAVENOUS ONCE
Status: COMPLETED | OUTPATIENT
Start: 2023-11-19 | End: 2023-11-19

## 2023-11-19 RX ORDER — MORPHINE SULFATE 2 MG/ML
2 INJECTION, SOLUTION INTRAMUSCULAR; INTRAVENOUS EVERY 4 HOURS PRN
Status: DISCONTINUED | OUTPATIENT
Start: 2023-11-19 | End: 2023-11-26 | Stop reason: HOSPADM

## 2023-11-19 RX ADMIN — SODIUM CHLORIDE, PRESERVATIVE FREE 10 ML: 5 INJECTION INTRAVENOUS at 21:05

## 2023-11-19 RX ADMIN — MORPHINE SULFATE 2 MG: 2 INJECTION, SOLUTION INTRAMUSCULAR; INTRAVENOUS at 11:44

## 2023-11-19 RX ADMIN — SODIUM CHLORIDE, PRESERVATIVE FREE 10 ML: 5 INJECTION INTRAVENOUS at 21:04

## 2023-11-19 RX ADMIN — MORPHINE SULFATE 2 MG: 2 INJECTION, SOLUTION INTRAMUSCULAR; INTRAVENOUS at 21:02

## 2023-11-19 RX ADMIN — HEPARIN SODIUM 5000 UNITS: 5000 INJECTION INTRAVENOUS; SUBCUTANEOUS at 07:09

## 2023-11-19 RX ADMIN — SODIUM CHLORIDE, PRESERVATIVE FREE 10 ML: 5 INJECTION INTRAVENOUS at 09:16

## 2023-11-19 RX ADMIN — HEPARIN SODIUM 5000 UNITS: 5000 INJECTION INTRAVENOUS; SUBCUTANEOUS at 14:25

## 2023-11-19 RX ADMIN — SODIUM CHLORIDE 250 ML: 9 INJECTION, SOLUTION INTRAVENOUS at 18:18

## 2023-11-19 ASSESSMENT — PAIN SCALES - WONG BAKER
WONGBAKER_NUMERICALRESPONSE: 0
WONGBAKER_NUMERICALRESPONSE: 6

## 2023-11-19 ASSESSMENT — PAIN SCALES - GENERAL
PAINLEVEL_OUTOF10: 4
PAINLEVEL_OUTOF10: 6

## 2023-11-19 ASSESSMENT — PAIN DESCRIPTION - LOCATION: LOCATION: HEAD

## 2023-11-20 LAB
ANION GAP SERPL CALC-SCNC: 6 MMOL/L (ref 5–15)
BASOPHILS # BLD: 0 K/UL (ref 0–0.1)
BASOPHILS NFR BLD: 0 % (ref 0–1)
BUN SERPL-MCNC: 8 MG/DL (ref 6–20)
BUN/CREAT SERPL: 22 (ref 12–20)
CALCIUM SERPL-MCNC: 9.3 MG/DL (ref 8.5–10.1)
CHLORIDE SERPL-SCNC: 109 MMOL/L (ref 97–108)
CO2 SERPL-SCNC: 25 MMOL/L (ref 21–32)
CREAT SERPL-MCNC: 0.36 MG/DL (ref 0.55–1.02)
DIFFERENTIAL METHOD BLD: ABNORMAL
EOSINOPHIL # BLD: 0.1 K/UL (ref 0–0.4)
EOSINOPHIL NFR BLD: 1 % (ref 0–7)
ERYTHROCYTE [DISTWIDTH] IN BLOOD BY AUTOMATED COUNT: 17.1 % (ref 11.5–14.5)
GLUCOSE SERPL-MCNC: 127 MG/DL (ref 65–100)
HCT VFR BLD AUTO: 36.6 % (ref 35–47)
HGB BLD-MCNC: 10.9 G/DL (ref 11.5–16)
IMM GRANULOCYTES # BLD AUTO: 0.1 K/UL (ref 0–0.04)
IMM GRANULOCYTES NFR BLD AUTO: 1 % (ref 0–0.5)
LYMPHOCYTES # BLD: 1.5 K/UL (ref 0.8–3.5)
LYMPHOCYTES NFR BLD: 15 % (ref 12–49)
MAGNESIUM SERPL-MCNC: 1.9 MG/DL (ref 1.6–2.4)
MCH RBC QN AUTO: 26.5 PG (ref 26–34)
MCHC RBC AUTO-ENTMCNC: 29.8 G/DL (ref 30–36.5)
MCV RBC AUTO: 88.8 FL (ref 80–99)
MONOCYTES # BLD: 0.8 K/UL (ref 0–1)
MONOCYTES NFR BLD: 8 % (ref 5–13)
NEUTS SEG # BLD: 7.2 K/UL (ref 1.8–8)
NEUTS SEG NFR BLD: 75 % (ref 32–75)
NRBC # BLD: 0 K/UL (ref 0–0.01)
NRBC BLD-RTO: 0 PER 100 WBC
PHOSPHATE SERPL-MCNC: 4.4 MG/DL (ref 2.6–4.7)
PLATELET # BLD AUTO: 189 K/UL (ref 150–400)
POTASSIUM SERPL-SCNC: 4.2 MMOL/L (ref 3.5–5.1)
RBC # BLD AUTO: 4.12 M/UL (ref 3.8–5.2)
RBC MORPH BLD: ABNORMAL
SODIUM SERPL-SCNC: 140 MMOL/L (ref 136–145)
WBC # BLD AUTO: 9.7 K/UL (ref 3.6–11)

## 2023-11-20 PROCEDURE — 2580000003 HC RX 258: Performed by: PSYCHIATRY & NEUROLOGY

## 2023-11-20 PROCEDURE — 97530 THERAPEUTIC ACTIVITIES: CPT

## 2023-11-20 PROCEDURE — 80048 BASIC METABOLIC PNL TOTAL CA: CPT

## 2023-11-20 PROCEDURE — 99232 SBSQ HOSP IP/OBS MODERATE 35: CPT

## 2023-11-20 PROCEDURE — 83735 ASSAY OF MAGNESIUM: CPT

## 2023-11-20 PROCEDURE — 84100 ASSAY OF PHOSPHORUS: CPT

## 2023-11-20 PROCEDURE — 36415 COLL VENOUS BLD VENIPUNCTURE: CPT

## 2023-11-20 PROCEDURE — 97535 SELF CARE MNGMENT TRAINING: CPT

## 2023-11-20 PROCEDURE — 97112 NEUROMUSCULAR REEDUCATION: CPT

## 2023-11-20 PROCEDURE — 2580000003 HC RX 258: Performed by: NURSE PRACTITIONER

## 2023-11-20 PROCEDURE — 2060000000 HC ICU INTERMEDIATE R&B

## 2023-11-20 PROCEDURE — 85025 COMPLETE CBC W/AUTO DIFF WBC: CPT

## 2023-11-20 RX ADMIN — SODIUM CHLORIDE, PRESERVATIVE FREE 10 ML: 5 INJECTION INTRAVENOUS at 12:58

## 2023-11-20 RX ADMIN — SODIUM CHLORIDE, PRESERVATIVE FREE 10 ML: 5 INJECTION INTRAVENOUS at 12:57

## 2023-11-20 ASSESSMENT — PAIN SCALES - GENERAL
PAINLEVEL_OUTOF10: 0
PAINLEVEL_OUTOF10: 4
PAINLEVEL_OUTOF10: 2

## 2023-11-20 ASSESSMENT — PAIN SCALES - WONG BAKER: WONGBAKER_NUMERICALRESPONSE: 4

## 2023-11-20 NOTE — CARE COORDINATION
Transition of Care Plan:     RUR: 15%, Moderate   Prior Level of Functioning: Independent  Disposition: IPR  Date FOC offerred: 11/20  Date FOC given: 11/20  Date auth started and received: TBD  Auth reference # and date expires: TBD   Follow up appointments: On AVS   DME needed: TBD   Transportation at discharge: [facility]   Is patient a [place] and connected with [facility]? No  Caregiver Contact: Son Glenroy Jon [phone]  Discharge Caregiver contacted prior to discharge? No  Care Conference needed? No  Barriers to discharge:  PEG placement and IPR auth and bed    Pt discussed in IMCU rounds. [facility] is Wednesday. IPR is recommendation. CM spoke with pt's son Nancy Eng by phone ([phone]) to talk about IPR choice. Referrals sent to [facility] and [facility] in [facility]. CM to call son today to get IPR choice.    Chely Love, MSW   [phone]

## 2023-11-21 LAB
ALBUMIN SERPL-MCNC: 2.5 G/DL (ref 3.5–5)
ALBUMIN/GLOB SERPL: 0.5 (ref 1.1–2.2)
ALP SERPL-CCNC: 77 U/L (ref 45–117)
ALT SERPL-CCNC: 36 U/L (ref 12–78)
ANION GAP SERPL CALC-SCNC: 9 MMOL/L (ref 5–15)
AST SERPL-CCNC: 36 U/L (ref 15–37)
BILIRUB SERPL-MCNC: 0.4 MG/DL (ref 0.2–1)
BUN SERPL-MCNC: 8 MG/DL (ref 6–20)
BUN/CREAT SERPL: 19 (ref 12–20)
CALCIUM SERPL-MCNC: 9.9 MG/DL (ref 8.5–10.1)
CHLORIDE SERPL-SCNC: 111 MMOL/L (ref 97–108)
CO2 SERPL-SCNC: 18 MMOL/L (ref 21–32)
CREAT SERPL-MCNC: 0.43 MG/DL (ref 0.55–1.02)
GLOBULIN SER CALC-MCNC: 4.8 G/DL (ref 2–4)
GLUCOSE SERPL-MCNC: 105 MG/DL (ref 65–100)
MAGNESIUM SERPL-MCNC: 1.9 MG/DL (ref 1.6–2.4)
PHOSPHATE SERPL-MCNC: 4 MG/DL (ref 2.6–4.7)
POTASSIUM SERPL-SCNC: 4 MMOL/L (ref 3.5–5.1)
PROT SERPL-MCNC: 7.3 G/DL (ref 6.4–8.2)
SODIUM SERPL-SCNC: 138 MMOL/L (ref 136–145)

## 2023-11-21 PROCEDURE — 92612 ENDOSCOPY SWALLOW (FEES) VID: CPT

## 2023-11-21 PROCEDURE — 6370000000 HC RX 637 (ALT 250 FOR IP): Performed by: NURSE PRACTITIONER

## 2023-11-21 PROCEDURE — 2580000003 HC RX 258: Performed by: NURSE PRACTITIONER

## 2023-11-21 PROCEDURE — 36415 COLL VENOUS BLD VENIPUNCTURE: CPT

## 2023-11-21 PROCEDURE — 92507 TX SP LANG VOICE COMM INDIV: CPT

## 2023-11-21 PROCEDURE — 97530 THERAPEUTIC ACTIVITIES: CPT

## 2023-11-21 PROCEDURE — 2060000000 HC ICU INTERMEDIATE R&B

## 2023-11-21 PROCEDURE — 6370000000 HC RX 637 (ALT 250 FOR IP): Performed by: PSYCHIATRY & NEUROLOGY

## 2023-11-21 PROCEDURE — 2580000003 HC RX 258: Performed by: HOSPITALIST

## 2023-11-21 PROCEDURE — 6360000002 HC RX W HCPCS: Performed by: PSYCHIATRY & NEUROLOGY

## 2023-11-21 PROCEDURE — 2580000003 HC RX 258: Performed by: PSYCHIATRY & NEUROLOGY

## 2023-11-21 PROCEDURE — 99232 SBSQ HOSP IP/OBS MODERATE 35: CPT

## 2023-11-21 PROCEDURE — 97535 SELF CARE MNGMENT TRAINING: CPT

## 2023-11-21 PROCEDURE — 83735 ASSAY OF MAGNESIUM: CPT

## 2023-11-21 PROCEDURE — 84100 ASSAY OF PHOSPHORUS: CPT

## 2023-11-21 PROCEDURE — 80053 COMPREHEN METABOLIC PANEL: CPT

## 2023-11-21 PROCEDURE — 97112 NEUROMUSCULAR REEDUCATION: CPT

## 2023-11-21 PROCEDURE — 92526 ORAL FUNCTION THERAPY: CPT

## 2023-11-21 PROCEDURE — 6370000000 HC RX 637 (ALT 250 FOR IP)

## 2023-11-21 RX ADMIN — SODIUM CHLORIDE, PRESERVATIVE FREE 10 ML: 5 INJECTION INTRAVENOUS at 09:00

## 2023-11-21 RX ADMIN — Medication 60 MG: at 22:04

## 2023-11-21 RX ADMIN — SODIUM CHLORIDE, PRESERVATIVE FREE 10 ML: 5 INJECTION INTRAVENOUS at 03:39

## 2023-11-21 RX ADMIN — SODIUM CHLORIDE, PRESERVATIVE FREE 10 ML: 5 INJECTION INTRAVENOUS at 22:05

## 2023-11-21 RX ADMIN — CILOSTAZOL 50 MG: 50 TABLET ORAL at 22:04

## 2023-11-21 RX ADMIN — ATORVASTATIN CALCIUM 20 MG: 20 TABLET, FILM COATED ORAL at 22:04

## 2023-11-21 RX ADMIN — HEPARIN SODIUM 5000 UNITS: 5000 INJECTION INTRAVENOUS; SUBCUTANEOUS at 22:04

## 2023-11-21 RX ADMIN — DEXTROSE AND SODIUM CHLORIDE: 5; 450 INJECTION, SOLUTION INTRAVENOUS at 10:04

## 2023-11-21 RX ADMIN — Medication 60 MG: at 20:00

## 2023-11-21 ASSESSMENT — PAIN SCALES - GENERAL
PAINLEVEL_OUTOF10: 0
PAINLEVEL_OUTOF10: 0

## 2023-11-21 NOTE — CARE COORDINATION
Transition of Care Plan:     RUR: 15%, Moderate   Prior Level of Functioning: Independent  Disposition: IPR  Date FOC offerred: 11/20  Date FOC given: 11/20  Accepting facility: [facility]   Date auth started and received: TBD  Auth reference # and date expires: TBD   Follow up appointments: On AVS   DME needed: TBD   Transportation at discharge: [facility]   Is patient a  and connected with [facility]? No  Caregiver Contact: Son Chelsey Good [phone]  Discharge Caregiver contacted prior to discharge? No  Care Conference needed? No  Barriers to discharge:  IPR auth and bed    Pt's son chose [facility]. [facility] following to start auth when al notes in. Pt discussed in IMCU rounds. [facility] is Wednesday. IPR is recommendation. ROSA spoke with pt's son [facility] [place] [facility] ([phone]) to talk about IPR acceptance. [facility] accepted and is requesting PT OT updated notes to demonstrate IPR appropriateness. Velasquez Dorman [phone]    [facility] is reviewing referral/clinicals. ROSA sent [place] [facility] message requesting acceptance update. Pt's son top choices are [facility] and [facility]. ROSA spoke with PT OT who will see pt this morning.     Wade Dewitt, MSW   [phone]

## 2023-11-22 ENCOUNTER — APPOINTMENT (OUTPATIENT)
Facility: HOSPITAL | Age: 58
DRG: 021 | End: 2023-11-22
Attending: FAMILY MEDICINE
Payer: MEDICAID

## 2023-11-22 LAB
ANION GAP SERPL CALC-SCNC: 11 MMOL/L (ref 5–15)
BUN SERPL-MCNC: 11 MG/DL (ref 6–20)
BUN/CREAT SERPL: 24 (ref 12–20)
CALCIUM SERPL-MCNC: 9.5 MG/DL (ref 8.5–10.1)
CHLORIDE SERPL-SCNC: 110 MMOL/L (ref 97–108)
CO2 SERPL-SCNC: 20 MMOL/L (ref 21–32)
CREAT SERPL-MCNC: 0.45 MG/DL (ref 0.55–1.02)
ECHO BSA: 2.53 M2
GLUCOSE SERPL-MCNC: 120 MG/DL (ref 65–100)
POTASSIUM SERPL-SCNC: 3.8 MMOL/L (ref 3.5–5.1)
SODIUM SERPL-SCNC: 141 MMOL/L (ref 136–145)

## 2023-11-22 PROCEDURE — 97530 THERAPEUTIC ACTIVITIES: CPT

## 2023-11-22 PROCEDURE — 6370000000 HC RX 637 (ALT 250 FOR IP): Performed by: NURSE PRACTITIONER

## 2023-11-22 PROCEDURE — 97110 THERAPEUTIC EXERCISES: CPT

## 2023-11-22 PROCEDURE — 92507 TX SP LANG VOICE COMM INDIV: CPT

## 2023-11-22 PROCEDURE — 6360000002 HC RX W HCPCS: Performed by: PSYCHIATRY & NEUROLOGY

## 2023-11-22 PROCEDURE — 2580000003 HC RX 258: Performed by: HOSPITALIST

## 2023-11-22 PROCEDURE — 2060000000 HC ICU INTERMEDIATE R&B

## 2023-11-22 PROCEDURE — 93971 EXTREMITY STUDY: CPT

## 2023-11-22 PROCEDURE — 36415 COLL VENOUS BLD VENIPUNCTURE: CPT

## 2023-11-22 PROCEDURE — 92526 ORAL FUNCTION THERAPY: CPT

## 2023-11-22 PROCEDURE — 80048 BASIC METABOLIC PNL TOTAL CA: CPT

## 2023-11-22 PROCEDURE — 99232 SBSQ HOSP IP/OBS MODERATE 35: CPT

## 2023-11-22 PROCEDURE — 6370000000 HC RX 637 (ALT 250 FOR IP): Performed by: PSYCHIATRY & NEUROLOGY

## 2023-11-22 PROCEDURE — 6370000000 HC RX 637 (ALT 250 FOR IP)

## 2023-11-22 RX ADMIN — Medication 60 MG: at 17:39

## 2023-11-22 RX ADMIN — CILOSTAZOL 50 MG: 50 TABLET ORAL at 10:14

## 2023-11-22 RX ADMIN — ACETAMINOPHEN 650 MG: 325 TABLET ORAL at 14:59

## 2023-11-22 RX ADMIN — CILOSTAZOL 50 MG: 50 TABLET ORAL at 21:09

## 2023-11-22 RX ADMIN — Medication 60 MG: at 04:07

## 2023-11-22 RX ADMIN — HEPARIN SODIUM 5000 UNITS: 5000 INJECTION INTRAVENOUS; SUBCUTANEOUS at 21:09

## 2023-11-22 RX ADMIN — ACETAMINOPHEN 650 MG: 325 TABLET ORAL at 21:41

## 2023-11-22 RX ADMIN — Medication 60 MG: at 21:10

## 2023-11-22 RX ADMIN — HEPARIN SODIUM 5000 UNITS: 5000 INJECTION INTRAVENOUS; SUBCUTANEOUS at 14:03

## 2023-11-22 RX ADMIN — ATORVASTATIN CALCIUM 20 MG: 20 TABLET, FILM COATED ORAL at 21:09

## 2023-11-22 RX ADMIN — LANSOPRAZOLE 30 MG: 30 TABLET, ORALLY DISINTEGRATING, DELAYED RELEASE ORAL at 10:13

## 2023-11-22 RX ADMIN — HEPARIN SODIUM 5000 UNITS: 5000 INJECTION INTRAVENOUS; SUBCUTANEOUS at 06:07

## 2023-11-22 RX ADMIN — Medication 60 MG: at 12:34

## 2023-11-22 RX ADMIN — DEXTROSE AND SODIUM CHLORIDE: 5; 450 INJECTION, SOLUTION INTRAVENOUS at 01:28

## 2023-11-22 ASSESSMENT — PAIN DESCRIPTION - ORIENTATION: ORIENTATION: RIGHT;LEFT

## 2023-11-22 ASSESSMENT — PAIN DESCRIPTION - LOCATION: LOCATION: OTHER (COMMENT)

## 2023-11-22 ASSESSMENT — PAIN SCALES - GENERAL
PAINLEVEL_OUTOF10: 0
PAINLEVEL_OUTOF10: 0
PAINLEVEL_OUTOF10: 8

## 2023-11-22 ASSESSMENT — PAIN SCALES - WONG BAKER: WONGBAKER_NUMERICALRESPONSE: 0

## 2023-11-22 NOTE — CARE COORDINATION
Transition of Care Plan:     RUR: 15%, Moderate   Prior Level of Functioning: Independent  Disposition: IPR  Date FOC offerred: 11/20  Date FOC given: 11/20  Accepting facility: [facility]   Date auth started and received: TBD  Auth reference # and date expires: TBD   Follow up appointments: On AVS   DME needed: TBD   Transportation at discharge: [address]   Is patient a  and connected with [place]? No  Caregiver Contact: Son Derick Hem [phone]  Discharge Caregiver contacted prior to discharge? No  Care Conference needed? No  Barriers to discharge:  IPR auth and bed    Attending reported in IMCU rounds that fecal management system will be dc'd. Sitter order dc'd, sitter has not been in room for at least 3-4 days. CM requested - through [employer] - that auth be started with [facility]. [facility] messaged stating pt can not come to IPR with the sitter order that is still in the chart. [facility] also requesting plan for fecal management system. Auth will be started when [facility] has clarification on these items. CM to perfect serve attending about fecal management and ask nursing to [employer] order. Perfect serve sent to attending to request sitter order be dc'd and plan for fecal management sytem. Attending to see pt and provide POC for fecal management system. SNF referrals sent in [facility] for dc back-up plan  to [facility] (and in [facility]) [address] [place] [address] No bed  [place] Reviewing, asked about NG tube status.  CM reported NG dc'd    Delores Cobb, MSW   [phone]

## 2023-11-23 LAB
ANION GAP SERPL CALC-SCNC: 5 MMOL/L (ref 5–15)
BUN SERPL-MCNC: 7 MG/DL (ref 6–20)
BUN/CREAT SERPL: 13 (ref 12–20)
CALCIUM SERPL-MCNC: 9.1 MG/DL (ref 8.5–10.1)
CHLORIDE SERPL-SCNC: 107 MMOL/L (ref 97–108)
CO2 SERPL-SCNC: 26 MMOL/L (ref 21–32)
CREAT SERPL-MCNC: 0.55 MG/DL (ref 0.55–1.02)
GLUCOSE SERPL-MCNC: 175 MG/DL (ref 65–100)
MAGNESIUM SERPL-MCNC: 2 MG/DL (ref 1.6–2.4)
PHOSPHATE SERPL-MCNC: 3.6 MG/DL (ref 2.6–4.7)
POTASSIUM SERPL-SCNC: 4 MMOL/L (ref 3.5–5.1)
SODIUM SERPL-SCNC: 138 MMOL/L (ref 136–145)

## 2023-11-23 PROCEDURE — 2580000003 HC RX 258: Performed by: HOSPITALIST

## 2023-11-23 PROCEDURE — 6360000002 HC RX W HCPCS: Performed by: HOSPITALIST

## 2023-11-23 PROCEDURE — 36415 COLL VENOUS BLD VENIPUNCTURE: CPT

## 2023-11-23 PROCEDURE — 6370000000 HC RX 637 (ALT 250 FOR IP): Performed by: STUDENT IN AN ORGANIZED HEALTH CARE EDUCATION/TRAINING PROGRAM

## 2023-11-23 PROCEDURE — 83735 ASSAY OF MAGNESIUM: CPT

## 2023-11-23 PROCEDURE — 6370000000 HC RX 637 (ALT 250 FOR IP): Performed by: NURSE PRACTITIONER

## 2023-11-23 PROCEDURE — 2580000003 HC RX 258: Performed by: NURSE PRACTITIONER

## 2023-11-23 PROCEDURE — 80048 BASIC METABOLIC PNL TOTAL CA: CPT

## 2023-11-23 PROCEDURE — 99232 SBSQ HOSP IP/OBS MODERATE 35: CPT | Performed by: NURSE PRACTITIONER

## 2023-11-23 PROCEDURE — 1100000000 HC RM PRIVATE

## 2023-11-23 PROCEDURE — 6360000002 HC RX W HCPCS: Performed by: PSYCHIATRY & NEUROLOGY

## 2023-11-23 PROCEDURE — 2580000003 HC RX 258: Performed by: PSYCHIATRY & NEUROLOGY

## 2023-11-23 PROCEDURE — 6370000000 HC RX 637 (ALT 250 FOR IP): Performed by: PSYCHIATRY & NEUROLOGY

## 2023-11-23 PROCEDURE — 6370000000 HC RX 637 (ALT 250 FOR IP)

## 2023-11-23 PROCEDURE — 84100 ASSAY OF PHOSPHORUS: CPT

## 2023-11-23 RX ORDER — LANSOPRAZOLE 30 MG/1
30 TABLET, ORALLY DISINTEGRATING, DELAYED RELEASE ORAL DAILY
Status: DISCONTINUED | OUTPATIENT
Start: 2023-11-24 | End: 2023-11-26 | Stop reason: HOSPADM

## 2023-11-23 RX ORDER — ATORVASTATIN CALCIUM 20 MG/1
20 TABLET, FILM COATED ORAL NIGHTLY
Status: DISCONTINUED | OUTPATIENT
Start: 2023-11-23 | End: 2023-11-26 | Stop reason: HOSPADM

## 2023-11-23 RX ADMIN — SODIUM CHLORIDE, PRESERVATIVE FREE 10 ML: 5 INJECTION INTRAVENOUS at 10:16

## 2023-11-23 RX ADMIN — HEPARIN SODIUM 5000 UNITS: 5000 INJECTION INTRAVENOUS; SUBCUTANEOUS at 21:21

## 2023-11-23 RX ADMIN — Medication 60 MG: at 13:57

## 2023-11-23 RX ADMIN — Medication 60 MG: at 06:07

## 2023-11-23 RX ADMIN — LANSOPRAZOLE 30 MG: 30 TABLET, ORALLY DISINTEGRATING, DELAYED RELEASE ORAL at 09:22

## 2023-11-23 RX ADMIN — POTASSIUM BICARBONATE 40 MEQ: 782 TABLET, EFFERVESCENT ORAL at 09:22

## 2023-11-23 RX ADMIN — Medication 60 MG: at 21:21

## 2023-11-23 RX ADMIN — DEXTROSE AND SODIUM CHLORIDE: 5; 450 INJECTION, SOLUTION INTRAVENOUS at 02:21

## 2023-11-23 RX ADMIN — SODIUM CHLORIDE, PRESERVATIVE FREE 10 ML: 5 INJECTION INTRAVENOUS at 09:23

## 2023-11-23 RX ADMIN — CILOSTAZOL 50 MG: 50 TABLET ORAL at 09:21

## 2023-11-23 RX ADMIN — SODIUM CHLORIDE, PRESERVATIVE FREE 10 ML: 5 INJECTION INTRAVENOUS at 09:47

## 2023-11-23 RX ADMIN — Medication 60 MG: at 01:15

## 2023-11-23 RX ADMIN — HEPARIN SODIUM 5000 UNITS: 5000 INJECTION INTRAVENOUS; SUBCUTANEOUS at 13:57

## 2023-11-23 RX ADMIN — DEXTROSE AND SODIUM CHLORIDE: 5; 450 INJECTION, SOLUTION INTRAVENOUS at 16:52

## 2023-11-23 RX ADMIN — HEPARIN SODIUM 5000 UNITS: 5000 INJECTION INTRAVENOUS; SUBCUTANEOUS at 06:07

## 2023-11-23 RX ADMIN — Medication 60 MG: at 09:21

## 2023-11-23 RX ADMIN — ACETAMINOPHEN 650 MG: 325 TABLET ORAL at 10:36

## 2023-11-23 RX ADMIN — MORPHINE SULFATE 2 MG: 2 INJECTION, SOLUTION INTRAMUSCULAR; INTRAVENOUS at 22:16

## 2023-11-23 RX ADMIN — ATORVASTATIN CALCIUM 20 MG: 20 TABLET, FILM COATED ORAL at 21:21

## 2023-11-23 RX ADMIN — ACETAMINOPHEN 650 MG: 325 TABLET ORAL at 21:21

## 2023-11-23 ASSESSMENT — PAIN SCALES - GENERAL
PAINLEVEL_OUTOF10: 0
PAINLEVEL_OUTOF10: 6
PAINLEVEL_OUTOF10: 0
PAINLEVEL_OUTOF10: 6
PAINLEVEL_OUTOF10: 0

## 2023-11-23 ASSESSMENT — PAIN SCALES - WONG BAKER
WONGBAKER_NUMERICALRESPONSE: 0

## 2023-11-23 ASSESSMENT — PAIN DESCRIPTION - LOCATION: LOCATION: OTHER (COMMENT)

## 2023-11-23 ASSESSMENT — PAIN - FUNCTIONAL ASSESSMENT: PAIN_FUNCTIONAL_ASSESSMENT: ACTIVITIES ARE NOT PREVENTED

## 2023-11-23 NOTE — CARE COORDINATION
Transition of Care Plan:     RUR: 15% Moderate   Prior Level of Functioning: Independent  Disposition: IPR with SNF back up plan   Date FOC offerred: 11/20  Date FOC given: 11/20  Accepting facility: [facility]   Date auth started and received: 11/22 not yet received   Auth reference # and date expires: TBD   Follow up appointments: On AVS   DME needed: TBD   Transportation at discharge: [facility]   Is patient a  and connected with [place]? No  Caregiver Contact: Son Miriam Ruvalcaba [phone]  Discharge Caregiver contacted prior to discharge? No  Care Conference needed? No  Barriers to discharge:  IPR auth and bed    Disposition: Kemi Paez has started for IPR [facility] [phone]  SNF back-up - [facility] has accepted, following in [employer]  Transportation: Stretcher - bariatric     Attending reported in Wednesday IMCU rounds pt medically ready and confirmed that fecal management system will be dc'd. Sitter order dc'd, sitter has not been in room for at least 3-4 days.      [facility]  to [facility] (and in [place]) [address] [place] [address] No bed  [place] Accepted [address], [facility]   [phone]

## 2023-11-24 LAB
ANION GAP SERPL CALC-SCNC: 3 MMOL/L (ref 5–15)
BUN SERPL-MCNC: 8 MG/DL (ref 6–20)
BUN/CREAT SERPL: 17 (ref 12–20)
CALCIUM SERPL-MCNC: 9 MG/DL (ref 8.5–10.1)
CHLORIDE SERPL-SCNC: 110 MMOL/L (ref 97–108)
CO2 SERPL-SCNC: 27 MMOL/L (ref 21–32)
CREAT SERPL-MCNC: 0.46 MG/DL (ref 0.55–1.02)
ERYTHROCYTE [DISTWIDTH] IN BLOOD BY AUTOMATED COUNT: 16.5 % (ref 11.5–14.5)
GLUCOSE SERPL-MCNC: 123 MG/DL (ref 65–100)
HCT VFR BLD AUTO: 34.7 % (ref 35–47)
HGB BLD-MCNC: 10.7 G/DL (ref 11.5–16)
MCH RBC QN AUTO: 26.6 PG (ref 26–34)
MCHC RBC AUTO-ENTMCNC: 30.8 G/DL (ref 30–36.5)
MCV RBC AUTO: 86.3 FL (ref 80–99)
NRBC # BLD: 0 K/UL (ref 0–0.01)
NRBC BLD-RTO: 0 PER 100 WBC
PLATELET # BLD AUTO: 299 K/UL (ref 150–400)
PMV BLD AUTO: 11.5 FL (ref 8.9–12.9)
POTASSIUM SERPL-SCNC: 3.9 MMOL/L (ref 3.5–5.1)
RBC # BLD AUTO: 4.02 M/UL (ref 3.8–5.2)
SODIUM SERPL-SCNC: 140 MMOL/L (ref 136–145)
WBC # BLD AUTO: 11.3 K/UL (ref 3.6–11)

## 2023-11-24 PROCEDURE — 94760 N-INVAS EAR/PLS OXIMETRY 1: CPT

## 2023-11-24 PROCEDURE — 99232 SBSQ HOSP IP/OBS MODERATE 35: CPT | Performed by: NURSE PRACTITIONER

## 2023-11-24 PROCEDURE — 92526 ORAL FUNCTION THERAPY: CPT

## 2023-11-24 PROCEDURE — 6360000002 HC RX W HCPCS: Performed by: PSYCHIATRY & NEUROLOGY

## 2023-11-24 PROCEDURE — 6370000000 HC RX 637 (ALT 250 FOR IP): Performed by: PSYCHIATRY & NEUROLOGY

## 2023-11-24 PROCEDURE — 6370000000 HC RX 637 (ALT 250 FOR IP): Performed by: STUDENT IN AN ORGANIZED HEALTH CARE EDUCATION/TRAINING PROGRAM

## 2023-11-24 PROCEDURE — 36415 COLL VENOUS BLD VENIPUNCTURE: CPT

## 2023-11-24 PROCEDURE — 2580000003 HC RX 258: Performed by: PSYCHIATRY & NEUROLOGY

## 2023-11-24 PROCEDURE — 80048 BASIC METABOLIC PNL TOTAL CA: CPT

## 2023-11-24 PROCEDURE — 85027 COMPLETE CBC AUTOMATED: CPT

## 2023-11-24 PROCEDURE — 2580000003 HC RX 258: Performed by: HOSPITALIST

## 2023-11-24 PROCEDURE — 1100000000 HC RM PRIVATE

## 2023-11-24 RX ADMIN — ATORVASTATIN CALCIUM 20 MG: 20 TABLET, FILM COATED ORAL at 21:47

## 2023-11-24 RX ADMIN — Medication 60 MG: at 21:47

## 2023-11-24 RX ADMIN — POTASSIUM BICARBONATE 40 MEQ: 782 TABLET, EFFERVESCENT ORAL at 08:21

## 2023-11-24 RX ADMIN — LANSOPRAZOLE 30 MG: 30 TABLET, ORALLY DISINTEGRATING, DELAYED RELEASE ORAL at 08:20

## 2023-11-24 RX ADMIN — SODIUM CHLORIDE, PRESERVATIVE FREE 10 ML: 5 INJECTION INTRAVENOUS at 08:21

## 2023-11-24 RX ADMIN — CILOSTAZOL 50 MG: 50 TABLET ORAL at 00:29

## 2023-11-24 RX ADMIN — HEPARIN SODIUM 5000 UNITS: 5000 INJECTION INTRAVENOUS; SUBCUTANEOUS at 14:23

## 2023-11-24 RX ADMIN — Medication 60 MG: at 01:16

## 2023-11-24 RX ADMIN — CILOSTAZOL 50 MG: 50 TABLET ORAL at 21:47

## 2023-11-24 RX ADMIN — CILOSTAZOL 50 MG: 50 TABLET ORAL at 12:57

## 2023-11-24 RX ADMIN — Medication 60 MG: at 08:21

## 2023-11-24 RX ADMIN — HEPARIN SODIUM 5000 UNITS: 5000 INJECTION INTRAVENOUS; SUBCUTANEOUS at 05:28

## 2023-11-24 RX ADMIN — HEPARIN SODIUM 5000 UNITS: 5000 INJECTION INTRAVENOUS; SUBCUTANEOUS at 21:47

## 2023-11-24 RX ADMIN — Medication 60 MG: at 05:28

## 2023-11-24 RX ADMIN — DEXTROSE AND SODIUM CHLORIDE: 5; 450 INJECTION, SOLUTION INTRAVENOUS at 05:31

## 2023-11-24 RX ADMIN — Medication 60 MG: at 13:00

## 2023-11-24 RX ADMIN — Medication 60 MG: at 17:04

## 2023-11-24 ASSESSMENT — PAIN SCALES - GENERAL
PAINLEVEL_OUTOF10: 0
PAINLEVEL_OUTOF10: 0

## 2023-11-24 NOTE — CARE COORDINATION
Transition of Care Plan:    RUR: 15%   Prior Level of Functioning: Max A  Disposition: IPR   If SNF or IPR: Date FOC offered: 11/20   Date [address] received: 11/20   Accepting facility: [facility]  Per conversation with Sandra Yanez- she reported that she would follow up with this cm shortly to provide an update regarding bed availability. Date authorization started with reference number: Approved   Date authorization received and expires: 11/24   Follow up appointments: PCP   DME needed: TBD pending;   Transportation at discharge: BLS   IM/IMM Medicare/[facility] letter given: N/A   Caregiver Contact: Edward Raphael [phone]  Discharge Caregiver contacted prior to discharge? N/A   Care Conference needed?  None   Barriers to discharge: Bed Availability,

## 2023-11-24 NOTE — WOUND CARE
Follow up visit for skin fold breakdown under pannus and breasts. These areas are resolved. Stoma powder in skin folds remains in use and available in room. She required the assist of 2 for turning onto right side for sacral assessment. Sacrum & buttocks intact with no redness. Remains on bar MICHEAL mattress. Heels intact with no redness. Offloaded with pillows. No currentl wound care needs. Keep Marshall Protocol in place for PI prevention. Will sign off.   KELECHI Thomas

## 2023-11-25 LAB
ANION GAP SERPL CALC-SCNC: 4 MMOL/L (ref 5–15)
BUN SERPL-MCNC: 7 MG/DL (ref 6–20)
BUN/CREAT SERPL: 14 (ref 12–20)
CALCIUM SERPL-MCNC: 9.1 MG/DL (ref 8.5–10.1)
CHLORIDE SERPL-SCNC: 110 MMOL/L (ref 97–108)
CO2 SERPL-SCNC: 21 MMOL/L (ref 21–32)
CREAT SERPL-MCNC: 0.51 MG/DL (ref 0.55–1.02)
ERYTHROCYTE [DISTWIDTH] IN BLOOD BY AUTOMATED COUNT: 16.5 % (ref 11.5–14.5)
GLUCOSE SERPL-MCNC: 126 MG/DL (ref 65–100)
HCT VFR BLD AUTO: 34.5 % (ref 35–47)
HGB BLD-MCNC: 10.7 G/DL (ref 11.5–16)
MCH RBC QN AUTO: 26.8 PG (ref 26–34)
MCHC RBC AUTO-ENTMCNC: 31 G/DL (ref 30–36.5)
MCV RBC AUTO: 86.3 FL (ref 80–99)
NRBC # BLD: 0 K/UL (ref 0–0.01)
NRBC BLD-RTO: 0 PER 100 WBC
PLATELET # BLD AUTO: 156 K/UL (ref 150–400)
PMV BLD AUTO: 11.7 FL (ref 8.9–12.9)
POTASSIUM SERPL-SCNC: 4.1 MMOL/L (ref 3.5–5.1)
RBC # BLD AUTO: 4 M/UL (ref 3.8–5.2)
SODIUM SERPL-SCNC: 135 MMOL/L (ref 136–145)
WBC # BLD AUTO: 12.7 K/UL (ref 3.6–11)

## 2023-11-25 PROCEDURE — 2580000003 HC RX 258: Performed by: PSYCHIATRY & NEUROLOGY

## 2023-11-25 PROCEDURE — 6360000002 HC RX W HCPCS: Performed by: PSYCHIATRY & NEUROLOGY

## 2023-11-25 PROCEDURE — 6370000000 HC RX 637 (ALT 250 FOR IP): Performed by: STUDENT IN AN ORGANIZED HEALTH CARE EDUCATION/TRAINING PROGRAM

## 2023-11-25 PROCEDURE — 36415 COLL VENOUS BLD VENIPUNCTURE: CPT

## 2023-11-25 PROCEDURE — 80048 BASIC METABOLIC PNL TOTAL CA: CPT

## 2023-11-25 PROCEDURE — 6370000000 HC RX 637 (ALT 250 FOR IP): Performed by: PSYCHIATRY & NEUROLOGY

## 2023-11-25 PROCEDURE — 85027 COMPLETE CBC AUTOMATED: CPT

## 2023-11-25 PROCEDURE — 1100000000 HC RM PRIVATE

## 2023-11-25 PROCEDURE — 99231 SBSQ HOSP IP/OBS SF/LOW 25: CPT | Performed by: NURSE PRACTITIONER

## 2023-11-25 RX ADMIN — LANSOPRAZOLE 30 MG: 30 TABLET, ORALLY DISINTEGRATING, DELAYED RELEASE ORAL at 08:24

## 2023-11-25 RX ADMIN — ATORVASTATIN CALCIUM 20 MG: 20 TABLET, FILM COATED ORAL at 22:09

## 2023-11-25 RX ADMIN — Medication 60 MG: at 19:19

## 2023-11-25 RX ADMIN — POTASSIUM BICARBONATE 40 MEQ: 782 TABLET, EFFERVESCENT ORAL at 08:24

## 2023-11-25 RX ADMIN — Medication 60 MG: at 22:09

## 2023-11-25 RX ADMIN — HEPARIN SODIUM 5000 UNITS: 5000 INJECTION INTRAVENOUS; SUBCUTANEOUS at 16:05

## 2023-11-25 RX ADMIN — Medication 60 MG: at 16:05

## 2023-11-25 RX ADMIN — HEPARIN SODIUM 5000 UNITS: 5000 INJECTION INTRAVENOUS; SUBCUTANEOUS at 06:10

## 2023-11-25 RX ADMIN — Medication 60 MG: at 06:10

## 2023-11-25 RX ADMIN — SODIUM CHLORIDE, PRESERVATIVE FREE 10 ML: 5 INJECTION INTRAVENOUS at 08:24

## 2023-11-25 RX ADMIN — HEPARIN SODIUM 5000 UNITS: 5000 INJECTION INTRAVENOUS; SUBCUTANEOUS at 22:09

## 2023-11-25 RX ADMIN — CILOSTAZOL 50 MG: 50 TABLET ORAL at 22:09

## 2023-11-25 RX ADMIN — Medication 60 MG: at 10:08

## 2023-11-25 RX ADMIN — CILOSTAZOL 50 MG: 50 TABLET ORAL at 08:24

## 2023-11-25 RX ADMIN — Medication 60 MG: at 01:39

## 2023-11-25 ASSESSMENT — PAIN SCALES - GENERAL
PAINLEVEL_OUTOF10: 0

## 2023-11-25 NOTE — CARE COORDINATION
Transition of Care Plan  RUR 15%    Admission  subarachnoid hemorrhage ---left PCOM aneurysm rupture    Patient accepted to [facility] and authorization has been secured for patient to be admitted to [facility] for IPR.  talked with on call liaison, Adriana Mccabe  [phone], to inquire about bed availability today. Adriana Mccabe informed CM that there is not a bed today but hopefully tomorrow. . She will call  if a bed becomes available today or tomorrow as patient is 2nd on the list today and tomorrow. Tl Dewitt NP informed. Contact   Edward Cruz [phone]    CM will arrange stretcher transport.    [address], BSW

## 2023-11-26 VITALS
BODY MASS INDEX: 51.28 KG/M2 | OXYGEN SATURATION: 98 % | DIASTOLIC BLOOD PRESSURE: 81 MMHG | HEART RATE: 90 BPM | TEMPERATURE: 97.9 F | SYSTOLIC BLOOD PRESSURE: 146 MMHG | WEIGHT: 289.4 LBS | HEIGHT: 63 IN | RESPIRATION RATE: 17 BRPM

## 2023-11-26 PROBLEM — I67.848 CEREBRAL VASOSPASM: Status: RESOLVED | Noted: 2023-11-10 | Resolved: 2023-11-26

## 2023-11-26 LAB
ANION GAP SERPL CALC-SCNC: 5 MMOL/L (ref 5–15)
BUN SERPL-MCNC: 7 MG/DL (ref 6–20)
BUN/CREAT SERPL: 15 (ref 12–20)
CALCIUM SERPL-MCNC: 10 MG/DL (ref 8.5–10.1)
CHLORIDE SERPL-SCNC: 110 MMOL/L (ref 97–108)
CO2 SERPL-SCNC: 26 MMOL/L (ref 21–32)
CREAT SERPL-MCNC: 0.47 MG/DL (ref 0.55–1.02)
ERYTHROCYTE [DISTWIDTH] IN BLOOD BY AUTOMATED COUNT: 16.2 % (ref 11.5–14.5)
GLUCOSE SERPL-MCNC: 116 MG/DL (ref 65–100)
HCT VFR BLD AUTO: 35.2 % (ref 35–47)
HGB BLD-MCNC: 10.9 G/DL (ref 11.5–16)
MCH RBC QN AUTO: 26.7 PG (ref 26–34)
MCHC RBC AUTO-ENTMCNC: 31 G/DL (ref 30–36.5)
MCV RBC AUTO: 86.3 FL (ref 80–99)
NRBC # BLD: 0 K/UL (ref 0–0.01)
NRBC BLD-RTO: 0 PER 100 WBC
PLATELET # BLD AUTO: 296 K/UL (ref 150–400)
PMV BLD AUTO: 11 FL (ref 8.9–12.9)
POTASSIUM SERPL-SCNC: 3.5 MMOL/L (ref 3.5–5.1)
RBC # BLD AUTO: 4.08 M/UL (ref 3.8–5.2)
SODIUM SERPL-SCNC: 141 MMOL/L (ref 136–145)
WBC # BLD AUTO: 10 K/UL (ref 3.6–11)

## 2023-11-26 PROCEDURE — 80048 BASIC METABOLIC PNL TOTAL CA: CPT

## 2023-11-26 PROCEDURE — 85027 COMPLETE CBC AUTOMATED: CPT

## 2023-11-26 PROCEDURE — 6370000000 HC RX 637 (ALT 250 FOR IP)

## 2023-11-26 PROCEDURE — 99232 SBSQ HOSP IP/OBS MODERATE 35: CPT

## 2023-11-26 PROCEDURE — 6370000000 HC RX 637 (ALT 250 FOR IP): Performed by: PSYCHIATRY & NEUROLOGY

## 2023-11-26 PROCEDURE — 6370000000 HC RX 637 (ALT 250 FOR IP): Performed by: STUDENT IN AN ORGANIZED HEALTH CARE EDUCATION/TRAINING PROGRAM

## 2023-11-26 PROCEDURE — 6360000002 HC RX W HCPCS: Performed by: PSYCHIATRY & NEUROLOGY

## 2023-11-26 PROCEDURE — 6370000000 HC RX 637 (ALT 250 FOR IP): Performed by: INTERNAL MEDICINE

## 2023-11-26 PROCEDURE — 36415 COLL VENOUS BLD VENIPUNCTURE: CPT

## 2023-11-26 RX ORDER — ATORVASTATIN CALCIUM 20 MG/1
20 TABLET, FILM COATED ORAL NIGHTLY
Qty: 30 TABLET | Refills: 0 | Status: SHIPPED
Start: 2023-11-26

## 2023-11-26 RX ORDER — LANSOPRAZOLE 30 MG/1
30 TABLET, ORALLY DISINTEGRATING, DELAYED RELEASE ORAL DAILY
Qty: 30 TABLET | Refills: 0 | Status: SHIPPED
Start: 2023-11-26

## 2023-11-26 RX ORDER — CILOSTAZOL 50 MG/1
50 TABLET ORAL 2 TIMES DAILY
Qty: 60 TABLET | Refills: 0 | Status: SHIPPED
Start: 2023-11-26

## 2023-11-26 RX ORDER — BUTALBITAL, ACETAMINOPHEN AND CAFFEINE 50; 325; 40 MG/1; MG/1; MG/1
1 TABLET ORAL EVERY 6 HOURS PRN
Qty: 8 TABLET | Refills: 0 | Status: SHIPPED | OUTPATIENT
Start: 2023-11-26 | End: 2023-11-28

## 2023-11-26 RX ADMIN — POTASSIUM BICARBONATE 40 MEQ: 782 TABLET, EFFERVESCENT ORAL at 08:57

## 2023-11-26 RX ADMIN — LANSOPRAZOLE 30 MG: 30 TABLET, ORALLY DISINTEGRATING, DELAYED RELEASE ORAL at 09:00

## 2023-11-26 RX ADMIN — HEPARIN SODIUM 5000 UNITS: 5000 INJECTION INTRAVENOUS; SUBCUTANEOUS at 06:17

## 2023-11-26 RX ADMIN — BUTALBITAL, ACETAMINOPHEN, AND CAFFEINE 1 TABLET: 325; 50; 40 TABLET ORAL at 11:14

## 2023-11-26 RX ADMIN — Medication 60 MG: at 14:07

## 2023-11-26 RX ADMIN — CILOSTAZOL 50 MG: 50 TABLET ORAL at 09:00

## 2023-11-26 RX ADMIN — Medication 60 MG: at 02:23

## 2023-11-26 RX ADMIN — HEPARIN SODIUM 5000 UNITS: 5000 INJECTION INTRAVENOUS; SUBCUTANEOUS at 14:10

## 2023-11-26 RX ADMIN — Medication 60 MG: at 10:26

## 2023-11-26 ASSESSMENT — PAIN SCALES - GENERAL: PAINLEVEL_OUTOF10: 10

## 2023-11-26 ASSESSMENT — PAIN DESCRIPTION - ORIENTATION: ORIENTATION: ANTERIOR

## 2023-11-26 ASSESSMENT — PAIN DESCRIPTION - DESCRIPTORS: DESCRIPTORS: ACHING

## 2023-11-26 ASSESSMENT — PAIN DESCRIPTION - LOCATION: LOCATION: HEAD

## 2023-11-26 NOTE — CARE COORDINATION
Transition of Care Plan    RUR: 15%   Prior Level of Functioning: Max A  Disposition: IPR   If SNF or IPR: Date FOC offered: 11/20   Date [address] received: 11/20     Accepting facility: [facility] liaison [phone] , contacted CM today-- bed available today  Room 2022   Nurse to call report to [phone]     Date authorization started with reference number: Approved     Date authorization  received and expires: 11/24     Follow up appointments: PCP     DME needed: TBD pending;     Transportation at discharge: [facility] to Home  2:30 pm   Cost $239.55    IM/IMM Medicare/ letter given: N/A     Caregiver Contact: Edward Bai [phone]    Discharge Caregiver contacted prior to discharge? family--SO and nephew in the room  Edward Byers on the way to the hospital      Care Conference needed? None   Barriers to discharge: none      Cinthia Medina , liaison, called Cm informing her that there is a bed available for patient today. She will be going to room 2022 at [facility] [place]. NP [facility] informed and she will complete the discharge instructions. Nurse to call report to [address] [place] [address] to Home confirmed 2:30 pm transport-- cost to [facility] is $238.55    Envelope and information for Hospital to Home on chart.      [address], BSW

## 2023-11-26 NOTE — DISCHARGE INSTRUCTIONS
Discharge Summary      PATIENT ID: Daniel Hopkins  MRN: [id number]   YOB: 1965    DATE OF ADMISSION: 10/30/2023 11:54 AM    DATE OF DISCHARGE: 11/26/2023   PRIMARY CARE PROVIDER: SHARON Canela NP   ATTENDING PHYSICIAN: Fuentes Monsivais MD  DISCHARGING PROVIDER: SHARON Grove NP       CONSULTATIONS: IP CONSULT TO NEUROSURGERY  IP CONSULT TO NEUROSURGERY  IP CONSULT TO PHARMACY  IP CONSULT TO DIETITIAN  IP WOUND CARE NURSE CONSULT TO EVAL  IP CONSULT TO GI  IP CONSULT TO GENERAL SURGERY     PROCEDURES/SURGERIES: * No surgery found *      [address] COURSE:   Ms. Wesly Flannery is a 56 y.o. female consulted who initially presented to [facility] for left eye pain and HA. CT Head showed diffuse SAH, mostly on the left. She was intubated in the ED for declining LOC and received keppra there but no mannitol. Was not on blood thinners and was very hypertensive. She was transferred to [facility] for evaluation and treatment with Neurointerventional Sugery and Neurosurgery. DISCHARGE DIAGNOSES / PLAN:       Subarachnoid hemorrhage  - Due to left PCOM aneurysm rupture, patient was transferred from [facility] ED  - S/p coil embolization of left PCOM aneurysm 10/30, also s/p EVD placement by neurosurgery on 06/66/1877  - complicated by acute respiratory failure requiring intubation and mechanical ventilation  - complicated by cerebral vasospasm requiring intra-arterial verapamil and balloon angioplasty 11/3, 11/4 and 11/5  - S/p removal of EVD 11/14  - remained in ICU from 10/30 to 11/15  - Nemotop has been re-calculated by NIS team and needs doses through tomorrow (needs 1400/1800/2200/0200/0600). Depending on transport time, [facility] will give 1400 dose. - Medically stable for discharge with NIS follow-up in 6 weeks.    - family has Nemotop prescription that was filled Friday and give prescription to [facility]     Superficial Thrombus, Left Cephalic Vein  - Conservative management

## 2023-11-26 NOTE — DISCHARGE SUMMARY
Miriankelyyoli  [phone]            ADDITIONAL CARE RECOMMENDATIONS:     Please note! Patient is to continue Nimotop (60 mg every 4 hours) through tomorrow morning (11/27) with last dose at 0600. Dose should be scheduled for 1800/2200/0200/0600. FAMILY HAS MEDICATION - WAS FILLED BY [facility] ON 11/24 AND GIVEN TO FAMILY TO PROVIDE TO REHAB FACILITY.     SBP Goal 100-160 per Neurointerventional Surgery  Check BP 1-2 times daily    DIET: Dysphagia   Protein supplement in am for breakfast  Magic cup at [employer]    ACTIVITY: activity as tolerated - as per PT/OT    WOUND CARE: none    EQUIPMENT needed: as per PT/OT    DISCHARGE MEDICATIONS:     Medication List        START taking these medications      atorvastatin 20 MG tablet  Commonly known as: LIPITOR  Take 1 tablet by mouth nightly     butalbital-acetaminophen-caffeine -40 MG per tablet  Commonly known as: FIORICET, ESGIC  Take 1 tablet by mouth every 6 hours as needed for Headaches     cilostazol 50 MG tablet  Commonly known as: PLETAL  Take 1 tablet by mouth 2 times daily     lansoprazole 30 MG disintegrating tablet  Commonly known as: PREVACID SOLUTAB  Take 1 tablet by mouth daily     niMODipine 60 MG/20ML oral solution  Commonly known as: NYMALIZE  Take 20 mLs by mouth every 4 hours for 1 day            CONTINUE taking these medications      acetaminophen 325 MG tablet  Commonly known as: TYLENOL     ARIPiprazole 5 MG tablet  Commonly known as: ABILIFY     cyanocobalamin 1000 MCG/ML injection     ergocalciferol 1.25 MG (26211 UT) capsule  Commonly known as: ERGOCALCIFEROL     ferrous sulfate 325 (65 Fe) MG EC tablet  Commonly known as: FE TABS 325            STOP taking these medications      amLODIPine 5 MG tablet  Commonly known as: NORVASC     amphetamine-dextroamphetamine 10 MG tablet  Commonly known as: ADDERALL     colestipol 1 g tablet  Commonly known as: COLESTID     famotidine 20 MG tablet  Commonly known as:

## 2024-01-22 ENCOUNTER — APPOINTMENT (OUTPATIENT)
Facility: HOSPITAL | Age: 59
DRG: 058 | End: 2024-01-22
Payer: MEDICAID

## 2024-01-22 ENCOUNTER — HOSPITAL ENCOUNTER (INPATIENT)
Facility: HOSPITAL | Age: 59
LOS: 3 days | Discharge: HOME HEALTH | DRG: 058 | End: 2024-01-25
Attending: EMERGENCY MEDICINE | Admitting: INTERNAL MEDICINE
Payer: MEDICAID

## 2024-01-22 ENCOUNTER — APPOINTMENT (OUTPATIENT)
Facility: HOSPITAL | Age: 59
DRG: 058 | End: 2024-01-22
Attending: EMERGENCY MEDICINE
Payer: MEDICAID

## 2024-01-22 DIAGNOSIS — M79.89 ARM SWELLING: ICD-10-CM

## 2024-01-22 DIAGNOSIS — R47.81 SLURRED SPEECH: Primary | ICD-10-CM

## 2024-01-22 DIAGNOSIS — G45.9 TIA (TRANSIENT ISCHEMIC ATTACK): ICD-10-CM

## 2024-01-22 LAB
ALBUMIN SERPL-MCNC: 3.3 G/DL (ref 3.5–5)
ALBUMIN/GLOB SERPL: 0.7 (ref 1.1–2.2)
ALP SERPL-CCNC: 105 U/L (ref 45–117)
ALT SERPL-CCNC: 24 U/L (ref 12–78)
ANION GAP SERPL CALC-SCNC: 4 MMOL/L (ref 5–15)
AST SERPL W P-5'-P-CCNC: 16 U/L (ref 15–37)
AST SERPL W P-5'-P-CCNC: ABNORMAL U/L (ref 15–37)
BASOPHILS # BLD: 0 K/UL (ref 0–0.1)
BASOPHILS NFR BLD: 0 % (ref 0–1)
BILIRUB SERPL-MCNC: 0.3 MG/DL (ref 0.2–1)
BNP SERPL-MCNC: 91 PG/ML
BUN SERPL-MCNC: 9 MG/DL (ref 6–20)
BUN/CREAT SERPL: 11 (ref 12–20)
CA-I BLD-MCNC: 8.7 MG/DL (ref 8.5–10.1)
CHLORIDE SERPL-SCNC: 110 MMOL/L (ref 97–108)
CO2 SERPL-SCNC: 22 MMOL/L (ref 21–32)
CREAT SERPL-MCNC: 0.8 MG/DL (ref 0.55–1.02)
DIFFERENTIAL METHOD BLD: ABNORMAL
EKG ATRIAL RATE: 94 BPM
EKG DIAGNOSIS: NORMAL
EKG P AXIS: 68 DEGREES
EKG P-R INTERVAL: 144 MS
EKG Q-T INTERVAL: 368 MS
EKG QRS DURATION: 88 MS
EKG QTC CALCULATION (BAZETT): 460 MS
EKG R AXIS: -14 DEGREES
EKG T AXIS: 38 DEGREES
EKG VENTRICULAR RATE: 94 BPM
EOSINOPHIL # BLD: 0.2 K/UL (ref 0–0.4)
EOSINOPHIL NFR BLD: 2 % (ref 0–7)
ERYTHROCYTE [DISTWIDTH] IN BLOOD BY AUTOMATED COUNT: 15.7 % (ref 11.5–14.5)
ETHANOL SERPL-MCNC: <10 MG/DL (ref 0–0.08)
GLOBULIN SER CALC-MCNC: 4.6 G/DL (ref 2–4)
GLUCOSE SERPL-MCNC: 94 MG/DL (ref 65–100)
HCT VFR BLD AUTO: 41 % (ref 35–47)
HGB BLD-MCNC: 12.4 G/DL (ref 11.5–16)
IMM GRANULOCYTES # BLD AUTO: 0 K/UL (ref 0–0.04)
IMM GRANULOCYTES NFR BLD AUTO: 0 % (ref 0–0.5)
LYMPHOCYTES # BLD: 2.6 K/UL (ref 0.8–3.5)
LYMPHOCYTES NFR BLD: 28 % (ref 12–49)
MAGNESIUM SERPL-MCNC: 1.7 MG/DL (ref 1.6–2.4)
MCH RBC QN AUTO: 26.9 PG (ref 26–34)
MCHC RBC AUTO-ENTMCNC: 30.2 G/DL (ref 30–36.5)
MCV RBC AUTO: 88.9 FL (ref 80–99)
MONOCYTES # BLD: 0.6 K/UL (ref 0–1)
MONOCYTES NFR BLD: 6 % (ref 5–13)
NEUTS SEG # BLD: 5.8 K/UL (ref 1.8–8)
NEUTS SEG NFR BLD: 64 % (ref 32–75)
NRBC # BLD: 0 K/UL (ref 0–0.01)
NRBC BLD-RTO: 0 PER 100 WBC
PLATELET # BLD AUTO: 262 K/UL (ref 150–400)
PMV BLD AUTO: 13 FL (ref 8.9–12.9)
POTASSIUM SERPL-SCNC: 4.5 MMOL/L (ref 3.5–5.1)
POTASSIUM SERPL-SCNC: ABNORMAL MMOL/L (ref 3.5–5.1)
PROT SERPL-MCNC: 7.9 G/DL (ref 6.4–8.2)
RBC # BLD AUTO: 4.61 M/UL (ref 3.8–5.2)
SODIUM SERPL-SCNC: 136 MMOL/L (ref 136–145)
TROPONIN I SERPL HS-MCNC: 8 NG/L (ref 0–51)
WBC # BLD AUTO: 9.2 K/UL (ref 3.6–11)

## 2024-01-22 PROCEDURE — 1100000000 HC RM PRIVATE

## 2024-01-22 PROCEDURE — 83735 ASSAY OF MAGNESIUM: CPT

## 2024-01-22 PROCEDURE — 73120 X-RAY EXAM OF HAND: CPT

## 2024-01-22 PROCEDURE — 99285 EMERGENCY DEPT VISIT HI MDM: CPT

## 2024-01-22 PROCEDURE — 36415 COLL VENOUS BLD VENIPUNCTURE: CPT

## 2024-01-22 PROCEDURE — 80053 COMPREHEN METABOLIC PANEL: CPT

## 2024-01-22 PROCEDURE — 84450 TRANSFERASE (AST) (SGOT): CPT

## 2024-01-22 PROCEDURE — 6360000002 HC RX W HCPCS: Performed by: INTERNAL MEDICINE

## 2024-01-22 PROCEDURE — 93971 EXTREMITY STUDY: CPT

## 2024-01-22 PROCEDURE — 73090 X-RAY EXAM OF FOREARM: CPT

## 2024-01-22 PROCEDURE — 05HC33Z INSERTION OF INFUSION DEVICE INTO LEFT BASILIC VEIN, PERCUTANEOUS APPROACH: ICD-10-PCS | Performed by: INTERNAL MEDICINE

## 2024-01-22 PROCEDURE — 71045 X-RAY EXAM CHEST 1 VIEW: CPT

## 2024-01-22 PROCEDURE — 84132 ASSAY OF SERUM POTASSIUM: CPT

## 2024-01-22 PROCEDURE — 85025 COMPLETE CBC W/AUTO DIFF WBC: CPT

## 2024-01-22 PROCEDURE — 70450 CT HEAD/BRAIN W/O DYE: CPT

## 2024-01-22 PROCEDURE — 84484 ASSAY OF TROPONIN QUANT: CPT

## 2024-01-22 PROCEDURE — 82077 ASSAY SPEC XCP UR&BREATH IA: CPT

## 2024-01-22 PROCEDURE — 93005 ELECTROCARDIOGRAM TRACING: CPT | Performed by: EMERGENCY MEDICINE

## 2024-01-22 PROCEDURE — 83880 ASSAY OF NATRIURETIC PEPTIDE: CPT

## 2024-01-22 RX ORDER — CILOSTAZOL 50 MG/1
50 TABLET ORAL 2 TIMES DAILY
Status: DISCONTINUED | OUTPATIENT
Start: 2024-01-22 | End: 2024-01-25 | Stop reason: HOSPADM

## 2024-01-22 RX ORDER — ENOXAPARIN SODIUM 100 MG/ML
30 INJECTION SUBCUTANEOUS 2 TIMES DAILY
Status: DISCONTINUED | OUTPATIENT
Start: 2024-01-22 | End: 2024-01-25 | Stop reason: HOSPADM

## 2024-01-22 RX ORDER — ONDANSETRON 4 MG/1
4 TABLET, ORALLY DISINTEGRATING ORAL EVERY 8 HOURS PRN
Status: DISCONTINUED | OUTPATIENT
Start: 2024-01-22 | End: 2024-01-25 | Stop reason: HOSPADM

## 2024-01-22 RX ORDER — ACETAMINOPHEN 325 MG/1
650 TABLET ORAL EVERY 6 HOURS PRN
Status: DISCONTINUED | OUTPATIENT
Start: 2024-01-22 | End: 2024-01-25 | Stop reason: HOSPADM

## 2024-01-22 RX ORDER — SODIUM CHLORIDE 0.9 % (FLUSH) 0.9 %
5-40 SYRINGE (ML) INJECTION EVERY 12 HOURS SCHEDULED
Status: DISCONTINUED | OUTPATIENT
Start: 2024-01-22 | End: 2024-01-25 | Stop reason: HOSPADM

## 2024-01-22 RX ORDER — LANSOPRAZOLE 30 MG/1
30 TABLET, ORALLY DISINTEGRATING, DELAYED RELEASE ORAL DAILY
Status: DISCONTINUED | OUTPATIENT
Start: 2024-01-23 | End: 2024-01-25 | Stop reason: HOSPADM

## 2024-01-22 RX ORDER — CYANOCOBALAMIN 1000 UG/ML
1000 INJECTION, SOLUTION INTRAMUSCULAR; SUBCUTANEOUS
Status: DISCONTINUED | OUTPATIENT
Start: 2024-01-22 | End: 2024-01-25

## 2024-01-22 RX ORDER — SODIUM CHLORIDE 0.9 % (FLUSH) 0.9 %
5-40 SYRINGE (ML) INJECTION PRN
Status: DISCONTINUED | OUTPATIENT
Start: 2024-01-22 | End: 2024-01-25 | Stop reason: HOSPADM

## 2024-01-22 RX ORDER — ERGOCALCIFEROL 1.25 MG/1
50000 CAPSULE ORAL
Status: DISCONTINUED | OUTPATIENT
Start: 2024-01-26 | End: 2024-01-25

## 2024-01-22 RX ORDER — ONDANSETRON 2 MG/ML
4 INJECTION INTRAMUSCULAR; INTRAVENOUS EVERY 6 HOURS PRN
Status: DISCONTINUED | OUTPATIENT
Start: 2024-01-22 | End: 2024-01-25 | Stop reason: HOSPADM

## 2024-01-22 RX ORDER — POLYETHYLENE GLYCOL 3350 17 G/17G
17 POWDER, FOR SOLUTION ORAL DAILY PRN
Status: DISCONTINUED | OUTPATIENT
Start: 2024-01-22 | End: 2024-01-25 | Stop reason: HOSPADM

## 2024-01-22 RX ORDER — BUTALBITAL, ACETAMINOPHEN AND CAFFEINE 50; 325; 40 MG/1; MG/1; MG/1
1 TABLET ORAL EVERY 6 HOURS PRN
Status: DISCONTINUED | OUTPATIENT
Start: 2024-01-22 | End: 2024-01-25 | Stop reason: HOSPADM

## 2024-01-22 RX ORDER — SODIUM CHLORIDE 9 MG/ML
INJECTION, SOLUTION INTRAVENOUS PRN
Status: DISCONTINUED | OUTPATIENT
Start: 2024-01-22 | End: 2024-01-25 | Stop reason: HOSPADM

## 2024-01-22 RX ORDER — ARIPIPRAZOLE 5 MG/1
5 TABLET ORAL DAILY
Status: DISCONTINUED | OUTPATIENT
Start: 2024-01-23 | End: 2024-01-25 | Stop reason: HOSPADM

## 2024-01-22 RX ORDER — ATORVASTATIN CALCIUM 20 MG/1
20 TABLET, FILM COATED ORAL NIGHTLY
Status: DISCONTINUED | OUTPATIENT
Start: 2024-01-22 | End: 2024-01-25 | Stop reason: HOSPADM

## 2024-01-22 RX ORDER — LABETALOL HYDROCHLORIDE 5 MG/ML
10 INJECTION, SOLUTION INTRAVENOUS EVERY 4 HOURS PRN
Status: DISCONTINUED | OUTPATIENT
Start: 2024-01-22 | End: 2024-01-25 | Stop reason: HOSPADM

## 2024-01-22 RX ORDER — FERROUS SULFATE 325(65) MG
325 TABLET ORAL
Status: DISCONTINUED | OUTPATIENT
Start: 2024-01-23 | End: 2024-01-23

## 2024-01-22 RX ADMIN — ENOXAPARIN SODIUM 30 MG: 100 INJECTION SUBCUTANEOUS at 23:51

## 2024-01-22 ASSESSMENT — PAIN SCALES - GENERAL
PAINLEVEL_OUTOF10: 0
PAINLEVEL_OUTOF10: 0
PAINLEVEL_OUTOF10: 6
PAINLEVEL_OUTOF10: 0

## 2024-01-22 ASSESSMENT — LIFESTYLE VARIABLES
HOW MANY STANDARD DRINKS CONTAINING ALCOHOL DO YOU HAVE ON A TYPICAL DAY: PATIENT DOES NOT DRINK
HOW OFTEN DO YOU HAVE A DRINK CONTAINING ALCOHOL: NEVER

## 2024-01-22 ASSESSMENT — PAIN - FUNCTIONAL ASSESSMENT: PAIN_FUNCTIONAL_ASSESSMENT: 0-10

## 2024-01-22 NOTE — ED PROVIDER NOTES
[facility] EMERGENCY DEPT  EMERGENCY DEPARTMENT HISTORY AND PHYSICAL EXAM      Date: 1/22/2024  Patient Name: Francisca Portillo  MRN: [id number]  Birthdate 1965  Date of evaluation: 1/22/2024  Provider: Lee Nick DO   Note Started: 5:09 PM EST 1/22/24    HISTORY OF PRESENT ILLNESS     Chief Complaint   Patient presents with    Aphasia     History Provided By: Patient and EMS, Patient's son    HPI: Francisca Portillo is a 58 y.o. female with past medical history of anxiety, arthritis, asthma, depression, GERD, obstructive sleep apnea, brain aneurysm, and hemorrhagic stroke   who presents with worsening slurred speech.  The patient's son states that patient has had some level of decline over the last 2 days.  He has noticed increased issues with her balance.  She has had headache for 2 days as well.  He has noticed swelling in her right hand however that has been present for longer than 2 days.  He noticed that she has had slurred speech that worsened this morning around 9 AM.  She has also been slightly more congested and short of breath than normal.  He states that at baseline she is improving from physical therapy.  She has been ambulatory however is not ambulatory today.  She does have baseline decreased strength in the right upper extremity however not in the right lower extremity.  She does have baseline slurred speech from the previous stroke however it has increased significantly today.  Patient is a limited historian due to slurred speech.    PAST MEDICAL HISTORY   Past Medical History:  Past Medical History:   Diagnosis Date    Anxiety     Arthritis     Asthma     Depression     GERD (gastroesophageal reflux disease)     Obesity     SONIA (obstructive sleep apnea)     SAH (subarachnoid hemorrhage) ([facility]) 11/2023    Smoker        Past Surgical History:  Past Surgical History:   Procedure Laterality Date    COLONOSCOPY N/A 5/27/2022    COLONOSCOPY, EGD performed by Mamie Ndiaye MD at [facility] ENDOSCOPY    GYN

## 2024-01-23 ENCOUNTER — APPOINTMENT (OUTPATIENT)
Facility: HOSPITAL | Age: 59
DRG: 058 | End: 2024-01-23
Attending: INTERNAL MEDICINE
Payer: MEDICAID

## 2024-01-23 LAB
AMPHET UR QL SCN: NEGATIVE
APPEARANCE UR: CLEAR
BACTERIA URNS QL MICRO: NEGATIVE /HPF
BARBITURATES UR QL SCN: POSITIVE
BENZODIAZ UR QL: POSITIVE
BILIRUB UR QL: NEGATIVE
CANNABINOIDS UR QL SCN: NEGATIVE
COCAINE UR QL SCN: NEGATIVE
COLOR UR: NORMAL
EPITH CASTS URNS QL MICRO: NORMAL /LPF
ERYTHROCYTE [DISTWIDTH] IN BLOOD BY AUTOMATED COUNT: 15.1 % (ref 11.5–14.5)
EST. AVERAGE GLUCOSE BLD GHB EST-MCNC: 120 MG/DL
GLUCOSE BLD STRIP.AUTO-MCNC: 154 MG/DL (ref 65–100)
GLUCOSE UR STRIP.AUTO-MCNC: NEGATIVE MG/DL
HBA1C MFR BLD: 5.8 % (ref 4–5.6)
HCT VFR BLD AUTO: 33.7 % (ref 35–47)
HGB BLD-MCNC: 10.3 G/DL (ref 11.5–16)
HGB UR QL STRIP: NEGATIVE
KETONES UR QL STRIP.AUTO: NEGATIVE MG/DL
LEUKOCYTE ESTERASE UR QL STRIP.AUTO: NEGATIVE
Lab: ABNORMAL
MCH RBC QN AUTO: 27 PG (ref 26–34)
MCHC RBC AUTO-ENTMCNC: 30.6 G/DL (ref 30–36.5)
MCV RBC AUTO: 88.2 FL (ref 80–99)
METHADONE UR QL: NEGATIVE
NITRITE UR QL STRIP.AUTO: NEGATIVE
NRBC # BLD: 0 K/UL (ref 0–0.01)
NRBC BLD-RTO: 0 PER 100 WBC
OPIATES UR QL: NEGATIVE
PCP UR QL: NEGATIVE
PERFORMED BY:: ABNORMAL
PH UR STRIP: 6 (ref 5–8)
PLATELET # BLD AUTO: 275 K/UL (ref 150–400)
PMV BLD AUTO: 11.8 FL (ref 8.9–12.9)
PROT UR STRIP-MCNC: NEGATIVE MG/DL
RBC # BLD AUTO: 3.82 M/UL (ref 3.8–5.2)
RBC #/AREA URNS HPF: NORMAL /HPF (ref 0–5)
SP GR UR REFRACTOMETRY: <1.005 (ref 1–1.03)
TROPONIN I SERPL HS-MCNC: 8 NG/L (ref 0–51)
UROBILINOGEN UR QL STRIP.AUTO: 0.1 EU/DL (ref 0.1–1)
WBC # BLD AUTO: 12.6 K/UL (ref 3.6–11)
WBC URNS QL MICRO: NORMAL /HPF (ref 0–4)

## 2024-01-23 PROCEDURE — 70551 MRI BRAIN STEM W/O DYE: CPT

## 2024-01-23 PROCEDURE — 1100000000 HC RM PRIVATE

## 2024-01-23 PROCEDURE — 6360000002 HC RX W HCPCS: Performed by: INTERNAL MEDICINE

## 2024-01-23 PROCEDURE — 36410 VNPNXR 3YR/> PHY/QHP DX/THER: CPT

## 2024-01-23 PROCEDURE — 85027 COMPLETE CBC AUTOMATED: CPT

## 2024-01-23 PROCEDURE — 6370000000 HC RX 637 (ALT 250 FOR IP): Performed by: INTERNAL MEDICINE

## 2024-01-23 PROCEDURE — 95816 EEG AWAKE AND DROWSY: CPT

## 2024-01-23 PROCEDURE — 82962 GLUCOSE BLOOD TEST: CPT

## 2024-01-23 PROCEDURE — 36415 COLL VENOUS BLD VENIPUNCTURE: CPT

## 2024-01-23 PROCEDURE — 2580000003 HC RX 258: Performed by: INTERNAL MEDICINE

## 2024-01-23 PROCEDURE — 80307 DRUG TEST PRSMV CHEM ANLYZR: CPT

## 2024-01-23 PROCEDURE — 81001 URINALYSIS AUTO W/SCOPE: CPT

## 2024-01-23 PROCEDURE — 83036 HEMOGLOBIN GLYCOSYLATED A1C: CPT

## 2024-01-23 PROCEDURE — 92610 EVALUATE SWALLOWING FUNCTION: CPT

## 2024-01-23 RX ORDER — GABAPENTIN 100 MG/1
100 CAPSULE ORAL 3 TIMES DAILY
Status: DISCONTINUED | OUTPATIENT
Start: 2024-01-23 | End: 2024-01-25 | Stop reason: HOSPADM

## 2024-01-23 RX ORDER — DEXTROSE AND SODIUM CHLORIDE 5; .9 G/100ML; G/100ML
INJECTION, SOLUTION INTRAVENOUS CONTINUOUS
Status: DISCONTINUED | OUTPATIENT
Start: 2024-01-23 | End: 2024-01-25 | Stop reason: HOSPADM

## 2024-01-23 RX ORDER — FERROUS SULFATE 325(65) MG
325 TABLET ORAL
Status: DISCONTINUED | OUTPATIENT
Start: 2024-01-23 | End: 2024-01-25 | Stop reason: HOSPADM

## 2024-01-23 RX ADMIN — SODIUM CHLORIDE, PRESERVATIVE FREE 10 ML: 5 INJECTION INTRAVENOUS at 00:55

## 2024-01-23 RX ADMIN — LANSOPRAZOLE 30 MG: 30 TABLET, ORALLY DISINTEGRATING ORAL at 11:08

## 2024-01-23 RX ADMIN — SODIUM CHLORIDE, PRESERVATIVE FREE 10 ML: 5 INJECTION INTRAVENOUS at 11:09

## 2024-01-23 RX ADMIN — DEXTROSE AND SODIUM CHLORIDE: 5; 900 INJECTION, SOLUTION INTRAVENOUS at 23:05

## 2024-01-23 RX ADMIN — FERROUS SULFATE TAB 325 MG (65 MG ELEMENTAL FE) 325 MG: 325 (65 FE) TAB at 11:07

## 2024-01-23 RX ADMIN — CILOSTAZOL 50 MG: 50 TABLET ORAL at 00:53

## 2024-01-23 RX ADMIN — SODIUM CHLORIDE, PRESERVATIVE FREE 10 ML: 5 INJECTION INTRAVENOUS at 23:02

## 2024-01-23 RX ADMIN — ATORVASTATIN CALCIUM 20 MG: 20 TABLET, FILM COATED ORAL at 23:01

## 2024-01-23 RX ADMIN — ENOXAPARIN SODIUM 30 MG: 100 INJECTION SUBCUTANEOUS at 11:41

## 2024-01-23 RX ADMIN — GABAPENTIN 100 MG: 100 CAPSULE ORAL at 23:17

## 2024-01-23 RX ADMIN — FERROUS SULFATE TAB 325 MG (65 MG ELEMENTAL FE) 325 MG: 325 (65 FE) TAB at 20:17

## 2024-01-23 RX ADMIN — CILOSTAZOL 50 MG: 50 TABLET ORAL at 23:01

## 2024-01-23 RX ADMIN — ENOXAPARIN SODIUM 30 MG: 100 INJECTION SUBCUTANEOUS at 23:05

## 2024-01-23 RX ADMIN — CILOSTAZOL 50 MG: 50 TABLET ORAL at 11:41

## 2024-01-23 ASSESSMENT — PAIN SCALES - GENERAL
PAINLEVEL_OUTOF10: 0
PAINLEVEL_OUTOF10: 8
PAINLEVEL_OUTOF10: 0

## 2024-01-23 NOTE — PROGRESS NOTES
I discussed with the hospitalist at [facility] recommend patient had an MRI of the brain and if any abnormality transfer patient to [facility] for acceptance but currently patient  is not accepted

## 2024-01-23 NOTE — CONSULTS
Negative      Comments:        This test is a screen for drugs of abuse in a medical setting only (i.e., they are unconfirmed results and as such must not be used for non-medical purposes, e.g.,employment testing, legal testing). Due to its inherent nature, false positive (FP) and false negative (FN) results may be obtained. Therefore, if necessary for medical care, recommend confirmation of positive findings by GC/MS.     CBC    Collection Time: 01/23/24  2:50 AM   Result Value Ref Range    WBC 12.6 (H) 3.6 - 11.0 K/uL    RBC 3.82 3.80 - 5.20 M/uL    Hemoglobin 10.3 (L) 11.5 - 16.0 g/dL    Hematocrit 33.7 (L) 35.0 - 47.0 %    MCV 88.2 80.0 - 99.0 FL    MCH 27.0 26.0 - 34.0 PG    MCHC 30.6 30.0 - 36.5 g/dL    RDW 15.1 (H) 11.5 - 14.5 %    Platelets 275 150 - 400 K/uL    MPV 11.8 8.9 - 12.9 FL    Nucleated RBCs 0.0 0.0  WBC    nRBC 0.00 0.00 - 0.01 K/uL           Imaging:       XR RADIUS ULNA RIGHT (2 VIEWS)    Result Date: 1/22/2024  EXAM: XR RADIUS ULNA RIGHT (2 VIEWS), XR HAND RIGHT (2 VIEWS) INDICATION: right arm pain. COMPARISON: None. FINDINGS: Two views of the right radius and ulna. 2 views of the right hand. No acute fracture or dislocation. There is soft tissue swelling of the forearm and hand. There is mild osteoarthritis in the first CMC joint and STT joint.     Soft tissue swelling of the forearm and hand    XR HAND RIGHT (2 VIEWS)    Result Date: 1/22/2024  EXAM: XR RADIUS ULNA RIGHT (2 VIEWS), XR HAND RIGHT (2 VIEWS) INDICATION: right arm pain. COMPARISON: None. FINDINGS: Two views of the right radius and ulna. 2 views of the right hand. No acute fracture or dislocation. There is soft tissue swelling of the forearm and hand. There is mild osteoarthritis in the first CMC joint and STT joint.     Soft tissue swelling of the forearm and hand    XR CHEST 1 VIEW    Result Date: 1/22/2024  INDICATION:  dyspnea Exam: Portable chest 1800. Comparison: 11/15/2023. Findings: Cardiomediastinal silhouette is

## 2024-01-23 NOTE — H&P
acute disease      CT HEAD WO CONTRAST    Result Date: 1/22/2024  EXAM: CT CODE NEURO HEAD WO CONTRAST INDICATION: Stroke Symptoms COMPARISON: 11/14/2023. CONTRAST: None. TECHNIQUE: Unenhanced CT of the head was performed using 5 mm images. Brain and bone windows were generated. Coronal and sagittal reformats. CT dose reduction was achieved through use of a standardized protocol tailored for this examination and automatic exposure control for dose modulation.  FINDINGS: The ventricles and sulci are normal in size, shape and configuration. Streak artifact from previous aneurysm coil placement. Low-density in the periventricular white matter with encephalomalacia in the posterior left frontal and left parietal lobe. There is no intracranial hemorrhage, extra-axial collection, or mass effect. The basilar cisterns are open. No CT evidence of acute infarct. The bone windows demonstrate no abnormalities. The visualized portions of the paranasal sinuses and mastoid air cells are clear.     Chronic changes as above. No acute abnormality      Assessment      Hospital Problems             Last Modified POA    * (Principal) TIA (transient ischemic attack) 1/22/2024 Yes   Strokelike symptoms  History of recent subarachnoid hemorrhage history of cerebral aneurysm repair history of communicating hydrocephalus status post coil embolization of left P-comm  Anxiety  Depression  COPD  Obesity  Cardiomyopathy  Rule out seizure    Plan   Patient was seen by teleneurology no in-house neurologist patient is of high  complexity of neurological  patient with recent sugery and events necessitates transfer to higher level of care called transfer center for  transfer to [facility]  Obtain EEG  Speech therapy  PT and OT  Discussed with patient and family obtain PICC line with difficulty to get patient/blood work and also for acess  Consultations Ordered:  IP CONSULT TO NEUROLOGY    Electronically signed by Yoan Nguyen MD on 1/23/24 at 9:09

## 2024-01-24 LAB — ECHO BSA: 2.41 M2

## 2024-01-24 PROCEDURE — 97161 PT EVAL LOW COMPLEX 20 MIN: CPT

## 2024-01-24 PROCEDURE — 2580000003 HC RX 258: Performed by: INTERNAL MEDICINE

## 2024-01-24 PROCEDURE — 6370000000 HC RX 637 (ALT 250 FOR IP): Performed by: INTERNAL MEDICINE

## 2024-01-24 PROCEDURE — 97530 THERAPEUTIC ACTIVITIES: CPT

## 2024-01-24 PROCEDURE — 6360000002 HC RX W HCPCS: Performed by: INTERNAL MEDICINE

## 2024-01-24 PROCEDURE — 97165 OT EVAL LOW COMPLEX 30 MIN: CPT

## 2024-01-24 PROCEDURE — 1100000000 HC RM PRIVATE

## 2024-01-24 RX ORDER — ALPRAZOLAM 0.5 MG/1
2 TABLET ORAL 3 TIMES DAILY
Status: DISCONTINUED | OUTPATIENT
Start: 2024-01-24 | End: 2024-01-25 | Stop reason: HOSPADM

## 2024-01-24 RX ORDER — ALPRAZOLAM 1 MG/1
2 TABLET ORAL 3 TIMES DAILY
COMMUNITY

## 2024-01-24 RX ORDER — ZOLPIDEM TARTRATE 10 MG/1
10 TABLET ORAL NIGHTLY PRN
COMMUNITY

## 2024-01-24 RX ORDER — ZOLPIDEM TARTRATE 5 MG/1
10 TABLET ORAL NIGHTLY PRN
Status: DISCONTINUED | OUTPATIENT
Start: 2024-01-24 | End: 2024-01-25 | Stop reason: HOSPADM

## 2024-01-24 RX ORDER — POLYETHYLENE GLYCOL 3350 17 G/17G
17 POWDER, FOR SOLUTION ORAL DAILY PRN
Qty: 527 G | Refills: 0 | Status: SHIPPED | OUTPATIENT
Start: 2024-01-24 | End: 2024-02-23

## 2024-01-24 RX ORDER — GABAPENTIN 100 MG/1
100 CAPSULE ORAL 3 TIMES DAILY
Qty: 30 CAPSULE | Refills: 0 | Status: SHIPPED | OUTPATIENT
Start: 2024-01-24 | End: 2024-02-03

## 2024-01-24 RX ADMIN — GABAPENTIN 100 MG: 100 CAPSULE ORAL at 09:39

## 2024-01-24 RX ADMIN — DEXTROSE AND SODIUM CHLORIDE: 5; 900 INJECTION, SOLUTION INTRAVENOUS at 20:44

## 2024-01-24 RX ADMIN — ACETAMINOPHEN 650 MG: 325 TABLET ORAL at 20:54

## 2024-01-24 RX ADMIN — DEXTROSE AND SODIUM CHLORIDE: 5; 900 INJECTION, SOLUTION INTRAVENOUS at 09:44

## 2024-01-24 RX ADMIN — FERROUS SULFATE TAB 325 MG (65 MG ELEMENTAL FE) 325 MG: 325 (65 FE) TAB at 09:39

## 2024-01-24 RX ADMIN — ALPRAZOLAM 2 MG: 0.5 TABLET ORAL at 14:20

## 2024-01-24 RX ADMIN — GABAPENTIN 100 MG: 100 CAPSULE ORAL at 20:54

## 2024-01-24 RX ADMIN — FERROUS SULFATE TAB 325 MG (65 MG ELEMENTAL FE) 325 MG: 325 (65 FE) TAB at 14:20

## 2024-01-24 RX ADMIN — ALPRAZOLAM 2 MG: 0.5 TABLET ORAL at 20:54

## 2024-01-24 RX ADMIN — SERTRALINE HYDROCHLORIDE 100 MG: 50 TABLET ORAL at 20:54

## 2024-01-24 RX ADMIN — ENOXAPARIN SODIUM 30 MG: 100 INJECTION SUBCUTANEOUS at 09:41

## 2024-01-24 RX ADMIN — CILOSTAZOL 50 MG: 50 TABLET ORAL at 09:39

## 2024-01-24 RX ADMIN — SODIUM CHLORIDE, PRESERVATIVE FREE 10 ML: 5 INJECTION INTRAVENOUS at 09:45

## 2024-01-24 RX ADMIN — ATORVASTATIN CALCIUM 20 MG: 20 TABLET, FILM COATED ORAL at 20:54

## 2024-01-24 RX ADMIN — LANSOPRAZOLE 30 MG: 30 TABLET, ORALLY DISINTEGRATING ORAL at 09:39

## 2024-01-24 RX ADMIN — ENOXAPARIN SODIUM 30 MG: 100 INJECTION SUBCUTANEOUS at 20:54

## 2024-01-24 RX ADMIN — GABAPENTIN 100 MG: 100 CAPSULE ORAL at 14:20

## 2024-01-24 RX ADMIN — SODIUM CHLORIDE, PRESERVATIVE FREE 10 ML: 5 INJECTION INTRAVENOUS at 20:54

## 2024-01-24 RX ADMIN — CILOSTAZOL 50 MG: 50 TABLET ORAL at 20:54

## 2024-01-24 ASSESSMENT — PAIN SCALES - GENERAL
PAINLEVEL_OUTOF10: 4
PAINLEVEL_OUTOF10: 0

## 2024-01-24 ASSESSMENT — PAIN DESCRIPTION - LOCATION: LOCATION: ARM

## 2024-01-24 ASSESSMENT — PAIN DESCRIPTION - DESCRIPTORS: DESCRIPTORS: DISCOMFORT

## 2024-01-24 ASSESSMENT — PAIN DESCRIPTION - ORIENTATION: ORIENTATION: RIGHT

## 2024-01-24 NOTE — PROGRESS NOTES
4 Eyes Skin Assessment     NAME:  Francisca Portillo  YOB: 1965  MEDICAL RECORD NUMBER:  [id number]    The patient is being assessed for  Admission    I agree that at least one RN has performed a thorough Head to Toe Skin Assessment on the patient. ALL assessment sites listed below have been assessed.      Areas assessed by both nurses:    Head, Face, Ears, Shoulders, Back, Chest, Arms, Elbows, Hands, Sacrum. Buttock, Coccyx, Ischium, Legs. Feet and Heels, and Under Medical Devices         Does the Patient have a Wound? Yes wound(s) were present on assessment. LDA wound assessment was Initiated and completed by RN       Marshall Prevention initiated by RN: Yes  Wound Care Orders initiated by RN: Yes    Pressure Injury (Stage 3,4, Unstageable, DTI, NWPT, and Complex wounds) if present, place Wound referral order by RN under : No    New Ostomies, if present place, Ostomy referral order under : No     Nurse 1 eSignature: Electronically signed by Tricia Cha RN on 1/24/24 at 12:19 AM EST    **SHARE this note so that the co-signing nurse can place an eSignature**    Nurse 2 eSignature: Electronically signed by Missy Villalta RN on 1/24/24 at 12:20 AM EST

## 2024-01-24 NOTE — CARE COORDINATION
01/24/24 1629   Service Assessment   Patient Orientation Alert and Oriented   Cognition Alert   History Provided By Patient   Primary Caregiver Private caregiver   Accompanied By/Relationship Son   Support Systems Children   Patient's Healthcare Decision Maker is: Legal Next of Kin   PCP Verified by CM Yes   Prior Functional Level Assistance with the following:   Current Functional Level Assistance with the following:   Can patient return to prior living arrangement Yes   Ability to make needs known: Good   Family able to assist with home care needs: Yes   Would you like for me to discuss the discharge plan with any other family members/significant others, and if so, who? Yes   Financial Resources None   Community Resources None   CM/SW Referral Other (see comment)   Social/Functional History   Lives With Son   Home Equipment Cane;Wheelchair-manual     Cm met with pt and son at the bedside. Discharge assessment obtained. Pt is current with [facility]. Pt would like to get a four prong cane. Choice letter signed.   Pt receives personal care M-F for 6 hr's a day.     Pharmacy - [facility] in     No medical Advance directive in place at this time. Cm offered completing one this admission and offer was declined.     Advance Care Planning   Healthcare Decision Maker:    Primary Decision Maker: Johnathan Portillo - Child - [phone]

## 2024-01-24 NOTE — PROGRESS NOTES
Patient requesting her home medications. States that she takes Zoloft, Ambien and Xanax daily and has not taken in 3 days. Son, Johnathan, called to verify mg patient takes.    Spoke to Dr. Nguyen regarding medications. Doctor requested to call patient's pharmacy to verify these medications. [facility] in [place] listed as patient's pharmacy. Attempted to call x2 with no answer.

## 2024-01-24 NOTE — PROGRESS NOTES
responded to Spiritual Consult. Ms. Portillo is lying in bed with her knees bent and she is talking on her cell phone. Pt ends call once  enters the room. Pt's family member, Johnathan, is present and seated bedside. Pt invites  to pray for healing.  prays for healing as requested. Informed pt of available spiritual resources; pt requests resources. Pt/family express their appreciation for the visit and the prayer.  provided spiritual resources as requested.     Please contact Spiritual Health for any emotional/spiritual needs and/or further referrals. Thank you.    Rev. Ritu Jasso MDIV   can be reached by calling the  at [facility]  [phone]

## 2024-01-24 NOTE — PROGRESS NOTES
Got in touch with patient's pharmacy. Medications verified and entered into patient's home medication list. Dr. Nguyen stated to order medications and monitor after administration.

## 2024-01-24 NOTE — ED NOTES
0730: report received from Luiza HADDAD using sbar. Pt sleeping    0830: family member at bedside. Pt sleeping    0945: Speech has been in to see pt. Pt okay to eat, speech therapist will order meal tray    1030: pt sleeping, family at bedside. Tech assisting pt with stalin care    1130: Pt and  are unsure of her home medications. Pt does not know if she takes abilify.  states he does not recall. When asked if she takes nymalize, pt nodded yes but  does not recall this medication. Spoke with maria teresa in pharmacy who states that this medication is not available in hospital  
5 Crow called at this time. Cherry notified of SBAR in chart  
Assumed care of patient after receiving report from Goldy RN.  Patient in bed and denies any complaints, but states she needs to be cleaned up.  ED tech at bedside to clean patient.  
Assumed care of patient from CATIE Ha. Patient is now on continuous blood pressure , pulse oximetry, and cardiac monitoring.  
Bedside shift report given to Nerissa HADDAD.  All questions and concerns answered at this time.  
Delay in initiating IVF as pt has no IV access. Attempting to contact PICC team. Per charge RN and nursing supervisor, there is a form that needs to be filled out and faxed to determine if pt meets criteria for PICC line  
Lab called and needs all new blood recollected. Will try to straight stick for blood specimens  
MRI form completed with patient at this time  
Patient cleaned up and linens changed at this time.  
Patient refusing IV and blood work  
Patient to MRI at this time  
Pt CHRISTINE for EEG  
Pt difficult IV stick. Pt was stuck multiple times while in CT for a CTA line. Ultrasound IV line was attempted and infiltrated.     Pt brought back to ED at this time for IV access.   
Pt's entire right arm is swollen and painful to touch.  states this is an ongoing problem and denies any recent trauma. Pt's US IV in right upper arm is infiltrated and when flushed, saline leaks from the puncture site. IV catheter removed.  states pt is a very hard IV stick.   
Report given to Pearl HADDAD using sbar. She is aware of pt's lack of IV access and order for PICC line. Will speak with charge rn regarding picc or US iv  
Still attempting IV access at this time with ultrasound IV. Dr. Nick also at bedside and aware.  
Tech here to do venous duplex of right arm at this time   
none  Pertinent or High Risk Medications/Drips: no   If Yes, please provide details: none  Blood Product Administration: no  If Yes, please provide details: none  Process Protocols/Bundles: Stroke Protocol/Bundle Completion-     Recommendation  Incomplete STAT orders: Vascular duplex upper extremity right  Overdue Medications: none  Patient Belongings:    Additional Comments: none  If any further questions, please call Sending RN at [phone]    Electronically signed by: Electronically signed by ELVA FLORES RN on 1/23/2024 at 7:52 PM

## 2024-01-25 VITALS
BODY MASS INDEX: 51.21 KG/M2 | SYSTOLIC BLOOD PRESSURE: 122 MMHG | WEIGHT: 289 LBS | HEIGHT: 63 IN | TEMPERATURE: 98.1 F | RESPIRATION RATE: 18 BRPM | DIASTOLIC BLOOD PRESSURE: 63 MMHG | OXYGEN SATURATION: 98 % | HEART RATE: 71 BPM

## 2024-01-25 PROCEDURE — 6370000000 HC RX 637 (ALT 250 FOR IP): Performed by: INTERNAL MEDICINE

## 2024-01-25 PROCEDURE — 2580000003 HC RX 258: Performed by: INTERNAL MEDICINE

## 2024-01-25 PROCEDURE — 6360000002 HC RX W HCPCS: Performed by: INTERNAL MEDICINE

## 2024-01-25 RX ORDER — ERGOCALCIFEROL 1.25 MG/1
50000 CAPSULE ORAL
Status: DISCONTINUED | OUTPATIENT
Start: 2024-01-26 | End: 2024-01-25 | Stop reason: HOSPADM

## 2024-01-25 RX ORDER — CYANOCOBALAMIN 1000 UG/ML
1000 INJECTION, SOLUTION INTRAMUSCULAR; SUBCUTANEOUS
Status: DISCONTINUED | OUTPATIENT
Start: 2024-01-25 | End: 2024-01-25 | Stop reason: HOSPADM

## 2024-01-25 RX ADMIN — CYANOCOBALAMIN 1000 MCG: 1000 INJECTION, SOLUTION INTRAMUSCULAR; SUBCUTANEOUS at 12:00

## 2024-01-25 RX ADMIN — SERTRALINE HYDROCHLORIDE 100 MG: 50 TABLET ORAL at 08:49

## 2024-01-25 RX ADMIN — ALPRAZOLAM 2 MG: 0.5 TABLET ORAL at 08:49

## 2024-01-25 RX ADMIN — ARIPIPRAZOLE 5 MG: 5 TABLET ORAL at 11:59

## 2024-01-25 RX ADMIN — ENOXAPARIN SODIUM 30 MG: 100 INJECTION SUBCUTANEOUS at 08:52

## 2024-01-25 RX ADMIN — LANSOPRAZOLE 30 MG: 30 TABLET, ORALLY DISINTEGRATING ORAL at 08:49

## 2024-01-25 RX ADMIN — SODIUM CHLORIDE, PRESERVATIVE FREE 10 ML: 5 INJECTION INTRAVENOUS at 08:55

## 2024-01-25 RX ADMIN — FERROUS SULFATE TAB 325 MG (65 MG ELEMENTAL FE) 325 MG: 325 (65 FE) TAB at 11:59

## 2024-01-25 RX ADMIN — CILOSTAZOL 50 MG: 50 TABLET ORAL at 08:49

## 2024-01-25 RX ADMIN — FERROUS SULFATE TAB 325 MG (65 MG ELEMENTAL FE) 325 MG: 325 (65 FE) TAB at 08:50

## 2024-01-25 RX ADMIN — GABAPENTIN 100 MG: 100 CAPSULE ORAL at 08:50

## 2024-01-25 RX ADMIN — ZOLPIDEM TARTRATE 10 MG: 5 TABLET ORAL at 02:40

## 2024-01-25 ASSESSMENT — PAIN SCALES - GENERAL: PAINLEVEL_OUTOF10: 0

## 2024-01-25 NOTE — DISCHARGE INSTR - COC
Continuity of Care Form    Patient Name: Francisca Portillo   :  1965  MRN:  [id number]    Admit date:  2024  Discharge date:  2024    Code Status Order: Full Code   Advance Directives:     Admitting Physician:  Yoan CLEANING MD  PCP: Bijan Martínez Jr., APRN - NP    Discharging Nurse: Angelica RN   Discharging Hospital Unit/Room#: 578/01  Discharging Unit Phone Number: [phone]    Emergency Contact:   Extended Emergency Contact Information  Primary Emergency Contact: Johnathan Portillo  Mobile Phone: [phone]  Relation: Child  Secondary Emergency Contact: Mustapha Baker  Mobile Phone: [phone]  Relation: Other    Past Surgical History:  Past Surgical History:   Procedure Laterality Date    COLONOSCOPY N/A 2022    COLONOSCOPY, EGD performed by Mamie [facility]diaye MD at [facility] ENDOSCOPY    GYN      LAPAROSCOPY    INSERT ARTERIAL LINE  2023    INTRACRANIAL ANEURYSM REPAIR  2023    Coil embolization- ruptured L PCOM aneurysm    LAP GASTRIC BYPASS/PHAN-EN-Y      LAP, PLACE ADJUST GASTR BAND      OTHER SURGICAL HISTORY      lapband removal    TUBAL LIGATION         Immunization History:     There is no immunization history on file for this patient.    Active Problems:  Patient Active Problem List   Diagnosis Code    Arthritis M19.90    Abdominal pain, acute R10.9    SAH (subarachnoid hemorrhage) (HCC) I60.9    Communicating hydrocephalus (HCC) G91.0    Subarachnoid hemorrhage due to ruptured aneurysm (HCC) I60.8    TIA (transient ischemic attack) G45.9       Isolation/Infection:   Isolation            No Isolation          Patient Infection Status       None to display            Nurse Assessment:  Last Vital Signs: /63   Pulse 71   Temp 98.1 °F (36.7 °C) (Oral)   Resp 18   Ht 1.6 m (5' 3\")   Wt 131.1 kg (289 lb)   SpO2 98%   BMI 51.19 kg/m²     Last documented pain score (0-10 scale): Pain Level: 4  Last Weight:   Wt Readings from Last 1 Encounters:   24 131.1 kg (289 lb)

## 2024-01-25 NOTE — DISCHARGE SUMMARY
Discharge Summary    Francisca Portillo  :  1965  MRN:  [id number]    ADMIT DATE:  2024  DISCHARGE DATE:  2024    PRIMARY CARE PHYSICIAN:  Bijan Martínez Jr., APRN - NP    VISIT STATUS: Observation    CODE STATUS:  Full Code    DISCHARGE DIAGNOSES:  Principal Problem:    TIA (transient ischemic attack)  Resolved Problems:    * No resolved hospital problems. *  History of subarachnoid hemorrhage  Communicating hydrocephalus  Subarachnoid hemorrhage due to ruptured aneurysm  Chronic pain syndrome  Possible  depression      HOSPITAL COURSE:  58 years old patient admitted for weakness on the right side persistent headache and slurred speech patient was seen by teleneurology and I also spoke with transfer center and spoke with the hospitalist who recommended an MRI MRI of the brain was negative for any new events patient is being discharged home with home health for PT and OT  SIGNIFICANT DIAGNOSTIC STUDIES:  MRI of the brain no significant acute event  CONSULTANTS:    RECOMMENDED NEXT STEPS:   PT and OT follow-up outpatient with [facility] neurologist     DISCHARGE MEDICATIONS:         Medication List        START taking these medications      acetaminophen 325 MG tablet  Commonly known as: TYLENOL     gabapentin 100 MG capsule  Commonly known as: NEURONTIN  Take 1 capsule by mouth 3 times daily for 10 days. Max Daily Amount: 300 mg     polyethylene glycol 17 g packet  Commonly known as: GLYCOLAX  Take 1 packet by mouth daily as needed for Constipation            CONTINUE taking these medications      ALPRAZolam 1 MG tablet  Commonly known as: XANAX     ARIPiprazole 5 MG tablet  Commonly known as: ABILIFY     atorvastatin 20 MG tablet  Commonly known as: LIPITOR  Take 1 tablet by mouth nightly     butalbital-acetaminophen-caffeine -40 MG per tablet  Commonly known as: FIORICET, ESGIC  Take 1 tablet by mouth every 6 hours as needed for Headaches     cyanocobalamin 1000 MCG/ML injection   
  ergocalciferol 1.25 MG (66428 UT) capsule  Commonly known as: ERGOCALCIFEROL     ferrous sulfate 325 (65 Fe) MG EC tablet  Commonly known as: FE TABS 325     lansoprazole 30 MG disintegrating tablet  Commonly known as: PREVACID SOLUTAB  Take 1 tablet by mouth daily     sertraline 50 MG tablet  Commonly known as: ZOLOFT     zolpidem 10 MG tablet  Commonly known as: AMBIEN            STOP taking these medications      cilostazol 50 MG tablet  Commonly known as: PLETAL     niMODipine 60 MG/20ML oral solution  Commonly known as: NYMALIZE               Where to Get Your Medications        These medications were sent to [id number] - [place], [place] - [address] - P [phone] - F [phone]  [address] [facility] [address]      Phone: [phone]   gabapentin 100 MG capsule  polyethylene glycol 17 g packet         DIET: ADULT DIET; Easy to Chew; Low Fat/Low Chol/High Fiber/2 gm Na    ACTIVITY: up with assist  ______________________________________________________________________  COMPLEXITY OF FOLLOW UP:   [] Moderate Complexity: follow up within 7-14 calendar days (60043)   [] Severe Complexity: follow up within 7 calendar days (12911)    FOLLOW UP TESTING, PENDING RESULTS OR REFERRALS AT TRANSITIONAL CARE VISIT:   []  Yes    []  No    PENDING STUDIES:       DISPOSITION: Home with Home Health Care    FACILITY/HOME CARE AGENCY NAME:     Follow up with No follow-up provider specified. on     INSTRUCTIONS TO MA/SW: Please call patient on day after discharge (must document patient  contacted within 2 business days of discharge).    FOLLOW UP QUESTIONS FOR MA/SW:  1. Did you get medications filled and taking them as instructed from discharge?  2. Are you following your discharge instructions from your hospital stay?  3. Please confirm patient is scheduled for a follow up appointment within the above time frame.    DISCHARGE TIME: > 30 minutes    SIGNED:  Yoan Nguyen MD   1/24/2024, 6:09

## 2024-01-25 NOTE — PLAN OF CARE
Speech LAnguage Pathology Dysphagia EVALUATION    Patient: Francisca Portillo (58 y.o. female)  Date: 1/23/2024  Primary Diagnosis: TIA (transient ischemic attack) [G45.9]       Precautions: Aspiration, fall                  DIET RECOMMENDATIONS: Easy to chew and thin liquids, meds as tolerated,    SWALLOW SAFETY PRECAUTIONS: Rec slow rate of intake, small bites/sips, 6 small meals, double swallow, liquid wash, remain upright 1 hour after PO and do not eat 3 hours before bedtime.       ASSESSMENT :  Based on the objective data described below, the patient presents with minimal oral dysphagia.  Decreased R facial sensation. There is mild to moderate dysarthria and expressive deficits present. Per family this is baseline from recent CVA she is currently receiving HH.    reports they came in s/t worsening dysarthria however it is now back to baseline..  Oral phase c/b minimal reduction in labial seal w/ trace anterior spillage of pudding trials,adequate mastication and bolus manipulation. There is trace oral residue s/t decreased sensation. Pharyngeal phase c/b minimal swallow delay and HLE is wfl upon palpation. No overt s/s of pen/asp observed.  Patient and family receptive of education.   Patient will benefit from skilled intervention to address the above impairments.    GOALS:    Problem: SLP Adult - Impaired Swallowing  Goal: By Discharge: Advance to least restrictive diet without signs or symptoms of aspiration for planned discharge setting.  See evaluation for individualized goals.  Description: Speech Therapy Swallow Goals  Initiated 1/23/2024  -Patient will tolerate easy to chew diet with thin liquids without clinical indicators of aspiration given no cues within 7 day(s).        -Patient will tolerate PO trials without clinical indicators of aspiration given no cues within 7 day(s).      -Patient will participate in modified barium swallow study within 7 day(s).      -Patient will demonstrate 
  Problem: Discharge Planning  Goal: Discharge to home or other facility with appropriate resources  1/25/2024 1235 by Angelica Whelan, RN  Outcome: Adequate for Discharge  1/25/2024 1235 by Angelica Whelan, RN    
OCCUPATIONAL THERAPY EVALUATION  Patient: Francisca Portillo (58 y.o. female)  Date: 1/24/2024  Primary Diagnosis: TIA (transient ischemic attack) [G45.9]  Slurred speech [R47.81]  Arm swelling [M79.89]       Precautions: Bed Alarm, Fall Risk, Up Ad [place]                Recommendations for nursing mobility: Amb to bathroom with AD and gait belt    In place during session:Peripheral IV  ASSESSMENT  Pt is a 58 y.o. female presenting to [facility] with c/o slurred speech and HA, admitted 1/22/24 and currently being treated for stroke workup. Pt received ambulating from bathroom w/ cane and CGA, AXO x3, and agreeable to OT evaluation.     Based on current observations, pt presents with decreased  functional mobility, independence in ADLs, high-level IADLs, strength, sensation, coordination, fine-motor control (see below for objective details and assist levels).     Overall, pt tolerates session well however pt w/ episode of distress w/ pain to dorsum of rt hand. Limited RUE assessment 2/2 pt w/ fear in assessing UE and anticipatory pain. Pt has HHOT/HHPT and HHRN at baseline x 4 days per week. Today pt presents near baseline PLOF. Will f/u at low frequency to optimize independence in UE BADLs and safety w/ OOB activity/mobility. Pt noted to be impulsive w/ OOB activity/mobility w/ PT. Pt will benefit from continued skilled OT services to address current impairments and improve IND and safety with self cares and functional transfers/mobility. Potential barriers for safe discharge: pt has poor safety awareness, pt has impaired cognition, pt is a high fall risk, and concern for pt safely navigating or managing the home environment. Current OT d/c recommendation Home with Home Health Therapy and family assistonce medically appropriate.    GOALS:    Problem: Occupational Therapy - Adult  Goal: By Discharge: Performs self-care activities at highest level of function for planned discharge setting.  See evaluation for individualized 
referral.  Norma Lopez, PT  Minutes: 42

## 2024-01-25 NOTE — CARE COORDINATION
CM reviewed Pt medicals, [facility] has accepted Pt to resume HH.    Pt receive's PCA M-F for 6 hours a day.    1:00  Pt was D/C, Pt left before CM could speak with her.  Apparently friend or family gave Pt a ride  home.    [facility] has accepted Pt, Pt also has PCA.    Transition of Care Plan:    RUR: 21%  Prior Level of Functioning: DEP  Disposition: Home with HH    Transportation at discharge: Family  IM/IMM Medicare/ letter given: No    Caregiver Contact: NO  Discharge Caregiver contacted prior to discharge? No  Care Conference needed? No  Barriers to discharge: None

## 2024-01-25 NOTE — PROGRESS NOTES
Discharge plan of care/case management plan validated with provider discharge order.    Discharge instructions discussed with the patient. All concerns addressed at this time.     PIV remove. Patient dressed and wheeled downstairs to go ome.

## 2024-02-13 ENCOUNTER — OFFICE VISIT (OUTPATIENT)
Age: 59
End: 2024-02-13
Payer: MEDICAID

## 2024-02-13 VITALS
HEIGHT: 63 IN | WEIGHT: 281 LBS | SYSTOLIC BLOOD PRESSURE: 130 MMHG | DIASTOLIC BLOOD PRESSURE: 82 MMHG | OXYGEN SATURATION: 96 % | BODY MASS INDEX: 49.79 KG/M2 | TEMPERATURE: 98.2 F | HEART RATE: 90 BPM

## 2024-02-13 DIAGNOSIS — I67.1 CEREBRAL ANEURYSM: Primary | ICD-10-CM

## 2024-02-13 PROCEDURE — 99204 OFFICE O/P NEW MOD 45 MIN: CPT | Performed by: PSYCHIATRY & NEUROLOGY

## 2024-02-13 RX ORDER — GABAPENTIN 100 MG/1
100 CAPSULE ORAL 3 TIMES DAILY
COMMUNITY

## 2024-02-13 RX ORDER — BUTALBITAL, ACETAMINOPHEN AND CAFFEINE 50; 325; 40 MG/1; MG/1; MG/1
1 TABLET ORAL EVERY 6 HOURS PRN
COMMUNITY

## 2024-02-13 NOTE — PATIENT INSTRUCTIONS
You will have a Diagnostic Angiogram in late April at [facility].   Date and time of procedure will be called to you.  Blood work will be needed at least one week prior to [facility]  -No eating or drinking after midnight the night prior to Angiogram.  -Take all morning medications with sips of water the morning of the angiogram.  -You must have a  to drive you home upon discharge.  -Recommend you have someone with you for at least 24-48 hours post procedure.  -No metal or glue in hair.

## 2024-02-14 RX ORDER — ASPIRIN 81 MG/1
81 TABLET ORAL DAILY
Qty: 30 TABLET | Refills: 5 | Status: SHIPPED | OUTPATIENT
Start: 2024-02-14

## 2024-02-14 NOTE — PROGRESS NOTES
[facility] procedure follow up for s/p SAH.  Son at visit with patient.  Patient reports continued right side weakness. Receiving in home therapy.  Denies headaches, dizziness, blurred or double vision.  No acute problems voiced.  
01/22/2024    K 4.5 01/22/2024     (H) 01/22/2024    BUN 9 01/22/2024    CO2 22 01/22/2024    TSH 0.591 08/08/2020    INR 1.0 10/30/2023       Imaging: my reads  DSA:   1. 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     2. Successful coil embolization of the left PCOM aneurysm, small residual neck.    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    Available via [employer]    February 13, 2024

## 2024-04-15 ENCOUNTER — TELEPHONE (OUTPATIENT)
Age: 59
End: 2024-04-15

## 2024-04-15 DIAGNOSIS — I67.1 CEREBRAL ANEURYSM: Primary | ICD-10-CM

## 2024-04-15 NOTE — TELEPHONE ENCOUNTER
Patient called with the help of her aide, Jacy.     They are wanting to know when the patient's DSA will be.

## 2024-04-22 ENCOUNTER — TELEPHONE (OUTPATIENT)
Age: 59
End: 2024-04-22

## 2024-04-22 NOTE — TELEPHONE ENCOUNTER
Patient and her aide (Anthonyjacqueline), called to see if we had a date for her DSA yet.    I let them know that Dr. Choi was out all last week, but hope to speak to him about this soon, and get back to them with a date.

## 2024-04-26 DIAGNOSIS — I67.1 CEREBRAL ANEURYSM: ICD-10-CM

## 2024-04-26 NOTE — TELEPHONE ENCOUNTER
Message left for patient for follow up diagnostic angiogram on 5/8/2024. Arrival time 7:00 am.  Asked that patient return the call to confirm.

## 2024-04-29 ENCOUNTER — TELEPHONE (OUTPATIENT)
Age: 59
End: 2024-04-29

## 2024-04-29 NOTE — TELEPHONE ENCOUNTER
Patient called in with two other people present, one being Alfredo, who is described as her aide.  They stated that they were still waiting for a procedure date.    I let them know that our nurse, Emily Gaspar, had called them on 4/26/2024 and left a message with a procedure date of 5/8/2024 with an arrival time of 7:00am, and she asked them to confirm that this worked for them.    They confirmed this worked, and said that they would get their MyChart working again so that Eimly Gaspar could send them some detailed information about the procedure.  I gave them the 800 number for MyChart and requested a call back to let us know that Ms. Portillo's My Chart is working again.    They all acknowledged understanding.

## 2024-05-08 ENCOUNTER — HOSPITAL ENCOUNTER (OUTPATIENT)
Facility: HOSPITAL | Age: 59
Discharge: HOME | End: 2024-05-08
Attending: PSYCHIATRY & NEUROLOGY | Admitting: PSYCHIATRY & NEUROLOGY
Payer: MEDICAID

## 2024-05-08 VITALS
BODY MASS INDEX: 47.8 KG/M2 | RESPIRATION RATE: 21 BRPM | HEART RATE: 78 BPM | WEIGHT: 280 LBS | TEMPERATURE: 97.8 F | SYSTOLIC BLOOD PRESSURE: 144 MMHG | HEIGHT: 64 IN | OXYGEN SATURATION: 97 % | DIASTOLIC BLOOD PRESSURE: 112 MMHG

## 2024-05-08 DIAGNOSIS — I67.1 CEREBRAL ANEURYSM: ICD-10-CM

## 2024-05-08 PROCEDURE — 6370000000 HC RX 637 (ALT 250 FOR IP): Performed by: PSYCHIATRY & NEUROLOGY

## 2024-05-08 PROCEDURE — 99152 MOD SED SAME PHYS/QHP 5/>YRS: CPT

## 2024-05-08 PROCEDURE — 2580000003 HC RX 258: Performed by: PSYCHIATRY & NEUROLOGY

## 2024-05-08 PROCEDURE — 6360000004 HC RX CONTRAST MEDICATION: Performed by: PSYCHIATRY & NEUROLOGY

## 2024-05-08 PROCEDURE — 6360000002 HC RX W HCPCS: Performed by: PSYCHIATRY & NEUROLOGY

## 2024-05-08 PROCEDURE — 2500000003 HC RX 250 WO HCPCS: Performed by: PSYCHIATRY & NEUROLOGY

## 2024-05-08 RX ORDER — MIDAZOLAM HYDROCHLORIDE 2 MG/2ML
INJECTION, SOLUTION INTRAMUSCULAR; INTRAVENOUS PRN
Status: COMPLETED | OUTPATIENT
Start: 2024-05-08 | End: 2024-05-08

## 2024-05-08 RX ORDER — SODIUM CHLORIDE 9 MG/ML
INJECTION, SOLUTION INTRAVENOUS CONTINUOUS PRN
Status: COMPLETED | OUTPATIENT
Start: 2024-05-08 | End: 2024-05-08

## 2024-05-08 RX ORDER — FENTANYL CITRATE 50 UG/ML
INJECTION, SOLUTION INTRAMUSCULAR; INTRAVENOUS PRN
Status: COMPLETED | OUTPATIENT
Start: 2024-05-08 | End: 2024-05-08

## 2024-05-08 RX ORDER — LIDOCAINE AND PRILOCAINE 25; 25 MG/G; MG/G
CREAM TOPICAL PRN
Status: COMPLETED | OUTPATIENT
Start: 2024-05-08 | End: 2024-05-08

## 2024-05-08 RX ORDER — LIDOCAINE HYDROCHLORIDE 10 MG/ML
INJECTION, SOLUTION EPIDURAL; INFILTRATION; INTRACAUDAL; PERINEURAL PRN
Status: COMPLETED | OUTPATIENT
Start: 2024-05-08 | End: 2024-05-08

## 2024-05-08 RX ADMIN — NITROGLYCERIN 1 INCH: 20 OINTMENT TOPICAL at 08:00

## 2024-05-08 RX ADMIN — LIDOCAINE HYDROCHLORIDE 6 ML: 10 INJECTION, SOLUTION EPIDURAL; INFILTRATION; INTRACAUDAL; PERINEURAL at 08:33

## 2024-05-08 RX ADMIN — HEPARIN SODIUM: 1000 INJECTION INTRAVENOUS; SUBCUTANEOUS at 08:13

## 2024-05-08 RX ADMIN — LIDOCAINE HYDROCHLORIDE 1 ML: 10 INJECTION, SOLUTION EPIDURAL; INFILTRATION; INTRACAUDAL; PERINEURAL at 08:20

## 2024-05-08 RX ADMIN — LIDOCAINE HYDROCHLORIDE 2 ML: 10 INJECTION, SOLUTION EPIDURAL; INFILTRATION; INTRACAUDAL; PERINEURAL at 08:16

## 2024-05-08 RX ADMIN — FENTANYL CITRATE 50 MCG: 50 INJECTION, SOLUTION INTRAMUSCULAR; INTRAVENOUS at 08:16

## 2024-05-08 RX ADMIN — MIDAZOLAM HYDROCHLORIDE 1 MG: 1 INJECTION, SOLUTION INTRAMUSCULAR; INTRAVENOUS at 08:16

## 2024-05-08 RX ADMIN — SODIUM CHLORIDE 125 ML/HR: 9 INJECTION, SOLUTION INTRAVENOUS at 08:11

## 2024-05-08 RX ADMIN — IOPAMIDOL 40 ML: 612 INJECTION, SOLUTION INTRAVENOUS at 08:45

## 2024-05-08 RX ADMIN — LIDOCAINE AND PRILOCAINE 1 ML: 25; 25 CREAM TOPICAL at 08:00

## 2024-05-08 NOTE — PROGRESS NOTES
0725: Pt arrives via wheelchair to angio department accompanied by spouse for diagnostic cerebral angiogram procedure. All assessments completed and consent was reviewed.  Education given was regarding procedure, moderate sedation, post-procedure care and  management/follow-up. Opportunity for questions was provided and all questions and concerns were addressed.       20429: Patient given copy of discharge instructions and verbalized understanding.  Patient wheeled to exit via wheelchair. Patient in NAD. No bleeding noted at puncture site.

## 2024-05-08 NOTE — H&P
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     2. Successful coil embolization of the left PCOM aneurysm, small residual neck.     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

## 2024-05-08 NOTE — BRIEF OP NOTE
Brief Procedure Note      Patient: Francisca Portillo MRN: [id number]     YOB: 1965  Age: 58 y.o.  Sex: female      Service: Neurointerventional Surgery    Date of Procedure: 5/8/2024    Pre-Procedure Diagnosis: L PCOM aneurym    Post-Procedure Diagnosis: SAME    : Neel Choi DO    Assistant(s): N/A    Procedure(s):   Diagnostic cerebral angiogram      Vessels Studied:   Left CCA  Left ICA      Puncture Site: Right CFA--> man comp    Preliminary Findings:   Coil compaction 3mm residual L PCOM aneurysm      Plan:   F/u clinic      Specimens: None    Implants: None    Drains: None    Anesthesia: Moderate Sedation    Estimated Blood Loss: 5 cc    Apparent Intraoperative Complications: None immediate    Patient Condition: Stable    Disposition: Same day recovery unit      Signed:   Neel Choi DO  [facility] Neurointerventional Surgery  [facility]

## 2024-05-14 ENCOUNTER — OFFICE VISIT (OUTPATIENT)
Age: 59
End: 2024-05-14

## 2024-05-14 VITALS
SYSTOLIC BLOOD PRESSURE: 140 MMHG | HEART RATE: 92 BPM | DIASTOLIC BLOOD PRESSURE: 92 MMHG | OXYGEN SATURATION: 97 % | TEMPERATURE: 97.9 F

## 2024-05-14 DIAGNOSIS — I67.1 CEREBRAL ANEURYSM: Primary | ICD-10-CM

## 2024-05-14 RX ORDER — CLOPIDOGREL BISULFATE 75 MG/1
75 TABLET ORAL DAILY
Qty: 30 TABLET | Refills: 5 | Status: SHIPPED | OUTPATIENT
Start: 2024-05-14

## 2024-05-14 NOTE — H&P (VIEW-ONLY)
Established Patient Visit         CONSULT INFORMATION:  Date of Service: 2024  Consultation Requested by: hospital    PATIENT IDENTIFICATION:  Patient Name: Francisca Portillo  : 1965      CC: aneurysm    HISTORY OF PRESENT ILLNESS:  58 y.o. LH Black /  [2]female referred for aneurysm. 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 She had successful coil embolization. Hospital stay complicated by respiratory issues and severe diffuse vasospasm requiring mechanical angioplasty. She did suffer a left MCA stroke from this. She eventually had EVD weaned and was d/c to rehab about a month from admission. She was there several weeks and did very well. She is now at home. She uses a cane to ambulate. Had stroke like sz in , MRI was done. She denies diplopia. Her speech has improved significantly. She has weakness in her RUE.      Interim: no new issues after DSA. Case discussed in conference with rec's to treat     Past Medical History:   Diagnosis Date    Anxiety     Arthritis     Asthma     Chronic mental illness     Depression     Falls     Fatigue     GERD (gastroesophageal reflux disease)     High blood pressure     Memory disorder     Obesity     SONIA (obstructive sleep apnea)     Rash     SAH (subarachnoid hemorrhage) ([facility]) 2023    Smoker        Past Surgical History:   Procedure Laterality Date    COLONOSCOPY N/A 2022    COLONOSCOPY, EGD performed by Mamie Ndiaye MD at [facility] ENDOSCOPY    GYN      LAPAROSCOPY    INSERT ARTERIAL LINE  2023    INTRACRANIAL ANEURYSM REPAIR  2023    Coil embolization- ruptured L PCOM aneurysm    LAP GASTRIC BYPASS/PHAN-EN-Y      LAP, PLACE ADJUST GASTR BAND      OTHER SURGICAL HISTORY      lapband removal    TUBAL LIGATION         Current Outpatient Medications   Medication Sig    clopidogrel (PLAVIX) 75 MG tablet Take 1 tablet by mouth daily    aspirin 81 MG EC tablet Take 1 tablet by mouth

## 2024-05-14 NOTE — PROGRESS NOTES
Diagnostic angiogram follow up.  Son aat visit.  Patient reports no symptoms since procedure.  Denies headaches, dizziness, numbness and tingling, blurred or double vision.  Puncture sites healed per patient.

## 2024-05-14 NOTE — PROGRESS NOTES
Established Patient Visit         CONSULT INFORMATION:  Date of Service: 2024  Consultation Requested by: hospital    PATIENT IDENTIFICATION:  Patient Name: Francisca Portillo  : 1965      CC: aneurysm    HISTORY OF PRESENT ILLNESS:  58 y.o. LH Black /  [2]female referred for aneurysm. 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 She had successful coil embolization. Hospital stay complicated by respiratory issues and severe diffuse vasospasm requiring mechanical angioplasty. She did suffer a left MCA stroke from this. She eventually had EVD weaned and was d/c to rehab about a month from admission. She was there several weeks and did very well. She is now at home. She uses a cane to ambulate. Had stroke like sz in , MRI was done. She denies diplopia. Her speech has improved significantly. She has weakness in her RUE.      Interim: no new issues after DSA. Case discussed in conference with rec's to treat     Past Medical History:   Diagnosis Date    Anxiety     Arthritis     Asthma     Chronic mental illness     Depression     Falls     Fatigue     GERD (gastroesophageal reflux disease)     High blood pressure     Memory disorder     Obesity     SONIA (obstructive sleep apnea)     Rash     SAH (subarachnoid hemorrhage) ([facility]) 2023    Smoker        Past Surgical History:   Procedure Laterality Date    COLONOSCOPY N/A 2022    COLONOSCOPY, EGD performed by Mamie Ndiaye MD at [facility] ENDOSCOPY    GYN      LAPAROSCOPY    INSERT ARTERIAL LINE  2023    INTRACRANIAL ANEURYSM REPAIR  2023    Coil embolization- ruptured L PCOM aneurysm    LAP GASTRIC BYPASS/PHAN-EN-Y      LAP, PLACE ADJUST GASTR BAND      OTHER SURGICAL HISTORY      lapband removal    TUBAL LIGATION         Current Outpatient Medications   Medication Sig    clopidogrel (PLAVIX) 75 MG tablet Take 1 tablet by mouth daily    aspirin 81 MG EC tablet Take 1 tablet by mouth

## 2024-05-14 NOTE — PATIENT INSTRUCTIONS
You will have Embolization with Surpass on 6/5/2024 at [facility].   Arrival time is 8:00 am at Patient Registration.  You will be contacted by Preadmission Testing to schedule an appointment for labs, chest xray, ekg.  Reminders to patient:  -No eating or drinking after midnight the day prior to procedure.  -Take morning medications the morning of procedure with sips of water.   -Do not stop taking Blood Thinners if applicable unless notified by this office.  -You must have a  to drive you home upon discharge.  -Recommend you have someone with you for at least 24-48 hours post procedure.  -No metal of glue in hair.  Start Plavix 75 mg on 5/15/2024.

## 2024-05-15 ENCOUNTER — TELEPHONE (OUTPATIENT)
Age: 59
End: 2024-05-15

## 2024-05-15 NOTE — TELEPHONE ENCOUNTER
[employer] has approved patient's NIS procedure and in patient stay for 6/5/2024.    Authorization number: [id number]

## 2024-05-28 ENCOUNTER — HOSPITAL ENCOUNTER (OUTPATIENT)
Facility: HOSPITAL | Age: 59
Discharge: HOME | End: 2024-05-31
Payer: MEDICAID

## 2024-05-28 VITALS
DIASTOLIC BLOOD PRESSURE: 68 MMHG | WEIGHT: 293 LBS | TEMPERATURE: 98.3 F | HEIGHT: 64 IN | SYSTOLIC BLOOD PRESSURE: 126 MMHG | RESPIRATION RATE: 12 BRPM | BODY MASS INDEX: 50.02 KG/M2 | HEART RATE: 69 BPM

## 2024-05-28 LAB
ABO + RH BLD: NORMAL
ALBUMIN SERPL-MCNC: 3.6 G/DL (ref 3.5–5)
ALBUMIN/GLOB SERPL: 1 (ref 1.1–2.2)
ALP SERPL-CCNC: 91 U/L (ref 45–117)
ALT SERPL-CCNC: 12 U/L (ref 12–78)
ANION GAP SERPL CALC-SCNC: 7 MMOL/L (ref 5–15)
ASPIRIN: 484 ARU
AST SERPL-CCNC: 12 U/L (ref 15–37)
BASOPHILS # BLD: 0 K/UL (ref 0–0.1)
BASOPHILS NFR BLD: 0 % (ref 0–1)
BILIRUB SERPL-MCNC: 0.4 MG/DL (ref 0.2–1)
BLOOD GROUP ANTIBODIES SERPL: NORMAL
BUN SERPL-MCNC: 10 MG/DL (ref 6–20)
BUN/CREAT SERPL: 18 (ref 12–20)
CALCIUM SERPL-MCNC: 9.5 MG/DL (ref 8.5–10.1)
CHLORIDE SERPL-SCNC: 108 MMOL/L (ref 97–108)
CO2 SERPL-SCNC: 25 MMOL/L (ref 21–32)
CREAT SERPL-MCNC: 0.57 MG/DL (ref 0.55–1.02)
DIFFERENTIAL METHOD BLD: ABNORMAL
EOSINOPHIL # BLD: 0.1 K/UL (ref 0–0.4)
EOSINOPHIL NFR BLD: 2 % (ref 0–7)
ERYTHROCYTE [DISTWIDTH] IN BLOOD BY AUTOMATED COUNT: 16.6 % (ref 11.5–14.5)
GLOBULIN SER CALC-MCNC: 3.7 G/DL (ref 2–4)
GLUCOSE SERPL-MCNC: 98 MG/DL (ref 65–100)
HCT VFR BLD AUTO: 38.1 % (ref 35–47)
HGB BLD-MCNC: 11.5 G/DL (ref 11.5–16)
IMM GRANULOCYTES # BLD AUTO: 0 K/UL (ref 0–0.04)
IMM GRANULOCYTES NFR BLD AUTO: 0 % (ref 0–0.5)
LYMPHOCYTES # BLD: 2.1 K/UL (ref 0.8–3.5)
LYMPHOCYTES NFR BLD: 26 % (ref 12–49)
MCH RBC QN AUTO: 26.7 PG (ref 26–34)
MCHC RBC AUTO-ENTMCNC: 30.2 G/DL (ref 30–36.5)
MCV RBC AUTO: 88.4 FL (ref 80–99)
MONOCYTES # BLD: 0.5 K/UL (ref 0–1)
MONOCYTES NFR BLD: 6 % (ref 5–13)
NEUTS SEG # BLD: 5.2 K/UL (ref 1.8–8)
NEUTS SEG NFR BLD: 66 % (ref 32–75)
NRBC # BLD: 0 K/UL (ref 0–0.01)
NRBC BLD-RTO: 0 PER 100 WBC
P2Y12 PLT RESPONSE: 36 PRU (ref 194–418)
PLATELET # BLD AUTO: 333 K/UL (ref 150–400)
PMV BLD AUTO: 10.9 FL (ref 8.9–12.9)
POTASSIUM SERPL-SCNC: 3.3 MMOL/L (ref 3.5–5.1)
PROT SERPL-MCNC: 7.3 G/DL (ref 6.4–8.2)
RBC # BLD AUTO: 4.31 M/UL (ref 3.8–5.2)
SODIUM SERPL-SCNC: 140 MMOL/L (ref 136–145)
SPECIMEN EXP DATE BLD: NORMAL
WBC # BLD AUTO: 8 K/UL (ref 3.6–11)

## 2024-05-28 PROCEDURE — 86850 RBC ANTIBODY SCREEN: CPT

## 2024-05-28 PROCEDURE — 93005 ELECTROCARDIOGRAM TRACING: CPT | Performed by: PSYCHIATRY & NEUROLOGY

## 2024-05-28 PROCEDURE — 86900 BLOOD TYPING SEROLOGIC ABO: CPT

## 2024-05-28 PROCEDURE — 80053 COMPREHEN METABOLIC PANEL: CPT

## 2024-05-28 PROCEDURE — 85025 COMPLETE CBC W/AUTO DIFF WBC: CPT

## 2024-05-28 PROCEDURE — 86901 BLOOD TYPING SEROLOGIC RH(D): CPT

## 2024-05-28 PROCEDURE — 85576 BLOOD PLATELET AGGREGATION: CPT

## 2024-05-28 PROCEDURE — 36415 COLL VENOUS BLD VENIPUNCTURE: CPT

## 2024-05-28 RX ORDER — HYDROCODONE BITARTRATE AND ACETAMINOPHEN 5; 325 MG/1; MG/1
1 TABLET ORAL EVERY 6 HOURS PRN
COMMUNITY

## 2024-05-28 NOTE — PERIOP NOTE
[address] [address]   Radiology(Angio)          [phone]  PRE-ADMISSION TESTING    [phone]     Surgery Date:  6/5/24       Is surgery arrival time given by surgeon?  YES  NO    If “NO”, [facility] staff will call you between 4 and 7pm the day before your surgery with your arrival time. (If your surgery is on a Monday, we will call you the Friday before.)    Call [phone] after 7pm Monday-Friday if you did not receive this call.    INSTRUCTIONS BEFORE YOUR SURGERY   When You  Arrive Arrive at [facility] Patient Access on 1st floor the day of your surgery.  Have your insurance card, photo ID,living will/advanced directive/POA (if applicable),  and any copayment (if needed)   Food   and   Drink NO food or drink after midnight the night before surgery    This means NO water, gum, mints, coffee, juice, etc.  No alcohol (beer, wine, liquor) or marijuana (smoking) 24 hours, edibles (3 days). Stop smoking cigarettes 14 days before surgery (helps w/healing and breathing).   Medications to   TAKE   Morning of Surgery MEDICATIONS TO TAKE THE MORNING OF SURGERY WITH A SIP OF WATER:   GABAPENTIN  ZOLOFT  You may take these medications, IF NEEDED, the morning of surgery:   XANAX  MAY TAKE HYDROCODONE UP TO 4 HOURS PRIOR TO ARRIVAL  Ask your surgeon/prescribing doctor for instructions on taking or stopping these medications prior to surgery: ASPIRIN, PLAVIX   Medications to STOP  before surgery Non-Steroidal anti-inflammatory Drugs (NSAID's): for example, Ibuprofen (Advil, Motrin), Naproxen (Aleve) 5 days  STOP all herbal supplements and vitamins(unless prescribed by your doctor), and fish oil for 7 days  Other:  (Pain medications not listed above, including Tylenol may be taken up until 4 hours prior to arrival time)   Blood  Thinners If you take Aspirin, Plavix, Coumadin, or any blood-thinning or anti-blood clot medicine, talk to the doctor who

## 2024-05-28 NOTE — PERIOP NOTE
Patient's caretaker, Alfredo, was with patient for entire PAT appointment today.      Confirmed with Flor Enamorado NP, that EKG needed to be repeated today.  12-lead EKG done today.

## 2024-05-29 LAB
EKG ATRIAL RATE: 66 BPM
EKG DIAGNOSIS: NORMAL
EKG P AXIS: 51 DEGREES
EKG P-R INTERVAL: 192 MS
EKG Q-T INTERVAL: 428 MS
EKG QRS DURATION: 98 MS
EKG QTC CALCULATION (BAZETT): 448 MS
EKG R AXIS: -13 DEGREES
EKG T AXIS: 12 DEGREES
EKG VENTRICULAR RATE: 66 BPM

## 2024-06-04 ENCOUNTER — TELEPHONE (OUTPATIENT)
Age: 59
End: 2024-06-04

## 2024-06-04 ENCOUNTER — PATIENT MESSAGE (OUTPATIENT)
Age: 59
End: 2024-06-04

## 2024-06-04 NOTE — TELEPHONE ENCOUNTER
Message left for patient to confirm procedure tomorrow.   Arrival time 8:00 am at Patient registration.    Reminder to patient:  -No eating or drinking after midnight the day prior to procedure.   -Take morning medications the morning of procedure with sips of water.   -Do not stop taking Blood Thinners if applicable unless notified by this office.  -You must have a  to drive you home upon discharge.  -Recommend you have someone with you for at least 24-48 hours post procedure.  -No metal of glue in hair.    Allergies reviewed.    Neel Choi DO Benjamin, Joann, LPN  Looks good thanks          Previous Messages       ----- Message -----  From: Hellen Aleman LPN  Sent: 6/4/2024   8:50 AM EDT  To: Neel Choi DO    Embolization tomorrow. P2Y12 = 36, Asa = 484.

## 2024-06-05 ENCOUNTER — ANESTHESIA EVENT (OUTPATIENT)
Facility: HOSPITAL | Age: 59
DRG: 030 | End: 2024-06-05
Payer: MEDICAID

## 2024-06-05 ENCOUNTER — ANESTHESIA (OUTPATIENT)
Facility: HOSPITAL | Age: 59
DRG: 030 | End: 2024-06-05
Payer: MEDICAID

## 2024-06-05 ENCOUNTER — HOSPITAL ENCOUNTER (INPATIENT)
Facility: HOSPITAL | Age: 59
LOS: 1 days | Discharge: HOME | DRG: 030 | End: 2024-06-06
Attending: PSYCHIATRY & NEUROLOGY | Admitting: PSYCHIATRY & NEUROLOGY
Payer: MEDICAID

## 2024-06-05 DIAGNOSIS — I67.1 CEREBRAL ANEURYSM: ICD-10-CM

## 2024-06-05 LAB — ACT BLD: 196 SECS (ref 79–138)

## 2024-06-05 PROCEDURE — 85347 COAGULATION TIME ACTIVATED: CPT

## 2024-06-05 PROCEDURE — 75898 FOLLOW-UP ANGIOGRAPHY: CPT | Performed by: PSYCHIATRY & NEUROLOGY

## 2024-06-05 PROCEDURE — 75894 X-RAYS TRANSCATH THERAPY: CPT | Performed by: PSYCHIATRY & NEUROLOGY

## 2024-06-05 PROCEDURE — 6370000000 HC RX 637 (ALT 250 FOR IP): Performed by: NURSE PRACTITIONER

## 2024-06-05 PROCEDURE — 2580000003 HC RX 258: Performed by: NURSE PRACTITIONER

## 2024-06-05 PROCEDURE — 6360000002 HC RX W HCPCS: Performed by: NURSE PRACTITIONER

## 2024-06-05 PROCEDURE — 2500000003 HC RX 250 WO HCPCS: Performed by: NURSE ANESTHETIST, CERTIFIED REGISTERED

## 2024-06-05 PROCEDURE — 6360000004 HC RX CONTRAST MEDICATION: Performed by: PSYCHIATRY & NEUROLOGY

## 2024-06-05 PROCEDURE — 2000000000 HC ICU R&B

## 2024-06-05 PROCEDURE — 2500000003 HC RX 250 WO HCPCS: Performed by: PSYCHIATRY & NEUROLOGY

## 2024-06-05 PROCEDURE — 6360000002 HC RX W HCPCS: Performed by: PSYCHIATRY & NEUROLOGY

## 2024-06-05 PROCEDURE — G0269 OCCLUSIVE DEVICE IN VEIN ART: HCPCS

## 2024-06-05 PROCEDURE — 61624 TCAT PERM OCCLS/EMBOLJ CNS: CPT | Performed by: PSYCHIATRY & NEUROLOGY

## 2024-06-05 PROCEDURE — 6360000002 HC RX W HCPCS: Performed by: NURSE ANESTHETIST, CERTIFIED REGISTERED

## 2024-06-05 PROCEDURE — B3171ZZ FLUOROSCOPY OF LEFT INTERNAL CAROTID ARTERY USING LOW OSMOLAR CONTRAST: ICD-10-PCS | Performed by: PSYCHIATRY & NEUROLOGY

## 2024-06-05 PROCEDURE — 03VL3HZ RESTRICTION OF LEFT INTERNAL CAROTID ARTERY WITH INTRALUMINAL DEVICE, FLOW DIVERTER, PERCUTANEOUS APPROACH: ICD-10-PCS | Performed by: PSYCHIATRY & NEUROLOGY

## 2024-06-05 PROCEDURE — 2580000003 HC RX 258: Performed by: PSYCHIATRY & NEUROLOGY

## 2024-06-05 PROCEDURE — 2580000003 HC RX 258: Performed by: NURSE ANESTHETIST, CERTIFIED REGISTERED

## 2024-06-05 PROCEDURE — 36224 PLACE CATH CAROTD ART: CPT

## 2024-06-05 RX ORDER — POTASSIUM CHLORIDE 7.45 MG/ML
10 INJECTION INTRAVENOUS PRN
Status: DISCONTINUED | OUTPATIENT
Start: 2024-06-05 | End: 2024-06-06 | Stop reason: HOSPADM

## 2024-06-05 RX ORDER — SODIUM CHLORIDE 0.9 % (FLUSH) 0.9 %
5-40 SYRINGE (ML) INJECTION PRN
Status: DISCONTINUED | OUTPATIENT
Start: 2024-06-05 | End: 2024-06-06 | Stop reason: HOSPADM

## 2024-06-05 RX ORDER — ZOLPIDEM TARTRATE 5 MG/1
10 TABLET ORAL NIGHTLY PRN
Status: DISCONTINUED | OUTPATIENT
Start: 2024-06-05 | End: 2024-06-06 | Stop reason: HOSPADM

## 2024-06-05 RX ORDER — SODIUM CHLORIDE 9 MG/ML
INJECTION, SOLUTION INTRAVENOUS PRN
Status: CANCELLED | OUTPATIENT
Start: 2024-06-05

## 2024-06-05 RX ORDER — ASPIRIN 81 MG/1
81 TABLET, CHEWABLE ORAL DAILY
Status: DISCONTINUED | OUTPATIENT
Start: 2024-06-05 | End: 2024-06-06 | Stop reason: HOSPADM

## 2024-06-05 RX ORDER — ONDANSETRON 2 MG/ML
4 INJECTION INTRAMUSCULAR; INTRAVENOUS EVERY 6 HOURS PRN
Status: DISCONTINUED | OUTPATIENT
Start: 2024-06-05 | End: 2024-06-06 | Stop reason: HOSPADM

## 2024-06-05 RX ORDER — ZOLPIDEM TARTRATE 5 MG/1
5 TABLET ORAL NIGHTLY PRN
Status: DISCONTINUED | OUTPATIENT
Start: 2024-06-05 | End: 2024-06-05

## 2024-06-05 RX ORDER — MAGNESIUM SULFATE IN WATER 40 MG/ML
2000 INJECTION, SOLUTION INTRAVENOUS PRN
Status: DISCONTINUED | OUTPATIENT
Start: 2024-06-05 | End: 2024-06-06 | Stop reason: HOSPADM

## 2024-06-05 RX ORDER — LIDOCAINE HYDROCHLORIDE 10 MG/ML
INJECTION, SOLUTION EPIDURAL; INFILTRATION; INTRACAUDAL; PERINEURAL PRN
Status: COMPLETED | OUTPATIENT
Start: 2024-06-05 | End: 2024-06-05

## 2024-06-05 RX ORDER — SUCCINYLCHOLINE/SOD CL,ISO/PF 200MG/10ML
SYRINGE (ML) INTRAVENOUS PRN
Status: DISCONTINUED | OUTPATIENT
Start: 2024-06-05 | End: 2024-06-05 | Stop reason: SDUPTHER

## 2024-06-05 RX ORDER — ACETAMINOPHEN 650 MG/1
650 SUPPOSITORY RECTAL EVERY 6 HOURS PRN
Status: DISCONTINUED | OUTPATIENT
Start: 2024-06-05 | End: 2024-06-06 | Stop reason: HOSPADM

## 2024-06-05 RX ORDER — ONDANSETRON 2 MG/ML
INJECTION INTRAMUSCULAR; INTRAVENOUS PRN
Status: DISCONTINUED | OUTPATIENT
Start: 2024-06-05 | End: 2024-06-05 | Stop reason: SDUPTHER

## 2024-06-05 RX ORDER — ACETAMINOPHEN 325 MG/1
650 TABLET ORAL EVERY 6 HOURS PRN
Status: DISCONTINUED | OUTPATIENT
Start: 2024-06-05 | End: 2024-06-06 | Stop reason: HOSPADM

## 2024-06-05 RX ORDER — PROPOFOL 10 MG/ML
INJECTION, EMULSION INTRAVENOUS PRN
Status: DISCONTINUED | OUTPATIENT
Start: 2024-06-05 | End: 2024-06-05 | Stop reason: SDUPTHER

## 2024-06-05 RX ORDER — ATORVASTATIN CALCIUM 20 MG/1
20 TABLET, FILM COATED ORAL NIGHTLY
Status: DISCONTINUED | OUTPATIENT
Start: 2024-06-05 | End: 2024-06-06 | Stop reason: HOSPADM

## 2024-06-05 RX ORDER — FUROSEMIDE 20 MG/1
20 TABLET ORAL DAILY
COMMUNITY

## 2024-06-05 RX ORDER — SODIUM CHLORIDE 0.9 % (FLUSH) 0.9 %
5-40 SYRINGE (ML) INJECTION PRN
Status: CANCELLED | OUTPATIENT
Start: 2024-06-05

## 2024-06-05 RX ORDER — CYCLOBENZAPRINE HCL 10 MG
10 TABLET ORAL 3 TIMES DAILY PRN
COMMUNITY

## 2024-06-05 RX ORDER — BUTALBITAL, ACETAMINOPHEN AND CAFFEINE 50; 325; 40 MG/1; MG/1; MG/1
1 TABLET ORAL EVERY 6 HOURS PRN
Status: DISCONTINUED | OUTPATIENT
Start: 2024-06-05 | End: 2024-06-06 | Stop reason: HOSPADM

## 2024-06-05 RX ORDER — SODIUM CHLORIDE 9 MG/ML
INJECTION, SOLUTION INTRAVENOUS CONTINUOUS
Status: DISCONTINUED | OUTPATIENT
Start: 2024-06-05 | End: 2024-06-06 | Stop reason: HOSPADM

## 2024-06-05 RX ORDER — ENOXAPARIN SODIUM 100 MG/ML
40 INJECTION SUBCUTANEOUS DAILY
Status: DISCONTINUED | OUTPATIENT
Start: 2024-06-05 | End: 2024-06-05

## 2024-06-05 RX ORDER — SODIUM CHLORIDE 0.9 % (FLUSH) 0.9 %
5-40 SYRINGE (ML) INJECTION EVERY 12 HOURS SCHEDULED
Status: CANCELLED | OUTPATIENT
Start: 2024-06-05

## 2024-06-05 RX ORDER — CLOPIDOGREL BISULFATE 75 MG/1
75 TABLET ORAL DAILY
Status: DISCONTINUED | OUTPATIENT
Start: 2024-06-05 | End: 2024-06-06 | Stop reason: HOSPADM

## 2024-06-05 RX ORDER — SODIUM CHLORIDE 0.9 % (FLUSH) 0.9 %
5-40 SYRINGE (ML) INJECTION EVERY 12 HOURS SCHEDULED
Status: DISCONTINUED | OUTPATIENT
Start: 2024-06-05 | End: 2024-06-05

## 2024-06-05 RX ORDER — SODIUM CHLORIDE 9 MG/ML
INJECTION, SOLUTION INTRAVENOUS PRN
Status: DISCONTINUED | OUTPATIENT
Start: 2024-06-05 | End: 2024-06-06 | Stop reason: HOSPADM

## 2024-06-05 RX ORDER — PANTOPRAZOLE SODIUM 40 MG/1
40 TABLET, DELAYED RELEASE ORAL
Status: DISCONTINUED | OUTPATIENT
Start: 2024-06-05 | End: 2024-06-06 | Stop reason: HOSPADM

## 2024-06-05 RX ORDER — HEPARIN SODIUM 1000 [USP'U]/ML
INJECTION, SOLUTION INTRAVENOUS; SUBCUTANEOUS PRN
Status: DISCONTINUED | OUTPATIENT
Start: 2024-06-05 | End: 2024-06-05 | Stop reason: SDUPTHER

## 2024-06-05 RX ORDER — CILOSTAZOL 50 MG/1
50 TABLET ORAL 2 TIMES DAILY
COMMUNITY

## 2024-06-05 RX ORDER — ROCURONIUM BROMIDE 10 MG/ML
INJECTION, SOLUTION INTRAVENOUS PRN
Status: DISCONTINUED | OUTPATIENT
Start: 2024-06-05 | End: 2024-06-05 | Stop reason: SDUPTHER

## 2024-06-05 RX ORDER — FUROSEMIDE 20 MG/1
20 TABLET ORAL DAILY
Status: DISCONTINUED | OUTPATIENT
Start: 2024-06-05 | End: 2024-06-06 | Stop reason: HOSPADM

## 2024-06-05 RX ORDER — HYDROCODONE BITARTRATE AND ACETAMINOPHEN 5; 325 MG/1; MG/1
1 TABLET ORAL EVERY 6 HOURS PRN
Status: DISCONTINUED | OUTPATIENT
Start: 2024-06-05 | End: 2024-06-06 | Stop reason: HOSPADM

## 2024-06-05 RX ORDER — CYCLOBENZAPRINE HCL 10 MG
10 TABLET ORAL 3 TIMES DAILY PRN
Status: DISCONTINUED | OUTPATIENT
Start: 2024-06-05 | End: 2024-06-06 | Stop reason: HOSPADM

## 2024-06-05 RX ORDER — LABETALOL HYDROCHLORIDE 5 MG/ML
10 INJECTION, SOLUTION INTRAVENOUS EVERY 4 HOURS PRN
Status: DISCONTINUED | OUTPATIENT
Start: 2024-06-05 | End: 2024-06-06 | Stop reason: HOSPADM

## 2024-06-05 RX ORDER — LABETALOL HYDROCHLORIDE 5 MG/ML
INJECTION, SOLUTION INTRAVENOUS
Status: DISPENSED
Start: 2024-06-05 | End: 2024-06-06

## 2024-06-05 RX ORDER — GABAPENTIN 100 MG/1
100 CAPSULE ORAL 3 TIMES DAILY
Status: DISCONTINUED | OUTPATIENT
Start: 2024-06-05 | End: 2024-06-06 | Stop reason: HOSPADM

## 2024-06-05 RX ORDER — OMEPRAZOLE 20 MG/1
40 CAPSULE, DELAYED RELEASE ORAL 2 TIMES DAILY
Status: ON HOLD | COMMUNITY
End: 2024-06-06 | Stop reason: HOSPADM

## 2024-06-05 RX ORDER — DEXAMETHASONE SODIUM PHOSPHATE 4 MG/ML
INJECTION, SOLUTION INTRA-ARTICULAR; INTRALESIONAL; INTRAMUSCULAR; INTRAVENOUS; SOFT TISSUE PRN
Status: DISCONTINUED | OUTPATIENT
Start: 2024-06-05 | End: 2024-06-05 | Stop reason: SDUPTHER

## 2024-06-05 RX ORDER — SODIUM CHLORIDE 9 MG/ML
INJECTION, SOLUTION INTRAVENOUS CONTINUOUS PRN
Status: DISCONTINUED | OUTPATIENT
Start: 2024-06-05 | End: 2024-06-05 | Stop reason: SDUPTHER

## 2024-06-05 RX ORDER — POLYETHYLENE GLYCOL 3350 17 G/17G
17 POWDER, FOR SOLUTION ORAL DAILY PRN
Status: DISCONTINUED | OUTPATIENT
Start: 2024-06-05 | End: 2024-06-06 | Stop reason: HOSPADM

## 2024-06-05 RX ORDER — ONDANSETRON 4 MG/1
4 TABLET, ORALLY DISINTEGRATING ORAL EVERY 8 HOURS PRN
Status: DISCONTINUED | OUTPATIENT
Start: 2024-06-05 | End: 2024-06-06 | Stop reason: HOSPADM

## 2024-06-05 RX ORDER — FENTANYL CITRATE 50 UG/ML
INJECTION, SOLUTION INTRAMUSCULAR; INTRAVENOUS PRN
Status: DISCONTINUED | OUTPATIENT
Start: 2024-06-05 | End: 2024-06-05 | Stop reason: SDUPTHER

## 2024-06-05 RX ORDER — ALPRAZOLAM 0.5 MG/1
2 TABLET ORAL 3 TIMES DAILY
Status: DISCONTINUED | OUTPATIENT
Start: 2024-06-05 | End: 2024-06-06 | Stop reason: HOSPADM

## 2024-06-05 RX ORDER — LIDOCAINE HYDROCHLORIDE 20 MG/ML
INJECTION, SOLUTION EPIDURAL; INFILTRATION; INTRACAUDAL; PERINEURAL PRN
Status: DISCONTINUED | OUTPATIENT
Start: 2024-06-05 | End: 2024-06-05 | Stop reason: SDUPTHER

## 2024-06-05 RX ORDER — POTASSIUM CHLORIDE 750 MG/1
40 TABLET, FILM COATED, EXTENDED RELEASE ORAL PRN
Status: DISCONTINUED | OUTPATIENT
Start: 2024-06-05 | End: 2024-06-06 | Stop reason: HOSPADM

## 2024-06-05 RX ORDER — HYDRALAZINE HYDROCHLORIDE 20 MG/ML
10 INJECTION INTRAMUSCULAR; INTRAVENOUS EVERY 6 HOURS PRN
Status: DISCONTINUED | OUTPATIENT
Start: 2024-06-05 | End: 2024-06-06 | Stop reason: HOSPADM

## 2024-06-05 RX ORDER — CILOSTAZOL 50 MG/1
50 TABLET ORAL
Status: DISCONTINUED | OUTPATIENT
Start: 2024-06-05 | End: 2024-06-06 | Stop reason: HOSPADM

## 2024-06-05 RX ADMIN — GABAPENTIN 100 MG: 100 CAPSULE ORAL at 13:59

## 2024-06-05 RX ADMIN — ONDANSETRON 4 MG: 2 INJECTION INTRAMUSCULAR; INTRAVENOUS at 17:26

## 2024-06-05 RX ADMIN — HEPARIN SODIUM 3000 UNITS: 1000 INJECTION, SOLUTION INTRAVENOUS; SUBCUTANEOUS at 11:01

## 2024-06-05 RX ADMIN — ROCURONIUM BROMIDE 10 MG: 10 SOLUTION INTRAVENOUS at 10:36

## 2024-06-05 RX ADMIN — SODIUM CHLORIDE: 9 INJECTION, SOLUTION INTRAVENOUS at 10:00

## 2024-06-05 RX ADMIN — CLOPIDOGREL BISULFATE 75 MG: 75 TABLET, FILM COATED ORAL at 09:01

## 2024-06-05 RX ADMIN — HEPARIN SODIUM 1000 ML: 1000 INJECTION INTRAVENOUS; SUBCUTANEOUS at 11:19

## 2024-06-05 RX ADMIN — HEPARIN SODIUM 1000 ML: 1000 INJECTION INTRAVENOUS; SUBCUTANEOUS at 11:17

## 2024-06-05 RX ADMIN — CYCLOBENZAPRINE 10 MG: 10 TABLET, FILM COATED ORAL at 16:10

## 2024-06-05 RX ADMIN — HEPARIN SODIUM 1000 ML: 1000 INJECTION INTRAVENOUS; SUBCUTANEOUS at 11:18

## 2024-06-05 RX ADMIN — SODIUM CHLORIDE: 9 INJECTION, SOLUTION INTRAVENOUS at 12:49

## 2024-06-05 RX ADMIN — Medication 160 MG: at 10:37

## 2024-06-05 RX ADMIN — SODIUM CHLORIDE: 9 INJECTION, SOLUTION INTRAVENOUS at 12:51

## 2024-06-05 RX ADMIN — LABETALOL HYDROCHLORIDE 10 MG: 5 INJECTION INTRAVENOUS at 13:10

## 2024-06-05 RX ADMIN — FENTANYL CITRATE 100 MCG: 50 INJECTION, SOLUTION INTRAMUSCULAR; INTRAVENOUS at 10:43

## 2024-06-05 RX ADMIN — LIDOCAINE HYDROCHLORIDE 6 ML: 10 INJECTION, SOLUTION EPIDURAL; INFILTRATION; INTRACAUDAL; PERINEURAL at 11:29

## 2024-06-05 RX ADMIN — DEXAMETHASONE SODIUM PHOSPHATE 4 MG: 4 INJECTION, SOLUTION INTRAMUSCULAR; INTRAVENOUS at 10:45

## 2024-06-05 RX ADMIN — ALPRAZOLAM 2 MG: 0.5 TABLET ORAL at 20:29

## 2024-06-05 RX ADMIN — ACETAMINOPHEN 650 MG: 325 TABLET ORAL at 13:59

## 2024-06-05 RX ADMIN — PANTOPRAZOLE SODIUM 40 MG: 40 TABLET, DELAYED RELEASE ORAL at 14:02

## 2024-06-05 RX ADMIN — ALPRAZOLAM 2 MG: 0.5 TABLET ORAL at 13:58

## 2024-06-05 RX ADMIN — ATORVASTATIN CALCIUM 20 MG: 20 TABLET, FILM COATED ORAL at 20:29

## 2024-06-05 RX ADMIN — IOPAMIDOL 71 ML: 612 INJECTION, SOLUTION INTRAVENOUS at 11:31

## 2024-06-05 RX ADMIN — PROPOFOL 150 MG: 10 INJECTION, EMULSION INTRAVENOUS at 10:36

## 2024-06-05 RX ADMIN — SERTRALINE HYDROCHLORIDE 200 MG: 50 TABLET ORAL at 16:11

## 2024-06-05 RX ADMIN — ZOLPIDEM TARTRATE 10 MG: 5 TABLET ORAL at 20:38

## 2024-06-05 RX ADMIN — HEPARIN SODIUM 1000 UNITS: 1000 INJECTION, SOLUTION INTRAVENOUS; SUBCUTANEOUS at 11:16

## 2024-06-05 RX ADMIN — ASPIRIN 81 MG CHEWABLE TABLET 81 MG: 81 TABLET CHEWABLE at 09:08

## 2024-06-05 RX ADMIN — CILOSTAZOL 50 MG: 50 TABLET ORAL at 16:10

## 2024-06-05 RX ADMIN — HYDROCODONE BITARTRATE AND ACETAMINOPHEN 1 TABLET: 5; 325 TABLET ORAL at 18:02

## 2024-06-05 RX ADMIN — GABAPENTIN 100 MG: 100 CAPSULE ORAL at 20:30

## 2024-06-05 RX ADMIN — LIDOCAINE HYDROCHLORIDE 100 MG: 20 INJECTION, SOLUTION EPIDURAL; INFILTRATION; INTRACAUDAL; PERINEURAL at 10:36

## 2024-06-05 RX ADMIN — ONDANSETRON 4 MG: 2 INJECTION INTRAMUSCULAR; INTRAVENOUS at 10:45

## 2024-06-05 RX ADMIN — FUROSEMIDE 20 MG: 20 TABLET ORAL at 14:02

## 2024-06-05 ASSESSMENT — PAIN SCALES - GENERAL
PAINLEVEL_OUTOF10: 4
PAINLEVEL_OUTOF10: 2
PAINLEVEL_OUTOF10: 3

## 2024-06-05 ASSESSMENT — PAIN DESCRIPTION - LOCATION
LOCATION: BACK
LOCATION: BACK

## 2024-06-05 ASSESSMENT — PAIN DESCRIPTION - DESCRIPTORS
DESCRIPTORS: ACHING
DESCRIPTORS: ACHING

## 2024-06-05 ASSESSMENT — PAIN DESCRIPTION - ORIENTATION
ORIENTATION: POSTERIOR
ORIENTATION: POSTERIOR

## 2024-06-05 ASSESSMENT — LIFESTYLE VARIABLES: SMOKING_STATUS: 1

## 2024-06-05 NOTE — PROGRESS NOTES
0800:  Pt assisted via WC to IR for scheduled Cerebral Embolization with Dr. Choi.  Pt accompanied by her  and niece.  Pt noted to be tearful in registration waiting room.  Contact information confirmed for .    0830  NP at bedside completing Neuro assessment.  Dr. Choi at pt's bedside prior to procedure.  Anesthesia obtaining consent via telephone with .      0910:  ASA 81 mg and Plavix given per order.    1015:  Pt's  notified of pt in pre-procedure area and will begin scheduled Cerebral Embolization upon arrival of Anesthesia.    1215:  TRANSFER - OUT REPORT:    Verbal report given to CATIE Huerta on Francisca Portillo  being transferred to ICU RM 18 for routine post-op       Report consisted of patient's Situation, Background, Assessment and   Recommendations(SBAR).     Information from the following report(s) Nurse Handoff Report was reviewed with the receiving nurse.           Lines:   Peripheral IV 06/05/24 Proximal Forearm (Active)   Site Assessment Clean, dry & intact 06/05/24 0825   Line Status Flushed 06/05/24 0825       Peripheral IV 06/05/24 Proximal;Right Forearm (Active)   Site Assessment Clean, dry & intact 06/05/24 1053   Line Status Blood return noted;Brisk blood return 06/05/24 1053   Line Care Connections checked and tightened 06/05/24 1053   Phlebitis Assessment No symptoms 06/05/24 1053   Infiltration Assessment 0 06/05/24 1053        Opportunity for questions and clarification was provided.  ICU RN notified of 15 minute checks to continue at 1230.    Patient transported with:  Monitor and Registered Nurse      1240:  Pt's  notified of pt's status and transport to ICU.

## 2024-06-05 NOTE — BRIEF OP NOTE
Brief Procedure Note      Patient: Francisca Portillo MRN: [id number]     YOB: 1965  Age: 58 y.o.  Sex: female      Service: Neurointerventional Surgery    Date of Procedure: 6/5/2024    Pre-Procedure Diagnosis: L PCOM aneurysm    Post-Procedure Diagnosis: SAME    : Neel Choi DO    Assistant(s): N/A    Procedure(s):   Diagnostic cerebral angiogram  Aneurysm embolization    Vessels Studied:   Left CCA  Left ICA      Puncture Site: Right CFA--> 6Fr angioseal    Preliminary Findings:   Stable coil compaction 3mm of L PCOM aneurysm  Successful Surpass evolve flow diverting stent in the supraclinoid L ICA    Plan:   - SBP   - asa/plavix  - IVF      Specimens: None    Implants: as above    Drains: None    Anesthesia: General    Estimated Blood Loss: 5 cc    Apparent Intraoperative Complications: None immediate    Patient Condition: Stable    Disposition: ICU overni, home in am if stable, f/u clinic      Signed:   Neel Choi DO  [facility] Neurointerventional Surgery  [facility]

## 2024-06-05 NOTE — ANESTHESIA POSTPROCEDURE EVALUATION
Department of Anesthesiology  Postprocedure Note    Patient: Francisca Portillo  MRN: [id number]  YOB: 1965  Date of evaluation: 6/5/2024    Procedure Summary       Date: 06/05/24 Room / Location: [facility] ANGIO IR    Anesthesia Start: 1030 Anesthesia Stop: 1136    Procedure: IR ARCH UNI CAR CERV CEREBRAL Diagnosis:       Cerebral aneurysm      Cerebral aneurysm      (Embolization, Surpass)    Scheduled Providers: Neel Chio DO; Poppy Riley MD Responsible Provider: Poppy Riley MD    Anesthesia Type: general ASA Status: 3            Anesthesia Type: No value filed.    Rubén Phase I:      Rubén Phase II:      Anesthesia Post Evaluation    Patient location during evaluation: ICU  Level of consciousness: awake  Airway patency: patent  Nausea & Vomiting: no nausea  Cardiovascular status: hemodynamically stable  Respiratory status: acceptable  Hydration status: stable  Comments: --------------------            06/05/24               1245     --------------------   BP:     (!) 167/80   Pulse:      82       Resp:       27       Temp:                SpO2:      96%      --------------------   Pain management: adequate    No notable events documented.

## 2024-06-05 NOTE — PROGRESS NOTES
Neurocritical Care Brief Progress Note:  Briefly, patient is a 58 year old female w/pmh of ruptured PCOM aneurysm in 10/23 w/coil embolization who presents for elective treatment of residual sac of L PCOM aneurysm with a flow diverting stent in the supraclinoid L ICA.     Post procedure patient is doing well. She denies headache, double vision, blurry vision, numbness, tingling and weakness.     Physical Exam:  Gen: NAD, calm, cooperative  Neuro: AAOx4. Follows commands. Speech clear. Affect normal. PERRL, 3 mm bilaterally. EOMI. VFF. Face symmetric. Palate symmetric. Tongue midline. Right upper extremity with drift, right hand is contracted. RUE 4/5 LUE 5/5 BLE 5/5. No involuntary movements. Gait deferred.  Skin: Warm, dry, color appropriate for ethnicity. Groin arteriotomy site soft, tender to palpation, dressing is C/D/I     Plan:   - SBP    - Continue DAPT   - Neuro checks per post EVT protocol     Cherry Olmedo, APRN - CNP  Neurocritical Care Nurse Practitioner

## 2024-06-05 NOTE — INTERVAL H&P NOTE
Update History & Physical    The Patient's recent History and Physical was reviewed with the patient and I examined the patient.  There was no change.      Plan:  The risk, benefits, expected outcome, and alternative to the recommended procedure have been discussed with the patient.  Patient understands and wants to proceed with the procedure.    Admit to ICU  Post op orders to follow    Electronically signed by Neel Choi DO on 6/5/2024 at 8:04 AM

## 2024-06-05 NOTE — ANESTHESIA PRE PROCEDURE
Department of Anesthesiology  Preprocedure Note       Name:  Francisca Portillo   Age:  58 y.o.  :  1965                                          MRN:  [id number]         Date:  2024      Surgeon: * No surgeons listed *    Procedure: * No procedures listed *    Medications prior to admission:   Prior to Admission medications    Medication Sig Start Date End Date Taking? Authorizing Provider   HYDROcodone-acetaminophen (NORCO) 5-325 MG per tablet Take 1 tablet by mouth every 6 hours as needed for Pain. Max Daily Amount: 4 tablets    Abby Alanis MD   clopidogrel (PLAVIX) 75 MG tablet Take 1 tablet by mouth daily 24   Neel Choi DO   aspirin 81 MG EC tablet Take 1 tablet by mouth daily 24   Neel Choi DO   gabapentin (NEURONTIN) 100 MG capsule Take 1 capsule by mouth 3 times daily.    Abby Alanis MD   butalbital-acetaminophen-caffeine (FIORICET, ESGIC) -40 MG per tablet Take 1 tablet by mouth every 6 hours as needed for Headaches    Abby Alanis MD   sertraline (ZOLOFT) 50 MG tablet Take 2 tablets by mouth daily    Abby Alanis MD   zolpidem (AMBIEN) 10 MG tablet Take 1 tablet by mouth nightly as needed for Sleep.    Abby Alanis MD   ALPRAZolam (XANAX) 1 MG tablet Take 2 tablets by mouth as needed for Anxiety.    Abby Alanis MD   lansoprazole (PREVACID SOLUTAB) 30 MG disintegrating tablet Take 1 tablet by mouth daily 23   Jacinta Oates APRN - NP   atorvastatin (LIPITOR) 20 MG tablet Take 1 tablet by mouth nightly 23   Jacinta Oates, APRN - NP   cyanocobalamin 1000 MCG/ML injection Inject 1 mL into the muscle every 30 days    Automatic Reconciliation, Ar   ergocalciferol (ERGOCALCIFEROL) 1.25 MG (98172 UT) capsule Take 1 capsule by mouth every 7 days    Automatic Reconciliation, Ar   ferrous sulfate (FE TABS 325) 325 (65 Fe) MG EC tablet Take 1 tablet by mouth daily (with breakfast)    Automatic Reconciliation,

## 2024-06-06 VITALS
SYSTOLIC BLOOD PRESSURE: 113 MMHG | BODY MASS INDEX: 48.82 KG/M2 | HEART RATE: 79 BPM | WEIGHT: 293 LBS | DIASTOLIC BLOOD PRESSURE: 60 MMHG | HEIGHT: 65 IN | OXYGEN SATURATION: 98 % | RESPIRATION RATE: 22 BRPM | TEMPERATURE: 97.7 F

## 2024-06-06 LAB
ANION GAP SERPL CALC-SCNC: 4 MMOL/L (ref 5–15)
BASOPHILS # BLD: 0 K/UL (ref 0–0.1)
BASOPHILS NFR BLD: 0 % (ref 0–1)
BUN SERPL-MCNC: 8 MG/DL (ref 6–20)
BUN/CREAT SERPL: 16 (ref 12–20)
CALCIUM SERPL-MCNC: 8.6 MG/DL (ref 8.5–10.1)
CHLORIDE SERPL-SCNC: 110 MMOL/L (ref 97–108)
CO2 SERPL-SCNC: 28 MMOL/L (ref 21–32)
CREAT SERPL-MCNC: 0.49 MG/DL (ref 0.55–1.02)
DIFFERENTIAL METHOD BLD: ABNORMAL
EOSINOPHIL # BLD: 0 K/UL (ref 0–0.4)
EOSINOPHIL NFR BLD: 0 % (ref 0–7)
ERYTHROCYTE [DISTWIDTH] IN BLOOD BY AUTOMATED COUNT: 17.2 % (ref 11.5–14.5)
GLUCOSE SERPL-MCNC: 97 MG/DL (ref 65–100)
HCT VFR BLD AUTO: 33 % (ref 35–47)
HGB BLD-MCNC: 10.5 G/DL (ref 11.5–16)
IMM GRANULOCYTES # BLD AUTO: 0 K/UL (ref 0–0.04)
IMM GRANULOCYTES NFR BLD AUTO: 0 % (ref 0–0.5)
LYMPHOCYTES # BLD: 2 K/UL (ref 0.8–3.5)
LYMPHOCYTES NFR BLD: 24 % (ref 12–49)
MCH RBC QN AUTO: 28 PG (ref 26–34)
MCHC RBC AUTO-ENTMCNC: 31.8 G/DL (ref 30–36.5)
MCV RBC AUTO: 88 FL (ref 80–99)
MONOCYTES # BLD: 0.7 K/UL (ref 0–1)
MONOCYTES NFR BLD: 8 % (ref 5–13)
NEUTS SEG # BLD: 5.3 K/UL (ref 1.8–8)
NEUTS SEG NFR BLD: 68 % (ref 32–75)
NRBC # BLD: 0 K/UL (ref 0–0.01)
NRBC BLD-RTO: 0 PER 100 WBC
PLATELET # BLD AUTO: 249 K/UL (ref 150–400)
PMV BLD AUTO: 10.5 FL (ref 8.9–12.9)
POTASSIUM SERPL-SCNC: 3.7 MMOL/L (ref 3.5–5.1)
RBC # BLD AUTO: 3.75 M/UL (ref 3.8–5.2)
SODIUM SERPL-SCNC: 142 MMOL/L (ref 136–145)
WBC # BLD AUTO: 8 K/UL (ref 3.6–11)

## 2024-06-06 PROCEDURE — 2580000003 HC RX 258: Performed by: NURSE PRACTITIONER

## 2024-06-06 PROCEDURE — 92610 EVALUATE SWALLOWING FUNCTION: CPT

## 2024-06-06 PROCEDURE — 80048 BASIC METABOLIC PNL TOTAL CA: CPT

## 2024-06-06 PROCEDURE — 36415 COLL VENOUS BLD VENIPUNCTURE: CPT

## 2024-06-06 PROCEDURE — 6370000000 HC RX 637 (ALT 250 FOR IP): Performed by: NURSE PRACTITIONER

## 2024-06-06 PROCEDURE — 85025 COMPLETE CBC W/AUTO DIFF WBC: CPT

## 2024-06-06 PROCEDURE — 51798 US URINE CAPACITY MEASURE: CPT

## 2024-06-06 PROCEDURE — 99239 HOSP IP/OBS DSCHRG MGMT >30: CPT | Performed by: NURSE PRACTITIONER

## 2024-06-06 PROCEDURE — 92522 EVALUATE SPEECH PRODUCTION: CPT

## 2024-06-06 RX ORDER — PANTOPRAZOLE SODIUM 40 MG/1
40 TABLET, DELAYED RELEASE ORAL
Qty: 30 TABLET | Refills: 3 | Status: SHIPPED | OUTPATIENT
Start: 2024-06-06

## 2024-06-06 RX ORDER — LOSARTAN POTASSIUM 50 MG/1
50 TABLET ORAL DAILY
COMMUNITY

## 2024-06-06 RX ADMIN — SODIUM CHLORIDE: 9 INJECTION, SOLUTION INTRAVENOUS at 02:19

## 2024-06-06 RX ADMIN — GABAPENTIN 100 MG: 100 CAPSULE ORAL at 09:01

## 2024-06-06 RX ADMIN — ACETAMINOPHEN 650 MG: 325 TABLET ORAL at 06:27

## 2024-06-06 RX ADMIN — ASPIRIN 81 MG CHEWABLE TABLET 81 MG: 81 TABLET CHEWABLE at 09:01

## 2024-06-06 RX ADMIN — ALPRAZOLAM 2 MG: 0.5 TABLET ORAL at 09:01

## 2024-06-06 RX ADMIN — PANTOPRAZOLE SODIUM 40 MG: 40 TABLET, DELAYED RELEASE ORAL at 06:27

## 2024-06-06 RX ADMIN — CILOSTAZOL 50 MG: 50 TABLET ORAL at 06:27

## 2024-06-06 RX ADMIN — HYDROCODONE BITARTRATE AND ACETAMINOPHEN 1 TABLET: 5; 325 TABLET ORAL at 02:30

## 2024-06-06 RX ADMIN — SERTRALINE HYDROCHLORIDE 200 MG: 50 TABLET ORAL at 09:01

## 2024-06-06 RX ADMIN — CLOPIDOGREL BISULFATE 75 MG: 75 TABLET, FILM COATED ORAL at 09:01

## 2024-06-06 RX ADMIN — FUROSEMIDE 20 MG: 20 TABLET ORAL at 09:01

## 2024-06-06 ASSESSMENT — PAIN DESCRIPTION - LOCATION
LOCATION: BACK
LOCATION: BACK

## 2024-06-06 ASSESSMENT — PAIN SCALES - GENERAL
PAINLEVEL_OUTOF10: 6
PAINLEVEL_OUTOF10: 6
PAINLEVEL_OUTOF10: 0

## 2024-06-06 NOTE — PLAN OF CARE
Problem: Safety - Adult  Goal: Free from fall injury  Outcome: Progressing     Problem: Pain  Goal: Verbalizes/displays adequate comfort level or baseline comfort level  Outcome: Progressing     Problem: Skin/Tissue Integrity  Goal: Absence of new skin breakdown  Description: 1.  Monitor for areas of redness and/or skin breakdown  2.  Assess vascular access sites hourly  3.  Every 4-6 hours minimum:  Change oxygen saturation probe site  4.  Every 4-6 hours:  If on nasal continuous positive airway pressure, respiratory therapy assess nares and determine need for appliance change or resting period.  Outcome: Progressing     Problem: Discharge Planning  Goal: Discharge to home or other facility with appropriate resources  Outcome: Progressing

## 2024-06-06 NOTE — DISCHARGE SUMMARY
Neurointerventional Surgery Discharge Summary  [address]  [address]  [place] [address]  [phone]    Patient: Francisca Portillo MRN: [id number]  SSN: [id number]    YOB: 1965  Age: 58 y.o.  Sex: female      DATE OF ADMISSION: 6/5/2024    DATE OF DISCHARGE: 06/06/24     ADMITTING PROVIDER: Neel Choi DO    DISCHARGING PROVIDER: Same    CONSULTATIONS: none     PRIMARY CARE PROVIDER: Bijan Martínez Jr., APRN - NP    PROCEDURES/SURGERIES: Procedure(s) with comments:   Cerebral angiogram with aneurysm embolization    OFFICE NUMBER: [phone] , you can reach the on call physician 24/7 even during non-business hours     ADMITTING DIAGNOSES & HOSPITAL COURSE:     ICD-10-CM    1. Cerebral aneurysm  I67.1 IR ARCH UNI CAR CERV CEREBRAL     IR ARCH UNI CAR CERV CEREBRAL        Pt was admitted for scheduled elective cerebral angiogram and flow diverting stent for treatment of residual L PCOM artery aneurysm by Dr. Choi. See op note for details. The patient tolerated the procedure well without complications. Following extubation, patient was transferred to ICU, fully recovered and returned to neurological baseline. The patient was admitted overnight for continued monitoring due to potential risks of stroke and thrombosis. There were no neurological events overnight. The hospital course was uneventful and the patient was appropriate for discharge home today in stable condition.    Patient ambulating in summers with cane with no difficulties. She is voiding without difficulty. She denies any headache, vision changes, dizziness, chest pain, SOB, nausea, vomiting, numbness, or tingling. She has baseline mild speech issues at times and RUE weakness from prior CVA.     Vitals:    06/06/24 1047   BP: 113/60   Pulse: 79   Resp: 22   Temp: 97.7 °F (36.5 °C)   SpO2:       Physical Exam:  GENERAL: NAD, calm, cooperative  HEART: RRR, S1, S2, no murmur, click, rub, or gallop  LUNGS: clear to  auscultation  EXTREMITIES: no cyanosis or significant edema, distal foot pulses palpable and also present via doppler   SKIN: Warm, dry, color appropriate for ethnicity. Right groin arteriotomy access site is C/D/I, with no active bleeding or drainage noted. No hematoma or bruising noted. Incision slightly open with scant blood, no active bleeding. Applied adhesive bandage.    Neurologic Exam:  Mental Status:  Alert and oriented x 4.  Appropriate affect, mood and behavior.       Language:    Hesitant with speech at times, but able to name objects and repeat sentences. Has subtle expressive aphasia. Comprehension intact. Follows commands    Cranial Nerves:   PERRL.      Visual fields full to confrontation.     Extraocular movements intact.       Facial sensation intact.     Full facial strength, no asymmetry.      No significant dysarthria. Tongue protrudes to midline, palate elevates symmetrically.      Shoulder shrug 5/5 bilaterally.    Motor:    RUE pronator drift. Strength 4/5 in RUE. Strength 5/5 in LUE and BLE.      Bulk and tone normal.      No involuntary movements.    Sensation:    Sensation intact throughout to light touch.No neglect.     Coordination & Gait: Slow to perform FTN in RUE due to weakness, but no significant ataxia. FTN intact in LUE. HTS intact bilaterally. Steady gait, walks with cane.     DIET:   Normal hearty healthy diet.     PLAN:  Discharge home with self care.    FOLLOW UP APPOINTMENTS:   1.  Follow up in the [facility] clinic with Dr. Choi on July 16, 2024 at 11:00 AM.   2   Please make an appointment to follow up with PCP routinely.     ACTIVITY:   Do not lift anything over 10 lbs for two weeks. Try to limit going up and down stairs as much as possible. Rest and hydrate. May resume all physical activities as tolerated after 2 weeks.     WOUND CARE:   Monitor for signs and sx of infection including fever, expanding inflammation and discolored drainage. Report any changes in temperature in  affected extremity, numbness, weakness or hematoma.  If you experience any increased bruising or bleeding at your puncture site either outside or under the skin please apply direct, firm pressure for 20 minutes. Keep track of the bruising at the site by marking it with a pen or marker. If the bruising continues to be dark purple or blue in color, OR is expanding, please call our office to let the on call doctor know. We have someone on call 24/7. You may shower normally and cleanse the site with gentle soap. Do not soak in the bathtub. Do not apply any lotions, creams, powders, or other cosmetic products to the site.     OTHER RECOMMENDATIONS:  BEFAST reviewed to educate for stroke symptoms. Please call 911 and proceed to emergency department immediately if you develop any of the following:  - Acute changes in your balance or vision  - Weakness in your face, arm or leg   - Speech changes or difficulties.  Patient instructed to seek ER services if she experiences the worst headache of her life.   Long term BP goal <120/80, check BP daily at home and log BP readings.     DISCHARGE MEDICATIONS:   1. Resume all home meds  2. Continue Plavix 75 mg daily.     Patient takes Cilostazol 50 mg BID prior to admission. Discontinue aspirin, but continue PLAVIX 75 mg daily. Patient instructed to take Cilostazol 30-60 mins before meals. Sent prescription to pharmacy to start Protonix 40 mg daily for GERD. Patient can take Pepcid OTC in evening if she is having acid reflux. Discontinue Prilosec as it can interfere with Plavix. Dr. Jimbo byers with Protonix.    Avoid NSAIDS including (Advil, Motrin, Ibuprofen, Celebrex etc) while you are taking Pletal and Plavix. You may take Tylenol for pain.     Labs:  Lab Results   Component Value Date/Time     06/06/2024 02:26 AM    K 3.7 06/06/2024 02:26 AM     06/06/2024 02:26 AM    CO2 28 06/06/2024 02:26 AM    BUN 8 06/06/2024 02:26 AM    GFRAA >60 12/22/2020 07:50 AM     Lab Results

## 2024-06-06 NOTE — PROGRESS NOTES
Speech LAnguage Pathology EVALUATION/DISCHARGE    Patient: Francisca Portillo (58 y.o. female)  Date: 6/6/2024  Primary Diagnosis: Cerebral aneurysm [I67.1]   1 Day Post-Op   Precautions:                     ASSESSMENT :  Patient seen for swallow and motor speech evaluation. Patient with functional oropharyngeal phases of swallow with no overt s/s of aspiration nor overt deficits appreciated. Low suspicion for silent aspiration given location of deficits. Recommend pt continue baseline diet. No further swallowing needs.    Patient also presents with motor planning deficits consistent with apraxia of speech. Patient noted to have oral groping/visual scanning with larger sequences of words and larger words. This is further exacerbated by fast rate of speech. Benefits from visual cues by SLP mouthing words with her. Would recommend, if pt still with this present at follow up visit with Dr. Choi, that pt pursue outpatient SLP to address.     Patient will be discharged from skilled speech-language pathology services at this time.     PLAN :  Recommendations and Planned Interventions:  Diet: Regular and thin liquids  Apraxia of speech tx         Acute SLP Services: No, patient will be discharged from acute skilled speech-language pathology at this time.    Discharge Recommendations: Yes, recommend SLP treatment at next level of care     SUBJECTIVE:   Patient stated, “It's frustrating.”    OBJECTIVE:     Past Medical History:   Diagnosis Date    Anxiety     Arthritis     Asthma     Chronic mental illness     Depression     Falls     Fatigue     GERD (gastroesophageal reflux disease)     High blood pressure     Memory disorder     Obesity     SONIA (obstructive sleep apnea)     NO CPAP - NEEDS NEW MACHINE    Rash     SAH (subarachnoid hemorrhage) ([facility]) 11/2023    Smoker      Past Surgical History:   Procedure Laterality Date    BRAIN ANEURYSM SURGERY  10/2023    Ruptured L PCOM--> coiled then surpass flow diversion 6/2024

## 2024-06-06 NOTE — PROGRESS NOTES
Neurocritical Care Brief Progress Note:    Hx of Ruptured left PCOM aneurysm s/p coil embolization with compaction identified in follow up, s/p successful Surpass evolve flow diverting stent placement in the supraclinoid L ICA    Patient is seen resting in bed and easily awakes to voice.  She denies any headache, back pain, chest pain, abdominal pain, acute vision changes, dizziness at rest, numbness, tingling, nause or vomiting.  She states she was able to eat her dinner without any difficulty.    Physical Exam:  Gen: NAD, calm, cooperative  Neuro: A&O to self, location and circumstance but is confused about date and time. Follows commands. Speech is dysarthric and slurred. Affect normal. PERRL, 3 mm bilaterally. Blinks to threat. No disconjugate gaze present. EOMI. Face symmetric. Palate symmetric. Tongue midline. Johny spontaneously. Strength 4/5 in RUE.  Strength 5/5 in the LUE and LE BL. Positive drift in the RUE. Bulk and tone normal. No involuntary movements. Gait deferred.  Skin: Warm, dry, color appropriate for ethnicity. Right Femoral groin arteriotomy site soft and non-tender on palpation, dressing is C/D/I, with no active bleeding or drainage noted. Proximal and distal pulses are easily palpated.    Continue NIS Plan, patient will likely discharge home tomorrow.      SHARON Barrett - NP  Neurocritical Care Nurse Practitioner  [phone]

## 2024-06-07 ENCOUNTER — TELEPHONE (OUTPATIENT)
Age: 59
End: 2024-06-07

## 2024-06-07 NOTE — TELEPHONE ENCOUNTER
Message sent to Dr. Choi via [employer]:    Received a call regarding Francisca Portillo, : 1965. Medication question. Patient was given Protonix at discharge, she is also on Cilostazol. They would like clarification on which to take. Patient also states that she was told to stop Lasix.  Call back to Rosario Calix, Speech Therapist from [facility] is requested at [phone]

## 2024-07-10 ENCOUNTER — APPOINTMENT (OUTPATIENT)
Facility: HOSPITAL | Age: 59
End: 2024-07-10
Payer: MEDICAID

## 2024-07-10 ENCOUNTER — HOSPITAL ENCOUNTER (INPATIENT)
Facility: HOSPITAL | Age: 59
LOS: 3 days | Discharge: HOME HEALTH | End: 2024-07-13
Attending: STUDENT IN AN ORGANIZED HEALTH CARE EDUCATION/TRAINING PROGRAM | Admitting: FAMILY MEDICINE
Payer: MEDICAID

## 2024-07-10 DIAGNOSIS — J18.9 PNEUMONIA DUE TO INFECTIOUS ORGANISM, UNSPECIFIED LATERALITY, UNSPECIFIED PART OF LUNG: Primary | ICD-10-CM

## 2024-07-10 PROBLEM — A41.9 SEPSIS (HCC): Status: ACTIVE | Noted: 2024-07-10

## 2024-07-10 LAB
ALBUMIN SERPL-MCNC: 3.8 G/DL (ref 3.5–5)
ALBUMIN/GLOB SERPL: 1.1 (ref 1.1–2.2)
ALP SERPL-CCNC: 92 U/L (ref 45–117)
ALT SERPL-CCNC: 18 U/L (ref 12–78)
ANION GAP SERPL CALC-SCNC: 6 MMOL/L (ref 5–15)
APPEARANCE UR: CLEAR
AST SERPL W P-5'-P-CCNC: 14 U/L (ref 15–37)
BACTERIA URNS QL MICRO: NEGATIVE /HPF
BASE DEFICIT BLD-SCNC: 0.4 MMOL/L
BASOPHILS # BLD: 0 K/UL (ref 0–0.1)
BASOPHILS NFR BLD: 0 % (ref 0–1)
BILIRUB SERPL-MCNC: 0.3 MG/DL (ref 0.2–1)
BILIRUB UR QL: NEGATIVE
BUN SERPL-MCNC: 11 MG/DL (ref 6–20)
BUN/CREAT SERPL: 14 (ref 12–20)
CA-I BLD-MCNC: 1.14 MMOL/L (ref 1.12–1.32)
CA-I BLD-MCNC: 9.1 MG/DL (ref 8.5–10.1)
CHLORIDE BLD-SCNC: 106 MMOL/L (ref 98–107)
CHLORIDE SERPL-SCNC: 108 MMOL/L (ref 97–108)
CO2 BLD-SCNC: 27 MMOL/L
CO2 SERPL-SCNC: 26 MMOL/L (ref 21–32)
COLOR UR: ABNORMAL
CREAT SERPL-MCNC: 0.78 MG/DL (ref 0.55–1.02)
CREAT UR-MCNC: 0.58 MG/DL (ref 0.6–1.3)
DIFFERENTIAL METHOD BLD: ABNORMAL
EKG ATRIAL RATE: 112 BPM
EKG DIAGNOSIS: NORMAL
EKG P AXIS: 59 DEGREES
EKG P-R INTERVAL: 160 MS
EKG Q-T INTERVAL: 328 MS
EKG QRS DURATION: 78 MS
EKG QTC CALCULATION (BAZETT): 447 MS
EKG R AXIS: -25 DEGREES
EKG T AXIS: 38 DEGREES
EKG VENTRICULAR RATE: 112 BPM
EOSINOPHIL # BLD: 0 K/UL (ref 0–0.4)
EOSINOPHIL NFR BLD: 0 % (ref 0–7)
EPITH CASTS URNS QL MICRO: ABNORMAL /LPF
ERYTHROCYTE [DISTWIDTH] IN BLOOD BY AUTOMATED COUNT: 16 % (ref 11.5–14.5)
FLUAV RNA SPEC QL NAA+PROBE: NOT DETECTED
FLUBV RNA SPEC QL NAA+PROBE: NOT DETECTED
GLOBULIN SER CALC-MCNC: 3.6 G/DL (ref 2–4)
GLUCOSE BLD STRIP.AUTO-MCNC: 160 MG/DL (ref 65–100)
GLUCOSE SERPL-MCNC: 142 MG/DL (ref 65–100)
GLUCOSE UR STRIP.AUTO-MCNC: NEGATIVE MG/DL
HCO3 BLD-SCNC: 26.3 MMOL/L (ref 19–28)
HCT VFR BLD AUTO: 40.2 % (ref 35–47)
HGB BLD-MCNC: 12.4 G/DL (ref 11.5–16)
HGB UR QL STRIP: ABNORMAL
IMM GRANULOCYTES # BLD AUTO: 0.1 K/UL (ref 0–0.04)
IMM GRANULOCYTES NFR BLD AUTO: 0 % (ref 0–0.5)
KETONES UR QL STRIP.AUTO: NEGATIVE MG/DL
LACTATE BLD-SCNC: 1.7 MMOL/L (ref 0.4–2)
LACTATE BLD-SCNC: 2.25 MMOL/L (ref 0.4–2)
LEUKOCYTE ESTERASE UR QL STRIP.AUTO: NEGATIVE
LYMPHOCYTES # BLD: 1.1 K/UL (ref 0.8–3.5)
LYMPHOCYTES NFR BLD: 8 % (ref 12–49)
MCH RBC QN AUTO: 27.8 PG (ref 26–34)
MCHC RBC AUTO-ENTMCNC: 30.8 G/DL (ref 30–36.5)
MCV RBC AUTO: 90.1 FL (ref 80–99)
MONOCYTES # BLD: 0.7 K/UL (ref 0–1)
MONOCYTES NFR BLD: 5 % (ref 5–13)
MUCOUS THREADS URNS QL MICRO: ABNORMAL /LPF
NEUTS SEG # BLD: 12.2 K/UL (ref 1.8–8)
NEUTS SEG NFR BLD: 87 % (ref 32–75)
NITRITE UR QL STRIP.AUTO: NEGATIVE
NRBC # BLD: 0 K/UL (ref 0–0.01)
NRBC BLD-RTO: 0 PER 100 WBC
PCO2 BLD: 50 MMHG (ref 35–45)
PERFORMED BY:: ABNORMAL
PERFORMED BY:: NORMAL
PH BLD: 7.33 (ref 7.35–7.45)
PH UR STRIP: 5 (ref 5–8)
PLATELET # BLD AUTO: 302 K/UL (ref 150–400)
PMV BLD AUTO: 10.8 FL (ref 8.9–12.9)
PO2 BLD: <27 MMHG (ref 75–100)
POTASSIUM BLD-SCNC: 4.4 MMOL/L (ref 3.5–5.5)
POTASSIUM SERPL-SCNC: 4.4 MMOL/L (ref 3.5–5.1)
PROCALCITONIN SERPL-MCNC: <0.05 NG/ML
PROT SERPL-MCNC: 7.4 G/DL (ref 6.4–8.2)
PROT UR STRIP-MCNC: NEGATIVE MG/DL
RBC # BLD AUTO: 4.46 M/UL (ref 3.8–5.2)
RBC #/AREA URNS HPF: ABNORMAL /HPF (ref 0–5)
SARS-COV-2 RNA RESP QL NAA+PROBE: NOT DETECTED
SODIUM BLD-SCNC: 142 MMOL/L (ref 136–145)
SODIUM SERPL-SCNC: 140 MMOL/L (ref 136–145)
SP GR UR REFRACTOMETRY: 1.02 (ref 1–1.03)
SPECIMEN SITE: ABNORMAL
TROPONIN I SERPL HS-MCNC: 5 NG/L (ref 0–51)
URINE CULTURE IF INDICATED: ABNORMAL
UROBILINOGEN UR QL STRIP.AUTO: 0.1 EU/DL (ref 0.1–1)
WBC # BLD AUTO: 14 K/UL (ref 3.6–11)
WBC URNS QL MICRO: ABNORMAL /HPF (ref 0–4)

## 2024-07-10 PROCEDURE — 36415 COLL VENOUS BLD VENIPUNCTURE: CPT

## 2024-07-10 PROCEDURE — 81001 URINALYSIS AUTO W/SCOPE: CPT

## 2024-07-10 PROCEDURE — 84295 ASSAY OF SERUM SODIUM: CPT

## 2024-07-10 PROCEDURE — 05HB33Z INSERTION OF INFUSION DEVICE INTO RIGHT BASILIC VEIN, PERCUTANEOUS APPROACH: ICD-10-PCS | Performed by: FAMILY MEDICINE

## 2024-07-10 PROCEDURE — 84145 PROCALCITONIN (PCT): CPT

## 2024-07-10 PROCEDURE — 82803 BLOOD GASES ANY COMBINATION: CPT

## 2024-07-10 PROCEDURE — 6370000000 HC RX 637 (ALT 250 FOR IP): Performed by: FAMILY MEDICINE

## 2024-07-10 PROCEDURE — 82330 ASSAY OF CALCIUM: CPT

## 2024-07-10 PROCEDURE — 6360000002 HC RX W HCPCS: Performed by: FAMILY MEDICINE

## 2024-07-10 PROCEDURE — 84132 ASSAY OF SERUM POTASSIUM: CPT

## 2024-07-10 PROCEDURE — 84484 ASSAY OF TROPONIN QUANT: CPT

## 2024-07-10 PROCEDURE — 99285 EMERGENCY DEPT VISIT HI MDM: CPT

## 2024-07-10 PROCEDURE — 1100000000 HC RM PRIVATE

## 2024-07-10 PROCEDURE — 87636 SARSCOV2 & INF A&B AMP PRB: CPT

## 2024-07-10 PROCEDURE — 74177 CT ABD & PELVIS W/CONTRAST: CPT

## 2024-07-10 PROCEDURE — 85025 COMPLETE CBC W/AUTO DIFF WBC: CPT

## 2024-07-10 PROCEDURE — 2580000003 HC RX 258: Performed by: FAMILY MEDICINE

## 2024-07-10 PROCEDURE — 6370000000 HC RX 637 (ALT 250 FOR IP): Performed by: STUDENT IN AN ORGANIZED HEALTH CARE EDUCATION/TRAINING PROGRAM

## 2024-07-10 PROCEDURE — 83605 ASSAY OF LACTIC ACID: CPT

## 2024-07-10 PROCEDURE — 2580000003 HC RX 258: Performed by: STUDENT IN AN ORGANIZED HEALTH CARE EDUCATION/TRAINING PROGRAM

## 2024-07-10 PROCEDURE — 71045 X-RAY EXAM CHEST 1 VIEW: CPT

## 2024-07-10 PROCEDURE — 93005 ELECTROCARDIOGRAM TRACING: CPT | Performed by: STUDENT IN AN ORGANIZED HEALTH CARE EDUCATION/TRAINING PROGRAM

## 2024-07-10 PROCEDURE — 82947 ASSAY GLUCOSE BLOOD QUANT: CPT

## 2024-07-10 PROCEDURE — 96372 THER/PROPH/DIAG INJ SC/IM: CPT

## 2024-07-10 PROCEDURE — 36410 VNPNXR 3YR/> PHY/QHP DX/THER: CPT

## 2024-07-10 PROCEDURE — 6360000002 HC RX W HCPCS: Performed by: STUDENT IN AN ORGANIZED HEALTH CARE EDUCATION/TRAINING PROGRAM

## 2024-07-10 PROCEDURE — 87040 BLOOD CULTURE FOR BACTERIA: CPT

## 2024-07-10 PROCEDURE — 80053 COMPREHEN METABOLIC PANEL: CPT

## 2024-07-10 PROCEDURE — 6360000004 HC RX CONTRAST MEDICATION: Performed by: STUDENT IN AN ORGANIZED HEALTH CARE EDUCATION/TRAINING PROGRAM

## 2024-07-10 RX ORDER — CYCLOBENZAPRINE HCL 10 MG
10 TABLET ORAL 3 TIMES DAILY PRN
Status: DISCONTINUED | OUTPATIENT
Start: 2024-07-10 | End: 2024-07-13 | Stop reason: HOSPADM

## 2024-07-10 RX ORDER — ALPRAZOLAM 0.5 MG/1
2 TABLET ORAL 3 TIMES DAILY PRN
Status: DISCONTINUED | OUTPATIENT
Start: 2024-07-10 | End: 2024-07-13 | Stop reason: HOSPADM

## 2024-07-10 RX ORDER — ZOLPIDEM TARTRATE 5 MG/1
10 TABLET ORAL NIGHTLY PRN
Status: DISCONTINUED | OUTPATIENT
Start: 2024-07-10 | End: 2024-07-13 | Stop reason: HOSPADM

## 2024-07-10 RX ORDER — ACETAMINOPHEN 650 MG/1
650 SUPPOSITORY RECTAL EVERY 6 HOURS PRN
Status: DISCONTINUED | OUTPATIENT
Start: 2024-07-10 | End: 2024-07-13 | Stop reason: HOSPADM

## 2024-07-10 RX ORDER — ACETAMINOPHEN 325 MG/1
325 TABLET ORAL
Status: COMPLETED | OUTPATIENT
Start: 2024-07-10 | End: 2024-07-10

## 2024-07-10 RX ORDER — ACETAMINOPHEN 325 MG/1
650 TABLET ORAL EVERY 6 HOURS PRN
Status: DISCONTINUED | OUTPATIENT
Start: 2024-07-10 | End: 2024-07-13 | Stop reason: HOSPADM

## 2024-07-10 RX ORDER — SODIUM CHLORIDE 9 MG/ML
INJECTION, SOLUTION INTRAVENOUS CONTINUOUS
Status: DISCONTINUED | OUTPATIENT
Start: 2024-07-10 | End: 2024-07-11

## 2024-07-10 RX ORDER — ENOXAPARIN SODIUM 100 MG/ML
30 INJECTION SUBCUTANEOUS 2 TIMES DAILY
Status: DISCONTINUED | OUTPATIENT
Start: 2024-07-10 | End: 2024-07-13 | Stop reason: HOSPADM

## 2024-07-10 RX ORDER — POTASSIUM CHLORIDE 7.45 MG/ML
10 INJECTION INTRAVENOUS PRN
Status: DISCONTINUED | OUTPATIENT
Start: 2024-07-10 | End: 2024-07-13 | Stop reason: HOSPADM

## 2024-07-10 RX ORDER — ACETAMINOPHEN 325 MG/1
650 TABLET ORAL
Status: COMPLETED | OUTPATIENT
Start: 2024-07-10 | End: 2024-07-10

## 2024-07-10 RX ORDER — SODIUM CHLORIDE 0.9 % (FLUSH) 0.9 %
5-40 SYRINGE (ML) INJECTION EVERY 12 HOURS SCHEDULED
Status: DISCONTINUED | OUTPATIENT
Start: 2024-07-10 | End: 2024-07-13 | Stop reason: HOSPADM

## 2024-07-10 RX ORDER — PANTOPRAZOLE SODIUM 40 MG/1
40 TABLET, DELAYED RELEASE ORAL
Status: DISCONTINUED | OUTPATIENT
Start: 2024-07-11 | End: 2024-07-13 | Stop reason: HOSPADM

## 2024-07-10 RX ORDER — ONDANSETRON 4 MG/1
4 TABLET, ORALLY DISINTEGRATING ORAL EVERY 8 HOURS PRN
Status: DISCONTINUED | OUTPATIENT
Start: 2024-07-10 | End: 2024-07-13 | Stop reason: HOSPADM

## 2024-07-10 RX ORDER — POLYETHYLENE GLYCOL 3350 17 G/17G
17 POWDER, FOR SOLUTION ORAL DAILY PRN
Status: DISCONTINUED | OUTPATIENT
Start: 2024-07-10 | End: 2024-07-13 | Stop reason: HOSPADM

## 2024-07-10 RX ORDER — HYDROCODONE BITARTRATE AND ACETAMINOPHEN 5; 325 MG/1; MG/1
1 TABLET ORAL EVERY 6 HOURS PRN
Status: DISCONTINUED | OUTPATIENT
Start: 2024-07-10 | End: 2024-07-13 | Stop reason: HOSPADM

## 2024-07-10 RX ORDER — SODIUM CHLORIDE 9 MG/ML
INJECTION, SOLUTION INTRAVENOUS PRN
Status: DISCONTINUED | OUTPATIENT
Start: 2024-07-10 | End: 2024-07-13 | Stop reason: HOSPADM

## 2024-07-10 RX ORDER — LOSARTAN POTASSIUM 50 MG/1
50 TABLET ORAL DAILY
Status: DISCONTINUED | OUTPATIENT
Start: 2024-07-10 | End: 2024-07-13 | Stop reason: HOSPADM

## 2024-07-10 RX ORDER — 0.9 % SODIUM CHLORIDE 0.9 %
1000 INTRAVENOUS SOLUTION INTRAVENOUS ONCE
Status: COMPLETED | OUTPATIENT
Start: 2024-07-10 | End: 2024-07-10

## 2024-07-10 RX ORDER — DEXTROMETHORPHAN HBR. AND GUAIFENESIN 10; 100 MG/5ML; MG/5ML
10 SOLUTION ORAL EVERY 4 HOURS PRN
Status: DISCONTINUED | OUTPATIENT
Start: 2024-07-10 | End: 2024-07-13 | Stop reason: HOSPADM

## 2024-07-10 RX ORDER — MAGNESIUM SULFATE IN WATER 40 MG/ML
2000 INJECTION, SOLUTION INTRAVENOUS PRN
Status: DISCONTINUED | OUTPATIENT
Start: 2024-07-10 | End: 2024-07-13 | Stop reason: HOSPADM

## 2024-07-10 RX ORDER — GABAPENTIN 100 MG/1
100 CAPSULE ORAL 2 TIMES DAILY
Status: DISCONTINUED | OUTPATIENT
Start: 2024-07-10 | End: 2024-07-13 | Stop reason: HOSPADM

## 2024-07-10 RX ORDER — ATORVASTATIN CALCIUM 20 MG/1
20 TABLET, FILM COATED ORAL NIGHTLY
Status: DISCONTINUED | OUTPATIENT
Start: 2024-07-10 | End: 2024-07-13 | Stop reason: HOSPADM

## 2024-07-10 RX ORDER — ONDANSETRON 2 MG/ML
4 INJECTION INTRAMUSCULAR; INTRAVENOUS EVERY 6 HOURS PRN
Status: DISCONTINUED | OUTPATIENT
Start: 2024-07-10 | End: 2024-07-13 | Stop reason: HOSPADM

## 2024-07-10 RX ORDER — CLOPIDOGREL BISULFATE 75 MG/1
75 TABLET ORAL DAILY
Status: DISCONTINUED | OUTPATIENT
Start: 2024-07-10 | End: 2024-07-13 | Stop reason: HOSPADM

## 2024-07-10 RX ORDER — POTASSIUM CHLORIDE 20 MEQ/1
40 TABLET, EXTENDED RELEASE ORAL PRN
Status: DISCONTINUED | OUTPATIENT
Start: 2024-07-10 | End: 2024-07-13 | Stop reason: HOSPADM

## 2024-07-10 RX ORDER — SODIUM CHLORIDE 0.9 % (FLUSH) 0.9 %
5-40 SYRINGE (ML) INJECTION PRN
Status: DISCONTINUED | OUTPATIENT
Start: 2024-07-10 | End: 2024-07-13 | Stop reason: HOSPADM

## 2024-07-10 RX ORDER — CILOSTAZOL 50 MG/1
50 TABLET ORAL 2 TIMES DAILY
Status: DISCONTINUED | OUTPATIENT
Start: 2024-07-10 | End: 2024-07-13 | Stop reason: HOSPADM

## 2024-07-10 RX ORDER — FERROUS SULFATE 325(65) MG
325 TABLET ORAL
Status: DISCONTINUED | OUTPATIENT
Start: 2024-07-11 | End: 2024-07-13 | Stop reason: HOSPADM

## 2024-07-10 RX ADMIN — ACETAMINOPHEN 325 MG: 325 TABLET ORAL at 08:38

## 2024-07-10 RX ADMIN — GABAPENTIN 100 MG: 100 CAPSULE ORAL at 20:19

## 2024-07-10 RX ADMIN — ZOLPIDEM TARTRATE 10 MG: 5 TABLET, COATED ORAL at 20:21

## 2024-07-10 RX ADMIN — ATORVASTATIN CALCIUM 20 MG: 20 TABLET, FILM COATED ORAL at 20:34

## 2024-07-10 RX ADMIN — HYDROCODONE BITARTRATE AND ACETAMINOPHEN 1 TABLET: 5; 325 TABLET ORAL at 20:17

## 2024-07-10 RX ADMIN — ACETAMINOPHEN 650 MG: 325 TABLET ORAL at 02:38

## 2024-07-10 RX ADMIN — ENOXAPARIN SODIUM 30 MG: 100 INJECTION SUBCUTANEOUS at 08:38

## 2024-07-10 RX ADMIN — SODIUM CHLORIDE, PRESERVATIVE FREE 10 ML: 5 INJECTION INTRAVENOUS at 20:24

## 2024-07-10 RX ADMIN — CILOSTAZOL 50 MG: 50 TABLET ORAL at 20:34

## 2024-07-10 RX ADMIN — IOPAMIDOL 100 ML: 755 INJECTION, SOLUTION INTRAVENOUS at 03:44

## 2024-07-10 RX ADMIN — LOSARTAN POTASSIUM 50 MG: 50 TABLET, FILM COATED ORAL at 20:18

## 2024-07-10 RX ADMIN — CLOPIDOGREL BISULFATE 75 MG: 75 TABLET ORAL at 20:17

## 2024-07-10 RX ADMIN — AZITHROMYCIN MONOHYDRATE 500 MG: 500 INJECTION, POWDER, LYOPHILIZED, FOR SOLUTION INTRAVENOUS at 08:37

## 2024-07-10 RX ADMIN — SERTRALINE HYDROCHLORIDE 200 MG: 50 TABLET ORAL at 20:17

## 2024-07-10 RX ADMIN — ENOXAPARIN SODIUM 30 MG: 100 INJECTION SUBCUTANEOUS at 20:19

## 2024-07-10 RX ADMIN — SODIUM CHLORIDE: 9 INJECTION, SOLUTION INTRAVENOUS at 20:20

## 2024-07-10 RX ADMIN — ACETAMINOPHEN 650 MG: 325 TABLET ORAL at 15:16

## 2024-07-10 RX ADMIN — CEFTRIAXONE SODIUM 1000 MG: 1 INJECTION, POWDER, FOR SOLUTION INTRAMUSCULAR; INTRAVENOUS at 02:30

## 2024-07-10 RX ADMIN — SODIUM CHLORIDE 1000 ML: 9 INJECTION, SOLUTION INTRAVENOUS at 01:46

## 2024-07-10 RX ADMIN — ALPRAZOLAM 2 MG: 0.5 TABLET ORAL at 20:17

## 2024-07-10 RX ADMIN — SODIUM CHLORIDE: 9 INJECTION, SOLUTION INTRAVENOUS at 08:37

## 2024-07-10 ASSESSMENT — PAIN DESCRIPTION - ORIENTATION
ORIENTATION: RIGHT
ORIENTATION: RIGHT;LEFT;LOWER;POSTERIOR

## 2024-07-10 ASSESSMENT — PAIN DESCRIPTION - PAIN TYPE: TYPE: CHRONIC PAIN

## 2024-07-10 ASSESSMENT — PAIN SCALES - GENERAL
PAINLEVEL_OUTOF10: 6
PAINLEVEL_OUTOF10: 8
PAINLEVEL_OUTOF10: 5
PAINLEVEL_OUTOF10: 7
PAINLEVEL_OUTOF10: 6

## 2024-07-10 ASSESSMENT — PAIN - FUNCTIONAL ASSESSMENT
PAIN_FUNCTIONAL_ASSESSMENT: PREVENTS OR INTERFERES SOME ACTIVE ACTIVITIES AND ADLS
PAIN_FUNCTIONAL_ASSESSMENT: PREVENTS OR INTERFERES SOME ACTIVE ACTIVITIES AND ADLS
PAIN_FUNCTIONAL_ASSESSMENT: 0-10

## 2024-07-10 ASSESSMENT — PAIN DESCRIPTION - LOCATION
LOCATION: BACK;SHOULDER
LOCATION: BACK
LOCATION: BACK;HAND;LEG

## 2024-07-10 ASSESSMENT — PAIN SCALES - WONG BAKER
WONGBAKER_NUMERICALRESPONSE: HURTS A LITTLE BIT
WONGBAKER_NUMERICALRESPONSE: HURTS A LITTLE BIT

## 2024-07-10 ASSESSMENT — PAIN DESCRIPTION - DESCRIPTORS: DESCRIPTORS: ACHING

## 2024-07-10 ASSESSMENT — PAIN DESCRIPTION - FREQUENCY: FREQUENCY: INTERMITTENT

## 2024-07-10 ASSESSMENT — PAIN DESCRIPTION - ONSET: ONSET: GRADUAL

## 2024-07-10 NOTE — PROGRESS NOTES
Midline Insertion Procedure Note    Procedure: Insertion of #4 FR/18G Midline    Indications:  Poor Access    Procedure Details   Informed consent was obtained for the procedure, including sedation.  Risks of hemorrhage, and adverse drug reaction were discussed.     #4 FR/18G Midline inserted to the R Basilic vein per hospital protocol.   Blood return:  yes    Findings:  Catheter inserted to 13 cm, with 0 cm. exposed. Mid upper arm circumference is 37 cm.  Catheter was flushed with 20 cc NS. Patient did tolerate procedure well.    Recommendations:  Brochure given to patient with teaching instruction.

## 2024-07-10 NOTE — CARE COORDINATION
07/10/24 1511   Service Assessment   Patient Orientation Alert and Oriented   Cognition Alert   History Provided By Patient   Primary Caregiver Self   Accompanied By/Relationship family   Support Systems Spouse/Significant Other;Children;Family Members   Patient's Healthcare Decision Maker is: Legal Next of Kin   PCP Verified by CM Yes  (Dr. Martínez last seen last Thursday)   Last Visit to PCP Within last 3 months   Prior Functional Level Assistance with the following:;Mobility;Bathing;Dressing;Toileting;Feeding;Cooking;Housework;Shopping   Current Functional Level Assistance with the following:;Mobility;Bathing;Dressing;Toileting;Feeding;Cooking;Housework;Shopping   Can patient return to prior living arrangement Unknown at present   Ability to make needs known: Fair   Family able to assist with home care needs: Yes   Would you like for me to discuss the discharge plan with any other family members/significant others, and if so, who? Yes  (, children)   Financial Resources Medicaid   Community Resources None   CM/SW Referral Other (see comment)  (discharge planning)   Social/Functional History   Lives With Alone   Type of Home Apartment   Home Equipment Cane   Discharge Planning   Patient expects to be discharged to: Apartment     CM met with pt and family at bedside to discuss dispo and verified demographics. Pt is from home alone and has PCA M-F [phone], family assists when PCA not there. Pt has no LIZ front door. DME: cane. Pt reports she is currently on HH services, but unable to recall through which agency. Pt reports hx of IRF at [facility], no hx of SNF. Pt's son to transport pt when cleared for dc.     Rx: [facility] [address] in [place]  Meds to Bed declined at this time    Advance Care Planning     General Advance Care Planning (ACP) Conversation    Date of Conversation: 7/10/2024  Conducted with: Patient with Decision Making Capacity  Other persons present: Edward Johnathan [facility]

## 2024-07-10 NOTE — PLAN OF CARE
Problem: Pain  Goal: Verbalizes/displays adequate comfort level or baseline comfort level  Outcome: Not Progressing     Problem: Discharge Planning  Goal: Discharge to home or other facility with appropriate resources  Outcome: Progressing     Problem: Skin/Tissue Integrity  Goal: Absence of new skin breakdown  Description: 1.  Monitor for areas of redness and/or skin breakdown  2.  Assess vascular access sites hourly  3.  Every 4-6 hours minimum:  Change oxygen saturation probe site  4.  Every 4-6 hours:  If on nasal continuous positive airway pressure, respiratory therapy assess nares and determine need for appliance change or resting period.  Outcome: Progressing     Problem: Pain  Goal: Verbalizes/displays adequate comfort level or baseline comfort level  Outcome: Not Progressing

## 2024-07-10 NOTE — PROGRESS NOTES
Spiritual Care Assessment/Progress Note  [facility]    Name: Francisca Portillo MRN: [id number]    Age: 58 y.o.     Sex: female   Language: English     Date: 7/10/2024            Total Time Calculated: 10 min              Spiritual Assessment begun in SSR [facility] [place]  Service Provided For: Patient  Referral/Consult From: Rounding  Encounter Overview/Reason: Initial Encounter    Spiritual beliefs:      [x] Involved in a alysia tradition/spiritual practice:      [] Supported by a alysia community:      [] Claims no spiritual orientation:      [] Seeking spiritual identity:           [] Adheres to an individual form of spirituality:      [] Not able to assess:                Identified resources for coping and support system:   Support System: Children, Family members, Friends/neighbors       [x] Prayer                  [] Devotional reading               [] Music                  [] Guided Imagery     [] Pet visits                                        [] Other: (COMMENT)     Specific area/focus of visit   Encounter:    Crisis:    Spiritual/Emotional needs: Type: Spiritual Support  Ritual, Rites and Sacraments:    Grief, Loss, and Adjustments:    Ethics/Mediation:    Behavioral Health:    Palliative Care:    Advance Care Planning:      Plan/Referrals: Other (Comment) ( follow-up is available on referral)    Narrative:  visited the pt on 2E while rounding. Pt spoke briefly about family support. She shared her feelings and concerns. She also shared that she is Mosque. Pt was appreciative of the visit and prayer.     listened to pt's and acknowledged patients feelings and concerns.  provided a calming presence and offered encouraging words and prayer for healing.    Kali Sahalain Intern  Contact [facility] at [phone]

## 2024-07-10 NOTE — ED PROVIDER NOTES
tablet Take 1 tablet by mouth nightly as needed for Sleep.      ALPRAZolam (XANAX) 1 MG tablet Take 2 tablets by mouth as needed for Anxiety.      atorvastatin (LIPITOR) 20 MG tablet Take 1 tablet by mouth nightly  Qty: 30 tablet, Refills: 0      cyanocobalamin 1000 MCG/ML injection Inject 1 mL into the muscle every 30 days      ferrous sulfate (FE TABS 325) 325 (65 Fe) MG EC tablet Take 1 tablet by mouth daily (with breakfast)            2. No follow-up provider specified.  3.  Return to ED if worse    4.      Medication List        ASK your doctor about these medications      ALPRAZolam 1 MG tablet  Commonly known as: XANAX     atorvastatin 20 MG tablet  Commonly known as: LIPITOR  Take 1 tablet by mouth nightly     butalbital-acetaminophen-caffeine -40 MG per tablet  Commonly known as: FIORICET, ESGIC     cilostazol 50 MG tablet  Commonly known as: PLETAL     clopidogrel 75 MG tablet  Commonly known as: Plavix  Take 1 tablet by mouth daily     cyanocobalamin 1000 MCG/ML injection     cyclobenzaprine 10 MG tablet  Commonly known as: FLEXERIL     ferrous sulfate 325 (65 Fe) MG EC tablet  Commonly known as: FE TABS 325     furosemide 20 MG tablet  Commonly known as: LASIX     gabapentin 100 MG capsule  Commonly known as: NEURONTIN     HYDROcodone-acetaminophen 5-325 MG per tablet  Commonly known as: NORCO     losartan 50 MG tablet  Commonly known as: COZAAR     pantoprazole 40 MG tablet  Commonly known as: PROTONIX  Take 1 tablet by mouth every morning (before breakfast)     sertraline 100 MG tablet  Commonly known as: ZOLOFT     zolpidem 10 MG tablet  Commonly known as: AMBIEN            5. Discontinued Medications:   Current Discharge Medication List          Procedures     Unless otherwise noted below, none.    Performed by: Rajan Garcia MD   Procedures      Critical Care Time     Critical care billing criteria and/or time were NOT met.    Documentation     I am the Primary Clinician of Record: Rajan MARTINEZ  MD Radha (electronically signed)    (Please note that parts of this dictation were completed with voice recognition software. Quite often unanticipated grammatical, syntax, homophones, and other interpretive errors are inadvertently transcribed by the computer software. Please disregards these errors. Please excuse any errors that have escaped final proofreading.)        Rajan Garcia MD  07/10/24 0973

## 2024-07-10 NOTE — ED NOTES
ED TO INPATIENT SBAR HANDOFF    Patient Name: Francisca Portillo   Preferred Name: Francisca  : 1965  58 y.o.   Family/Caregiver Present: no   Code Status Order: Full Code  PO Status: NPO:No  Telemetry Order:   C-SSRS: Risk of Suicide: No Risk  Sitter no  N/A  Restraints:     Sepsis Risk Score      Situation  Chief Complaint   Patient presents with    Chills     Brief Description of Patient's Condition: Pt resting with eyes closed, easily arousable. Afebrile. Slurred speech at baseline.   Mental Status: oriented, alert, coherent, logical, thought processes intact, and able to concentrate and follow conversation  Arrived from:Home  Imaging:   CT ABDOMEN PELVIS W IV CONTRAST Additional Contrast? None   Final Result   Patchy bilateral airspace disease is suspicious for pneumonia. No acute   abdominal or pelvic process.      Electronically signed by Vivek High      XR CHEST 1 VIEW   Final Result   No evidence of acute cardiopulmonary process. No significant   interval change.         Electronically signed by Vivek High        Abnormal labs:   Abnormal Labs Reviewed   CBC WITH AUTO DIFFERENTIAL - Abnormal; Notable for the following components:       Result Value    WBC 14.0 (*)     RDW 16.0 (*)     Neutrophils % 87 (*)     Lymphocytes % 8 (*)     Neutrophils Absolute 12.2 (*)     Immature Granulocytes Absolute 0.1 (*)     All other components within normal limits   COMPREHENSIVE METABOLIC PANEL - Abnormal; Notable for the following components:    Glucose 142 (*)     AST 14 (*)     All other components within normal limits   URINALYSIS WITH REFLEX TO CULTURE - Abnormal; Notable for the following components:    Blood, Urine Small (*)     Mucus, UA Trace (*)     All other components within normal limits   PROCALCITONIN - Abnormal; Notable for the following components:    Procalcitonin <0.05 (*)     All other components within normal limits   POC BLOOD GAS AND CHEMISTRY - Abnormal; Notable for the following components:     POC pH 7.33 (*)     POC pCO2 50.0 (*)     POC PO2 <27 (*)     POC Glucose 160 (*)     POC Creatinine 0.58 (*)     POC Lactic Acid 2.25 (*)     All other components within normal limits       Background  Allergies:   Allergies   Allergen Reactions    Adhesive Tape Other (See Comments)     BURNS SKIN    Nsaids Nausea And Vomiting and Other (See Comments)     BURNS STOMACH     History:   Past Medical History:   Diagnosis Date    Anxiety     Arthritis     Asthma     Chronic mental illness     Depression     Falls     Fatigue     GERD (gastroesophageal reflux disease)     High blood pressure     Memory disorder     Obesity     SONIA (obstructive sleep apnea)     NO CPAP - NEEDS NEW MACHINE    Rash     SAH (subarachnoid hemorrhage) ([facility]) 11/2023    Smoker        Assessment  Vitals: MEWS Score: 4  Level of Consciousness: Alert (0)   Vitals:    07/10/24 0515 07/10/24 0615 07/10/24 0715 07/10/24 0931   BP: 106/71 109/73 118/68 (!) 115/58   Pulse: (!) 105 (!) 104 88 81   Resp: 25 14 27 23   Temp:  (!) 100.7 °F (38.2 °C)     TempSrc:  Axillary     SpO2: 95% 98% 100% 96%   Weight:       Height:         Deterioration Index (DI): Deterioration Index: 46.48  Deterioration Index (DI) Interventions Performed:    O2 Flow Rate: O2 Flow Rate (L/min): 2 L/min  O2 Device: O2 Device: Nasal cannula  Cardiac Rhythm:    Critical Lab Results: [unfilled]  Cultures: Cultures: Blood, urine  NIH Score: NIH     Active LDA's:   Peripheral IV 07/10/24 Left Antecubital (Active)     Active Central Lines:                          Active Wounds:    Active Arnold's:    Active Feeding Tubes:      Administered Medications:   Medications   azithromycin (ZITHROMAX) 500 mg in sodium chloride 0.9 % 250 mL IVPB ([id number]) (500 mg IntraVENous New Bag 7/10/24 0837)   cefTRIAXone (ROCEPHIN) 1,000 mg in sterile water 10 mL IV syringe (has no administration in time range)   dextromethorphan-guaiFENesin (ROBITUSSIN-DM)  MG/5ML liquid 10 mL (has no administration

## 2024-07-10 NOTE — PROGRESS NOTES
4 Eyes Skin Assessment     NAME:  Francisca Portillo  YOB: 1965  MEDICAL RECORD NUMBER:  [id number]    The patient is being assessed for  Admission    I agree that at least one RN has performed a thorough Head to Toe Skin Assessment on the patient. ALL assessment sites listed below have been assessed.      Areas assessed by both nurses:    Head, Face, Ears, Shoulders, Back, Chest, Arms, Elbows, Hands, Sacrum. Buttock, Coccyx, Ischium, Legs. Feet and Heels, Under Medical Devices , and Other redness under breasts, abdominal pannus, L elbow and sacrum        Does the Patient have a Wound? No noted wound(s)       Marshall Prevention initiated by RN: Yes  Wound Care Orders initiated by RN: No    Pressure Injury (Stage 3,4, Unstageable, DTI, NWPT, and Complex wounds) if present, place Wound referral order by RN under : No    New Ostomies, if present place, Ostomy referral order under : No     Nurse 1 eSignature: Electronically signed by Jeannette Calix RN on 7/10/24 at 12:00 PM EDT    **SHARE this note so that the co-signing nurse can place an eSignature**    Nurse 2 eSignature: Electronically signed by Rossi Henderson RN on 7/10/24 at 12:01 PM EDT

## 2024-07-10 NOTE — H&P
(obstructive sleep apnea)     NO CPAP - NEEDS NEW MACHINE    Rash     SAH (subarachnoid hemorrhage) ([facility]) 11/2023    Smoker         Past Surgical History:   Procedure Laterality Date    BRAIN ANEURYSM SURGERY  10/2023    Ruptured L PCOM--> coiled then surpass flow diversion 6/2024    COLONOSCOPY N/A 5/27/2022    COLONOSCOPY, EGD performed by Mamie Ndiaye MD at [facility]    GYN      LAPAROSCOPY    INSERT ARTERIAL LINE  11/08/2023    INTRACRANIAL ANEURYSM REPAIR  11/2023    Coil embolization- ruptured L PCOM aneurysm    LAP GASTRIC BYPASS/PHAN-EN-Y  2005    LAP, PLACE ADJUST GASTR BAND  2003    OTHER SURGICAL HISTORY      lapband removal    TONSILLECTOMY      TUBAL LIGATION         Prior to Admission medications    Medication Sig Start Date End Date Taking? Authorizing Provider   pantoprazole (PROTONIX) 40 MG tablet Take 1 tablet by mouth every morning (before breakfast) 6/6/24   Sharath Leon, APRN - NP   losartan (COZAAR) 50 MG tablet Take 1 tablet by mouth daily    Abby Alanis MD   cilostazol (PLETAL) 50 MG tablet Take 1 tablet by mouth 2 times daily    Abby Alanis MD   cyclobenzaprine (FLEXERIL) 10 MG tablet Take 1 tablet by mouth 3 times daily as needed for Muscle spasms    Abby Alanis MD   furosemide (LASIX) 20 MG tablet Take 1 tablet by mouth daily    Abby Alanis MD   HYDROcodone-acetaminophen (NORCO) 5-325 MG per tablet Take 1 tablet by mouth every 6 hours as needed for Pain.    ProviderAbby MD   clopidogrel (PLAVIX) 75 MG tablet Take 1 tablet by mouth daily 5/14/24   Neel Choi DO   gabapentin (NEURONTIN) 100 MG capsule Take 1 capsule by mouth 2 times daily.    Abby Alanis MD   butalbital-acetaminophen-caffeine (FIORICET, ESGIC) -40 MG per tablet Take 1 tablet by mouth every 6 hours as needed for Headaches    Abby Alanis MD   sertraline (ZOLOFT) 100 MG tablet Take 2 tablets by mouth daily    Abby Alanis MD  for pneumonia. No acute   abdominal or pelvic process.      Electronically signed by Vivek High      XR CHEST 1 VIEW   Final Result   No evidence of acute cardiopulmonary process. No significant   interval change.         Electronically signed by Vivek High         Review of chest x-ray personally by myself-no acute findings noted    Discussion/Medical Decision Making: Patient with numerous medical comorbidities, each with increased risk for mortality and morbidity if left untreated.   I have reviewed patient's presenting subjective and objective findings, as well as all laboratory studies, imaging studies, and vital signs to date as well as treatment rendered  and patient's response to those treatments.  In addition, prior medical, surgical and relevant social and family histories were reviewed.    I have discussed patient's presentation/findings and clinical course to date with ED provider. Given the patient's current clinical presentation, I have a high level of concern for decompensation if discharged from the emergency department and that patient warrants admission to hospital.     Assessment:   Pneumonia of bilateral lower lobes-this is in a 58-year-old female with no other related history except asthma who presents today with a sudden onset of chills cough and shortness of breath as noted above.  This community-acquired pneumonia  Severe sepsis with lactic acidosis-as indicated by leukocytosis, tachycardia, fever, secondary to pneumonia.  Blood pressure is somewhat soft however there is no evidence of shock  Hypoxia-pulse oximetry noted to be 90% on room air  Essential hypertension-as noted above  Asthma-this appears to be stable there is no wheezing currently      Plan:   Admit inpatient to medical floor with telemetry monitoring  Will treat for community-acquired pneumonia with IV ceftriaxone and IV azithromycin  Symptomatic treatment with nebulizers and cough medication as needed  Continue home

## 2024-07-10 NOTE — ED TRIAGE NOTES
Per EMS: pt called wanting assistance for a blanket, then family reported that patient has been having chills

## 2024-07-10 NOTE — ED NOTES
Attempted 2 blood culture collection attempts with no success.  Notified Dr. Garcia, will switch antibiotics to IM instead of IV administration.

## 2024-07-11 LAB
ANION GAP SERPL CALC-SCNC: 3 MMOL/L (ref 5–15)
BASOPHILS # BLD: 0 K/UL (ref 0–0.1)
BASOPHILS NFR BLD: 0 % (ref 0–1)
BUN SERPL-MCNC: 8 MG/DL (ref 6–20)
BUN/CREAT SERPL: 18 (ref 12–20)
CA-I BLD-MCNC: 9 MG/DL (ref 8.5–10.1)
CHLORIDE SERPL-SCNC: 112 MMOL/L (ref 97–108)
CO2 SERPL-SCNC: 27 MMOL/L (ref 21–32)
CREAT SERPL-MCNC: 0.44 MG/DL (ref 0.55–1.02)
DIFFERENTIAL METHOD BLD: ABNORMAL
EOSINOPHIL # BLD: 0.3 K/UL (ref 0–0.4)
EOSINOPHIL NFR BLD: 3 % (ref 0–7)
ERYTHROCYTE [DISTWIDTH] IN BLOOD BY AUTOMATED COUNT: 16.2 % (ref 11.5–14.5)
GLUCOSE SERPL-MCNC: 108 MG/DL (ref 65–100)
HCT VFR BLD AUTO: 33.4 % (ref 35–47)
HGB BLD-MCNC: 10.5 G/DL (ref 11.5–16)
IMM GRANULOCYTES # BLD AUTO: 0 K/UL (ref 0–0.04)
IMM GRANULOCYTES NFR BLD AUTO: 0 % (ref 0–0.5)
LYMPHOCYTES # BLD: 2.2 K/UL (ref 0.8–3.5)
LYMPHOCYTES NFR BLD: 24 % (ref 12–49)
MCH RBC QN AUTO: 28.4 PG (ref 26–34)
MCHC RBC AUTO-ENTMCNC: 31.4 G/DL (ref 30–36.5)
MCV RBC AUTO: 90.3 FL (ref 80–99)
MONOCYTES # BLD: 0.8 K/UL (ref 0–1)
MONOCYTES NFR BLD: 9 % (ref 5–13)
NEUTS SEG # BLD: 5.9 K/UL (ref 1.8–8)
NEUTS SEG NFR BLD: 64 % (ref 32–75)
NRBC # BLD: 0 K/UL (ref 0–0.01)
NRBC BLD-RTO: 0 PER 100 WBC
PLATELET # BLD AUTO: 275 K/UL (ref 150–400)
PMV BLD AUTO: 11 FL (ref 8.9–12.9)
POTASSIUM SERPL-SCNC: 3.7 MMOL/L (ref 3.5–5.1)
RBC # BLD AUTO: 3.7 M/UL (ref 3.8–5.2)
SODIUM SERPL-SCNC: 142 MMOL/L (ref 136–145)
WBC # BLD AUTO: 9.2 K/UL (ref 3.6–11)

## 2024-07-11 PROCEDURE — 85025 COMPLETE CBC W/AUTO DIFF WBC: CPT

## 2024-07-11 PROCEDURE — 6370000000 HC RX 637 (ALT 250 FOR IP): Performed by: HOSPITALIST

## 2024-07-11 PROCEDURE — 6370000000 HC RX 637 (ALT 250 FOR IP): Performed by: FAMILY MEDICINE

## 2024-07-11 PROCEDURE — 80048 BASIC METABOLIC PNL TOTAL CA: CPT

## 2024-07-11 PROCEDURE — 1100000000 HC RM PRIVATE

## 2024-07-11 PROCEDURE — 2580000003 HC RX 258: Performed by: FAMILY MEDICINE

## 2024-07-11 PROCEDURE — 36415 COLL VENOUS BLD VENIPUNCTURE: CPT

## 2024-07-11 PROCEDURE — 6360000002 HC RX W HCPCS: Performed by: FAMILY MEDICINE

## 2024-07-11 PROCEDURE — 2700000000 HC OXYGEN THERAPY PER DAY

## 2024-07-11 RX ORDER — CALCIUM CARBONATE 500 MG/1
1000 TABLET, CHEWABLE ORAL 3 TIMES DAILY PRN
Status: DISCONTINUED | OUTPATIENT
Start: 2024-07-11 | End: 2024-07-13 | Stop reason: HOSPADM

## 2024-07-11 RX ADMIN — CEFTRIAXONE SODIUM 1000 MG: 1 INJECTION, POWDER, FOR SOLUTION INTRAMUSCULAR; INTRAVENOUS at 09:04

## 2024-07-11 RX ADMIN — ENOXAPARIN SODIUM 30 MG: 100 INJECTION SUBCUTANEOUS at 09:05

## 2024-07-11 RX ADMIN — CILOSTAZOL 50 MG: 50 TABLET ORAL at 20:33

## 2024-07-11 RX ADMIN — SODIUM CHLORIDE: 9 INJECTION, SOLUTION INTRAVENOUS at 02:27

## 2024-07-11 RX ADMIN — HYDROCODONE BITARTRATE AND ACETAMINOPHEN 1 TABLET: 5; 325 TABLET ORAL at 02:26

## 2024-07-11 RX ADMIN — GABAPENTIN 100 MG: 100 CAPSULE ORAL at 20:33

## 2024-07-11 RX ADMIN — CILOSTAZOL 50 MG: 50 TABLET ORAL at 09:04

## 2024-07-11 RX ADMIN — ALPRAZOLAM 2 MG: 0.5 TABLET ORAL at 06:19

## 2024-07-11 RX ADMIN — AZITHROMYCIN MONOHYDRATE 500 MG: 500 INJECTION, POWDER, LYOPHILIZED, FOR SOLUTION INTRAVENOUS at 09:04

## 2024-07-11 RX ADMIN — ZOLPIDEM TARTRATE 10 MG: 5 TABLET, COATED ORAL at 20:33

## 2024-07-11 RX ADMIN — SERTRALINE HYDROCHLORIDE 200 MG: 50 TABLET ORAL at 09:04

## 2024-07-11 RX ADMIN — ATORVASTATIN CALCIUM 20 MG: 20 TABLET, FILM COATED ORAL at 20:32

## 2024-07-11 RX ADMIN — HYDROCODONE BITARTRATE AND ACETAMINOPHEN 1 TABLET: 5; 325 TABLET ORAL at 23:30

## 2024-07-11 RX ADMIN — HYDROCODONE BITARTRATE AND ACETAMINOPHEN 1 TABLET: 5; 325 TABLET ORAL at 17:26

## 2024-07-11 RX ADMIN — CLOPIDOGREL BISULFATE 75 MG: 75 TABLET ORAL at 09:04

## 2024-07-11 RX ADMIN — POLYETHYLENE GLYCOL 3350 17 G: 17 POWDER, FOR SOLUTION ORAL at 02:26

## 2024-07-11 RX ADMIN — ALPRAZOLAM 2 MG: 0.5 TABLET ORAL at 14:31

## 2024-07-11 RX ADMIN — PANTOPRAZOLE SODIUM 40 MG: 40 TABLET, DELAYED RELEASE ORAL at 06:19

## 2024-07-11 RX ADMIN — CALCIUM CARBONATE 1000 MG: 500 TABLET, CHEWABLE ORAL at 20:33

## 2024-07-11 RX ADMIN — LOSARTAN POTASSIUM 50 MG: 50 TABLET, FILM COATED ORAL at 09:04

## 2024-07-11 RX ADMIN — ENOXAPARIN SODIUM 30 MG: 100 INJECTION SUBCUTANEOUS at 20:31

## 2024-07-11 RX ADMIN — SODIUM CHLORIDE, PRESERVATIVE FREE 10 ML: 5 INJECTION INTRAVENOUS at 20:31

## 2024-07-11 RX ADMIN — FERROUS SULFATE TAB 325 MG (65 MG ELEMENTAL FE) 325 MG: 325 (65 FE) TAB at 09:04

## 2024-07-11 RX ADMIN — ALPRAZOLAM 2 MG: 0.5 TABLET ORAL at 20:32

## 2024-07-11 RX ADMIN — GABAPENTIN 100 MG: 100 CAPSULE ORAL at 09:04

## 2024-07-11 RX ADMIN — ACETAMINOPHEN 650 MG: 325 TABLET ORAL at 20:31

## 2024-07-11 RX ADMIN — HYDROCODONE BITARTRATE AND ACETAMINOPHEN 1 TABLET: 5; 325 TABLET ORAL at 09:11

## 2024-07-11 ASSESSMENT — PAIN DESCRIPTION - DESCRIPTORS
DESCRIPTORS: ACHING;DULL
DESCRIPTORS: ACHING
DESCRIPTORS: ACHING;DULL

## 2024-07-11 ASSESSMENT — PAIN DESCRIPTION - LOCATION
LOCATION: BACK;SHOULDER
LOCATION: BACK;SHOULDER
LOCATION: SHOULDER;BACK

## 2024-07-11 ASSESSMENT — PAIN SCALES - GENERAL
PAINLEVEL_OUTOF10: 0
PAINLEVEL_OUTOF10: 4
PAINLEVEL_OUTOF10: 5
PAINLEVEL_OUTOF10: 4
PAINLEVEL_OUTOF10: 5
PAINLEVEL_OUTOF10: 4

## 2024-07-11 ASSESSMENT — PAIN DESCRIPTION - ORIENTATION
ORIENTATION: POSTERIOR

## 2024-07-11 ASSESSMENT — PAIN SCALES - WONG BAKER
WONGBAKER_NUMERICALRESPONSE: HURTS A LITTLE BIT

## 2024-07-11 NOTE — PLAN OF CARE
Problem: Discharge Planning  Goal: Discharge to home or other facility with appropriate resources  7/10/2024 2329 by Berna Camara RN  Outcome: Progressing  7/10/2024 2329 by Berna Camara RN  Outcome: Progressing  Flowsheets (Taken 7/10/2024 2030)  Discharge to home or other facility with appropriate resources: Identify barriers to discharge with patient and caregiver  7/10/2024 1202 by Jeannette Calix, RN  Outcome: Progressing  Flowsheets (Taken 7/10/2024 1201)  Discharge to home or other facility with appropriate resources: Identify barriers to discharge with patient and caregiver     Problem: Pain  Goal: Verbalizes/displays adequate comfort level or baseline comfort level  7/10/2024 2329 by Berna Camara RN  Outcome: Progressing  7/10/2024 2329 by Berna Camara RN  Outcome: Progressing  7/10/2024 1202 by Jeannette Calix RN  Outcome: Not Progressing     Problem: Skin/Tissue Integrity  Goal: Absence of new skin breakdown  Description: 1.  Monitor for areas of redness and/or skin breakdown  2.  Assess vascular access sites hourly  3.  Every 4-6 hours minimum:  Change oxygen saturation probe site  4.  Every 4-6 hours:  If on nasal continuous positive airway pressure, respiratory therapy assess nares and determine need for appliance change or resting period.  7/10/2024 2329 by Berna Camara RN  Outcome: Progressing  7/10/2024 2329 by Berna Camara RN  Outcome: Progressing  7/10/2024 1202 by Jeannette Calix RN  Outcome: Progressing     Problem: Safety - Adult  Goal: Free from fall injury  7/10/2024 2329 by Berna Camara RN  Outcome: Progressing  7/10/2024 2329 by Berna Camara RN  Outcome: Progressing     Problem: Pain  Goal: Verbalizes/displays adequate comfort level or baseline comfort level  7/10/2024 2329 by Berna Camara RN  Outcome: Progressing  7/10/2024 2329 by Berna Camara RN  Outcome: Progressing  7/10/2024 1202 by

## 2024-07-11 NOTE — PROGRESS NOTES
Hospitalist Progress Note               Daily Progress Note: 7/11/2024      Hospital Day: 2     Chief complaint:   Chief Complaint   Patient presents with    Chills        Subjective:   Hospital course to date:  Francisca Portillo is a 58 y.o. female with multiple medical problems including asthma, mental illness, hypertension, obstructive sleep apnea, history of subarachnoid hemorrhage, cerebral aneurysm, who presents today to the emergency department accompanied by her nephew.  Patient states that she was in her usual state of health until last night at approximately 1130 when she had the sudden onset of chills and bodyaches along with cough and shortness of breath.  The symptoms continued to worsen, causing her to present to the emergency department today.  Upon arrival the patient was noted to be febrile with a temperature of 100.6 was tachycardic and was hypoxic with a pulse oximetry of 90% on room air.  Routine lab work was performed blood chemistry was generally unremarkable with exception of slightly elevated glucose of 142.  Lactic acid was elevated at 2.25.  CBC showed a leukocytosis with a white blood cell count of 14 however the remainder of it was within normal limits.  Chest x-ray did not show any specific acute findings however CT scan of the abdomen and pelvis indicated infiltrates in the bilateral lower lobes suggestive of acute pneumonia.  Patient was also noted to be somewhat hypotensive with a soft blood pressure.  Patient denies any chest pain, nausea, vomiting or diarrhea.  No recent injury     --------  7/11-Patient is seen today for follow-up.  No reported issues overnight.  Patient is currently resting in bed comfortably she still does have some supplemental oxygen on via nasal cannula.  She has no specific complaints, there are multiple family members at bedside.        Medications reviewed  Current Facility-Administered Medications   Medication Dose Route Frequency    ALPRAZolam (XANAX)

## 2024-07-12 LAB
ANION GAP SERPL CALC-SCNC: 4 MMOL/L (ref 5–15)
BUN SERPL-MCNC: 8 MG/DL (ref 6–20)
BUN/CREAT SERPL: 17 (ref 12–20)
CA-I BLD-MCNC: 9.2 MG/DL (ref 8.5–10.1)
CHLORIDE SERPL-SCNC: 109 MMOL/L (ref 97–108)
CO2 SERPL-SCNC: 28 MMOL/L (ref 21–32)
CREAT SERPL-MCNC: 0.46 MG/DL (ref 0.55–1.02)
ERYTHROCYTE [DISTWIDTH] IN BLOOD BY AUTOMATED COUNT: 16.1 % (ref 11.5–14.5)
GLUCOSE SERPL-MCNC: 104 MG/DL (ref 65–100)
HCT VFR BLD AUTO: 32.8 % (ref 35–47)
HGB BLD-MCNC: 10.1 G/DL (ref 11.5–16)
MCH RBC QN AUTO: 27.7 PG (ref 26–34)
MCHC RBC AUTO-ENTMCNC: 30.8 G/DL (ref 30–36.5)
MCV RBC AUTO: 90.1 FL (ref 80–99)
NRBC # BLD: 0 K/UL (ref 0–0.01)
NRBC BLD-RTO: 0 PER 100 WBC
PLATELET # BLD AUTO: 271 K/UL (ref 150–400)
PMV BLD AUTO: 10.9 FL (ref 8.9–12.9)
POTASSIUM SERPL-SCNC: 3.8 MMOL/L (ref 3.5–5.1)
RBC # BLD AUTO: 3.64 M/UL (ref 3.8–5.2)
SODIUM SERPL-SCNC: 141 MMOL/L (ref 136–145)
WBC # BLD AUTO: 7.2 K/UL (ref 3.6–11)

## 2024-07-12 PROCEDURE — 6370000000 HC RX 637 (ALT 250 FOR IP): Performed by: FAMILY MEDICINE

## 2024-07-12 PROCEDURE — 6360000002 HC RX W HCPCS: Performed by: FAMILY MEDICINE

## 2024-07-12 PROCEDURE — 97161 PT EVAL LOW COMPLEX 20 MIN: CPT

## 2024-07-12 PROCEDURE — 80048 BASIC METABOLIC PNL TOTAL CA: CPT

## 2024-07-12 PROCEDURE — 36415 COLL VENOUS BLD VENIPUNCTURE: CPT

## 2024-07-12 PROCEDURE — 2580000003 HC RX 258: Performed by: FAMILY MEDICINE

## 2024-07-12 PROCEDURE — 1100000000 HC RM PRIVATE

## 2024-07-12 PROCEDURE — 97530 THERAPEUTIC ACTIVITIES: CPT

## 2024-07-12 PROCEDURE — 85027 COMPLETE CBC AUTOMATED: CPT

## 2024-07-12 RX ADMIN — HYDROCODONE BITARTRATE AND ACETAMINOPHEN 1 TABLET: 5; 325 TABLET ORAL at 05:29

## 2024-07-12 RX ADMIN — SODIUM CHLORIDE, PRESERVATIVE FREE 10 ML: 5 INJECTION INTRAVENOUS at 20:18

## 2024-07-12 RX ADMIN — GABAPENTIN 100 MG: 100 CAPSULE ORAL at 21:00

## 2024-07-12 RX ADMIN — ENOXAPARIN SODIUM 30 MG: 100 INJECTION SUBCUTANEOUS at 08:43

## 2024-07-12 RX ADMIN — GABAPENTIN 100 MG: 100 CAPSULE ORAL at 08:43

## 2024-07-12 RX ADMIN — ALPRAZOLAM 2 MG: 0.5 TABLET ORAL at 20:17

## 2024-07-12 RX ADMIN — HYDROCODONE BITARTRATE AND ACETAMINOPHEN 1 TABLET: 5; 325 TABLET ORAL at 16:55

## 2024-07-12 RX ADMIN — FERROUS SULFATE TAB 325 MG (65 MG ELEMENTAL FE) 325 MG: 325 (65 FE) TAB at 08:42

## 2024-07-12 RX ADMIN — ALPRAZOLAM 2 MG: 0.5 TABLET ORAL at 05:29

## 2024-07-12 RX ADMIN — PANTOPRAZOLE SODIUM 40 MG: 40 TABLET, DELAYED RELEASE ORAL at 05:30

## 2024-07-12 RX ADMIN — HYDROCODONE BITARTRATE AND ACETAMINOPHEN 1 TABLET: 5; 325 TABLET ORAL at 23:08

## 2024-07-12 RX ADMIN — SERTRALINE HYDROCHLORIDE 200 MG: 50 TABLET ORAL at 08:42

## 2024-07-12 RX ADMIN — ENOXAPARIN SODIUM 30 MG: 100 INJECTION SUBCUTANEOUS at 21:30

## 2024-07-12 RX ADMIN — CILOSTAZOL 50 MG: 50 TABLET ORAL at 20:17

## 2024-07-12 RX ADMIN — ZOLPIDEM TARTRATE 10 MG: 5 TABLET, COATED ORAL at 23:08

## 2024-07-12 RX ADMIN — SODIUM CHLORIDE, PRESERVATIVE FREE 10 ML: 5 INJECTION INTRAVENOUS at 08:46

## 2024-07-12 RX ADMIN — CEFTRIAXONE SODIUM 1000 MG: 1 INJECTION, POWDER, FOR SOLUTION INTRAMUSCULAR; INTRAVENOUS at 08:44

## 2024-07-12 RX ADMIN — HYDROCODONE BITARTRATE AND ACETAMINOPHEN 1 TABLET: 5; 325 TABLET ORAL at 11:29

## 2024-07-12 RX ADMIN — CILOSTAZOL 50 MG: 50 TABLET ORAL at 08:43

## 2024-07-12 RX ADMIN — CLOPIDOGREL BISULFATE 75 MG: 75 TABLET ORAL at 08:43

## 2024-07-12 RX ADMIN — ATORVASTATIN CALCIUM 20 MG: 20 TABLET, FILM COATED ORAL at 20:17

## 2024-07-12 RX ADMIN — AZITHROMYCIN MONOHYDRATE 500 MG: 500 INJECTION, POWDER, LYOPHILIZED, FOR SOLUTION INTRAVENOUS at 08:43

## 2024-07-12 ASSESSMENT — PAIN DESCRIPTION - DESCRIPTORS
DESCRIPTORS: ACHING
DESCRIPTORS: ACHING;DULL
DESCRIPTORS: ACHING
DESCRIPTORS: ACHING;DISCOMFORT
DESCRIPTORS: ACHING

## 2024-07-12 ASSESSMENT — PAIN SCALES - WONG BAKER
WONGBAKER_NUMERICALRESPONSE: NO HURT

## 2024-07-12 ASSESSMENT — PAIN SCALES - GENERAL
PAINLEVEL_OUTOF10: 3
PAINLEVEL_OUTOF10: 7
PAINLEVEL_OUTOF10: 5
PAINLEVEL_OUTOF10: 5
PAINLEVEL_OUTOF10: 0
PAINLEVEL_OUTOF10: 1
PAINLEVEL_OUTOF10: 7
PAINLEVEL_OUTOF10: 3
PAINLEVEL_OUTOF10: 4

## 2024-07-12 ASSESSMENT — PAIN DESCRIPTION - LOCATION
LOCATION: ABDOMEN;BACK
LOCATION: GENERALIZED
LOCATION: BACK
LOCATION: BACK;SHOULDER;ELBOW
LOCATION: GENERALIZED

## 2024-07-12 ASSESSMENT — PAIN DESCRIPTION - ORIENTATION
ORIENTATION: POSTERIOR
ORIENTATION: LEFT;RIGHT
ORIENTATION: LEFT;RIGHT
ORIENTATION: RIGHT
ORIENTATION: LOWER

## 2024-07-12 ASSESSMENT — PAIN - FUNCTIONAL ASSESSMENT
PAIN_FUNCTIONAL_ASSESSMENT: ACTIVITIES ARE NOT PREVENTED
PAIN_FUNCTIONAL_ASSESSMENT: ACTIVITIES ARE NOT PREVENTED

## 2024-07-12 NOTE — PLAN OF CARE
Problem: Discharge Planning  Goal: Discharge to home or other facility with appropriate resources  7/11/2024 2226 by Berna Camara RN  Outcome: Progressing  7/11/2024 1151 by Rossi Monge RN  Outcome: Not Progressing     Problem: Pain  Goal: Verbalizes/displays adequate comfort level or baseline comfort level  7/11/2024 2226 by Berna Camara RN  Outcome: Progressing  7/11/2024 1151 by Rossi Monge RN  Outcome: Not Progressing     Problem: Skin/Tissue Integrity  Goal: Absence of new skin breakdown  Description: 1.  Monitor for areas of redness and/or skin breakdown  2.  Assess vascular access sites hourly  3.  Every 4-6 hours minimum:  Change oxygen saturation probe site  4.  Every 4-6 hours:  If on nasal continuous positive airway pressure, respiratory therapy assess nares and determine need for appliance change or resting period.  7/11/2024 2226 by Berna Camara RN  Outcome: Progressing  7/11/2024 1151 by Rossi Monge RN  Outcome: Not Progressing     Problem: Safety - Adult  Goal: Free from fall injury  7/11/2024 2226 by Berna Camara RN  Outcome: Progressing  7/11/2024 1151 by Rossi Monge RN  Outcome: Not Progressing     Problem: ABCDS Injury Assessment  Goal: Absence of physical injury  Outcome: Progressing     Problem: Discharge Planning  Goal: Discharge to home or other facility with appropriate resources  7/11/2024 2226 by Berna Camara RN  Outcome: Progressing  7/11/2024 1151 by Rossi Monge RN  Outcome: Not Progressing     Problem: Pain  Goal: Verbalizes/displays adequate comfort level or baseline comfort level  7/11/2024 2226 by Berna Camara RN  Outcome: Progressing  7/11/2024 1151 by Rossi Monge RN  Outcome: Not Progressing     Problem: Skin/Tissue Integrity  Goal: Absence of new skin breakdown  Description: 1.  Monitor for areas of redness and/or skin breakdown  2.  Assess vascular access sites hourly  3.  Every 4-6 hours

## 2024-07-12 NOTE — PLAN OF CARE
PHYSICAL THERAPY EVALUATION  Patient: Francisca Portillo (58 y.o. female)  Date: 7/12/2024  Primary Diagnosis: Sepsis (HCC) [A41.9]  Pneumonia due to infectious organism, unspecified laterality, unspecified part of lung [J18.9]  Community acquired bilateral lower lobe pneumonia [J18.9]       Precautions: Fall Risk                      Recommendations for nursing mobility: Encourage HEP in prep for ADLs/mobility; see handout for details, Use of bed/chair alarm for safety, Use of BSC for toileting , and Assist x1    In place during session: Peripheral IV, Nasal Cannula 2L, and EKG/telemetry     ASSESSMENT  Pt is a 58 y.o. female admitted on 7/10/2024 for chills, cough, SOB; pt currently being treated for pneumonia B lower lobes, sepsis and hypoxia . Pt semi-supine upon PT arrival, agreeable to evaluation. Pt A&O x 3.  Pt reports she is not on oxygen at home.     Based on the objective data described below, the patient currently presents with impaired functional mobility, decreased ROM, impaired strength, decreased activity tolerance, poor safety awareness, impaired balance, and increased pain levels. (See below for objective details and assist levels).     Overall pt tolerated session fair today with 5/10 pain in side and back. Pt required max A for bed mobility and transfers. Pt amb 2 feet with RW, gt belt and min A x 2; demonstrates slow, shuffling steps with decreased step length, requiring cues for weightshifting.  Pt is slow with mental processing and requires additional time (due to hx of brain aneurysm).  Pt also has decreased use of her R UE since aneurysm.  Pt is L handed.  Pt with fair static sitting balance and denies any dizziness or lightheadedness at EOB. Pt's personal care aide present in room during evaluation and there to provide some PLOF information. Aide also assisted with sit to stand transfer today.  Pt uses a rocking motion to get to standing and still requiring max Ax 2 to stand.  Aide reports

## 2024-07-13 VITALS
SYSTOLIC BLOOD PRESSURE: 161 MMHG | DIASTOLIC BLOOD PRESSURE: 77 MMHG | TEMPERATURE: 98.2 F | RESPIRATION RATE: 16 BRPM | OXYGEN SATURATION: 93 % | WEIGHT: 293 LBS | BODY MASS INDEX: 48.82 KG/M2 | HEIGHT: 65 IN | HEART RATE: 70 BPM

## 2024-07-13 PROCEDURE — 6370000000 HC RX 637 (ALT 250 FOR IP): Performed by: FAMILY MEDICINE

## 2024-07-13 PROCEDURE — 2580000003 HC RX 258: Performed by: FAMILY MEDICINE

## 2024-07-13 PROCEDURE — 6360000002 HC RX W HCPCS: Performed by: FAMILY MEDICINE

## 2024-07-13 RX ORDER — CEFUROXIME AXETIL 500 MG/1
500 TABLET ORAL 2 TIMES DAILY
Qty: 10 TABLET | Refills: 0 | Status: SHIPPED | OUTPATIENT
Start: 2024-07-13 | End: 2024-07-18

## 2024-07-13 RX ADMIN — FERROUS SULFATE TAB 325 MG (65 MG ELEMENTAL FE) 325 MG: 325 (65 FE) TAB at 08:44

## 2024-07-13 RX ADMIN — PANTOPRAZOLE SODIUM 40 MG: 40 TABLET, DELAYED RELEASE ORAL at 05:29

## 2024-07-13 RX ADMIN — LOSARTAN POTASSIUM 50 MG: 50 TABLET, FILM COATED ORAL at 08:44

## 2024-07-13 RX ADMIN — AZITHROMYCIN MONOHYDRATE 500 MG: 500 INJECTION, POWDER, LYOPHILIZED, FOR SOLUTION INTRAVENOUS at 08:42

## 2024-07-13 RX ADMIN — HYDROCODONE BITARTRATE AND ACETAMINOPHEN 1 TABLET: 5; 325 TABLET ORAL at 08:48

## 2024-07-13 RX ADMIN — CEFTRIAXONE SODIUM 1000 MG: 1 INJECTION, POWDER, FOR SOLUTION INTRAMUSCULAR; INTRAVENOUS at 08:43

## 2024-07-13 RX ADMIN — CILOSTAZOL 50 MG: 50 TABLET ORAL at 08:44

## 2024-07-13 RX ADMIN — SODIUM CHLORIDE, PRESERVATIVE FREE 10 ML: 5 INJECTION INTRAVENOUS at 08:57

## 2024-07-13 RX ADMIN — GABAPENTIN 100 MG: 100 CAPSULE ORAL at 08:44

## 2024-07-13 RX ADMIN — SERTRALINE HYDROCHLORIDE 200 MG: 50 TABLET ORAL at 08:44

## 2024-07-13 RX ADMIN — SODIUM CHLORIDE, PRESERVATIVE FREE 10 ML: 5 INJECTION INTRAVENOUS at 08:43

## 2024-07-13 RX ADMIN — ENOXAPARIN SODIUM 30 MG: 100 INJECTION SUBCUTANEOUS at 08:43

## 2024-07-13 RX ADMIN — ALPRAZOLAM 2 MG: 0.5 TABLET ORAL at 08:48

## 2024-07-13 RX ADMIN — CLOPIDOGREL BISULFATE 75 MG: 75 TABLET ORAL at 08:44

## 2024-07-13 ASSESSMENT — PAIN SCALES - GENERAL: PAINLEVEL_OUTOF10: 5

## 2024-07-13 ASSESSMENT — PAIN DESCRIPTION - DESCRIPTORS: DESCRIPTORS: ACHING

## 2024-07-13 ASSESSMENT — PAIN DESCRIPTION - LOCATION: LOCATION: BACK

## 2024-07-13 NOTE — PROGRESS NOTES
Discharge plan of care/case management plan validated with provider discharge order. Discharge order noted and discharge summary in. Patient in stable condition and wishes to be discharged at this time.     Discharge instructions and medications reviewed with patient. Patient verbalized understanding and expressed no concerns.     Midline and telemetry box removed. Patient dressed in personal clothing and taken to front entrance in wheel chair with all belongings to be transported home by son.

## 2024-07-13 NOTE — CARE COORDINATION
CM noted discharge order. Patient going home with Iliana ELKINS.    Transition of Care Plan:    RUR: 18%  Prior Level of Functioning: Needs Assistance   Disposition: Home Health  If SNF or IPR: Date FOC offered: n/a  Date FOC received: n/a  Accepting facility: n/a  Date authorization started with reference number: n/a  Date authorization received and expires: n/a  Follow up appointments: Per MD  DME needed: n/a  Transportation at discharge:   IM/IMM Medicare/[facility] [place] letter given: n/a  Is patient a [place] and connected with [facility]? N/a   If yes, was [place] transfer form completed and [facility] notified?   Caregiver Contact: n/a  Discharge Caregiver contacted prior to discharge? N/a  Care Conference needed? no  Barriers to discharge: none

## 2024-07-13 NOTE — DISCHARGE SUMMARY
ZOLOFT     zolpidem 10 MG tablet  Commonly known as: AMBIEN               Where to Get Your Medications        These medications were sent to [id number] - [place], [place] - [address] - P [phone] - F [phone]  [address] [facility] [address]      Phone: [phone]   cefUROXime 500 MG tablet           Follow up Care:    Follow-up Information       Follow up With Specialties Details Why Contact Info    Bijan Martínez Jr., APRN - NP Nurse Practitioner Follow up in 1 week(s) hosptial follow up for pneumonia [address] [facility], [place] [address] [phone]                   Diet:  cardiac diet    Disposition:  Home.    Advanced Directive:   FULL X   DNR      Discharge Exam:                     Vitals:    07/13/24 0720   BP: (!) 161/77   Pulse: 70   Resp: 18   Temp: 98.2 °F (36.8 °C)   SpO2: 93%      General:  Alert, cooperative, no distress, appears stated age.   Lungs:   Clear to auscultation bilaterally.   Chest wall:  No tenderness or deformity.   Heart:  Regular rate and rhythm, S1, S2 normal, no murmur, click, rub or gallop.   Abdomen:   Soft, non-tender. Bowel sounds normal. No masses,  No organomegaly.   Extremities: Extremities normal, atraumatic, no cyanosis or edema.   Pulses: 2+ and symmetric all extremities.   Skin: Skin color, texture, turgor normal. No rashes or lesions   Neurologic: CNII-XII intact. No gross sensory or motor deficits             Significant Diagnostic Studies:   7/10/2024: BUN 11 mg/dL (Ref range: 6 - 20 mg/dL); Calcium 9.1 mg/dL (Ref range: 8.5 - 10.1 mg/dL); Chloride 108 mmol/L (Ref range: 97 - 108 mmol/L); CO2 26 mmol/L (Ref range: 21 - 32 mmol/L); Creatinine 0.78 mg/dL (Ref range: 0.55 - 1.02 mg/dL); Glucose 142 mg/dL (H; Ref range: 65 - 100 mg/dL); Hematocrit 40.2 % (Ref range: 35.0 - 47.0 %); Hemoglobin 12.4 g/dL (Ref range: 11.5 - 16.0 g/dL); POC Creatinine 0.58 MG/DL (L; Ref range: 0.6 - 1.3 MG/DL); Potassium 4.4 mmol/L (Ref range:  3.5 - 5.1 mmol/L); Sodium 140 mmol/L (Ref range: 136 - 145 mmol/L)  7/11/2024: BUN 8 mg/dL (Ref range: 6 - 20 mg/dL); Calcium 9.0 mg/dL (Ref range: 8.5 - 10.1 mg/dL); Chloride 112 mmol/L (H; Ref range: 97 - 108 mmol/L); CO2 27 mmol/L (Ref range: 21 - 32 mmol/L); Creatinine 0.44 mg/dL (L; Ref range: 0.55 - 1.02 mg/dL); Glucose 108 mg/dL (H; Ref range: 65 - 100 mg/dL); Hematocrit 33.4 % (L; Ref range: 35.0 - 47.0 %); Hemoglobin 10.5 g/dL (L; Ref range: 11.5 - 16.0 g/dL); Potassium 3.7 mmol/L (Ref range: 3.5 - 5.1 mmol/L); Sodium 142 mmol/L (Ref range: 136 - 145 mmol/L)  Recent Labs     07/11/24  0453 07/12/24  0503   WBC 9.2 7.2   HGB 10.5* 10.1*   HCT 33.4* 32.8*    271     Recent Labs     07/11/24  0453 07/12/24  0503    141   K 3.7 3.8   * 109*   CO2 27 28   BUN 8 8   CREATININE 0.44* 0.46*   GLUCOSE 108* 104*   CALCIUM 9.0 9.2     No results for input(s): \"AST\", \"ALT\", \"ALKPHOS\", \"BILITOT\", \"LABALBU\", \"GLOB\", \"GGT\", \"AMYLASE\", \"LIPASE\" in the last 72 hours.    Invalid input(s): \"GPT\", \"PROT\"  No results for input(s): \"INR\", \"PROTIME\", \"APTT\" in the last 72 hours.   No results for input(s): \"IRON\", \"TIBC\" in the last 72 hours.    Invalid input(s): \"PSAT\", \"FERR\"   No results for input(s): \"PH\", \"PCO2\", \"PO2\" in the last 72 hours.  No results for input(s): \"CKTOTAL\", \"CKMB\", \"TROPONINI\" in the last 72 hours.  Lab Results   Component Value Date/Time    POCGLU 160 07/10/2024 01:40 AM    POCGLU 154 01/23/2024 08:20 PM    POCGLU 130 11/16/2023 09:22 PM    POCGLU 147 11/07/2023 04:08 PM         Discharge time spent 35 minutes    Signed:  Wiley Zamora MD  7/13/2024  8:26 AM

## 2024-07-14 LAB
BACTERIA SPEC CULT: NORMAL
BACTERIA SPEC CULT: NORMAL
Lab: NORMAL
Lab: NORMAL

## 2024-07-16 ENCOUNTER — TELEMEDICINE (OUTPATIENT)
Age: 59
End: 2024-07-16

## 2024-07-16 DIAGNOSIS — I67.1 CEREBRAL ANEURYSM: Primary | ICD-10-CM

## 2024-07-16 LAB
BACTERIA SPEC CULT: NORMAL
BACTERIA SPEC CULT: NORMAL
Lab: NORMAL
Lab: NORMAL

## 2024-07-16 NOTE — PROGRESS NOTES
Phone call to patient in preparation for Virtual/phone visit with provider.  Name and  verified.  Patient unable to obtain vital signs at home.  Procedure follow up for Cerebral aneurysm.

## 2024-07-16 NOTE — PATIENT INSTRUCTIONS
Diagnostic Angiogram December 2024 at [facility].   Patient Registration. Main Hospital Entrance.  Date and arrival time will be called to you closer to December.  Blood work will be needed at least one week prior to [facility]  -No eating or drinking after midnight the night prior to Angiogram.  -Take all morning medications with sips of water the morning of the angiogram.  -You must have a  to drive you home upon discharge.  -Recommend you have someone with you for at least 24-48 hours post procedure.  -No metal or glue in hair.

## 2024-07-16 NOTE — PROGRESS NOTES
NIS phone consult    S/p flow diversion L PCOM aneurysm 1 month. No issues. Recent admit for pna.     Cont pletal and plavix  Will repeat DSA in 5mo to re-eval

## 2024-07-18 NOTE — PROGRESS NOTES
Physician Progress Note      PATIENT:               CAROLA COOK  CSN #:                  [id number]  :                       1965  ADMIT DATE:       7/10/2024 1:30 AM  DISCH DATE:        2024 12:12 PM  RESPONDING  PROVIDER #:        Wiley España MD          QUERY TEXT:    Pt admitted with sepsis and pna.  Pt noted to have hypoxia noted on H&P with   sats 90 on admission on RA and RR 29. If possible, please document in the   progress notes and discharge summary if you are evaluating and/or treating any   of the following:    The medical record reflects the following:  Risk Factors: pna  Clinical Indicators: admitted with sepsis and pna  - noted to be hypoxic in H&P with sats on arrival 90 on RA and RR 29  - H&P: Hypoxia-pulse oximetry noted to be 90% on room air  Treatment: O2 support, monitoring    Thank you,    Марина Swain RN  CDI  Options provided:  -- Acute respiratory failure with hypoxia  -- Acute hypoxia only  -- Other - I will add my own diagnosis  -- Disagree - Not applicable / Not valid  -- Disagree - Clinically unable to determine / Unknown  -- Refer to Clinical Documentation Reviewer    PROVIDER RESPONSE TEXT:    This patient has acute hypoxia only.    Query created by: Марина Swain on 2024 3:58 PM      Electronically signed by:  Wiley España MD 2024 5:43 PM

## 2024-08-08 ENCOUNTER — APPOINTMENT (OUTPATIENT)
Facility: HOSPITAL | Age: 59
End: 2024-08-08
Payer: MEDICAID

## 2024-08-08 ENCOUNTER — HOSPITAL ENCOUNTER (EMERGENCY)
Facility: HOSPITAL | Age: 59
Discharge: HOME | End: 2024-08-08
Attending: EMERGENCY MEDICINE
Payer: MEDICAID

## 2024-08-08 VITALS
HEIGHT: 65 IN | DIASTOLIC BLOOD PRESSURE: 75 MMHG | WEIGHT: 293 LBS | TEMPERATURE: 97.7 F | OXYGEN SATURATION: 100 % | BODY MASS INDEX: 48.82 KG/M2 | HEART RATE: 85 BPM | RESPIRATION RATE: 18 BRPM | SYSTOLIC BLOOD PRESSURE: 120 MMHG

## 2024-08-08 DIAGNOSIS — S16.1XXA STRAIN OF NECK MUSCLE, INITIAL ENCOUNTER: ICD-10-CM

## 2024-08-08 DIAGNOSIS — S09.90XA INJURY OF HEAD, INITIAL ENCOUNTER: Primary | ICD-10-CM

## 2024-08-08 PROCEDURE — 6370000000 HC RX 637 (ALT 250 FOR IP): Performed by: EMERGENCY MEDICINE

## 2024-08-08 PROCEDURE — 73130 X-RAY EXAM OF HAND: CPT

## 2024-08-08 PROCEDURE — 72125 CT NECK SPINE W/O DYE: CPT

## 2024-08-08 PROCEDURE — 93005 ELECTROCARDIOGRAM TRACING: CPT | Performed by: EMERGENCY MEDICINE

## 2024-08-08 PROCEDURE — 73610 X-RAY EXAM OF ANKLE: CPT

## 2024-08-08 PROCEDURE — 99285 EMERGENCY DEPT VISIT HI MDM: CPT

## 2024-08-08 PROCEDURE — 6830039000 HC L3 TRAUMA ALERT

## 2024-08-08 PROCEDURE — 70450 CT HEAD/BRAIN W/O DYE: CPT

## 2024-08-08 RX ORDER — ACETAMINOPHEN 500 MG
500 TABLET ORAL EVERY 6 HOURS PRN
Qty: 30 TABLET | Refills: 0 | Status: SHIPPED | OUTPATIENT
Start: 2024-08-08

## 2024-08-08 RX ORDER — ACETAMINOPHEN 325 MG/1
650 TABLET ORAL
Status: COMPLETED | OUTPATIENT
Start: 2024-08-08 | End: 2024-08-08

## 2024-08-08 RX ADMIN — ACETAMINOPHEN 650 MG: 325 TABLET ORAL at 16:11

## 2024-08-08 ASSESSMENT — PAIN DESCRIPTION - LOCATION: LOCATION: HIP

## 2024-08-08 ASSESSMENT — PAIN SCALES - GENERAL
PAINLEVEL_OUTOF10: 5
PAINLEVEL_OUTOF10: 5

## 2024-08-08 NOTE — DISCHARGE INSTRUCTIONS
Thank you for choosing our Emergency Department for your care.  It is our privilege to care for you in your time of need.  In the next several days, you may receive a survey via email or mailed to your home about your experience with our team.  We would greatly appreciate you taking a few minutes to complete the survey, as we use this information to learn what we have done well and what we could be doing better. Thank you for trusting us with your care!    Below you will find a list of your tests from today's visit.   Labs  No results found for this or any previous visit (from the past 12 hour(s)).    Radiologic Studies  XR ANKLE LEFT (MIN 3 VIEWS)   Final Result    No acute bony abnormalities.                           Electronically signed by DESIRAE Pagan      XR HAND LEFT (MIN 3 VIEWS)   Final Result   No acute abnormality.      Electronically signed by CECILIA GARCIA      CT HEAD WO CONTRAST   Final Result   No evidence of acute process.            Electronically signed by CECILIA GOGO PEAJACY      CT CERVICAL SPINE WO CONTRAST   Final Result   No acute fracture or subluxation.         Electronically signed by Mustapha Farris MD        ------------------------------------------------------------------------------------------------------------  The evaluation and treatment you received in the Emergency Department were for an urgent problem. It is important that you follow-up with a doctor, nurse practitioner, or physician assistant to:  (1) confirm your diagnosis,  (2) re-evaluation of changes in your illness and treatment, and (3) for ongoing care. Please take your discharge instructions with you when you go to your follow-up appointment.     If you have any problem arranging a follow-up appointment, contact us!  If your symptoms become worse or you do not improve as expected, please return to us. We are available 24 hours a day.     If a prescription has been provided, please fill it as soon as possible to prevent a  delay in treatment. If you have any questions or reservations about taking the medication due to side effects or interactions with other medications, please call your primary care provider or contact us directly.  Again, THANK YOU for choosing us to care for YOU!

## 2024-08-08 NOTE — ED PROVIDER NOTES
[facility] EMERGENCY DEPT  EMERGENCY DEPARTMENT HISTORY AND PHYSICAL EXAM      Date: 8/8/2024  Patient Name: Francisca Portillo  MRN: [id number]  Birthdate 1965  Date of evaluation: 8/8/2024  Provider: Rolando Lloyd MD   Note Started: 3:10 PM EDT 8/8/24    HISTORY OF PRESENT ILLNESS     Chief Complaint   Patient presents with    Fall       History Provided By: Patient, ems    HPI: Francisca Portillo is a 58 y.o. female presenting as a bravo trauma for head injury on blood thinners.  Patient states that she was using her cane and she tripped over a stool and hit her head on a marble side table.  Did not lose consciousness.  Having some pain in her left hand as well as left ankle in addition to her neck.  Not sure which blood thinner she is on.  Per chart review she is on Plavix    PAST MEDICAL HISTORY   Past Medical History:  Past Medical History:   Diagnosis Date    Anxiety     Arthritis     Asthma     Chronic mental illness     Depression     Falls     Fatigue     GERD (gastroesophageal reflux disease)     High blood pressure     Memory disorder     Obesity     SONIA (obstructive sleep apnea)     NO CPAP - NEEDS NEW MACHINE    Rash     SAH (subarachnoid hemorrhage) (HCC) 11/2023    Smoker        Past Surgical History:  Past Surgical History:   Procedure Laterality Date    BRAIN ANEURYSM SURGERY  10/2023    Ruptured L PCOM--> coiled then surpass flow diversion 6/2024    COLONOSCOPY N/A 5/27/2022    COLONOSCOPY, EGD performed by Mamie Ndiaye MD at [facility] ENDOSCOPY    GYN      LAPAROSCOPY    INSERT ARTERIAL LINE  11/08/2023    INTRACRANIAL ANEURYSM REPAIR  11/2023    Coil embolization- ruptured L PCOM aneurysm    LAP GASTRIC BYPASS/PHAN-EN-Y  2005    LAP, PLACE ADJUST GASTR BAND  2003    OTHER SURGICAL HISTORY      lapband removal    TONSILLECTOMY      TUBAL LIGATION         Family History:  Family History   Problem Relation Age of Onset    Cancer Mother         LUNG    Cancer Father         COLON    Lung Cancer Sister

## 2024-08-09 LAB
EKG ATRIAL RATE: 80 BPM
EKG DIAGNOSIS: NORMAL
EKG P AXIS: -1 DEGREES
EKG P-R INTERVAL: 184 MS
EKG Q-T INTERVAL: 388 MS
EKG QRS DURATION: 82 MS
EKG QTC CALCULATION (BAZETT): 447 MS
EKG R AXIS: 75 DEGREES
EKG T AXIS: 57 DEGREES
EKG VENTRICULAR RATE: 80 BPM

## 2024-08-16 ENCOUNTER — TELEPHONE (OUTPATIENT)
Age: 59
End: 2024-08-16

## 2024-08-16 NOTE — TELEPHONE ENCOUNTER
Mustapha called on the patient's behalf.  Per Mustapha, the patient's pharmacy is concerned about the patient being on two prescription blood thinner.     Mustapha, who is on the patient's HIPAA form, requests a call back.

## 2024-08-16 NOTE — TELEPHONE ENCOUNTER
----- Message from Dr. Neel Choi DO sent at 8/16/2024  1:31 PM EDT -----  Regarding: RE:  Yes. Both  ----- Message -----  From: Hellen Aleman LPN  Sent: 8/16/2024   1:15 PM EDT  To: Neel Choi, DO    Patient should continue Pletal and Plavix correct? Her pharmacy is questioning this.  Thanks

## 2024-08-16 NOTE — TELEPHONE ENCOUNTER
Message left for son per provider, patient should be on Pletal and Plavix.  Asked that he have pharmacy call the office with questions.

## 2024-08-18 ENCOUNTER — HOSPITAL ENCOUNTER (EMERGENCY)
Facility: HOSPITAL | Age: 59
Discharge: HOME | End: 2024-08-18
Attending: EMERGENCY MEDICINE
Payer: MEDICAID

## 2024-08-18 VITALS
WEIGHT: 293 LBS | OXYGEN SATURATION: 98 % | RESPIRATION RATE: 20 BRPM | DIASTOLIC BLOOD PRESSURE: 88 MMHG | TEMPERATURE: 98.1 F | BODY MASS INDEX: 48.82 KG/M2 | HEART RATE: 90 BPM | HEIGHT: 65 IN | SYSTOLIC BLOOD PRESSURE: 145 MMHG

## 2024-08-18 DIAGNOSIS — L03.116 CELLULITIS OF LEFT LOWER EXTREMITY: Primary | ICD-10-CM

## 2024-08-18 LAB
ANION GAP SERPL CALC-SCNC: 5 MMOL/L (ref 5–15)
BUN SERPL-MCNC: 10 MG/DL (ref 6–20)
BUN/CREAT SERPL: 16 (ref 12–20)
CA-I BLD-MCNC: 9.5 MG/DL (ref 8.5–10.1)
CHLORIDE SERPL-SCNC: 105 MMOL/L (ref 97–108)
CO2 SERPL-SCNC: 27 MMOL/L (ref 21–32)
CREAT SERPL-MCNC: 0.63 MG/DL (ref 0.55–1.02)
GLUCOSE SERPL-MCNC: 114 MG/DL (ref 65–100)
POTASSIUM SERPL-SCNC: 4.2 MMOL/L (ref 3.5–5.1)
SODIUM SERPL-SCNC: 137 MMOL/L (ref 136–145)

## 2024-08-18 PROCEDURE — 94761 N-INVAS EAR/PLS OXIMETRY MLT: CPT

## 2024-08-18 PROCEDURE — 99283 EMERGENCY DEPT VISIT LOW MDM: CPT

## 2024-08-18 PROCEDURE — 36415 COLL VENOUS BLD VENIPUNCTURE: CPT

## 2024-08-18 PROCEDURE — 80048 BASIC METABOLIC PNL TOTAL CA: CPT

## 2024-08-18 PROCEDURE — 6370000000 HC RX 637 (ALT 250 FOR IP): Performed by: EMERGENCY MEDICINE

## 2024-08-18 RX ORDER — CLINDAMYCIN HYDROCHLORIDE 150 MG/1
450 CAPSULE ORAL 3 TIMES DAILY
Qty: 90 CAPSULE | Refills: 0 | Status: SHIPPED | OUTPATIENT
Start: 2024-08-18 | End: 2024-08-28

## 2024-08-18 RX ORDER — CLINDAMYCIN HYDROCHLORIDE 150 MG/1
450 CAPSULE ORAL ONCE
Status: COMPLETED | OUTPATIENT
Start: 2024-08-18 | End: 2024-08-18

## 2024-08-18 RX ADMIN — CLINDAMYCIN HYDROCHLORIDE 450 MG: 150 CAPSULE ORAL at 21:28

## 2024-08-18 ASSESSMENT — PAIN - FUNCTIONAL ASSESSMENT: PAIN_FUNCTIONAL_ASSESSMENT: 0-10

## 2024-08-19 NOTE — ED PROVIDER NOTES
Brother     No Known Problems Brother     Anesth Problems Neg Hx        Social History:  Social History     Tobacco Use    Smoking status: Former     Types: Cigarettes    Smokeless tobacco: Never   Vaping Use    Vaping status: Never Used   Substance Use Topics    Alcohol use: Not Currently    Drug use: No       Allergies:  Allergies   Allergen Reactions    Adhesive Tape Other (See Comments)     BURNS SKIN    Nsaids Nausea And Vomiting and Other (See Comments)     BURNS STOMACH       PCP: Bijan Martínez Jr., APRN - NP    Current Meds:   No current facility-administered medications for this encounter.     Current Outpatient Medications   Medication Sig Dispense Refill    clindamycin (CLEOCIN) 150 MG capsule Take 3 capsules by mouth 3 times daily for 10 days 90 capsule 0    acetaminophen (TYLENOL) 500 MG tablet Take 1 tablet by mouth every 6 hours as needed for Pain or Fever 30 tablet 0    pantoprazole (PROTONIX) 40 MG tablet Take 1 tablet by mouth every morning (before breakfast) 30 tablet 3    losartan (COZAAR) 50 MG tablet Take 1 tablet by mouth daily      cilostazol (PLETAL) 50 MG tablet Take 1 tablet by mouth 2 times daily      cyclobenzaprine (FLEXERIL) 10 MG tablet Take 1 tablet by mouth 3 times daily as needed for Muscle spasms      furosemide (LASIX) 20 MG tablet Take 1 tablet by mouth daily      HYDROcodone-acetaminophen (NORCO) 5-325 MG per tablet Take 1 tablet by mouth every 6 hours as needed for Pain.      clopidogrel (PLAVIX) 75 MG tablet Take 1 tablet by mouth daily 30 tablet 5    gabapentin (NEURONTIN) 100 MG capsule Take 1 capsule by mouth 2 times daily.      butalbital-acetaminophen-caffeine (FIORICET, ESGIC) -40 MG per tablet Take 1 tablet by mouth every 6 hours as needed for Headaches      sertraline (ZOLOFT) 100 MG tablet Take 2 tablets by mouth daily      zolpidem (AMBIEN) 10 MG tablet Take 1 tablet by mouth nightly as needed for Sleep.      ALPRAZolam (XANAX) 1 MG tablet Take 2 tablets

## 2024-08-19 NOTE — ED NOTES
Spoke with jorge l Wright and he stated that pt needs a redraw of CBC because it clotted. Asked if he could send a phlebotomist to get the redraw and he stated he will let them know.

## 2024-11-01 ENCOUNTER — APPOINTMENT (OUTPATIENT)
Facility: HOSPITAL | Age: 59
End: 2024-11-01
Payer: MEDICAID

## 2024-11-01 ENCOUNTER — HOSPITAL ENCOUNTER (EMERGENCY)
Facility: HOSPITAL | Age: 59
Discharge: HOME | End: 2024-11-01
Attending: EMERGENCY MEDICINE
Payer: MEDICAID

## 2024-11-01 VITALS
BODY MASS INDEX: 51.91 KG/M2 | SYSTOLIC BLOOD PRESSURE: 120 MMHG | DIASTOLIC BLOOD PRESSURE: 66 MMHG | WEIGHT: 293 LBS | OXYGEN SATURATION: 94 % | HEIGHT: 63 IN | TEMPERATURE: 97.6 F | RESPIRATION RATE: 12 BRPM | HEART RATE: 123 BPM

## 2024-11-01 DIAGNOSIS — M79.604 BILATERAL LEG PAIN: ICD-10-CM

## 2024-11-01 DIAGNOSIS — R53.81 PHYSICAL DECONDITIONING: Primary | ICD-10-CM

## 2024-11-01 DIAGNOSIS — M79.605 BILATERAL LEG PAIN: ICD-10-CM

## 2024-11-01 DIAGNOSIS — R26.2 INABILITY TO WALK: ICD-10-CM

## 2024-11-01 LAB
ALBUMIN SERPL-MCNC: 3.8 G/DL (ref 3.5–5)
ALBUMIN/GLOB SERPL: 0.8 (ref 1.1–2.2)
ALP SERPL-CCNC: 95 U/L (ref 45–117)
ALT SERPL-CCNC: 24 U/L (ref 12–78)
ANION GAP SERPL CALC-SCNC: 4 MMOL/L (ref 2–12)
APPEARANCE UR: CLEAR
AST SERPL-CCNC: 39 U/L (ref 15–37)
BACTERIA URNS QL MICRO: NEGATIVE /HPF
BASOPHILS # BLD: 0 K/UL (ref 0–0.1)
BASOPHILS NFR BLD: 0 % (ref 0–1)
BILIRUB SERPL-MCNC: 0.6 MG/DL (ref 0.2–1)
BILIRUB UR QL: NEGATIVE
BUN SERPL-MCNC: 8 MG/DL (ref 6–20)
BUN/CREAT SERPL: 9 (ref 12–20)
CALCIUM SERPL-MCNC: 9.8 MG/DL (ref 8.5–10.1)
CHLORIDE SERPL-SCNC: 98 MMOL/L (ref 97–108)
CO2 SERPL-SCNC: 31 MMOL/L (ref 21–32)
COLOR UR: NORMAL
COMMENT:: NORMAL
CREAT SERPL-MCNC: 0.91 MG/DL (ref 0.55–1.02)
DIFFERENTIAL METHOD BLD: ABNORMAL
EKG ATRIAL RATE: 98 BPM
EKG DIAGNOSIS: NORMAL
EKG P AXIS: 51 DEGREES
EKG P-R INTERVAL: 164 MS
EKG Q-T INTERVAL: 382 MS
EKG QRS DURATION: 94 MS
EKG QTC CALCULATION (BAZETT): 487 MS
EKG R AXIS: -15 DEGREES
EKG T AXIS: 2 DEGREES
EKG VENTRICULAR RATE: 98 BPM
EOSINOPHIL # BLD: 0.1 K/UL (ref 0–0.4)
EOSINOPHIL NFR BLD: 1 % (ref 0–7)
EPITH CASTS URNS QL MICRO: NORMAL /LPF
ERYTHROCYTE [DISTWIDTH] IN BLOOD BY AUTOMATED COUNT: 14.7 % (ref 11.5–14.5)
GLOBULIN SER CALC-MCNC: 4.7 G/DL (ref 2–4)
GLUCOSE SERPL-MCNC: 129 MG/DL (ref 65–100)
GLUCOSE UR STRIP.AUTO-MCNC: NEGATIVE MG/DL
HCT VFR BLD AUTO: 43 % (ref 35–47)
HGB BLD-MCNC: 13.4 G/DL (ref 11.5–16)
HGB UR QL STRIP: NEGATIVE
HYALINE CASTS URNS QL MICRO: NORMAL /LPF (ref 0–5)
IMM GRANULOCYTES # BLD AUTO: 0 K/UL (ref 0–0.04)
IMM GRANULOCYTES NFR BLD AUTO: 0 % (ref 0–0.5)
KETONES UR QL STRIP.AUTO: NEGATIVE MG/DL
LEUKOCYTE ESTERASE UR QL STRIP.AUTO: NEGATIVE
LYMPHOCYTES # BLD: 1.7 K/UL (ref 0.8–3.5)
LYMPHOCYTES NFR BLD: 16 % (ref 12–49)
MCH RBC QN AUTO: 28.9 PG (ref 26–34)
MCHC RBC AUTO-ENTMCNC: 31.2 G/DL (ref 30–36.5)
MCV RBC AUTO: 92.7 FL (ref 80–99)
MONOCYTES # BLD: 0.7 K/UL (ref 0–1)
MONOCYTES NFR BLD: 7 % (ref 5–13)
NEUTS SEG # BLD: 8 K/UL (ref 1.8–8)
NEUTS SEG NFR BLD: 76 % (ref 32–75)
NITRITE UR QL STRIP.AUTO: NEGATIVE
NRBC # BLD: 0 K/UL (ref 0–0.01)
NRBC BLD-RTO: 0 PER 100 WBC
NT PRO BNP: 82 PG/ML
PH UR STRIP: 5.5 (ref 5–8)
PLATELET # BLD AUTO: 315 K/UL (ref 150–400)
PMV BLD AUTO: 10.4 FL (ref 8.9–12.9)
POTASSIUM SERPL-SCNC: 4.2 MMOL/L (ref 3.5–5.1)
PROT SERPL-MCNC: 8.5 G/DL (ref 6.4–8.2)
PROT UR STRIP-MCNC: NEGATIVE MG/DL
RBC # BLD AUTO: 4.64 M/UL (ref 3.8–5.2)
RBC #/AREA URNS HPF: NORMAL /HPF (ref 0–5)
SODIUM SERPL-SCNC: 133 MMOL/L (ref 136–145)
SP GR UR REFRACTOMETRY: 1.01 (ref 1–1.03)
SPECIMEN HOLD: NORMAL
SPECIMEN HOLD: NORMAL
TROPONIN I SERPL HS-MCNC: 8 NG/L (ref 0–51)
UROBILINOGEN UR QL STRIP.AUTO: 0.2 EU/DL (ref 0.2–1)
WBC # BLD AUTO: 10.6 K/UL (ref 3.6–11)
WBC URNS QL MICRO: NORMAL /HPF (ref 0–4)

## 2024-11-01 PROCEDURE — 97161 PT EVAL LOW COMPLEX 20 MIN: CPT

## 2024-11-01 PROCEDURE — 81001 URINALYSIS AUTO W/SCOPE: CPT

## 2024-11-01 PROCEDURE — 93010 ELECTROCARDIOGRAM REPORT: CPT | Performed by: INTERNAL MEDICINE

## 2024-11-01 PROCEDURE — 84484 ASSAY OF TROPONIN QUANT: CPT

## 2024-11-01 PROCEDURE — 97530 THERAPEUTIC ACTIVITIES: CPT

## 2024-11-01 PROCEDURE — 36415 COLL VENOUS BLD VENIPUNCTURE: CPT

## 2024-11-01 PROCEDURE — 93005 ELECTROCARDIOGRAM TRACING: CPT

## 2024-11-01 PROCEDURE — 99284 EMERGENCY DEPT VISIT MOD MDM: CPT

## 2024-11-01 PROCEDURE — 85025 COMPLETE CBC W/AUTO DIFF WBC: CPT

## 2024-11-01 PROCEDURE — 97165 OT EVAL LOW COMPLEX 30 MIN: CPT

## 2024-11-01 PROCEDURE — 83880 ASSAY OF NATRIURETIC PEPTIDE: CPT

## 2024-11-01 PROCEDURE — 70450 CT HEAD/BRAIN W/O DYE: CPT

## 2024-11-01 PROCEDURE — 80053 COMPREHEN METABOLIC PANEL: CPT

## 2024-11-01 ASSESSMENT — PAIN - FUNCTIONAL ASSESSMENT: PAIN_FUNCTIONAL_ASSESSMENT: ADULT NONVERBAL PAIN SCALE (NPVS)

## 2024-11-01 NOTE — ED TRIAGE NOTES
Patient is coming in with ground level fall this morning complaining of back pain and scratch to the lower bag. Patient has been swollen for the past 3 months. Patient has therapy coming in for the past year since she had a stroke. Patient had brain surgery about 1 year ago. Patient states having pain to both knees. Patient hit head on the wheel chair when she fell. Patient is complaining of dizziness.

## 2024-11-01 NOTE — ED NOTES
58-year-old past medical history of aneurysm and stroke earlier this year female presenting to emergency department with fall.  Fall happened earlier this morning.  Reported that she fell on to her back side. Unsure if she hit her head. Accompanied here with son who reports that patient felt off balance which caused the fall, she has had balance problems since stroke and aneurysm that has been made worse by recently worsening fluid in the legs that has caused her difficulty walking. Patient reports feeling dizzy. Right sided weakness following prior stroke. Reporting pain to the bilateral knees. Also with abrasion to the right upper back. Denies head, neck pain. Denies chest pain, SOB.             11:29 AM  I have evaluated the patient as the Provider in Rapid Medical Evaluation (RME). I have reviewed her vital signs and the triage nurse assessment. I have talked with the patient and any available family and advised that I am the provider in triage and have ordered the appropriate study to initiate their work up based on the clinical presentation during my assessment. I have advised that the patient will be accommodated in the Main ED as soon as possible. I have also requested to contact the triage nurse or myself immediately if the patient experiences any changes in their condition during this brief waiting period.  ARABELLA Childers, John MARTINEZ PA-C  11/01/24 9251

## 2024-11-01 NOTE — ED PROVIDER NOTES
3:15 PM  Change of shift. Care of patient taken over from Dr. Lund; H&P reviewed, bedside handoff complete.  Awaiting Care Management, PT/OT evaluation for rehab placement.      5:57 PM  Discussed with Care Management, Justin.  Working to get placement at [facility] but as it is Friday evening Medicaid would not be available to approve until early next week.  CM discussed with patient and family and she will return home tonight and they will assist over the weekend.  I also placed order for home health PT if event patient unable to be placed in rehab.          Magi Mann MD  11/01/24 1800

## 2024-11-01 NOTE — CARE COORDINATION
Care Management Initial Assessment       RUR: NA  Readmission? No  1st IM letter given? No  1st  letter given: No    Patient currently is not admitted to the hospital. Assessment performed.      11/01/24 5290   Service Assessment   Patient Orientation Alert and Oriented   Cognition Alert   History Provided By Patient;Child/Family   Primary Caregiver Other (Comment)  ([employer] Personal Care Attendant)   Support Systems Family Members;Friends/Neighbors   Patient's Healthcare Decision Maker is: Legal Next of Kin   PCP Verified by CM Yes   Last Visit to PCP Within last 3 months   Prior Functional Level Bathing;Dressing;Toileting;Cooking;Housework;Shopping;Mobility   Can patient return to prior living arrangement Unknown at present   Ability to make needs known: Fair   Would you like for me to discuss the discharge plan with any other family members/significant others, and if so, who? Yes  (Johnathan Portillo son)   Financial Resources Medicaid   Social/Functional History   Lives With Alone   Type of Home Apartment   Home Equipment Wheelchair - Manual;Rollator;Cane   Receives Help From Family;Personal care attendant   ADL Assistance Needs assistance   Homemaking Assistance Needs assistance   Discharge Planning   History of falls? 1   Condition of Participation: Discharge Planning   The Plan for Transition of Care is related to the following treatment goals: IPR referral   The Patient and/or Patient Representative was provided with a Choice of Provider? Patient Representative;Patient   Name of the Patient Representative who was provided with the Choice of Provider and agrees with the Discharge Plan?  Johnathan cleveland   The Patient and/Or Patient Representative agree with the Discharge Plan? Yes   Freedom of Choice list was provided with basic dialogue that supports the patient's individualized plan of care/goals, treatment preferences, and shares the quality data associated with the providers?  Yes     RSOA  consult received appreciated. EMR reviewed. History of SAH, obesity, and arthritis. Met w/patient and introduced to role. Discussed recommendations for IPR FOC offered prefers [facility] previous stay in 2023. CM asked if could communicate w/ son Johnathan Portillo [phone] patient in agreement.  endorses living home alone and has [employer].     Call placed to son states [phone] and family checks in and assists in evenings and on weekends. Son lives in [place]. Boyfriend/Significant Other Mustapha Baker [phone] is supportive. CM asked about family ability to assist at home today if discharged son state did not have ability.    Contact made w/ [facility]/ Cheryl, Liaison and referral sent via [employer].

## 2024-11-01 NOTE — PLAN OF CARE
Problem: Physical Therapy - Adult  Goal: By Discharge: Performs mobility at highest level of function for planned discharge setting.  See evaluation for individualized goals.  Description: FUNCTIONAL STATUS PRIOR TO ADMISSION: Patient was modified independent using a rolling walker and wheelchair for functional mobility. and The patient and/or family endorse 2 falls within the last 2 weeks. Pt's son and caregiver at bedside reported she has primarily been using a wheelchair after her fall 2 weeks ago.     HOME SUPPORT PRIOR TO ADMISSION: The patient lived with son and caregiver.    Physical Therapy Goals  Initiated 11/1/2024  1.  Patient will move from supine to sit and sit to supine and roll side to side in bed with moderate assistance within 7 day(s).    2.  Patient will perform sit to stand with moderate assistance within 7 day(s).  3.  Patient will transfer from bed to chair and chair to bed with moderate assistance using the least restrictive device within 7 day(s).  4.  Patient will ambulate with moderate assistance for 10 feet with the least restrictive device within 7 day(s).     Outcome: Progressing   PHYSICAL THERAPY EVALUATION    Patient: Francisca Portillo (58 y.o. female)  Date: 11/1/2024  Primary Diagnosis: No admission diagnoses are documented for this encounter.       Precautions: Restrictions/Precautions: Fall Risk                      ASSESSMENT :   DEFICITS/IMPAIRMENTS:   The patient is limited by decreased functional mobility, ROM, strength, sensation, activity tolerance, cognition, coordination, balance, increased pain levels, s/p admission on 11/1 with a fall and difficulty ambulating. Pt with history of another fall ~ 2 weeks ago.       Based on the impairments listed above pt was received in the ER supine on stretcher. Pt with increased LE edema with taut skin and painful to touch and with ROM. Pt required total A x 2 rolling L and R and repositioning in the stretcher. Given pt's pain level,      Past Surgical History:   Procedure Laterality Date    BRAIN ANEURYSM SURGERY  10/2023    Ruptured L PCOM--> coiled then surpass flow diversion 6/2024    COLONOSCOPY N/A 5/27/2022    COLONOSCOPY, EGD performed by Mamie [facility]diaye MD at [facility] ENDOSCOPY    GYN      LAPAROSCOPY    INSERT ARTERIAL LINE  11/08/2023    INTRACRANIAL ANEURYSM REPAIR  11/2023    Coil embolization- ruptured L PCOM aneurysm    LAP GASTRIC BYPASS/PHAN-EN-Y  2005    LAP, PLACE ADJUST GASTR BAND  2003    OTHER SURGICAL HISTORY      lapband removal    TONSILLECTOMY      TUBAL LIGATION         Home Situation:  Social/Functional History  Lives With: Son  Type of Home: Apartment  Home Layout: One level  Home Access: Level entry  Bathroom Shower/Tub: Tub/Shower unit  Bathroom Equipment: Tub transfer bench, Shower chair  Home Equipment: Cane, Wheelchair - Manual, Walker - Rolling  Has the patient had two or more falls in the past year or any fall with injury in the past year?: Yes  ADL Assistance: Needs assistance  Toileting: Needs assistance  Homemaking Assistance: Needs assistance  Ambulation Assistance: Needs assistance  Transfer Assistance: Needs assistance  Active : No    Cognitive/Behavioral Status:  Orientation  Orientation Level: Oriented to person;Oriented to place;Disoriented to time    Edema: pitting edema in bilateral LEs      Strength:    Strength: Generally decreased, functional    Tone & Sensation:      Sensation: Impaired (reported RLE impaired to light touch)    Coordination:       Range Of Motion:  AROM: Generally decreased, functional       Functional Mobility:  Bed Mobility:     Bed Mobility Training  Bed Mobility Training: Yes  Rolling: Total assistance;Assist X2  Scooting: Total assistance;Assist X2  Transfers:   NT     Balance:    Impaired, need support of stretcher with HOB elevated

## 2024-11-01 NOTE — PLAN OF CARE
Problem: Occupational Therapy - Adult  Goal: By Discharge: Performs self-care activities at highest level of function for planned discharge setting.  See evaluation for individualized goals.  Description: FUNCTIONAL STATUS PRIOR TO ADMISSION:  Patient was ambulatory using RW and w/c; caregiver reports that since fall 2 weeks ago pt has been using w/c more often   , ADL Assistance: Needs assistance,  ,  ,  ,  , Toileting: Needs assistance, Homemaking Assistance: Needs assistance, Ambulation Assistance: Needs assistance, Transfer Assistance: Needs assistance, Active : No     HOME SUPPORT: Patient lived with son and reports having caregiver to assist throughout the day with ADLS/IADLS.    Occupational Therapy Goals:  Initiated 11/1/2024  1.  Patient will perform grooming with Moderate Assist sitting unsupported EOB within 7 day(s).  2.  Patient will perform bathing with Moderate Assist within 7 day(s).  3.  Patient will perform lower body dressing with Moderate Assist and AE within 7 day(s).  4.  Patient will perform toilet transfers with Maximal Assist to BSC  within 7 day(s).  5.  Patient will perform all aspects of toileting with Moderate Assist within 7 day(s).  6.  Patient will participate in upper extremity therapeutic exercise/activities with Moderate Assist for 10 minutes within 7 day(s).    7.  Patient will utilize energy conservation techniques during functional activities with verbal cues within 7 day(s).   Outcome: Progressing   OCCUPATIONAL THERAPY EVALUATION    Patient: Francisca Portillo (58 y.o. female)  Date: 11/1/2024  Primary Diagnosis: No admission diagnoses are documented for this encounter.         Precautions: Fall Risk                  ASSESSMENT :  The patient is limited by decreased functional mobility, independence in ADLs, high-level IADLs, ROM, strength, body mechanics, activity tolerance, endurance, safety awareness, coordination, balance, posture s/p admission for GLF. Prior to  breaks    After treatment:   Patient left in no apparent distress in bed, Call bell within reach, Caregiver / family present, and Side rails x3    COMMUNICATION/EDUCATION:   The patient's plan of care was discussed with: physical therapist and registered nurse         Thank you for this referral.  Deanna Vines OT  Minutes: 20    Occupational Therapy Evaluation Charge Determination   History Examination Decision-Making   LOW Complexity : Brief history review  LOW Complexity: 1-3 Performance deficits relating to physical, cognitive, or psychosocial skills that result in activity limitations and/or participation restrictions LOW Complexity: No comorbidities that affect functional and  no verbal  or physical assist needed to complete eval tasks   Based on the above components, the patient evaluation is determined to be of the following complexity level: Low

## 2024-11-01 NOTE — CARE COORDINATION
Pending referral review and authorization patient may not receive decision until Monday and is not admitted to the hospital.     Updates provided to Dr. Mann patient does not require admission. CM Department will assist family regarding home plan this weekend at residence .     1735 Attempted to reach megha Mann mailbox is full. CM will attempt patient's BF.    1750 Spoke w/ son Johnathan and provided updates informed patient does not meet criteria for admission. Son acknowledged patient's BF Mustapha Baker [phone] is at hospital and will contact him of which should be able to transport home and wait for decision regarding IPR referral/insurance authorization.     Communication back w/  informed of above and requested HH/PT evaluate and treat order for services as patient is pending decision at home.     1800 Spoke w/ son informed patient has [facility]/PT currently will continue service. [employer] communication sent back to MD informing will not need new order.     Family /Friend will transport home.

## 2024-11-01 NOTE — ED PROVIDER NOTES
[facility] EMERGENCY DEP  EMERGENCY DEPARTMENT ENCOUNTER      Pt Name: Francisca Portillo  MRN: [id number]  Birthdate 1965  Date of evaluation: 11/1/2024  Provider: Dwain Lund MD    CHIEF COMPLAINT       Chief Complaint   Patient presents with    Fall         HISTORY OF PRESENT ILLNESS    58-year-old female presents with ongoing leg swelling and worsening lower leg pain bilaterally.  She is on Lasix at home to try to minimize swelling but has not improved pain and has now had a second fall today after 1 earlier in the week.  She is unable to ambulate to and from the bathroom and has home health aides but no 24-hour care except for family.  She has ongoing SONIA, arthritis,, obesity which are contributors.            Review of External Medical Records:     Nursing Notes were reviewed.    REVIEW OF SYSTEMS       Review of Systems    Except as noted above the remainder of the review of systems was reviewed and negative.       PAST MEDICAL HISTORY     Past Medical History:   Diagnosis Date    Anxiety     Arthritis     Asthma     Chronic mental illness     Depression     Falls     Fatigue     GERD (gastroesophageal reflux disease)     High blood pressure     Memory disorder     Obesity     SONIA (obstructive sleep apnea)     NO CPAP - NEEDS NEW MACHINE    Rash     SAH (subarachnoid hemorrhage) ([facility]) 11/2023    Smoker          SURGICAL HISTORY       Past Surgical History:   Procedure Laterality Date    BRAIN ANEURYSM SURGERY  10/2023    Ruptured L PCOM--> coiled then surpass flow diversion 6/2024    COLONOSCOPY N/A 5/27/2022    COLONOSCOPY, EGD performed by Mamie Ndiaye MD at [facility] ENDOSCOPY    GYN      LAPAROSCOPY    INSERT ARTERIAL LINE  11/08/2023    INTRACRANIAL ANEURYSM REPAIR  11/2023    Coil embolization- ruptured L PCOM aneurysm    LAP GASTRIC BYPASS/PHAN-EN-Y  2005    LAP, PLACE ADJUST GASTR BAND  2003    OTHER SURGICAL HISTORY      lapband removal    TONSILLECTOMY      TUBAL LIGATION           CURRENT  MEDICATIONS       Previous Medications    ACETAMINOPHEN (TYLENOL) 500 MG TABLET    Take 1 tablet by mouth every 6 hours as needed for Pain or Fever    ALPRAZOLAM (XANAX) 1 MG TABLET    Take 2 tablets by mouth as needed for Anxiety.    ATORVASTATIN (LIPITOR) 20 MG TABLET    Take 1 tablet by mouth nightly    BUTALBITAL-ACETAMINOPHEN-CAFFEINE (FIORICET, ESGIC) -40 MG PER TABLET    Take 1 tablet by mouth every 6 hours as needed for Headaches    CILOSTAZOL (PLETAL) 50 MG TABLET    Take 1 tablet by mouth 2 times daily    CLOPIDOGREL (PLAVIX) 75 MG TABLET    Take 1 tablet by mouth daily    CYANOCOBALAMIN 1000 MCG/ML INJECTION    Inject 1 mL into the muscle every 30 days    CYCLOBENZAPRINE (FLEXERIL) 10 MG TABLET    Take 1 tablet by mouth 3 times daily as needed for Muscle spasms    FERROUS SULFATE (FE TABS 325) 325 (65 FE) MG EC TABLET    Take 1 tablet by mouth daily (with breakfast)    FUROSEMIDE (LASIX) 20 MG TABLET    Take 1 tablet by mouth daily    GABAPENTIN (NEURONTIN) 100 MG CAPSULE    Take 1 capsule by mouth 2 times daily.    HYDROCODONE-ACETAMINOPHEN (NORCO) 5-325 MG PER TABLET    Take 1 tablet by mouth every 6 hours as needed for Pain.    LOSARTAN (COZAAR) 50 MG TABLET    Take 1 tablet by mouth daily    PANTOPRAZOLE (PROTONIX) 40 MG TABLET    Take 1 tablet by mouth every morning (before breakfast)    SERTRALINE (ZOLOFT) 100 MG TABLET    Take 2 tablets by mouth daily    ZOLPIDEM (AMBIEN) 10 MG TABLET    Take 1 tablet by mouth nightly as needed for Sleep.       ALLERGIES     Adhesive tape and Nsaids    FAMILY HISTORY       Family History   Problem Relation Age of Onset    Cancer Mother         LUNG    Cancer Father         COLON    Lung Cancer Sister     Asthma Sister     Heart Disease Brother     Heart Attack Brother     No Known Problems Brother     No Known Problems Brother     Anesth Problems Neg Hx           SOCIAL HISTORY       Social History     Socioeconomic History    Marital status: Single

## 2024-11-04 ENCOUNTER — FOLLOWUP TELEPHONE ENCOUNTER (OUTPATIENT)
Facility: HOSPITAL | Age: 59
End: 2024-11-04

## 2024-11-04 NOTE — TELEPHONE ENCOUNTER
Electronic communication from [facility] referral closed secondary to patient discharging home. Contact made w/ liaison informed will need  provider to initiate referral.     Call placed to [facility] [phone] w/ Nicky and will communicate w/ Therapist . F/U call placed patient and son VMs left to contact CM (outcome pending).

## 2024-11-06 ENCOUNTER — FOLLOWUP TELEPHONE ENCOUNTER (OUTPATIENT)
Facility: HOSPITAL | Age: 59
End: 2024-11-06

## 2024-11-06 NOTE — TELEPHONE ENCOUNTER
Call received from Meredith LEE/ [facility] [phone] requested updates regarding [facility] referral. ROSA requested HIPAA identifier and verified. Informed patient d/cd home and contact made w/ HH [facility] to place referral to [facility] and provide HH/PT notes. [facility] will follow up w/ patient and HH Provider.

## 2024-11-24 ENCOUNTER — HOSPITAL ENCOUNTER (EMERGENCY)
Facility: HOSPITAL | Age: 59
Discharge: HOME | DRG: 720 | End: 2024-11-27
Payer: MEDICAID

## 2024-11-24 ENCOUNTER — HOSPITAL ENCOUNTER (INPATIENT)
Facility: HOSPITAL | Age: 59
LOS: 12 days | Discharge: NURSING, REHAB OR LONG-TERM CARE | DRG: 720 | End: 2024-12-06
Attending: EMERGENCY MEDICINE | Admitting: FAMILY MEDICINE
Payer: MEDICAID

## 2024-11-24 ENCOUNTER — APPOINTMENT (OUTPATIENT)
Facility: HOSPITAL | Age: 59
DRG: 720 | End: 2024-11-24
Payer: MEDICAID

## 2024-11-24 DIAGNOSIS — N17.9 ACUTE RENAL FAILURE, UNSPECIFIED ACUTE RENAL FAILURE TYPE (HCC): Primary | ICD-10-CM

## 2024-11-24 DIAGNOSIS — J18.9 COMMUNITY ACQUIRED PNEUMONIA, UNSPECIFIED LATERALITY: ICD-10-CM

## 2024-11-24 LAB
ALBUMIN SERPL-MCNC: 2.8 G/DL (ref 3.5–5)
ALBUMIN/GLOB SERPL: 0.7 (ref 1.1–2.2)
ALP SERPL-CCNC: 85 U/L (ref 45–117)
ALT SERPL-CCNC: 18 U/L (ref 12–78)
ANION GAP SERPL CALC-SCNC: 19 MMOL/L (ref 2–12)
APPEARANCE UR: CLEAR
AST SERPL W P-5'-P-CCNC: 39 U/L (ref 15–37)
BACTERIA URNS QL MICRO: NEGATIVE /HPF
BASE DEFICIT BLD-SCNC: 3.1 MMOL/L
BASOPHILS # BLD: 0 K/UL (ref 0–0.1)
BASOPHILS NFR BLD: 0 % (ref 0–1)
BILIRUB SERPL-MCNC: 0.5 MG/DL (ref 0.2–1)
BILIRUB UR QL: NEGATIVE
BUN SERPL-MCNC: 114 MG/DL (ref 6–20)
BUN/CREAT SERPL: 24 (ref 12–20)
CA-I BLD-MCNC: 1.04 MMOL/L (ref 1.12–1.32)
CA-I BLD-MCNC: 8.6 MG/DL (ref 8.5–10.1)
CHLORIDE BLD-SCNC: 96 MMOL/L (ref 98–107)
CHLORIDE SERPL-SCNC: 96 MMOL/L (ref 97–108)
CO2 BLD-SCNC: 23 MMOL/L
CO2 SERPL-SCNC: 19 MMOL/L (ref 21–32)
COLOR UR: ABNORMAL
CREAT SERPL-MCNC: 4.83 MG/DL (ref 0.55–1.02)
CREAT UR-MCNC: 4.47 MG/DL (ref 0.6–1.3)
DIFFERENTIAL METHOD BLD: ABNORMAL
EOSINOPHIL # BLD: 0.1 K/UL (ref 0–0.4)
EOSINOPHIL NFR BLD: 1 % (ref 0–7)
EPITH CASTS URNS QL MICRO: ABNORMAL /LPF
ERYTHROCYTE [DISTWIDTH] IN BLOOD BY AUTOMATED COUNT: 14.2 % (ref 11.5–14.5)
FLUAV RNA SPEC QL NAA+PROBE: NOT DETECTED
FLUBV RNA SPEC QL NAA+PROBE: NOT DETECTED
GLOBULIN SER CALC-MCNC: 4.1 G/DL (ref 2–4)
GLUCOSE BLD STRIP.AUTO-MCNC: 126 MG/DL (ref 65–100)
GLUCOSE SERPL-MCNC: 123 MG/DL (ref 65–100)
GLUCOSE UR STRIP.AUTO-MCNC: NEGATIVE MG/DL
HCO3 BLD-SCNC: 22.9 MMOL/L (ref 19–28)
HCT VFR BLD AUTO: 30.4 % (ref 35–47)
HGB BLD-MCNC: 10.2 G/DL (ref 11.5–16)
HGB UR QL STRIP: ABNORMAL
HYALINE CASTS URNS QL MICRO: ABNORMAL /LPF (ref 0–5)
IMM GRANULOCYTES # BLD AUTO: 0.1 K/UL (ref 0–0.04)
IMM GRANULOCYTES NFR BLD AUTO: 1 % (ref 0–0.5)
KETONES UR QL STRIP.AUTO: NEGATIVE MG/DL
LACTATE BLD-SCNC: 0.97 MMOL/L (ref 0.4–2)
LEUKOCYTE ESTERASE UR QL STRIP.AUTO: NEGATIVE
LYMPHOCYTES # BLD: 1.3 K/UL (ref 0.8–3.5)
LYMPHOCYTES NFR BLD: 11 % (ref 12–49)
MCH RBC QN AUTO: 29.9 PG (ref 26–34)
MCHC RBC AUTO-ENTMCNC: 33.6 G/DL (ref 30–36.5)
MCV RBC AUTO: 89.1 FL (ref 80–99)
MONOCYTES # BLD: 1.1 K/UL (ref 0–1)
MONOCYTES NFR BLD: 9 % (ref 5–13)
MUCOUS THREADS URNS QL MICRO: ABNORMAL /LPF
NEUTS SEG # BLD: 9 K/UL (ref 1.8–8)
NEUTS SEG NFR BLD: 78 % (ref 32–75)
NITRITE UR QL STRIP.AUTO: NEGATIVE
NRBC # BLD: 0 K/UL (ref 0–0.01)
NRBC BLD-RTO: 0 PER 100 WBC
PCO2 BLD: 43.9 MMHG (ref 35–45)
PERFORMED BY:: ABNORMAL
PH BLD: 7.33 (ref 7.35–7.45)
PH UR STRIP: 5 (ref 5–8)
PLATELET # BLD AUTO: 287 K/UL (ref 150–400)
PMV BLD AUTO: 10.3 FL (ref 8.9–12.9)
PO2 BLD: 36 MMHG (ref 75–100)
POTASSIUM BLD-SCNC: 3.1 MMOL/L (ref 3.5–5.5)
POTASSIUM SERPL-SCNC: 3.4 MMOL/L (ref 3.5–5.1)
PROCALCITONIN SERPL-MCNC: 0.43 NG/ML
PROT SERPL-MCNC: 6.9 G/DL (ref 6.4–8.2)
PROT UR STRIP-MCNC: NEGATIVE MG/DL
RBC # BLD AUTO: 3.41 M/UL (ref 3.8–5.2)
RBC #/AREA URNS HPF: ABNORMAL /HPF (ref 0–5)
SAO2 % BLD: 63 %
SARS-COV-2 RNA RESP QL NAA+PROBE: NOT DETECTED
SODIUM BLD-SCNC: 132 MMOL/L (ref 136–145)
SODIUM SERPL-SCNC: 134 MMOL/L (ref 136–145)
SP GR UR REFRACTOMETRY: 1.01 (ref 1–1.03)
SPECIMEN SITE: ABNORMAL
TROPONIN I SERPL HS-MCNC: 38 NG/L (ref 0–51)
URINE CULTURE IF INDICATED: ABNORMAL
UROBILINOGEN UR QL STRIP.AUTO: 0.1 EU/DL (ref 0.1–1)
WBC # BLD AUTO: 11.5 K/UL (ref 3.6–11)
WBC URNS QL MICRO: ABNORMAL /HPF (ref 0–4)

## 2024-11-24 PROCEDURE — 2580000003 HC RX 258: Performed by: FAMILY MEDICINE

## 2024-11-24 PROCEDURE — 84132 ASSAY OF SERUM POTASSIUM: CPT

## 2024-11-24 PROCEDURE — 6360000002 HC RX W HCPCS: Performed by: EMERGENCY MEDICINE

## 2024-11-24 PROCEDURE — 71045 X-RAY EXAM CHEST 1 VIEW: CPT

## 2024-11-24 PROCEDURE — 83605 ASSAY OF LACTIC ACID: CPT

## 2024-11-24 PROCEDURE — 84484 ASSAY OF TROPONIN QUANT: CPT

## 2024-11-24 PROCEDURE — 2580000003 HC RX 258: Performed by: EMERGENCY MEDICINE

## 2024-11-24 PROCEDURE — 80053 COMPREHEN METABOLIC PANEL: CPT

## 2024-11-24 PROCEDURE — 87636 SARSCOV2 & INF A&B AMP PRB: CPT

## 2024-11-24 PROCEDURE — 70450 CT HEAD/BRAIN W/O DYE: CPT

## 2024-11-24 PROCEDURE — 87040 BLOOD CULTURE FOR BACTERIA: CPT

## 2024-11-24 PROCEDURE — 84145 PROCALCITONIN (PCT): CPT

## 2024-11-24 PROCEDURE — 76770 US EXAM ABDO BACK WALL COMP: CPT

## 2024-11-24 PROCEDURE — 82330 ASSAY OF CALCIUM: CPT

## 2024-11-24 PROCEDURE — 99285 EMERGENCY DEPT VISIT HI MDM: CPT

## 2024-11-24 PROCEDURE — 74176 CT ABD & PELVIS W/O CONTRAST: CPT

## 2024-11-24 PROCEDURE — 82803 BLOOD GASES ANY COMBINATION: CPT

## 2024-11-24 PROCEDURE — 84295 ASSAY OF SERUM SODIUM: CPT

## 2024-11-24 PROCEDURE — 93005 ELECTROCARDIOGRAM TRACING: CPT | Performed by: EMERGENCY MEDICINE

## 2024-11-24 PROCEDURE — 1100000000 HC RM PRIVATE

## 2024-11-24 PROCEDURE — 36415 COLL VENOUS BLD VENIPUNCTURE: CPT

## 2024-11-24 PROCEDURE — 82947 ASSAY GLUCOSE BLOOD QUANT: CPT

## 2024-11-24 PROCEDURE — 85025 COMPLETE CBC W/AUTO DIFF WBC: CPT

## 2024-11-24 PROCEDURE — 81001 URINALYSIS AUTO W/SCOPE: CPT

## 2024-11-24 PROCEDURE — 2500000003 HC RX 250 WO HCPCS: Performed by: EMERGENCY MEDICINE

## 2024-11-24 PROCEDURE — 96375 TX/PRO/DX INJ NEW DRUG ADDON: CPT

## 2024-11-24 PROCEDURE — 96374 THER/PROPH/DIAG INJ IV PUSH: CPT

## 2024-11-24 PROCEDURE — 96365 THER/PROPH/DIAG IV INF INIT: CPT

## 2024-11-24 RX ORDER — SODIUM CHLORIDE 9 MG/ML
INJECTION, SOLUTION INTRAVENOUS PRN
Status: DISCONTINUED | OUTPATIENT
Start: 2024-11-24 | End: 2024-12-06 | Stop reason: HOSPADM

## 2024-11-24 RX ORDER — SODIUM CHLORIDE 0.9 % (FLUSH) 0.9 %
5-40 SYRINGE (ML) INJECTION EVERY 12 HOURS SCHEDULED
Status: DISCONTINUED | OUTPATIENT
Start: 2024-11-24 | End: 2024-12-06 | Stop reason: HOSPADM

## 2024-11-24 RX ORDER — POTASSIUM CHLORIDE 1500 MG/1
40 TABLET, EXTENDED RELEASE ORAL ONCE
Status: COMPLETED | OUTPATIENT
Start: 2024-11-24 | End: 2024-11-25

## 2024-11-24 RX ORDER — LEVOFLOXACIN 5 MG/ML
750 INJECTION, SOLUTION INTRAVENOUS
Status: COMPLETED | OUTPATIENT
Start: 2024-11-24 | End: 2024-11-24

## 2024-11-24 RX ORDER — ATORVASTATIN CALCIUM 20 MG/1
20 TABLET, FILM COATED ORAL NIGHTLY
Status: DISCONTINUED | OUTPATIENT
Start: 2024-11-24 | End: 2024-12-06 | Stop reason: HOSPADM

## 2024-11-24 RX ORDER — CYANOCOBALAMIN 1000 UG/ML
1000 INJECTION, SOLUTION INTRAMUSCULAR; SUBCUTANEOUS
Status: DISCONTINUED | OUTPATIENT
Start: 2024-11-24 | End: 2024-11-24

## 2024-11-24 RX ORDER — 0.9 % SODIUM CHLORIDE 0.9 %
1000 INTRAVENOUS SOLUTION INTRAVENOUS ONCE
Status: COMPLETED | OUTPATIENT
Start: 2024-11-24 | End: 2024-11-25

## 2024-11-24 RX ORDER — ACETAMINOPHEN 325 MG/1
650 TABLET ORAL EVERY 6 HOURS PRN
Status: DISCONTINUED | OUTPATIENT
Start: 2024-11-24 | End: 2024-12-06 | Stop reason: HOSPADM

## 2024-11-24 RX ORDER — CLOPIDOGREL BISULFATE 75 MG/1
75 TABLET ORAL DAILY
Status: DISCONTINUED | OUTPATIENT
Start: 2024-11-25 | End: 2024-12-06 | Stop reason: HOSPADM

## 2024-11-24 RX ORDER — SODIUM CHLORIDE 0.9 % (FLUSH) 0.9 %
5-40 SYRINGE (ML) INJECTION PRN
Status: DISCONTINUED | OUTPATIENT
Start: 2024-11-24 | End: 2024-12-06 | Stop reason: HOSPADM

## 2024-11-24 RX ORDER — ONDANSETRON 4 MG/1
4 TABLET, ORALLY DISINTEGRATING ORAL EVERY 8 HOURS PRN
Status: DISCONTINUED | OUTPATIENT
Start: 2024-11-24 | End: 2024-12-06 | Stop reason: HOSPADM

## 2024-11-24 RX ORDER — ONDANSETRON 2 MG/ML
4 INJECTION INTRAMUSCULAR; INTRAVENOUS EVERY 6 HOURS PRN
Status: DISCONTINUED | OUTPATIENT
Start: 2024-11-24 | End: 2024-12-06 | Stop reason: HOSPADM

## 2024-11-24 RX ORDER — FERROUS SULFATE 325(65) MG
325 TABLET ORAL
Status: DISCONTINUED | OUTPATIENT
Start: 2024-11-25 | End: 2024-12-06 | Stop reason: HOSPADM

## 2024-11-24 RX ORDER — CILOSTAZOL 50 MG/1
50 TABLET ORAL 2 TIMES DAILY
Status: DISCONTINUED | OUTPATIENT
Start: 2024-11-24 | End: 2024-12-06 | Stop reason: HOSPADM

## 2024-11-24 RX ORDER — POLYETHYLENE GLYCOL 3350 17 G/17G
17 POWDER, FOR SOLUTION ORAL DAILY PRN
Status: DISCONTINUED | OUTPATIENT
Start: 2024-11-24 | End: 2024-12-06 | Stop reason: HOSPADM

## 2024-11-24 RX ORDER — PANTOPRAZOLE SODIUM 40 MG/1
40 TABLET, DELAYED RELEASE ORAL
Status: DISCONTINUED | OUTPATIENT
Start: 2024-11-25 | End: 2024-12-06 | Stop reason: HOSPADM

## 2024-11-24 RX ORDER — ACETAMINOPHEN 650 MG/1
650 SUPPOSITORY RECTAL EVERY 6 HOURS PRN
Status: DISCONTINUED | OUTPATIENT
Start: 2024-11-24 | End: 2024-12-06 | Stop reason: HOSPADM

## 2024-11-24 RX ORDER — HEPARIN SODIUM 5000 [USP'U]/ML
5000 INJECTION, SOLUTION INTRAVENOUS; SUBCUTANEOUS EVERY 8 HOURS SCHEDULED
Status: DISCONTINUED | OUTPATIENT
Start: 2024-11-24 | End: 2024-11-27

## 2024-11-24 RX ORDER — 0.9 % SODIUM CHLORIDE 0.9 %
1000 INTRAVENOUS SOLUTION INTRAVENOUS ONCE
Status: COMPLETED | OUTPATIENT
Start: 2024-11-24 | End: 2024-11-24

## 2024-11-24 RX ADMIN — LEVOFLOXACIN 750 MG: 750 INJECTION, SOLUTION INTRAVENOUS at 16:54

## 2024-11-24 RX ADMIN — SODIUM BICARBONATE: 84 INJECTION, SOLUTION INTRAVENOUS at 17:57

## 2024-11-24 RX ADMIN — WATER 1000 MG: 1 INJECTION INTRAMUSCULAR; INTRAVENOUS; SUBCUTANEOUS at 15:39

## 2024-11-24 RX ADMIN — SODIUM CHLORIDE 1000 ML: 9 INJECTION, SOLUTION INTRAVENOUS at 20:48

## 2024-11-24 ASSESSMENT — LIFESTYLE VARIABLES
HOW OFTEN DO YOU HAVE A DRINK CONTAINING ALCOHOL: PATIENT DECLINED
HOW MANY STANDARD DRINKS CONTAINING ALCOHOL DO YOU HAVE ON A TYPICAL DAY: PATIENT DECLINED

## 2024-11-24 NOTE — ED PROVIDER NOTES
(5b): Some effort against gravity  Motor Leg, Left (6a): No drift  Motor Leg, Right (6b): Some effort against gravity  Limb Ataxia (7): Absent  Sensory (8): Normal  Best Language (9): Mild to moderate aphasia  Dysarthria (10): Mild to moderate, slurs some words  Extinction and Inattention (11): No abnormality  Total: 9          No data recorded    LAB, EKG AND DIAGNOSTIC RESULTS   Labs:  Recent Results (from the past 12 hour(s))   Comprehensive Metabolic Panel    Collection Time: 11/24/24  3:05 PM   Result Value Ref Range    Sodium 134 (L) 136 - 145 mmol/L    Potassium 3.4 (L) 3.5 - 5.1 mmol/L    Chloride 96 (L) 97 - 108 mmol/L    CO2 19 (L) 21 - 32 mmol/L    Anion Gap 19 (H) 2 - 12 mmol/L    Glucose 123 (H) 65 - 100 mg/dL     (H) 6 - 20 mg/dL    Creatinine 4.83 (H) 0.55 - 1.02 mg/dL    BUN/Creatinine Ratio 24 (H) 12 - 20      Est, Glom Filt Rate 10 (L) >60 ml/min/1.73m2    Calcium 8.6 8.5 - 10.1 mg/dL    Total Bilirubin 0.5 0.2 - 1.0 mg/dL    AST 39 (H) 15 - 37 U/L    ALT 18 12 - 78 U/L    Alk Phosphatase 85 45 - 117 U/L    Total Protein 6.9 6.4 - 8.2 g/dL    Albumin 2.8 (L) 3.5 - 5.0 g/dL    Globulin 4.1 (H) 2.0 - 4.0 g/dL    Albumin/Globulin Ratio 0.7 (L) 1.1 - 2.2     COVID-19 & Influenza Combo    Collection Time: 11/24/24  3:05 PM    Specimen: Nasopharyngeal   Result Value Ref Range    SARS-CoV-2, PCR Not Detected Not Detected      Rapid Influenza A By PCR Not Detected Not Detected      Rapid Influenza B By PCR Not Detected Not Detected     Procalcitonin \"IF Physician concerned for severe sepsis or septic shock\"    Collection Time: 11/24/24  3:05 PM   Result Value Ref Range    Procalcitonin 0.43 (H) 0 ng/mL   POC Blood Gas and Chemistry    Collection Time: 11/24/24  3:05 PM   Result Value Ref Range    POC pH 7.33 (L) 7.35 - 7.45      POC pCO2 43.9 35.0 - 45.0 mmHg    POC PO2 36 (LL) 75 - 100 mmHg    POC Sodium 132 (L) 136 - 145 mmol/L    POC Potassium 3.1 (L) 3.5 - 5.5 mmol/L    POC Ionized Calcium 1.04

## 2024-11-24 NOTE — ED TRIAGE NOTES
Arrives via EMS from home with complaints of AMS, per pt  pt has been \"sick for a few days.\" Was given a duo neb in route. HX CVA with R sided deficits. GCS 14,

## 2024-11-25 LAB
ALBUMIN SERPL-MCNC: 2.6 G/DL (ref 3.5–5)
ALBUMIN SERPL-MCNC: 2.6 G/DL (ref 3.5–5)
ALBUMIN/GLOB SERPL: 0.7 (ref 1.1–2.2)
ALP SERPL-CCNC: 80 U/L (ref 45–117)
ALT SERPL-CCNC: 22 U/L (ref 12–78)
ANION GAP SERPL CALC-SCNC: 12 MMOL/L (ref 2–12)
ANION GAP SERPL CALC-SCNC: 14 MMOL/L (ref 2–12)
ANION GAP SERPL CALC-SCNC: 14 MMOL/L (ref 2–12)
AST SERPL W P-5'-P-CCNC: 77 U/L (ref 15–37)
BASOPHILS # BLD: 0 K/UL (ref 0–0.1)
BASOPHILS NFR BLD: 0 % (ref 0–1)
BILIRUB SERPL-MCNC: 0.4 MG/DL (ref 0.2–1)
BUN SERPL-MCNC: 101 MG/DL (ref 6–20)
BUN SERPL-MCNC: 80 MG/DL (ref 6–20)
BUN SERPL-MCNC: 98 MG/DL (ref 6–20)
BUN/CREAT SERPL: 33 (ref 12–20)
BUN/CREAT SERPL: 34 (ref 12–20)
BUN/CREAT SERPL: 42 (ref 12–20)
CA-I BLD-MCNC: 8.6 MG/DL (ref 8.5–10.1)
CA-I BLD-MCNC: 8.9 MG/DL (ref 8.5–10.1)
CA-I BLD-MCNC: 9 MG/DL (ref 8.5–10.1)
CHLORIDE SERPL-SCNC: 100 MMOL/L (ref 97–108)
CHLORIDE SERPL-SCNC: 97 MMOL/L (ref 97–108)
CHLORIDE SERPL-SCNC: 98 MMOL/L (ref 97–108)
CK SERPL-CCNC: 3299 U/L (ref 26–192)
CO2 SERPL-SCNC: 24 MMOL/L (ref 21–32)
CO2 SERPL-SCNC: 24 MMOL/L (ref 21–32)
CO2 SERPL-SCNC: 26 MMOL/L (ref 21–32)
CREAT SERPL-MCNC: 1.9 MG/DL (ref 0.55–1.02)
CREAT SERPL-MCNC: 2.94 MG/DL (ref 0.55–1.02)
CREAT SERPL-MCNC: 2.95 MG/DL (ref 0.55–1.02)
DIFFERENTIAL METHOD BLD: ABNORMAL
EKG ATRIAL RATE: 103 BPM
EKG DIAGNOSIS: NORMAL
EKG P AXIS: 40 DEGREES
EKG P-R INTERVAL: 162 MS
EKG Q-T INTERVAL: 360 MS
EKG QRS DURATION: 98 MS
EKG QTC CALCULATION (BAZETT): 471 MS
EKG R AXIS: -18 DEGREES
EKG T AXIS: 12 DEGREES
EKG VENTRICULAR RATE: 103 BPM
EOSINOPHIL # BLD: 0.2 K/UL (ref 0–0.4)
EOSINOPHIL NFR BLD: 2 % (ref 0–7)
ERYTHROCYTE [DISTWIDTH] IN BLOOD BY AUTOMATED COUNT: 14 % (ref 11.5–14.5)
GLOBULIN SER CALC-MCNC: 3.7 G/DL (ref 2–4)
GLUCOSE SERPL-MCNC: 115 MG/DL (ref 65–100)
GLUCOSE SERPL-MCNC: 94 MG/DL (ref 65–100)
GLUCOSE SERPL-MCNC: 97 MG/DL (ref 65–100)
HCT VFR BLD AUTO: 31 % (ref 35–47)
HGB BLD-MCNC: 10.2 G/DL (ref 11.5–16)
IMM GRANULOCYTES # BLD AUTO: 0.1 K/UL (ref 0–0.04)
IMM GRANULOCYTES NFR BLD AUTO: 1 % (ref 0–0.5)
LYMPHOCYTES # BLD: 1 K/UL (ref 0.8–3.5)
LYMPHOCYTES NFR BLD: 14 % (ref 12–49)
MCH RBC QN AUTO: 29.1 PG (ref 26–34)
MCHC RBC AUTO-ENTMCNC: 32.9 G/DL (ref 30–36.5)
MCV RBC AUTO: 88.6 FL (ref 80–99)
MONOCYTES # BLD: 0.6 K/UL (ref 0–1)
MONOCYTES NFR BLD: 9 % (ref 5–13)
NEUTS SEG # BLD: 5.6 K/UL (ref 1.8–8)
NEUTS SEG NFR BLD: 74 % (ref 32–75)
NRBC # BLD: 0 K/UL (ref 0–0.01)
NRBC BLD-RTO: 0 PER 100 WBC
PHOSPHATE SERPL-MCNC: 6.9 MG/DL (ref 2.6–4.7)
PLATELET # BLD AUTO: 283 K/UL (ref 150–400)
PMV BLD AUTO: 10.8 FL (ref 8.9–12.9)
POTASSIUM SERPL-SCNC: 2.7 MMOL/L (ref 3.5–5.1)
POTASSIUM SERPL-SCNC: 2.8 MMOL/L (ref 3.5–5.1)
POTASSIUM SERPL-SCNC: 3.1 MMOL/L (ref 3.5–5.1)
PROT SERPL-MCNC: 6.3 G/DL (ref 6.4–8.2)
RBC # BLD AUTO: 3.5 M/UL (ref 3.8–5.2)
SODIUM SERPL-SCNC: 135 MMOL/L (ref 136–145)
SODIUM SERPL-SCNC: 136 MMOL/L (ref 136–145)
SODIUM SERPL-SCNC: 138 MMOL/L (ref 136–145)
WBC # BLD AUTO: 7.5 K/UL (ref 3.6–11)

## 2024-11-25 PROCEDURE — 80048 BASIC METABOLIC PNL TOTAL CA: CPT

## 2024-11-25 PROCEDURE — 6370000000 HC RX 637 (ALT 250 FOR IP): Performed by: FAMILY MEDICINE

## 2024-11-25 PROCEDURE — 6370000000 HC RX 637 (ALT 250 FOR IP): Performed by: PHYSICIAN ASSISTANT

## 2024-11-25 PROCEDURE — 85025 COMPLETE CBC W/AUTO DIFF WBC: CPT

## 2024-11-25 PROCEDURE — 82550 ASSAY OF CK (CPK): CPT

## 2024-11-25 PROCEDURE — 36415 COLL VENOUS BLD VENIPUNCTURE: CPT

## 2024-11-25 PROCEDURE — 80069 RENAL FUNCTION PANEL: CPT

## 2024-11-25 PROCEDURE — 6360000002 HC RX W HCPCS: Performed by: FAMILY MEDICINE

## 2024-11-25 PROCEDURE — 2580000003 HC RX 258: Performed by: INTERNAL MEDICINE

## 2024-11-25 PROCEDURE — 80053 COMPREHEN METABOLIC PANEL: CPT

## 2024-11-25 PROCEDURE — 2500000003 HC RX 250 WO HCPCS: Performed by: INTERNAL MEDICINE

## 2024-11-25 PROCEDURE — 2580000003 HC RX 258: Performed by: FAMILY MEDICINE

## 2024-11-25 PROCEDURE — 6370000000 HC RX 637 (ALT 250 FOR IP): Performed by: INTERNAL MEDICINE

## 2024-11-25 PROCEDURE — 1100000000 HC RM PRIVATE

## 2024-11-25 RX ORDER — POTASSIUM CHLORIDE 7.45 MG/ML
10 INJECTION INTRAVENOUS
Status: COMPLETED | OUTPATIENT
Start: 2024-11-25 | End: 2024-11-25

## 2024-11-25 RX ORDER — CYCLOBENZAPRINE HCL 10 MG
10 TABLET ORAL 3 TIMES DAILY PRN
Status: DISCONTINUED | OUTPATIENT
Start: 2024-11-25 | End: 2024-12-06 | Stop reason: HOSPADM

## 2024-11-25 RX ORDER — SODIUM CHLORIDE 9 MG/ML
INJECTION, SOLUTION INTRAVENOUS CONTINUOUS
Status: DISCONTINUED | OUTPATIENT
Start: 2024-11-25 | End: 2024-11-27

## 2024-11-25 RX ORDER — 0.9 % SODIUM CHLORIDE 0.9 %
500 INTRAVENOUS SOLUTION INTRAVENOUS ONCE
Status: COMPLETED | OUTPATIENT
Start: 2024-11-25 | End: 2024-11-25

## 2024-11-25 RX ORDER — OXYCODONE AND ACETAMINOPHEN 10; 325 MG/1; MG/1
1 TABLET ORAL EVERY 6 HOURS PRN
Status: DISCONTINUED | OUTPATIENT
Start: 2024-11-25 | End: 2024-12-06 | Stop reason: HOSPADM

## 2024-11-25 RX ORDER — OXYCODONE AND ACETAMINOPHEN 10; 325 MG/1; MG/1
1 TABLET ORAL EVERY 6 HOURS PRN
COMMUNITY

## 2024-11-25 RX ORDER — ALPRAZOLAM 0.5 MG
2 TABLET ORAL AS NEEDED
Status: DISCONTINUED | OUTPATIENT
Start: 2024-11-25 | End: 2024-11-25

## 2024-11-25 RX ORDER — MIDODRINE HYDROCHLORIDE 5 MG/1
5 TABLET ORAL 3 TIMES DAILY PRN
Status: DISCONTINUED | OUTPATIENT
Start: 2024-11-25 | End: 2024-12-06 | Stop reason: HOSPADM

## 2024-11-25 RX ORDER — GABAPENTIN 100 MG/1
100 CAPSULE ORAL 2 TIMES DAILY
Status: DISCONTINUED | OUTPATIENT
Start: 2024-11-25 | End: 2024-12-06 | Stop reason: HOSPADM

## 2024-11-25 RX ORDER — ALPRAZOLAM 0.5 MG
2 TABLET ORAL 3 TIMES DAILY PRN
Status: DISCONTINUED | OUTPATIENT
Start: 2024-11-25 | End: 2024-12-06 | Stop reason: HOSPADM

## 2024-11-25 RX ADMIN — HEPARIN SODIUM 5000 UNITS: 5000 INJECTION INTRAVENOUS; SUBCUTANEOUS at 08:11

## 2024-11-25 RX ADMIN — POTASSIUM CHLORIDE 10 MEQ: 7.46 INJECTION, SOLUTION INTRAVENOUS at 10:44

## 2024-11-25 RX ADMIN — SODIUM CHLORIDE 1000 ML: 9 INJECTION, SOLUTION INTRAVENOUS at 03:02

## 2024-11-25 RX ADMIN — GABAPENTIN 100 MG: 100 CAPSULE ORAL at 20:32

## 2024-11-25 RX ADMIN — MIDODRINE HYDROCHLORIDE 5 MG: 5 TABLET ORAL at 06:54

## 2024-11-25 RX ADMIN — FERROUS SULFATE TAB 325 MG (65 MG ELEMENTAL FE) 325 MG: 325 (65 FE) TAB at 08:11

## 2024-11-25 RX ADMIN — POTASSIUM CHLORIDE 10 MEQ: 7.46 INJECTION, SOLUTION INTRAVENOUS at 09:34

## 2024-11-25 RX ADMIN — POTASSIUM CHLORIDE 10 MEQ: 7.46 INJECTION, SOLUTION INTRAVENOUS at 08:24

## 2024-11-25 RX ADMIN — SERTRALINE HYDROCHLORIDE 200 MG: 50 TABLET ORAL at 08:12

## 2024-11-25 RX ADMIN — SODIUM CHLORIDE: 9 INJECTION, SOLUTION INTRAVENOUS at 20:46

## 2024-11-25 RX ADMIN — POTASSIUM BICARBONATE 40 MEQ: 782 TABLET, EFFERVESCENT ORAL at 15:58

## 2024-11-25 RX ADMIN — SODIUM CHLORIDE 500 ML: 9 INJECTION, SOLUTION INTRAVENOUS at 11:54

## 2024-11-25 RX ADMIN — SODIUM BICARBONATE: 84 INJECTION, SOLUTION INTRAVENOUS at 02:53

## 2024-11-25 RX ADMIN — OXYCODONE AND ACETAMINOPHEN 1 TABLET: 10; 325 TABLET ORAL at 15:25

## 2024-11-25 RX ADMIN — CYCLOBENZAPRINE 10 MG: 10 TABLET, FILM COATED ORAL at 15:58

## 2024-11-25 RX ADMIN — HEPARIN SODIUM 5000 UNITS: 5000 INJECTION INTRAVENOUS; SUBCUTANEOUS at 15:25

## 2024-11-25 RX ADMIN — HEPARIN SODIUM 5000 UNITS: 5000 INJECTION INTRAVENOUS; SUBCUTANEOUS at 20:33

## 2024-11-25 RX ADMIN — SODIUM CHLORIDE, PRESERVATIVE FREE 10 ML: 5 INJECTION INTRAVENOUS at 20:33

## 2024-11-25 RX ADMIN — OXYCODONE AND ACETAMINOPHEN 1 TABLET: 10; 325 TABLET ORAL at 08:12

## 2024-11-25 RX ADMIN — SODIUM CHLORIDE, PRESERVATIVE FREE 10 ML: 5 INJECTION INTRAVENOUS at 08:13

## 2024-11-25 RX ADMIN — ALPRAZOLAM 2 MG: 0.5 TABLET ORAL at 15:58

## 2024-11-25 RX ADMIN — POTASSIUM BICARBONATE 40 MEQ: 782 TABLET, EFFERVESCENT ORAL at 08:11

## 2024-11-25 RX ADMIN — ATORVASTATIN CALCIUM 20 MG: 20 TABLET, FILM COATED ORAL at 20:32

## 2024-11-25 RX ADMIN — OXYCODONE AND ACETAMINOPHEN 1 TABLET: 10; 325 TABLET ORAL at 21:37

## 2024-11-25 RX ADMIN — PANTOPRAZOLE SODIUM 40 MG: 40 TABLET, DELAYED RELEASE ORAL at 06:54

## 2024-11-25 RX ADMIN — CLOPIDOGREL BISULFATE 75 MG: 75 TABLET ORAL at 08:12

## 2024-11-25 RX ADMIN — OXYCODONE AND ACETAMINOPHEN 1 TABLET: 10; 325 TABLET ORAL at 02:55

## 2024-11-25 RX ADMIN — SODIUM CHLORIDE: 9 INJECTION, SOLUTION INTRAVENOUS at 10:43

## 2024-11-25 RX ADMIN — POTASSIUM CHLORIDE 10 MEQ: 7.46 INJECTION, SOLUTION INTRAVENOUS at 11:51

## 2024-11-25 RX ADMIN — AZITHROMYCIN DIHYDRATE 500 MG: 500 INJECTION, POWDER, LYOPHILIZED, FOR SOLUTION INTRAVENOUS at 20:38

## 2024-11-25 RX ADMIN — CILOSTAZOL 50 MG: 50 TABLET ORAL at 20:32

## 2024-11-25 RX ADMIN — ATORVASTATIN CALCIUM 20 MG: 20 TABLET, FILM COATED ORAL at 00:48

## 2024-11-25 RX ADMIN — POLYETHYLENE GLYCOL 3350 17 G: 17 POWDER, FOR SOLUTION ORAL at 17:39

## 2024-11-25 RX ADMIN — CILOSTAZOL 50 MG: 50 TABLET ORAL at 08:12

## 2024-11-25 RX ADMIN — WATER 1000 MG: 1 INJECTION INTRAMUSCULAR; INTRAVENOUS; SUBCUTANEOUS at 20:32

## 2024-11-25 RX ADMIN — SODIUM CHLORIDE, PRESERVATIVE FREE 10 ML: 5 INJECTION INTRAVENOUS at 00:30

## 2024-11-25 RX ADMIN — HEPARIN SODIUM 5000 UNITS: 5000 INJECTION INTRAVENOUS; SUBCUTANEOUS at 00:51

## 2024-11-25 RX ADMIN — AZITHROMYCIN DIHYDRATE 500 MG: 500 INJECTION, POWDER, LYOPHILIZED, FOR SOLUTION INTRAVENOUS at 00:56

## 2024-11-25 RX ADMIN — POTASSIUM CHLORIDE 40 MEQ: 1500 TABLET, EXTENDED RELEASE ORAL at 00:48

## 2024-11-25 ASSESSMENT — PAIN SCALES - GENERAL
PAINLEVEL_OUTOF10: 0
PAINLEVEL_OUTOF10: 7
PAINLEVEL_OUTOF10: 8
PAINLEVEL_OUTOF10: 4
PAINLEVEL_OUTOF10: 2

## 2024-11-25 ASSESSMENT — PAIN DESCRIPTION - DESCRIPTORS
DESCRIPTORS: ACHING

## 2024-11-25 ASSESSMENT — PAIN SCALES - WONG BAKER
WONGBAKER_NUMERICALRESPONSE: NO HURT
WONGBAKER_NUMERICALRESPONSE: NO HURT

## 2024-11-25 ASSESSMENT — PAIN DESCRIPTION - LOCATION
LOCATION: BACK
LOCATION: BACK
LOCATION: BACK;BUTTOCKS

## 2024-11-25 ASSESSMENT — PAIN DESCRIPTION - ORIENTATION
ORIENTATION: RIGHT;LEFT
ORIENTATION: POSTERIOR
ORIENTATION: POSTERIOR;LOWER

## 2024-11-25 NOTE — CONSULTS
Father         COLON    Lung Cancer Sister     Asthma Sister     Heart Disease Brother     Heart Attack Brother     No Known Problems Brother     No Known Problems Brother     Anesth Problems Neg Hx      Social History     Tobacco Use    Smoking status: Former     Types: Cigarettes    Smokeless tobacco: Never   Substance Use Topics    Alcohol use: Not Currently      Current Facility-Administered Medications   Medication Dose Route Frequency Provider Last Rate Last Admin    oxyCODONE-acetaminophen (PERCOCET)  MG per tablet 1 tablet  1 tablet Oral Q6H PRN Darius Sams PA-C   1 tablet at 11/25/24 0812    midodrine (PROAMATINE) tablet 5 mg  5 mg Oral TID PRN Darius Sams PA-C   5 mg at 11/25/24 0654    potassium chloride 10 mEq/100 mL IVPB (Peripheral Line)  10 mEq IntraVENous Q1H Makayla Otto  mL/hr at 11/25/24 1044 10 mEq at 11/25/24 1044    0.9 % sodium chloride infusion   IntraVENous Continuous Murphy Thao  mL/hr at 11/25/24 1043 New Bag at 11/25/24 1043    atorvastatin (LIPITOR) tablet 20 mg  20 mg Oral Nightly Makayla Otto MD   20 mg at 11/25/24 0048    cilostazol (PLETAL) tablet 50 mg  50 mg Oral BID Makayla Otto MD   50 mg at 11/25/24 0812    clopidogrel (PLAVIX) tablet 75 mg  75 mg Oral Daily Makayla Otto MD   75 mg at 11/25/24 0812    ferrous sulfate (IRON 325) tablet 325 mg  325 mg Oral Daily with breakfast Makayla Otto MD   325 mg at 11/25/24 0811    pantoprazole (PROTONIX) tablet 40 mg  40 mg Oral QAM AC Makayla Otto MD   40 mg at 11/25/24 0654    sertraline (ZOLOFT) tablet 200 mg  200 mg Oral Daily Makayla Otto MD   200 mg at 11/25/24 0812    sodium chloride flush 0.9 % injection 5-40 mL  5-40 mL IntraVENous 2 times per day Makayla Otto MD   10 mL at 11/25/24 0813    sodium chloride flush 0.9 % injection 5-40 mL  5-40 mL IntraVENous PRN Makayla Otto MD        0.9 % sodium chloride infusion   IntraVENous PRN Makayla Otto MD 
this examination and automatic exposure control for dose modulation. FINDINGS: The ventricles and sulci are age-appropriate without hydrocephalus. There is no mass effect or midline shift. There is no intracranial hemorrhage or extra-axial fluid collection. Aneurysm coils or clips are again demonstrated in the [place] of [place]. There is stable chronic encephalomalacia in the left frontotemporal region. There is no new abnormal parenchymal attenuation. The basal cisterns are patent. The osseous structures are intact. The visualized paranasal sinuses and mastoid air cells are clear.     No acute intracranial abnormality. Electronically signed by Dwain Vela    US RETROPERITONEAL COMPLETE    Result Date: 11/24/2024   EXAM: US RENAL-RETROPERITONEAL INDICATION: Acute kidney injury COMPARISON: CT 7/10/2024 FINDINGS: The patient is studied with coronal and axial real-time ultrasound. The right kidney measures 11.0 cm. The left kidney is not seen due to body habitus and limited mobility.  Renal contour and echogenicity are normal.  There is no mass or shadowing calculus.  There is no hydronephrosis.  The aorta and IVC are not seen due to bowel gas.  The urinary bladder is nondistended with a Arnold catheter in place.     Normal appearance of the right kidney without hydronephrosis. Nonvisualized left kidney due to body habitus and limited mobility. Electronically signed by Dwain Vela    XR CHEST 1 VIEW    Result Date: 11/24/2024  EXAM: XR CHEST 1 VIEW INDICATION: Fever/Suspected Infection/Sepsis COMPARISON: 7/10/2024 FINDINGS: A portable AP radiograph of the chest was obtained at 1517 hours patchy infiltrate in the right infrahilar region. The cardiac and mediastinal contours and pulmonary vascularity are normal.  The bones and soft tissues are grossly within normal limits.     Right infrahilar pneumonia Electronically signed by Jerica Mccormick       ARTERIAL BLOOD GAS  No results for input(s): \"PHART\", \"[id number]\",

## 2024-11-25 NOTE — ED NOTES
ED TO INPATIENT SBAR HANDOFF    Patient Name: Francisca Portillo   Preferred Name: Francisca  : 1965  59 y.o.   Family/Caregiver Present: no   Code Status Order: Full Code  PO Status: NPO:No  Telemetry Order:   C-SSRS: Risk of Suicide: No Risk  Sitter no   Restraints:     Sepsis Risk Score      Situation  Chief Complaint   Patient presents with    Altered Mental Status     Brief Description of Patient's Condition: Patient arrives to ER with c/o AMS, generalized weakness, decreased appetite, decreased urination. Patient has hx of stroke with R sided deficits and aphasia at baseline. Patient received sepsis workup, kidneys found to be dysfunctional. UA normal, Chest xray showed pneumonia. Patient having low urinary output, 400mL out after straight cath but nothing since. Fluids ordered and running.  Mental Status: oriented and alert  Arrived from:Home  Imaging:   XR CHEST 1 VIEW   Final Result   Right infrahilar pneumonia      Electronically signed by Jerica Mccormick      US RETROPERITONEAL COMPLETE    (Results Pending)   CT HEAD WO CONTRAST    (Results Pending)   US RETROPERITONEAL COMPLETE    (Results Pending)   CT ABDOMEN PELVIS WO CONTRAST Additional Contrast? None    (Results Pending)     Abnormal labs:   Abnormal Labs Reviewed   COMPREHENSIVE METABOLIC PANEL - Abnormal; Notable for the following components:       Result Value    Sodium 134 (*)     Potassium 3.4 (*)     Chloride 96 (*)     CO2 19 (*)     Anion Gap 19 (*)     Glucose 123 (*)      (*)     Creatinine 4.83 (*)     BUN/Creatinine Ratio 24 (*)     Est, Glom Filt Rate 10 (*)     AST 39 (*)     Albumin 2.8 (*)     Globulin 4.1 (*)     Albumin/Globulin Ratio 0.7 (*)     All other components within normal limits   PROCALCITONIN - Abnormal; Notable for the following components:    Procalcitonin 0.43 (*)     All other components within normal limits   URINALYSIS WITH REFLEX TO CULTURE - Abnormal; Notable for the following components:    Blood, Urine

## 2024-11-25 NOTE — ED NOTES
US called at this time. Bladder scanner performed at this time with provider at bedside, no urine found in bladder.

## 2024-11-25 NOTE — ED NOTES
Pen needle    9/22/2021  [facility]     Gin Ferreira MD    Endocrinology, Diabetes, and Metabolism     US at bedside at this time

## 2024-11-25 NOTE — CARE COORDINATION
11/25/24 1313   Service Assessment   Patient Orientation Alert and Oriented   Cognition Alert   History Provided By Patient;Medical Record   Primary Caregiver Self   Support Systems Children;Family Members   Patient's Healthcare Decision Maker is: Legal Next of Kin   PCP Verified by CM Yes   Last Visit to PCP Within last 3 months   Prior Functional Level Assistance with the following:;Bathing;Dressing;Mobility   Current Functional Level Assistance with the following:;Bathing;Dressing;Mobility       Demographics confirmed by the patient.  Son resides w/patient.  Last seen at listed PCP office approx one week ago.  Utilizes [employer]  Self reports she requires some assistance w/ADL's & IADL's.  No home O2.  DME consists of a shower chair; wheelchair; rollator; and cane.  Son verbalized \"mom walks.\"  Patient nor her son could recall the name of the HH agency she's currently open with.  Asked the son to check the paperwork at home and inform the writer.      Advance Care Planning     General Advance Care Planning (ACP) Conversation    Date of Conversation: 11/25/2024  Conducted with: Patient with Decision Making Capacity  Other persons present: Son      Healthcare Decision Maker:   Primary Decision Maker: Johnathan Portillo - Child - [phone]       Content/Action Overview:  Has ACP document(s) on file - reflects the patient's care preferences        Length of Voluntary ACP Conversation in minutes:  <16 minutes (Non-Billable)           REGINALDO Acuna

## 2024-11-26 ENCOUNTER — APPOINTMENT (OUTPATIENT)
Facility: HOSPITAL | Age: 59
DRG: 720 | End: 2024-11-26
Payer: MEDICAID

## 2024-11-26 LAB
ALBUMIN SERPL-MCNC: 2.5 G/DL (ref 3.5–5)
ANION GAP SERPL CALC-SCNC: 9 MMOL/L (ref 2–12)
BASOPHILS # BLD: 0 K/UL (ref 0–0.1)
BASOPHILS NFR BLD: 0 % (ref 0–1)
BUN SERPL-MCNC: 71 MG/DL (ref 6–20)
BUN/CREAT SERPL: 49 (ref 12–20)
CA-I BLD-MCNC: 9.1 MG/DL (ref 8.5–10.1)
CHLORIDE SERPL-SCNC: 102 MMOL/L (ref 97–108)
CO2 SERPL-SCNC: 27 MMOL/L (ref 21–32)
CREAT SERPL-MCNC: 1.46 MG/DL (ref 0.55–1.02)
DIFFERENTIAL METHOD BLD: ABNORMAL
EOSINOPHIL # BLD: 0.2 K/UL (ref 0–0.4)
EOSINOPHIL NFR BLD: 2 % (ref 0–7)
ERYTHROCYTE [DISTWIDTH] IN BLOOD BY AUTOMATED COUNT: 14.1 % (ref 11.5–14.5)
GLUCOSE SERPL-MCNC: 102 MG/DL (ref 65–100)
HCT VFR BLD AUTO: 31.4 % (ref 35–47)
HGB BLD-MCNC: 9.9 G/DL (ref 11.5–16)
IMM GRANULOCYTES # BLD AUTO: 0.1 K/UL (ref 0–0.04)
IMM GRANULOCYTES NFR BLD AUTO: 1 % (ref 0–0.5)
LYMPHOCYTES # BLD: 1.4 K/UL (ref 0.8–3.5)
LYMPHOCYTES NFR BLD: 18 % (ref 12–49)
MCH RBC QN AUTO: 28.6 PG (ref 26–34)
MCHC RBC AUTO-ENTMCNC: 31.5 G/DL (ref 30–36.5)
MCV RBC AUTO: 90.8 FL (ref 80–99)
MONOCYTES # BLD: 0.9 K/UL (ref 0–1)
MONOCYTES NFR BLD: 12 % (ref 5–13)
NEUTS SEG # BLD: 5.4 K/UL (ref 1.8–8)
NEUTS SEG NFR BLD: 67 % (ref 32–75)
NRBC # BLD: 0 K/UL (ref 0–0.01)
NRBC BLD-RTO: 0 PER 100 WBC
PHOSPHATE SERPL-MCNC: 3.2 MG/DL (ref 2.6–4.7)
PLATELET # BLD AUTO: 319 K/UL (ref 150–400)
PMV BLD AUTO: 10.8 FL (ref 8.9–12.9)
POTASSIUM SERPL-SCNC: 3 MMOL/L (ref 3.5–5.1)
RBC # BLD AUTO: 3.46 M/UL (ref 3.8–5.2)
SODIUM SERPL-SCNC: 138 MMOL/L (ref 136–145)
WBC # BLD AUTO: 8.1 K/UL (ref 3.6–11)

## 2024-11-26 PROCEDURE — 2580000003 HC RX 258: Performed by: FAMILY MEDICINE

## 2024-11-26 PROCEDURE — 87086 URINE CULTURE/COLONY COUNT: CPT

## 2024-11-26 PROCEDURE — 6370000000 HC RX 637 (ALT 250 FOR IP): Performed by: FAMILY MEDICINE

## 2024-11-26 PROCEDURE — 85025 COMPLETE CBC W/AUTO DIFF WBC: CPT

## 2024-11-26 PROCEDURE — 1100000000 HC RM PRIVATE

## 2024-11-26 PROCEDURE — 36415 COLL VENOUS BLD VENIPUNCTURE: CPT

## 2024-11-26 PROCEDURE — 6370000000 HC RX 637 (ALT 250 FOR IP): Performed by: INTERNAL MEDICINE

## 2024-11-26 PROCEDURE — 6370000000 HC RX 637 (ALT 250 FOR IP): Performed by: PHYSICIAN ASSISTANT

## 2024-11-26 PROCEDURE — 2580000003 HC RX 258: Performed by: INTERNAL MEDICINE

## 2024-11-26 PROCEDURE — 71045 X-RAY EXAM CHEST 1 VIEW: CPT

## 2024-11-26 PROCEDURE — 6360000002 HC RX W HCPCS: Performed by: FAMILY MEDICINE

## 2024-11-26 PROCEDURE — 80069 RENAL FUNCTION PANEL: CPT

## 2024-11-26 RX ORDER — POTASSIUM CHLORIDE 1500 MG/1
40 TABLET, EXTENDED RELEASE ORAL EVERY 4 HOURS
Status: COMPLETED | OUTPATIENT
Start: 2024-11-26 | End: 2024-11-26

## 2024-11-26 RX ADMIN — AZITHROMYCIN DIHYDRATE 500 MG: 500 INJECTION, POWDER, LYOPHILIZED, FOR SOLUTION INTRAVENOUS at 21:31

## 2024-11-26 RX ADMIN — HEPARIN SODIUM 5000 UNITS: 5000 INJECTION INTRAVENOUS; SUBCUTANEOUS at 05:19

## 2024-11-26 RX ADMIN — PANTOPRAZOLE SODIUM 40 MG: 40 TABLET, DELAYED RELEASE ORAL at 05:19

## 2024-11-26 RX ADMIN — CILOSTAZOL 50 MG: 50 TABLET ORAL at 08:47

## 2024-11-26 RX ADMIN — SODIUM CHLORIDE: 9 INJECTION, SOLUTION INTRAVENOUS at 21:35

## 2024-11-26 RX ADMIN — POTASSIUM CHLORIDE 40 MEQ: 1500 TABLET, EXTENDED RELEASE ORAL at 08:48

## 2024-11-26 RX ADMIN — HEPARIN SODIUM 5000 UNITS: 5000 INJECTION INTRAVENOUS; SUBCUTANEOUS at 21:27

## 2024-11-26 RX ADMIN — OXYCODONE AND ACETAMINOPHEN 1 TABLET: 10; 325 TABLET ORAL at 15:54

## 2024-11-26 RX ADMIN — ALPRAZOLAM 2 MG: 0.5 TABLET ORAL at 18:09

## 2024-11-26 RX ADMIN — SODIUM CHLORIDE, PRESERVATIVE FREE 10 ML: 5 INJECTION INTRAVENOUS at 08:48

## 2024-11-26 RX ADMIN — POTASSIUM CHLORIDE 40 MEQ: 1500 TABLET, EXTENDED RELEASE ORAL at 13:34

## 2024-11-26 RX ADMIN — GABAPENTIN 100 MG: 100 CAPSULE ORAL at 21:27

## 2024-11-26 RX ADMIN — GABAPENTIN 100 MG: 100 CAPSULE ORAL at 08:48

## 2024-11-26 RX ADMIN — SODIUM CHLORIDE: 9 INJECTION, SOLUTION INTRAVENOUS at 05:06

## 2024-11-26 RX ADMIN — WATER 1000 MG: 1 INJECTION INTRAMUSCULAR; INTRAVENOUS; SUBCUTANEOUS at 21:27

## 2024-11-26 RX ADMIN — ALPRAZOLAM 2 MG: 0.5 TABLET ORAL at 08:52

## 2024-11-26 RX ADMIN — SODIUM CHLORIDE, PRESERVATIVE FREE 10 ML: 5 INJECTION INTRAVENOUS at 21:27

## 2024-11-26 RX ADMIN — OXYCODONE AND ACETAMINOPHEN 1 TABLET: 10; 325 TABLET ORAL at 05:42

## 2024-11-26 RX ADMIN — CLOPIDOGREL BISULFATE 75 MG: 75 TABLET ORAL at 08:47

## 2024-11-26 RX ADMIN — HEPARIN SODIUM 5000 UNITS: 5000 INJECTION INTRAVENOUS; SUBCUTANEOUS at 13:34

## 2024-11-26 RX ADMIN — CYCLOBENZAPRINE 10 MG: 10 TABLET, FILM COATED ORAL at 08:52

## 2024-11-26 RX ADMIN — POLYETHYLENE GLYCOL 3350 17 G: 17 POWDER, FOR SOLUTION ORAL at 15:54

## 2024-11-26 RX ADMIN — SERTRALINE HYDROCHLORIDE 200 MG: 50 TABLET ORAL at 08:47

## 2024-11-26 RX ADMIN — CILOSTAZOL 50 MG: 50 TABLET ORAL at 21:27

## 2024-11-26 RX ADMIN — ATORVASTATIN CALCIUM 20 MG: 20 TABLET, FILM COATED ORAL at 21:28

## 2024-11-26 RX ADMIN — FERROUS SULFATE TAB 325 MG (65 MG ELEMENTAL FE) 325 MG: 325 (65 FE) TAB at 08:48

## 2024-11-26 RX ADMIN — CYCLOBENZAPRINE 10 MG: 10 TABLET, FILM COATED ORAL at 21:27

## 2024-11-26 ASSESSMENT — PAIN DESCRIPTION - LOCATION
LOCATION: BACK
LOCATION: BACK

## 2024-11-26 ASSESSMENT — PAIN SCALES - GENERAL
PAINLEVEL_OUTOF10: 7
PAINLEVEL_OUTOF10: 4
PAINLEVEL_OUTOF10: 3
PAINLEVEL_OUTOF10: 10
PAINLEVEL_OUTOF10: 7
PAINLEVEL_OUTOF10: 5

## 2024-11-26 ASSESSMENT — PAIN DESCRIPTION - DESCRIPTORS
DESCRIPTORS: ACHING;THROBBING
DESCRIPTORS: ACHING

## 2024-11-26 ASSESSMENT — PAIN SCALES - WONG BAKER: WONGBAKER_NUMERICALRESPONSE: NO HURT

## 2024-11-26 NOTE — CARE COORDINATION
IDR 24HRS PNA. Nephro/pulm clearance. Met w/patient's son (Johnathan Portillo).  In[facility]  Referral sent pending acceptance.       REGINALDO Acuna

## 2024-11-27 LAB
ANION GAP SERPL CALC-SCNC: 5 MMOL/L (ref 2–12)
BACTERIA SPEC CULT: NORMAL
BASOPHILS # BLD: 0.1 K/UL (ref 0–0.1)
BASOPHILS NFR BLD: 1 % (ref 0–1)
BUN SERPL-MCNC: 53 MG/DL (ref 6–20)
BUN/CREAT SERPL: 54 (ref 12–20)
CA-I BLD-MCNC: 9.3 MG/DL (ref 8.5–10.1)
CHLORIDE SERPL-SCNC: 109 MMOL/L (ref 97–108)
CO2 SERPL-SCNC: 26 MMOL/L (ref 21–32)
CREAT SERPL-MCNC: 0.98 MG/DL (ref 0.55–1.02)
DIFFERENTIAL METHOD BLD: ABNORMAL
EOSINOPHIL # BLD: 0.4 K/UL (ref 0–0.4)
EOSINOPHIL NFR BLD: 5 % (ref 0–7)
ERYTHROCYTE [DISTWIDTH] IN BLOOD BY AUTOMATED COUNT: 14.1 % (ref 11.5–14.5)
FERRITIN SERPL-MCNC: 123 NG/ML (ref 8–252)
GLUCOSE BLD STRIP.AUTO-MCNC: 104 MG/DL (ref 65–100)
GLUCOSE BLD STRIP.AUTO-MCNC: 126 MG/DL (ref 65–100)
GLUCOSE SERPL-MCNC: 99 MG/DL (ref 65–100)
HCT VFR BLD AUTO: 32.2 % (ref 35–47)
HGB BLD-MCNC: 10 G/DL (ref 11.5–16)
IMM GRANULOCYTES # BLD AUTO: 0 K/UL
IMM GRANULOCYTES NFR BLD AUTO: 0 %
IRON SATN MFR SERPL: 22 % (ref 20–50)
IRON SERPL-MCNC: 47 UG/DL (ref 35–150)
LYMPHOCYTES # BLD: 2.1 K/UL (ref 0.8–3.5)
LYMPHOCYTES NFR BLD: 28 % (ref 12–49)
Lab: NORMAL
MCH RBC QN AUTO: 29 PG (ref 26–34)
MCHC RBC AUTO-ENTMCNC: 31.1 G/DL (ref 30–36.5)
MCV RBC AUTO: 93.3 FL (ref 80–99)
METAMYELOCYTES NFR BLD MANUAL: 1 %
MONOCYTES # BLD: 0.2 K/UL (ref 0–1)
MONOCYTES NFR BLD: 3 % (ref 5–13)
NEUTS SEG # BLD: 4.6 K/UL (ref 1.8–8)
NEUTS SEG NFR BLD: 61 % (ref 32–75)
NRBC # BLD: 0 K/UL (ref 0–0.01)
NRBC BLD-RTO: 0 PER 100 WBC
PERFORMED BY:: ABNORMAL
PERFORMED BY:: ABNORMAL
PLATELET # BLD AUTO: 329 K/UL (ref 150–400)
PMV BLD AUTO: 10.1 FL (ref 8.9–12.9)
POTASSIUM SERPL-SCNC: 3.4 MMOL/L (ref 3.5–5.1)
PROMYELOCYTES NFR BLD MANUAL: 1 %
RBC # BLD AUTO: 3.45 M/UL (ref 3.8–5.2)
RBC MORPH BLD: ABNORMAL
SODIUM SERPL-SCNC: 140 MMOL/L (ref 136–145)
TIBC SERPL-MCNC: 210 UG/DL (ref 250–450)
WBC # BLD AUTO: 7.6 K/UL (ref 3.6–11)

## 2024-11-27 PROCEDURE — 6360000002 HC RX W HCPCS

## 2024-11-27 PROCEDURE — 6370000000 HC RX 637 (ALT 250 FOR IP)

## 2024-11-27 PROCEDURE — 6370000000 HC RX 637 (ALT 250 FOR IP): Performed by: PHYSICIAN ASSISTANT

## 2024-11-27 PROCEDURE — 97530 THERAPEUTIC ACTIVITIES: CPT

## 2024-11-27 PROCEDURE — 1100000000 HC RM PRIVATE

## 2024-11-27 PROCEDURE — 97161 PT EVAL LOW COMPLEX 20 MIN: CPT

## 2024-11-27 PROCEDURE — 83540 ASSAY OF IRON: CPT

## 2024-11-27 PROCEDURE — 6370000000 HC RX 637 (ALT 250 FOR IP): Performed by: FAMILY MEDICINE

## 2024-11-27 PROCEDURE — 36415 COLL VENOUS BLD VENIPUNCTURE: CPT

## 2024-11-27 PROCEDURE — 80048 BASIC METABOLIC PNL TOTAL CA: CPT

## 2024-11-27 PROCEDURE — 82728 ASSAY OF FERRITIN: CPT

## 2024-11-27 PROCEDURE — 2580000003 HC RX 258: Performed by: INTERNAL MEDICINE

## 2024-11-27 PROCEDURE — 85025 COMPLETE CBC W/AUTO DIFF WBC: CPT

## 2024-11-27 PROCEDURE — 82962 GLUCOSE BLOOD TEST: CPT

## 2024-11-27 PROCEDURE — 2580000003 HC RX 258: Performed by: FAMILY MEDICINE

## 2024-11-27 PROCEDURE — 6360000002 HC RX W HCPCS: Performed by: FAMILY MEDICINE

## 2024-11-27 PROCEDURE — 6370000000 HC RX 637 (ALT 250 FOR IP): Performed by: INTERNAL MEDICINE

## 2024-11-27 RX ORDER — POTASSIUM CHLORIDE 1500 MG/1
40 TABLET, EXTENDED RELEASE ORAL ONCE
Status: COMPLETED | OUTPATIENT
Start: 2024-11-27 | End: 2024-11-27

## 2024-11-27 RX ORDER — ENOXAPARIN SODIUM 100 MG/ML
40 INJECTION SUBCUTANEOUS DAILY
Status: DISCONTINUED | OUTPATIENT
Start: 2024-11-27 | End: 2024-12-06 | Stop reason: HOSPADM

## 2024-11-27 RX ADMIN — PANTOPRAZOLE SODIUM 40 MG: 40 TABLET, DELAYED RELEASE ORAL at 05:29

## 2024-11-27 RX ADMIN — OXYCODONE AND ACETAMINOPHEN 1 TABLET: 10; 325 TABLET ORAL at 18:05

## 2024-11-27 RX ADMIN — AZITHROMYCIN DIHYDRATE 500 MG: 500 INJECTION, POWDER, LYOPHILIZED, FOR SOLUTION INTRAVENOUS at 21:14

## 2024-11-27 RX ADMIN — ENOXAPARIN SODIUM 40 MG: 100 INJECTION SUBCUTANEOUS at 14:50

## 2024-11-27 RX ADMIN — WATER 1000 MG: 1 INJECTION INTRAMUSCULAR; INTRAVENOUS; SUBCUTANEOUS at 19:58

## 2024-11-27 RX ADMIN — GABAPENTIN 100 MG: 100 CAPSULE ORAL at 19:59

## 2024-11-27 RX ADMIN — CILOSTAZOL 50 MG: 50 TABLET ORAL at 09:43

## 2024-11-27 RX ADMIN — SODIUM CHLORIDE, PRESERVATIVE FREE 10 ML: 5 INJECTION INTRAVENOUS at 19:58

## 2024-11-27 RX ADMIN — GABAPENTIN 100 MG: 100 CAPSULE ORAL at 09:43

## 2024-11-27 RX ADMIN — HEPARIN SODIUM 5000 UNITS: 5000 INJECTION INTRAVENOUS; SUBCUTANEOUS at 05:29

## 2024-11-27 RX ADMIN — SODIUM CHLORIDE: 9 INJECTION, SOLUTION INTRAVENOUS at 05:32

## 2024-11-27 RX ADMIN — CILOSTAZOL 50 MG: 50 TABLET ORAL at 19:58

## 2024-11-27 RX ADMIN — ALPRAZOLAM 2 MG: 0.5 TABLET ORAL at 14:49

## 2024-11-27 RX ADMIN — ATORVASTATIN CALCIUM 20 MG: 20 TABLET, FILM COATED ORAL at 19:59

## 2024-11-27 RX ADMIN — POTASSIUM CHLORIDE 40 MEQ: 1500 TABLET, EXTENDED RELEASE ORAL at 14:49

## 2024-11-27 RX ADMIN — FERROUS SULFATE TAB 325 MG (65 MG ELEMENTAL FE) 325 MG: 325 (65 FE) TAB at 09:43

## 2024-11-27 RX ADMIN — OXYCODONE AND ACETAMINOPHEN 1 TABLET: 10; 325 TABLET ORAL at 03:54

## 2024-11-27 RX ADMIN — SERTRALINE HYDROCHLORIDE 200 MG: 50 TABLET ORAL at 09:43

## 2024-11-27 RX ADMIN — CLOPIDOGREL BISULFATE 75 MG: 75 TABLET ORAL at 09:43

## 2024-11-27 RX ADMIN — SODIUM CHLORIDE, PRESERVATIVE FREE 10 ML: 5 INJECTION INTRAVENOUS at 10:50

## 2024-11-27 ASSESSMENT — PAIN SCALES - GENERAL
PAINLEVEL_OUTOF10: 0
PAINLEVEL_OUTOF10: 2
PAINLEVEL_OUTOF10: 4
PAINLEVEL_OUTOF10: 3
PAINLEVEL_OUTOF10: 4
PAINLEVEL_OUTOF10: 10

## 2024-11-27 ASSESSMENT — PAIN DESCRIPTION - DESCRIPTORS: DESCRIPTORS: ACHING;THROBBING

## 2024-11-27 ASSESSMENT — PAIN DESCRIPTION - LOCATION: LOCATION: BACK

## 2024-11-27 NOTE — CARE COORDINATION
Met w/spouse.  Spouse desires for her to go to [facility] ([facility]). Spouse voiced patient went to [facility], 2023 after a stroke.  Verbal consent given by spouse for referral to be sent to [facility].  Referral sent pending acceptance.  Insurance will require yasmeen.       REGINALDO Acuna

## 2024-11-28 LAB
ALBUMIN SERPL-MCNC: 2.4 G/DL (ref 3.5–5)
ANION GAP SERPL CALC-SCNC: 7 MMOL/L (ref 2–12)
BASOPHILS # BLD: 0.1 K/UL (ref 0–0.1)
BASOPHILS NFR BLD: 1 % (ref 0–1)
BUN SERPL-MCNC: 33 MG/DL (ref 6–20)
BUN/CREAT SERPL: 39 (ref 12–20)
CA-I BLD-MCNC: 9.4 MG/DL (ref 8.5–10.1)
CHLORIDE SERPL-SCNC: 112 MMOL/L (ref 97–108)
CO2 SERPL-SCNC: 25 MMOL/L (ref 21–32)
CREAT SERPL-MCNC: 0.84 MG/DL (ref 0.55–1.02)
DIFFERENTIAL METHOD BLD: ABNORMAL
EOSINOPHIL # BLD: 0.4 K/UL (ref 0–0.4)
EOSINOPHIL NFR BLD: 6 % (ref 0–7)
ERYTHROCYTE [DISTWIDTH] IN BLOOD BY AUTOMATED COUNT: 13.9 % (ref 11.5–14.5)
GLUCOSE SERPL-MCNC: 98 MG/DL (ref 65–100)
HCT VFR BLD AUTO: 33 % (ref 35–47)
HGB BLD-MCNC: 10.3 G/DL (ref 11.5–16)
IMM GRANULOCYTES # BLD AUTO: 0 K/UL
IMM GRANULOCYTES NFR BLD AUTO: 0 %
LYMPHOCYTES # BLD: 2 K/UL (ref 0.8–3.5)
LYMPHOCYTES NFR BLD: 32 % (ref 12–49)
MCH RBC QN AUTO: 29.2 PG (ref 26–34)
MCHC RBC AUTO-ENTMCNC: 31.2 G/DL (ref 30–36.5)
MCV RBC AUTO: 93.5 FL (ref 80–99)
MONOCYTES # BLD: 0.3 K/UL (ref 0–1)
MONOCYTES NFR BLD: 4 % (ref 5–13)
MYELOCYTES NFR BLD MANUAL: 1 %
NEUTS BAND NFR BLD MANUAL: 2 % (ref 0–6)
NEUTS SEG # BLD: 3.5 K/UL (ref 1.8–8)
NEUTS SEG NFR BLD: 54 % (ref 32–75)
NRBC # BLD: 0 K/UL (ref 0–0.01)
NRBC BLD-RTO: 0 PER 100 WBC
PHOSPHATE SERPL-MCNC: 2.3 MG/DL (ref 2.6–4.7)
PLATELET # BLD AUTO: 333 K/UL (ref 150–400)
PMV BLD AUTO: 10.3 FL (ref 8.9–12.9)
POTASSIUM SERPL-SCNC: 3.6 MMOL/L (ref 3.5–5.1)
RBC # BLD AUTO: 3.53 M/UL (ref 3.8–5.2)
RBC MORPH BLD: ABNORMAL
SODIUM SERPL-SCNC: 144 MMOL/L (ref 136–145)
WBC # BLD AUTO: 6.3 K/UL (ref 3.6–11)

## 2024-11-28 PROCEDURE — 6370000000 HC RX 637 (ALT 250 FOR IP): Performed by: INTERNAL MEDICINE

## 2024-11-28 PROCEDURE — 2580000003 HC RX 258: Performed by: FAMILY MEDICINE

## 2024-11-28 PROCEDURE — 80069 RENAL FUNCTION PANEL: CPT

## 2024-11-28 PROCEDURE — 36415 COLL VENOUS BLD VENIPUNCTURE: CPT

## 2024-11-28 PROCEDURE — 85025 COMPLETE CBC W/AUTO DIFF WBC: CPT

## 2024-11-28 PROCEDURE — 6370000000 HC RX 637 (ALT 250 FOR IP): Performed by: PHYSICIAN ASSISTANT

## 2024-11-28 PROCEDURE — 6360000002 HC RX W HCPCS

## 2024-11-28 PROCEDURE — 6360000002 HC RX W HCPCS: Performed by: FAMILY MEDICINE

## 2024-11-28 PROCEDURE — 1100000000 HC RM PRIVATE

## 2024-11-28 PROCEDURE — 6370000000 HC RX 637 (ALT 250 FOR IP): Performed by: FAMILY MEDICINE

## 2024-11-28 RX ORDER — MIDODRINE HYDROCHLORIDE 5 MG/1
5 TABLET ORAL
Status: DISCONTINUED | OUTPATIENT
Start: 2024-11-28 | End: 2024-11-30

## 2024-11-28 RX ADMIN — SODIUM CHLORIDE, PRESERVATIVE FREE 10 ML: 5 INJECTION INTRAVENOUS at 21:10

## 2024-11-28 RX ADMIN — OXYCODONE AND ACETAMINOPHEN 1 TABLET: 10; 325 TABLET ORAL at 09:46

## 2024-11-28 RX ADMIN — OXYCODONE AND ACETAMINOPHEN 1 TABLET: 10; 325 TABLET ORAL at 03:05

## 2024-11-28 RX ADMIN — AZITHROMYCIN DIHYDRATE 500 MG: 500 INJECTION, POWDER, LYOPHILIZED, FOR SOLUTION INTRAVENOUS at 21:17

## 2024-11-28 RX ADMIN — CYCLOBENZAPRINE 10 MG: 10 TABLET, FILM COATED ORAL at 21:09

## 2024-11-28 RX ADMIN — FERROUS SULFATE TAB 325 MG (65 MG ELEMENTAL FE) 325 MG: 325 (65 FE) TAB at 09:20

## 2024-11-28 RX ADMIN — ALPRAZOLAM 2 MG: 0.5 TABLET ORAL at 21:10

## 2024-11-28 RX ADMIN — MIDODRINE HYDROCHLORIDE 5 MG: 5 TABLET ORAL at 09:23

## 2024-11-28 RX ADMIN — WATER 1000 MG: 1 INJECTION INTRAMUSCULAR; INTRAVENOUS; SUBCUTANEOUS at 21:05

## 2024-11-28 RX ADMIN — PANTOPRAZOLE SODIUM 40 MG: 40 TABLET, DELAYED RELEASE ORAL at 05:56

## 2024-11-28 RX ADMIN — OXYCODONE AND ACETAMINOPHEN 1 TABLET: 10; 325 TABLET ORAL at 16:48

## 2024-11-28 RX ADMIN — CLOPIDOGREL BISULFATE 75 MG: 75 TABLET ORAL at 09:20

## 2024-11-28 RX ADMIN — MIDODRINE HYDROCHLORIDE 5 MG: 5 TABLET ORAL at 16:47

## 2024-11-28 RX ADMIN — GABAPENTIN 100 MG: 100 CAPSULE ORAL at 09:20

## 2024-11-28 RX ADMIN — ALPRAZOLAM 2 MG: 0.5 TABLET ORAL at 09:46

## 2024-11-28 RX ADMIN — SODIUM CHLORIDE, PRESERVATIVE FREE 10 ML: 5 INJECTION INTRAVENOUS at 09:21

## 2024-11-28 RX ADMIN — SERTRALINE HYDROCHLORIDE 200 MG: 50 TABLET ORAL at 09:20

## 2024-11-28 RX ADMIN — CILOSTAZOL 50 MG: 50 TABLET ORAL at 09:20

## 2024-11-28 RX ADMIN — CILOSTAZOL 50 MG: 50 TABLET ORAL at 21:06

## 2024-11-28 RX ADMIN — ENOXAPARIN SODIUM 40 MG: 100 INJECTION SUBCUTANEOUS at 09:20

## 2024-11-28 RX ADMIN — GABAPENTIN 100 MG: 100 CAPSULE ORAL at 21:05

## 2024-11-28 RX ADMIN — ATORVASTATIN CALCIUM 20 MG: 20 TABLET, FILM COATED ORAL at 21:06

## 2024-11-28 ASSESSMENT — PAIN SCALES - GENERAL
PAINLEVEL_OUTOF10: 8
PAINLEVEL_OUTOF10: 8
PAINLEVEL_OUTOF10: 4
PAINLEVEL_OUTOF10: 0
PAINLEVEL_OUTOF10: 3
PAINLEVEL_OUTOF10: 0
PAINLEVEL_OUTOF10: 7
PAINLEVEL_OUTOF10: 0
PAINLEVEL_OUTOF10: 7
PAINLEVEL_OUTOF10: 9

## 2024-11-28 ASSESSMENT — PAIN DESCRIPTION - DESCRIPTORS
DESCRIPTORS: ACHING
DESCRIPTORS: ACHING
DESCRIPTORS: ACHING;DISCOMFORT

## 2024-11-28 ASSESSMENT — PAIN DESCRIPTION - LOCATION
LOCATION: GENERALIZED
LOCATION: BACK

## 2024-11-29 LAB
ALBUMIN SERPL-MCNC: 2.4 G/DL (ref 3.5–5)
ANION GAP SERPL CALC-SCNC: 8 MMOL/L (ref 2–12)
BUN SERPL-MCNC: 21 MG/DL (ref 6–20)
BUN/CREAT SERPL: 24 (ref 12–20)
CA-I BLD-MCNC: 9 MG/DL (ref 8.5–10.1)
CHLORIDE SERPL-SCNC: 109 MMOL/L (ref 97–108)
CO2 SERPL-SCNC: 25 MMOL/L (ref 21–32)
CREAT SERPL-MCNC: 0.89 MG/DL (ref 0.55–1.02)
GLUCOSE SERPL-MCNC: 165 MG/DL (ref 65–100)
PHOSPHATE SERPL-MCNC: 2.2 MG/DL (ref 2.6–4.7)
POTASSIUM SERPL-SCNC: 3 MMOL/L (ref 3.5–5.1)
SODIUM SERPL-SCNC: 142 MMOL/L (ref 136–145)

## 2024-11-29 PROCEDURE — 97165 OT EVAL LOW COMPLEX 30 MIN: CPT

## 2024-11-29 PROCEDURE — 97530 THERAPEUTIC ACTIVITIES: CPT

## 2024-11-29 PROCEDURE — 6370000000 HC RX 637 (ALT 250 FOR IP): Performed by: INTERNAL MEDICINE

## 2024-11-29 PROCEDURE — 6370000000 HC RX 637 (ALT 250 FOR IP): Performed by: PHYSICIAN ASSISTANT

## 2024-11-29 PROCEDURE — 1100000000 HC RM PRIVATE

## 2024-11-29 PROCEDURE — 6360000002 HC RX W HCPCS

## 2024-11-29 PROCEDURE — 80069 RENAL FUNCTION PANEL: CPT

## 2024-11-29 PROCEDURE — 36415 COLL VENOUS BLD VENIPUNCTURE: CPT

## 2024-11-29 PROCEDURE — 2580000003 HC RX 258: Performed by: FAMILY MEDICINE

## 2024-11-29 PROCEDURE — 6370000000 HC RX 637 (ALT 250 FOR IP): Performed by: FAMILY MEDICINE

## 2024-11-29 RX ORDER — BUMETANIDE 1 MG/1
1 TABLET ORAL DAILY
Status: DISCONTINUED | OUTPATIENT
Start: 2024-11-29 | End: 2024-12-06 | Stop reason: HOSPADM

## 2024-11-29 RX ORDER — POTASSIUM CHLORIDE 1500 MG/1
40 TABLET, EXTENDED RELEASE ORAL ONCE
Status: COMPLETED | OUTPATIENT
Start: 2024-11-29 | End: 2024-11-29

## 2024-11-29 RX ADMIN — MIDODRINE HYDROCHLORIDE 5 MG: 5 TABLET ORAL at 16:21

## 2024-11-29 RX ADMIN — BUMETANIDE 1 MG: 1 TABLET ORAL at 11:33

## 2024-11-29 RX ADMIN — FERROUS SULFATE TAB 325 MG (65 MG ELEMENTAL FE) 325 MG: 325 (65 FE) TAB at 09:18

## 2024-11-29 RX ADMIN — MIDODRINE HYDROCHLORIDE 5 MG: 5 TABLET ORAL at 10:59

## 2024-11-29 RX ADMIN — GABAPENTIN 100 MG: 100 CAPSULE ORAL at 20:29

## 2024-11-29 RX ADMIN — ATORVASTATIN CALCIUM 20 MG: 20 TABLET, FILM COATED ORAL at 20:29

## 2024-11-29 RX ADMIN — CILOSTAZOL 50 MG: 50 TABLET ORAL at 09:18

## 2024-11-29 RX ADMIN — ENOXAPARIN SODIUM 40 MG: 100 INJECTION SUBCUTANEOUS at 09:18

## 2024-11-29 RX ADMIN — SODIUM CHLORIDE, PRESERVATIVE FREE 10 ML: 5 INJECTION INTRAVENOUS at 20:37

## 2024-11-29 RX ADMIN — PANTOPRAZOLE SODIUM 40 MG: 40 TABLET, DELAYED RELEASE ORAL at 05:11

## 2024-11-29 RX ADMIN — POTASSIUM CHLORIDE 40 MEQ: 1500 TABLET, EXTENDED RELEASE ORAL at 11:33

## 2024-11-29 RX ADMIN — SODIUM CHLORIDE, PRESERVATIVE FREE 10 ML: 5 INJECTION INTRAVENOUS at 09:19

## 2024-11-29 RX ADMIN — CLOPIDOGREL BISULFATE 75 MG: 75 TABLET ORAL at 09:18

## 2024-11-29 RX ADMIN — CILOSTAZOL 50 MG: 50 TABLET ORAL at 20:29

## 2024-11-29 RX ADMIN — OXYCODONE AND ACETAMINOPHEN 1 TABLET: 10; 325 TABLET ORAL at 10:59

## 2024-11-29 RX ADMIN — ALPRAZOLAM 2 MG: 0.5 TABLET ORAL at 20:29

## 2024-11-29 RX ADMIN — ALPRAZOLAM 2 MG: 0.5 TABLET ORAL at 10:59

## 2024-11-29 RX ADMIN — MIDODRINE HYDROCHLORIDE 5 MG: 5 TABLET ORAL at 09:18

## 2024-11-29 RX ADMIN — GABAPENTIN 100 MG: 100 CAPSULE ORAL at 09:18

## 2024-11-29 RX ADMIN — OXYCODONE AND ACETAMINOPHEN 1 TABLET: 10; 325 TABLET ORAL at 05:38

## 2024-11-29 RX ADMIN — SERTRALINE HYDROCHLORIDE 200 MG: 50 TABLET ORAL at 09:18

## 2024-11-29 RX ADMIN — OXYCODONE AND ACETAMINOPHEN 1 TABLET: 10; 325 TABLET ORAL at 00:01

## 2024-11-29 RX ADMIN — CYCLOBENZAPRINE 10 MG: 10 TABLET, FILM COATED ORAL at 20:29

## 2024-11-29 RX ADMIN — OXYCODONE AND ACETAMINOPHEN 1 TABLET: 10; 325 TABLET ORAL at 17:05

## 2024-11-29 ASSESSMENT — PAIN SCALES - GENERAL
PAINLEVEL_OUTOF10: 7
PAINLEVEL_OUTOF10: 8
PAINLEVEL_OUTOF10: 2
PAINLEVEL_OUTOF10: 2
PAINLEVEL_OUTOF10: 0
PAINLEVEL_OUTOF10: 2
PAINLEVEL_OUTOF10: 2
PAINLEVEL_OUTOF10: 7

## 2024-11-29 ASSESSMENT — PAIN DESCRIPTION - DESCRIPTORS
DESCRIPTORS: ACHING
DESCRIPTORS: ACHING
DESCRIPTORS: ACHING;BURNING
DESCRIPTORS: ACHING

## 2024-11-29 ASSESSMENT — PAIN DESCRIPTION - LOCATION
LOCATION: BACK
LOCATION: BACK

## 2024-11-29 ASSESSMENT — PAIN DESCRIPTION - ORIENTATION: ORIENTATION: RIGHT;LEFT

## 2024-11-29 NOTE — CARE COORDINATION
Patients referral has been sent to [facility] inpatient rehab on 11/27/2024.  Per [facility] liaison, they are awaiting OT evaluation to review with physician prior to starting the insurance authorization.  OT orders are in.

## 2024-11-30 LAB
ALBUMIN SERPL-MCNC: 2.4 G/DL (ref 3.5–5)
ANION GAP SERPL CALC-SCNC: 4 MMOL/L (ref 2–12)
BACTERIA SPEC CULT: NORMAL
BACTERIA SPEC CULT: NORMAL
BUN SERPL-MCNC: 17 MG/DL (ref 6–20)
BUN/CREAT SERPL: 22 (ref 12–20)
CA-I BLD-MCNC: 9.1 MG/DL (ref 8.5–10.1)
CHLORIDE SERPL-SCNC: 108 MMOL/L (ref 97–108)
CO2 SERPL-SCNC: 30 MMOL/L (ref 21–32)
CREAT SERPL-MCNC: 0.77 MG/DL (ref 0.55–1.02)
GLUCOSE SERPL-MCNC: 96 MG/DL (ref 65–100)
Lab: NORMAL
Lab: NORMAL
PHOSPHATE SERPL-MCNC: 3.2 MG/DL (ref 2.6–4.7)
POTASSIUM SERPL-SCNC: 3.6 MMOL/L (ref 3.5–5.1)
SODIUM SERPL-SCNC: 142 MMOL/L (ref 136–145)

## 2024-11-30 PROCEDURE — 6370000000 HC RX 637 (ALT 250 FOR IP): Performed by: INTERNAL MEDICINE

## 2024-11-30 PROCEDURE — 80069 RENAL FUNCTION PANEL: CPT

## 2024-11-30 PROCEDURE — 6360000002 HC RX W HCPCS

## 2024-11-30 PROCEDURE — 97535 SELF CARE MNGMENT TRAINING: CPT

## 2024-11-30 PROCEDURE — 1100000000 HC RM PRIVATE

## 2024-11-30 PROCEDURE — 6370000000 HC RX 637 (ALT 250 FOR IP): Performed by: PHYSICIAN ASSISTANT

## 2024-11-30 PROCEDURE — 2580000003 HC RX 258: Performed by: FAMILY MEDICINE

## 2024-11-30 PROCEDURE — 97530 THERAPEUTIC ACTIVITIES: CPT

## 2024-11-30 PROCEDURE — 6370000000 HC RX 637 (ALT 250 FOR IP): Performed by: FAMILY MEDICINE

## 2024-11-30 PROCEDURE — 36415 COLL VENOUS BLD VENIPUNCTURE: CPT

## 2024-11-30 RX ORDER — POTASSIUM CHLORIDE 1500 MG/1
40 TABLET, EXTENDED RELEASE ORAL DAILY
Status: DISCONTINUED | OUTPATIENT
Start: 2024-12-01 | End: 2024-11-30

## 2024-11-30 RX ORDER — POTASSIUM CHLORIDE 1500 MG/1
40 TABLET, EXTENDED RELEASE ORAL ONCE
Status: DISCONTINUED | OUTPATIENT
Start: 2024-11-30 | End: 2024-11-30

## 2024-11-30 RX ADMIN — OXYCODONE AND ACETAMINOPHEN 1 TABLET: 10; 325 TABLET ORAL at 00:43

## 2024-11-30 RX ADMIN — SODIUM CHLORIDE, PRESERVATIVE FREE 10 ML: 5 INJECTION INTRAVENOUS at 09:44

## 2024-11-30 RX ADMIN — PANTOPRAZOLE SODIUM 40 MG: 40 TABLET, DELAYED RELEASE ORAL at 05:29

## 2024-11-30 RX ADMIN — BUMETANIDE 1 MG: 1 TABLET ORAL at 09:43

## 2024-11-30 RX ADMIN — SERTRALINE HYDROCHLORIDE 200 MG: 50 TABLET ORAL at 09:43

## 2024-11-30 RX ADMIN — MIDODRINE HYDROCHLORIDE 5 MG: 5 TABLET ORAL at 09:43

## 2024-11-30 RX ADMIN — CILOSTAZOL 50 MG: 50 TABLET ORAL at 21:15

## 2024-11-30 RX ADMIN — OXYCODONE AND ACETAMINOPHEN 1 TABLET: 10; 325 TABLET ORAL at 15:47

## 2024-11-30 RX ADMIN — SODIUM CHLORIDE, PRESERVATIVE FREE 10 ML: 5 INJECTION INTRAVENOUS at 21:22

## 2024-11-30 RX ADMIN — GABAPENTIN 100 MG: 100 CAPSULE ORAL at 21:15

## 2024-11-30 RX ADMIN — FERROUS SULFATE TAB 325 MG (65 MG ELEMENTAL FE) 325 MG: 325 (65 FE) TAB at 09:43

## 2024-11-30 RX ADMIN — ALPRAZOLAM 2 MG: 0.5 TABLET ORAL at 21:15

## 2024-11-30 RX ADMIN — GABAPENTIN 100 MG: 100 CAPSULE ORAL at 09:43

## 2024-11-30 RX ADMIN — OXYCODONE AND ACETAMINOPHEN 1 TABLET: 10; 325 TABLET ORAL at 09:46

## 2024-11-30 RX ADMIN — ATORVASTATIN CALCIUM 20 MG: 20 TABLET, FILM COATED ORAL at 21:15

## 2024-11-30 RX ADMIN — ENOXAPARIN SODIUM 40 MG: 100 INJECTION SUBCUTANEOUS at 09:42

## 2024-11-30 RX ADMIN — CLOPIDOGREL BISULFATE 75 MG: 75 TABLET ORAL at 09:43

## 2024-11-30 RX ADMIN — CILOSTAZOL 50 MG: 50 TABLET ORAL at 09:43

## 2024-11-30 ASSESSMENT — PAIN SCALES - GENERAL
PAINLEVEL_OUTOF10: 2
PAINLEVEL_OUTOF10: 7
PAINLEVEL_OUTOF10: 3

## 2024-11-30 ASSESSMENT — PAIN DESCRIPTION - LOCATION
LOCATION: BACK
LOCATION: BACK

## 2024-12-01 PROCEDURE — 6370000000 HC RX 637 (ALT 250 FOR IP): Performed by: INTERNAL MEDICINE

## 2024-12-01 PROCEDURE — 2580000003 HC RX 258: Performed by: FAMILY MEDICINE

## 2024-12-01 PROCEDURE — 6360000002 HC RX W HCPCS

## 2024-12-01 PROCEDURE — 1100000000 HC RM PRIVATE

## 2024-12-01 PROCEDURE — 6370000000 HC RX 637 (ALT 250 FOR IP): Performed by: PHYSICIAN ASSISTANT

## 2024-12-01 PROCEDURE — 6370000000 HC RX 637 (ALT 250 FOR IP): Performed by: FAMILY MEDICINE

## 2024-12-01 RX ADMIN — ALPRAZOLAM 2 MG: 0.5 TABLET ORAL at 18:37

## 2024-12-01 RX ADMIN — CLOPIDOGREL BISULFATE 75 MG: 75 TABLET ORAL at 11:15

## 2024-12-01 RX ADMIN — FERROUS SULFATE TAB 325 MG (65 MG ELEMENTAL FE) 325 MG: 325 (65 FE) TAB at 11:15

## 2024-12-01 RX ADMIN — GABAPENTIN 100 MG: 100 CAPSULE ORAL at 11:15

## 2024-12-01 RX ADMIN — OXYCODONE AND ACETAMINOPHEN 1 TABLET: 10; 325 TABLET ORAL at 02:18

## 2024-12-01 RX ADMIN — CILOSTAZOL 50 MG: 50 TABLET ORAL at 11:15

## 2024-12-01 RX ADMIN — PANTOPRAZOLE SODIUM 40 MG: 40 TABLET, DELAYED RELEASE ORAL at 06:45

## 2024-12-01 RX ADMIN — ENOXAPARIN SODIUM 40 MG: 100 INJECTION SUBCUTANEOUS at 11:14

## 2024-12-01 RX ADMIN — GABAPENTIN 100 MG: 100 CAPSULE ORAL at 20:34

## 2024-12-01 RX ADMIN — SODIUM CHLORIDE, PRESERVATIVE FREE 10 ML: 5 INJECTION INTRAVENOUS at 20:34

## 2024-12-01 RX ADMIN — SERTRALINE HYDROCHLORIDE 200 MG: 50 TABLET ORAL at 11:15

## 2024-12-01 RX ADMIN — CILOSTAZOL 50 MG: 50 TABLET ORAL at 20:34

## 2024-12-01 RX ADMIN — OXYCODONE AND ACETAMINOPHEN 1 TABLET: 10; 325 TABLET ORAL at 18:42

## 2024-12-01 RX ADMIN — OXYCODONE AND ACETAMINOPHEN 1 TABLET: 10; 325 TABLET ORAL at 11:15

## 2024-12-01 RX ADMIN — ATORVASTATIN CALCIUM 20 MG: 20 TABLET, FILM COATED ORAL at 20:34

## 2024-12-01 RX ADMIN — BUMETANIDE 1 MG: 1 TABLET ORAL at 11:15

## 2024-12-01 ASSESSMENT — PAIN SCALES - GENERAL
PAINLEVEL_OUTOF10: 7
PAINLEVEL_OUTOF10: 4
PAINLEVEL_OUTOF10: 5
PAINLEVEL_OUTOF10: 2
PAINLEVEL_OUTOF10: 4

## 2024-12-01 ASSESSMENT — PAIN SCALES - WONG BAKER: WONGBAKER_NUMERICALRESPONSE: NO HURT

## 2024-12-01 ASSESSMENT — PAIN - FUNCTIONAL ASSESSMENT: PAIN_FUNCTIONAL_ASSESSMENT: ACTIVITIES ARE NOT PREVENTED

## 2024-12-01 ASSESSMENT — PAIN DESCRIPTION - DESCRIPTORS: DESCRIPTORS: ACHING

## 2024-12-01 ASSESSMENT — PAIN DESCRIPTION - LOCATION: LOCATION: BACK

## 2024-12-01 ASSESSMENT — PAIN DESCRIPTION - ORIENTATION: ORIENTATION: POSTERIOR

## 2024-12-02 LAB
ANION GAP SERPL CALC-SCNC: 4 MMOL/L (ref 2–12)
BUN SERPL-MCNC: 12 MG/DL (ref 6–20)
BUN/CREAT SERPL: 19 (ref 12–20)
CA-I BLD-MCNC: 9 MG/DL (ref 8.5–10.1)
CHLORIDE SERPL-SCNC: 106 MMOL/L (ref 97–108)
CO2 SERPL-SCNC: 27 MMOL/L (ref 21–32)
CREAT SERPL-MCNC: 0.64 MG/DL (ref 0.55–1.02)
GLUCOSE SERPL-MCNC: 107 MG/DL (ref 65–100)
POTASSIUM SERPL-SCNC: 3.9 MMOL/L (ref 3.5–5.1)
SODIUM SERPL-SCNC: 137 MMOL/L (ref 136–145)

## 2024-12-02 PROCEDURE — 6370000000 HC RX 637 (ALT 250 FOR IP): Performed by: FAMILY MEDICINE

## 2024-12-02 PROCEDURE — 6370000000 HC RX 637 (ALT 250 FOR IP): Performed by: INTERNAL MEDICINE

## 2024-12-02 PROCEDURE — 36415 COLL VENOUS BLD VENIPUNCTURE: CPT

## 2024-12-02 PROCEDURE — 80048 BASIC METABOLIC PNL TOTAL CA: CPT

## 2024-12-02 PROCEDURE — 6370000000 HC RX 637 (ALT 250 FOR IP): Performed by: PHYSICIAN ASSISTANT

## 2024-12-02 PROCEDURE — 2580000003 HC RX 258: Performed by: FAMILY MEDICINE

## 2024-12-02 PROCEDURE — 6360000002 HC RX W HCPCS

## 2024-12-02 PROCEDURE — 1100000000 HC RM PRIVATE

## 2024-12-02 RX ADMIN — CLOPIDOGREL BISULFATE 75 MG: 75 TABLET ORAL at 09:52

## 2024-12-02 RX ADMIN — FERROUS SULFATE TAB 325 MG (65 MG ELEMENTAL FE) 325 MG: 325 (65 FE) TAB at 09:52

## 2024-12-02 RX ADMIN — OXYCODONE AND ACETAMINOPHEN 1 TABLET: 10; 325 TABLET ORAL at 23:47

## 2024-12-02 RX ADMIN — BUMETANIDE 1 MG: 1 TABLET ORAL at 09:52

## 2024-12-02 RX ADMIN — ACETAMINOPHEN 650 MG: 325 TABLET ORAL at 20:34

## 2024-12-02 RX ADMIN — SERTRALINE HYDROCHLORIDE 200 MG: 50 TABLET ORAL at 09:52

## 2024-12-02 RX ADMIN — ENOXAPARIN SODIUM 40 MG: 100 INJECTION SUBCUTANEOUS at 09:52

## 2024-12-02 RX ADMIN — ATORVASTATIN CALCIUM 20 MG: 20 TABLET, FILM COATED ORAL at 20:35

## 2024-12-02 RX ADMIN — ALPRAZOLAM 2 MG: 0.5 TABLET ORAL at 23:47

## 2024-12-02 RX ADMIN — CILOSTAZOL 50 MG: 50 TABLET ORAL at 09:52

## 2024-12-02 RX ADMIN — GABAPENTIN 100 MG: 100 CAPSULE ORAL at 20:35

## 2024-12-02 RX ADMIN — ALPRAZOLAM 2 MG: 0.5 TABLET ORAL at 14:39

## 2024-12-02 RX ADMIN — GABAPENTIN 100 MG: 100 CAPSULE ORAL at 09:52

## 2024-12-02 RX ADMIN — SODIUM CHLORIDE, PRESERVATIVE FREE 10 ML: 5 INJECTION INTRAVENOUS at 09:53

## 2024-12-02 RX ADMIN — CILOSTAZOL 50 MG: 50 TABLET ORAL at 20:35

## 2024-12-02 RX ADMIN — PANTOPRAZOLE SODIUM 40 MG: 40 TABLET, DELAYED RELEASE ORAL at 06:42

## 2024-12-02 RX ADMIN — OXYCODONE AND ACETAMINOPHEN 1 TABLET: 10; 325 TABLET ORAL at 14:38

## 2024-12-02 RX ADMIN — OXYCODONE AND ACETAMINOPHEN 1 TABLET: 10; 325 TABLET ORAL at 06:42

## 2024-12-02 RX ADMIN — SODIUM CHLORIDE, PRESERVATIVE FREE 10 ML: 5 INJECTION INTRAVENOUS at 20:34

## 2024-12-02 ASSESSMENT — PAIN DESCRIPTION - DESCRIPTORS
DESCRIPTORS: ACHING

## 2024-12-02 ASSESSMENT — ENCOUNTER SYMPTOMS
GASTROINTESTINAL NEGATIVE: 1
SHORTNESS OF BREATH: 1

## 2024-12-02 ASSESSMENT — PAIN SCALES - GENERAL
PAINLEVEL_OUTOF10: 4
PAINLEVEL_OUTOF10: 3
PAINLEVEL_OUTOF10: 0
PAINLEVEL_OUTOF10: 7
PAINLEVEL_OUTOF10: 2

## 2024-12-02 ASSESSMENT — PAIN DESCRIPTION - LOCATION
LOCATION: BACK

## 2024-12-02 ASSESSMENT — PAIN SCALES - WONG BAKER: WONGBAKER_NUMERICALRESPONSE: NO HURT

## 2024-12-02 ASSESSMENT — PAIN - FUNCTIONAL ASSESSMENT
PAIN_FUNCTIONAL_ASSESSMENT: ACTIVITIES ARE NOT PREVENTED
PAIN_FUNCTIONAL_ASSESSMENT: PREVENTS OR INTERFERES SOME ACTIVE ACTIVITIES AND ADLS
PAIN_FUNCTIONAL_ASSESSMENT: PREVENTS OR INTERFERES SOME ACTIVE ACTIVITIES AND ADLS

## 2024-12-02 ASSESSMENT — PAIN DESCRIPTION - ORIENTATION
ORIENTATION: POSTERIOR

## 2024-12-02 NOTE — CARE COORDINATION
10:30AM [id number].  Pending acceptance for IRF (inpatient acute rehab) @ [facility] ([facility]).    REGINALDO Acuna      09:47AM Inbound call from [facility] ([facility]).  Admissions, needed to call the spouse and talk to him.  Still pending acceptance; and insurance will require auth.        Accepted w/[facility] , as she was open w/this agency prior to inpatient admission.       REGINALDO Acuna

## 2024-12-03 PROCEDURE — 6370000000 HC RX 637 (ALT 250 FOR IP): Performed by: PHYSICIAN ASSISTANT

## 2024-12-03 PROCEDURE — 6370000000 HC RX 637 (ALT 250 FOR IP): Performed by: INTERNAL MEDICINE

## 2024-12-03 PROCEDURE — 6360000002 HC RX W HCPCS

## 2024-12-03 PROCEDURE — 97535 SELF CARE MNGMENT TRAINING: CPT

## 2024-12-03 PROCEDURE — 1100000000 HC RM PRIVATE

## 2024-12-03 PROCEDURE — 97530 THERAPEUTIC ACTIVITIES: CPT

## 2024-12-03 PROCEDURE — 6360000002 HC RX W HCPCS: Performed by: FAMILY MEDICINE

## 2024-12-03 PROCEDURE — 6370000000 HC RX 637 (ALT 250 FOR IP): Performed by: FAMILY MEDICINE

## 2024-12-03 PROCEDURE — 2580000003 HC RX 258: Performed by: FAMILY MEDICINE

## 2024-12-03 RX ADMIN — SERTRALINE HYDROCHLORIDE 200 MG: 50 TABLET ORAL at 09:15

## 2024-12-03 RX ADMIN — CYCLOBENZAPRINE 10 MG: 10 TABLET, FILM COATED ORAL at 13:45

## 2024-12-03 RX ADMIN — FERROUS SULFATE TAB 325 MG (65 MG ELEMENTAL FE) 325 MG: 325 (65 FE) TAB at 09:15

## 2024-12-03 RX ADMIN — CILOSTAZOL 50 MG: 50 TABLET ORAL at 21:46

## 2024-12-03 RX ADMIN — OXYCODONE AND ACETAMINOPHEN 1 TABLET: 10; 325 TABLET ORAL at 09:17

## 2024-12-03 RX ADMIN — PANTOPRAZOLE SODIUM 40 MG: 40 TABLET, DELAYED RELEASE ORAL at 07:50

## 2024-12-03 RX ADMIN — ONDANSETRON 4 MG: 2 INJECTION INTRAMUSCULAR; INTRAVENOUS at 10:38

## 2024-12-03 RX ADMIN — GABAPENTIN 100 MG: 100 CAPSULE ORAL at 09:16

## 2024-12-03 RX ADMIN — ALPRAZOLAM 2 MG: 0.5 TABLET ORAL at 13:50

## 2024-12-03 RX ADMIN — MIDODRINE HYDROCHLORIDE 5 MG: 5 TABLET ORAL at 21:55

## 2024-12-03 RX ADMIN — CILOSTAZOL 50 MG: 50 TABLET ORAL at 09:16

## 2024-12-03 RX ADMIN — SODIUM CHLORIDE, PRESERVATIVE FREE 10 ML: 5 INJECTION INTRAVENOUS at 21:47

## 2024-12-03 RX ADMIN — GABAPENTIN 100 MG: 100 CAPSULE ORAL at 21:47

## 2024-12-03 RX ADMIN — BUMETANIDE 1 MG: 1 TABLET ORAL at 09:15

## 2024-12-03 RX ADMIN — ENOXAPARIN SODIUM 40 MG: 100 INJECTION SUBCUTANEOUS at 09:15

## 2024-12-03 RX ADMIN — CLOPIDOGREL BISULFATE 75 MG: 75 TABLET ORAL at 09:15

## 2024-12-03 RX ADMIN — SODIUM CHLORIDE, PRESERVATIVE FREE 10 ML: 5 INJECTION INTRAVENOUS at 09:15

## 2024-12-03 RX ADMIN — ATORVASTATIN CALCIUM 20 MG: 20 TABLET, FILM COATED ORAL at 21:46

## 2024-12-03 RX ADMIN — OXYCODONE AND ACETAMINOPHEN 1 TABLET: 10; 325 TABLET ORAL at 16:01

## 2024-12-03 RX ADMIN — OXYCODONE AND ACETAMINOPHEN 1 TABLET: 10; 325 TABLET ORAL at 21:55

## 2024-12-03 ASSESSMENT — PAIN DESCRIPTION - DESCRIPTORS
DESCRIPTORS: ACHING
DESCRIPTORS: SHARP
DESCRIPTORS: ACHING

## 2024-12-03 ASSESSMENT — PAIN SCALES - GENERAL
PAINLEVEL_OUTOF10: 3
PAINLEVEL_OUTOF10: 8
PAINLEVEL_OUTOF10: 5
PAINLEVEL_OUTOF10: 7
PAINLEVEL_OUTOF10: 9

## 2024-12-03 ASSESSMENT — ENCOUNTER SYMPTOMS
GASTROINTESTINAL NEGATIVE: 1
SHORTNESS OF BREATH: 1

## 2024-12-03 ASSESSMENT — PAIN DESCRIPTION - PAIN TYPE: TYPE: CHRONIC PAIN

## 2024-12-03 ASSESSMENT — PAIN DESCRIPTION - ORIENTATION: ORIENTATION: POSTERIOR;LOWER;MID

## 2024-12-03 ASSESSMENT — PAIN DESCRIPTION - LOCATION
LOCATION: BACK
LOCATION: BUTTOCKS
LOCATION: BACK

## 2024-12-03 ASSESSMENT — PAIN DESCRIPTION - FREQUENCY: FREQUENCY: CONTINUOUS

## 2024-12-03 ASSESSMENT — PAIN - FUNCTIONAL ASSESSMENT: PAIN_FUNCTIONAL_ASSESSMENT: PREVENTS OR INTERFERES SOME ACTIVE ACTIVITIES AND ADLS

## 2024-12-03 NOTE — CARE COORDINATION
Inbound call from [facility] ([facility]/admissions).  Informed patient has been accepted; and insurance auth will be started this morning.     Accepted w/[facility], as she was open w/this agency prior to inpatient admission.       REGINALDO Acuna

## 2024-12-04 PROCEDURE — 6370000000 HC RX 637 (ALT 250 FOR IP): Performed by: FAMILY MEDICINE

## 2024-12-04 PROCEDURE — 97535 SELF CARE MNGMENT TRAINING: CPT

## 2024-12-04 PROCEDURE — 97530 THERAPEUTIC ACTIVITIES: CPT

## 2024-12-04 PROCEDURE — 6370000000 HC RX 637 (ALT 250 FOR IP): Performed by: INTERNAL MEDICINE

## 2024-12-04 PROCEDURE — 6370000000 HC RX 637 (ALT 250 FOR IP): Performed by: PHYSICIAN ASSISTANT

## 2024-12-04 PROCEDURE — 6360000002 HC RX W HCPCS

## 2024-12-04 PROCEDURE — 2580000003 HC RX 258: Performed by: FAMILY MEDICINE

## 2024-12-04 PROCEDURE — 1100000000 HC RM PRIVATE

## 2024-12-04 RX ADMIN — CILOSTAZOL 50 MG: 50 TABLET ORAL at 08:52

## 2024-12-04 RX ADMIN — ALPRAZOLAM 2 MG: 0.5 TABLET ORAL at 12:41

## 2024-12-04 RX ADMIN — GABAPENTIN 100 MG: 100 CAPSULE ORAL at 08:52

## 2024-12-04 RX ADMIN — CILOSTAZOL 50 MG: 50 TABLET ORAL at 20:00

## 2024-12-04 RX ADMIN — OXYCODONE AND ACETAMINOPHEN 1 TABLET: 10; 325 TABLET ORAL at 15:37

## 2024-12-04 RX ADMIN — OXYCODONE AND ACETAMINOPHEN 1 TABLET: 10; 325 TABLET ORAL at 23:55

## 2024-12-04 RX ADMIN — FERROUS SULFATE TAB 325 MG (65 MG ELEMENTAL FE) 325 MG: 325 (65 FE) TAB at 08:52

## 2024-12-04 RX ADMIN — SODIUM CHLORIDE, PRESERVATIVE FREE 10 ML: 5 INJECTION INTRAVENOUS at 20:01

## 2024-12-04 RX ADMIN — BUMETANIDE 1 MG: 1 TABLET ORAL at 08:52

## 2024-12-04 RX ADMIN — SERTRALINE HYDROCHLORIDE 200 MG: 50 TABLET ORAL at 08:52

## 2024-12-04 RX ADMIN — CLOPIDOGREL BISULFATE 75 MG: 75 TABLET ORAL at 08:52

## 2024-12-04 RX ADMIN — ATORVASTATIN CALCIUM 20 MG: 20 TABLET, FILM COATED ORAL at 20:00

## 2024-12-04 RX ADMIN — SODIUM CHLORIDE, PRESERVATIVE FREE 10 ML: 5 INJECTION INTRAVENOUS at 08:52

## 2024-12-04 RX ADMIN — PANTOPRAZOLE SODIUM 40 MG: 40 TABLET, DELAYED RELEASE ORAL at 07:39

## 2024-12-04 RX ADMIN — ENOXAPARIN SODIUM 40 MG: 100 INJECTION SUBCUTANEOUS at 08:52

## 2024-12-04 RX ADMIN — ALPRAZOLAM 2 MG: 0.5 TABLET ORAL at 20:00

## 2024-12-04 RX ADMIN — OXYCODONE AND ACETAMINOPHEN 1 TABLET: 10; 325 TABLET ORAL at 08:30

## 2024-12-04 RX ADMIN — GABAPENTIN 100 MG: 100 CAPSULE ORAL at 20:00

## 2024-12-04 ASSESSMENT — PAIN SCALES - GENERAL
PAINLEVEL_OUTOF10: 7
PAINLEVEL_OUTOF10: 2
PAINLEVEL_OUTOF10: 9
PAINLEVEL_OUTOF10: 7

## 2024-12-04 ASSESSMENT — ENCOUNTER SYMPTOMS
SHORTNESS OF BREATH: 1
GASTROINTESTINAL NEGATIVE: 1

## 2024-12-04 ASSESSMENT — PAIN DESCRIPTION - LOCATION
LOCATION: BACK

## 2024-12-04 ASSESSMENT — PAIN DESCRIPTION - DESCRIPTORS
DESCRIPTORS: SHOOTING
DESCRIPTORS: ACHING

## 2024-12-04 ASSESSMENT — PAIN DESCRIPTION - ORIENTATION: ORIENTATION: LOWER;POSTERIOR

## 2024-12-04 NOTE — CARE COORDINATION
Insurance auth still pending for [facility] ([facility]).        REGINALDO Acuna

## 2024-12-05 PROCEDURE — 6370000000 HC RX 637 (ALT 250 FOR IP): Performed by: PHYSICIAN ASSISTANT

## 2024-12-05 PROCEDURE — 2580000003 HC RX 258: Performed by: FAMILY MEDICINE

## 2024-12-05 PROCEDURE — 6370000000 HC RX 637 (ALT 250 FOR IP): Performed by: FAMILY MEDICINE

## 2024-12-05 PROCEDURE — 6360000002 HC RX W HCPCS

## 2024-12-05 PROCEDURE — 1100000000 HC RM PRIVATE

## 2024-12-05 PROCEDURE — 97530 THERAPEUTIC ACTIVITIES: CPT

## 2024-12-05 PROCEDURE — 6370000000 HC RX 637 (ALT 250 FOR IP): Performed by: INTERNAL MEDICINE

## 2024-12-05 PROCEDURE — 97535 SELF CARE MNGMENT TRAINING: CPT

## 2024-12-05 RX ADMIN — POLYETHYLENE GLYCOL 3350 17 G: 17 POWDER, FOR SOLUTION ORAL at 22:10

## 2024-12-05 RX ADMIN — SERTRALINE HYDROCHLORIDE 200 MG: 50 TABLET ORAL at 09:29

## 2024-12-05 RX ADMIN — OXYCODONE AND ACETAMINOPHEN 1 TABLET: 10; 325 TABLET ORAL at 09:29

## 2024-12-05 RX ADMIN — CLOPIDOGREL BISULFATE 75 MG: 75 TABLET ORAL at 09:29

## 2024-12-05 RX ADMIN — SODIUM CHLORIDE, PRESERVATIVE FREE 10 ML: 5 INJECTION INTRAVENOUS at 20:44

## 2024-12-05 RX ADMIN — ALPRAZOLAM 2 MG: 0.5 TABLET ORAL at 20:43

## 2024-12-05 RX ADMIN — SODIUM CHLORIDE, PRESERVATIVE FREE 10 ML: 5 INJECTION INTRAVENOUS at 09:30

## 2024-12-05 RX ADMIN — ENOXAPARIN SODIUM 40 MG: 100 INJECTION SUBCUTANEOUS at 09:28

## 2024-12-05 RX ADMIN — ALPRAZOLAM 2 MG: 0.5 TABLET ORAL at 12:56

## 2024-12-05 RX ADMIN — ATORVASTATIN CALCIUM 20 MG: 20 TABLET, FILM COATED ORAL at 20:43

## 2024-12-05 RX ADMIN — FERROUS SULFATE TAB 325 MG (65 MG ELEMENTAL FE) 325 MG: 325 (65 FE) TAB at 09:29

## 2024-12-05 RX ADMIN — OXYCODONE AND ACETAMINOPHEN 1 TABLET: 10; 325 TABLET ORAL at 22:07

## 2024-12-05 RX ADMIN — GABAPENTIN 100 MG: 100 CAPSULE ORAL at 09:29

## 2024-12-05 RX ADMIN — PANTOPRAZOLE SODIUM 40 MG: 40 TABLET, DELAYED RELEASE ORAL at 07:11

## 2024-12-05 RX ADMIN — GABAPENTIN 100 MG: 100 CAPSULE ORAL at 20:43

## 2024-12-05 RX ADMIN — CILOSTAZOL 50 MG: 50 TABLET ORAL at 09:29

## 2024-12-05 RX ADMIN — OXYCODONE AND ACETAMINOPHEN 1 TABLET: 10; 325 TABLET ORAL at 15:39

## 2024-12-05 RX ADMIN — BUMETANIDE 1 MG: 1 TABLET ORAL at 09:29

## 2024-12-05 RX ADMIN — CILOSTAZOL 50 MG: 50 TABLET ORAL at 20:44

## 2024-12-05 ASSESSMENT — PAIN DESCRIPTION - DESCRIPTORS
DESCRIPTORS: ACHING;THROBBING
DESCRIPTORS: ACHING;SHARP
DESCRIPTORS: ACHING

## 2024-12-05 ASSESSMENT — PAIN SCALES - GENERAL
PAINLEVEL_OUTOF10: 0
PAINLEVEL_OUTOF10: 0
PAINLEVEL_OUTOF10: 6
PAINLEVEL_OUTOF10: 8
PAINLEVEL_OUTOF10: 0
PAINLEVEL_OUTOF10: 8

## 2024-12-05 ASSESSMENT — PAIN DESCRIPTION - LOCATION
LOCATION: BACK

## 2024-12-05 ASSESSMENT — PAIN - FUNCTIONAL ASSESSMENT: PAIN_FUNCTIONAL_ASSESSMENT: PREVENTS OR INTERFERES SOME ACTIVE ACTIVITIES AND ADLS

## 2024-12-05 ASSESSMENT — PAIN DESCRIPTION - ORIENTATION: ORIENTATION: POSTERIOR;LOWER

## 2024-12-05 ASSESSMENT — PAIN DESCRIPTION - FREQUENCY: FREQUENCY: INTERMITTENT

## 2024-12-05 ASSESSMENT — PAIN DESCRIPTION - PAIN TYPE: TYPE: CHRONIC PAIN

## 2024-12-05 NOTE — CARE COORDINATION
Auth recsherron'd for [facility] ([facility]).  Patient is on the admission list for tomorrow.  Requesting transport @ 14:00PM.       REGINALDO Acuna

## 2024-12-05 NOTE — PLAN OF CARE
PHYSICAL THERAPY TREATMENT     Patient: Francisca Portillo (59 y.o. female)  Date: 12/5/2024  Diagnosis: Acute kidney injury (HCC) [N17.9]  Acute renal failure, unspecified acute renal failure type (HCC) [N17.9]  Community acquired pneumonia, unspecified laterality [J18.9] Acute kidney injury (HCC)      Precautions: Bed Alarm, Fall Risk                      Recommendations for nursing mobility: Encourage HEP in prep for ADLs/mobility; see handout for details, Frequent repositioning to prevent skin breakdown, Use of bed/chair alarm for safety, Maxi Move for bed to chair transfers, and Assist x2    In place during session: EKG/telemetry   Chart, physical therapy assessment, plan of care and goals were reviewed.  ASSESSMENT  Patient continues with skilled PT services and is progressing towards goals. Pt received semi supine upon PT/OT arrival, agreeable to session.  (See below for objective details and assist levels). Pt. Continues to require extensive assist for bed mobility and Tfs. Pt. Only able to stand approx 10 seconds before R knee buckling. Pt. Unable to take any side steps due to poor balance and difficulty weight bearing through LE s. Pt. Required assistance with LE exercises in bed.     Overall pt tolerated session fair today with Mobility.  Will continue to benefit from skilled PT services, and will continue to progress as tolerated. Current PT DC recommendation High intensity and comprehensive skilled physical therapy in a multidisciplinary setting as patient is working towards tolerating up to 3 hours of therapy/day x 5-7 days/week once medically appropriate.     Start of Session End of Session   SPO2 (%) 98 98   Heart Rate (BPM) 76 80     GOALS:    Problem: Physical Therapy - Adult  Goal: By Discharge: Performs mobility at highest level of function for planned discharge setting.  See evaluation for individualized goals.  Description: FUNCTIONAL STATUS PRIOR TO ADMISSION: Patient was modified 
  Problem: Discharge Planning  Goal: Discharge to home or other facility with appropriate resources  11/26/2024 2313 by Berna Camara RN  Outcome: Progressing  11/26/2024 0959 by Jessica Unger RN  Outcome: Progressing     Problem: Skin/Tissue Integrity  Goal: Absence of new skin breakdown  Description: 1.  Monitor for areas of redness and/or skin breakdown  2.  Assess vascular access sites hourly  3.  Every 4-6 hours minimum:  Change oxygen saturation probe site  4.  Every 4-6 hours:  If on nasal continuous positive airway pressure, respiratory therapy assess nares and determine need for appliance change or resting period.  11/26/2024 2313 by Berna Camara RN  Outcome: Progressing  11/26/2024 0959 by Jessica Unger RN  Outcome: Progressing     Problem: Safety - Adult  Goal: Free from fall injury  11/26/2024 2313 by Berna Camara RN  Outcome: Progressing  11/26/2024 0959 by Jessica Unger RN  Outcome: Progressing     Problem: ABCDS Injury Assessment  Goal: Absence of physical injury  11/26/2024 2313 by Berna Camara RN  Outcome: Progressing  11/26/2024 0959 by Jessica Unger RN  Outcome: Progressing     Problem: Pain  Goal: Verbalizes/displays adequate comfort level or baseline comfort level  11/26/2024 2313 by Berna Camara RN  Outcome: Progressing  11/26/2024 0959 by Jessica Unger RN  Outcome: Progressing     Problem: Neurosensory - Adult  Goal: Achieves stable or improved neurological status  11/26/2024 2313 by Berna Camara RN  Outcome: Progressing  11/26/2024 0959 by Jessica Unger RN  Outcome: Progressing  Goal: Absence of seizures  11/26/2024 2313 by Beran Camara RN  Outcome: Progressing  11/26/2024 0959 by Jessica Unger RN  Outcome: Progressing  Goal: Remains free of injury related to seizures activity  11/26/2024 2313 by Berna Camara RN  Outcome: Progressing  11/26/2024 0959 by Jessica Unger RN  Outcome: Progressing  Goal: Achieves maximal 
  Problem: Discharge Planning  Goal: Discharge to home or other facility with appropriate resources  12/2/2024 0152 by Nadira Hernandez RN  Outcome: Progressing  12/1/2024 1244 by Bronwyn Villarreal RN  Outcome: Progressing     Problem: Skin/Tissue Integrity  Goal: Absence of new skin breakdown  Description: 1.  Monitor for areas of redness and/or skin breakdown  2.  Assess vascular access sites hourly  3.  Every 4-6 hours minimum:  Change oxygen saturation probe site  4.  Every 4-6 hours:  If on nasal continuous positive airway pressure, respiratory therapy assess nares and determine need for appliance change or resting period.  12/2/2024 0152 by Nadira Hernandez RN  Outcome: Progressing  12/1/2024 1244 by Bronwyn Villarreal RN  Outcome: Progressing     Problem: Safety - Adult  Goal: Free from fall injury  12/2/2024 0152 by Nadira Hernandez RN  Outcome: Progressing  12/1/2024 1244 by Bronwyn Villarreal RN  Outcome: Progressing     Problem: ABCDS Injury Assessment  Goal: Absence of physical injury  12/2/2024 0152 by Nadira Hernandez RN  Outcome: Progressing  12/1/2024 1244 by Bronwyn Villarreal RN  Outcome: Progressing     Problem: Pain  Goal: Verbalizes/displays adequate comfort level or baseline comfort level  12/2/2024 0152 by Nadira Hernandez RN  Outcome: Progressing  12/1/2024 1244 by Bronwyn Villarreal RN  Outcome: Progressing     Problem: Neurosensory - Adult  Goal: Achieves stable or improved neurological status  12/2/2024 0152 by Nadira Hernandez RN  Outcome: Progressing  12/1/2024 1244 by Bronwyn Villarreal RN  Outcome: Progressing  Goal: Absence of seizures  12/2/2024 0152 by Nadira Hernandez RN  Outcome: Progressing  12/1/2024 1244 by Bronwyn Villarreal RN  Outcome: Progressing  Goal: Remains free of injury related to seizures activity  12/2/2024 0152 by Nadira Hernandez RN  Outcome: Progressing  12/1/2024 1244 by Bronwyn Villarreal RN  Outcome: 
  Problem: Discharge Planning  Goal: Discharge to home or other facility with appropriate resources  Outcome: Progressing     Problem: Skin/Tissue Integrity  Goal: Absence of new skin breakdown  Description: 1.  Monitor for areas of redness and/or skin breakdown  2.  Assess vascular access sites hourly  3.  Every 4-6 hours minimum:  Change oxygen saturation probe site  4.  Every 4-6 hours:  If on nasal continuous positive airway pressure, respiratory therapy assess nares and determine need for appliance change or resting period.  Outcome: Progressing     Problem: Safety - Adult  Goal: Free from fall injury  Outcome: Progressing     Problem: ABCDS Injury Assessment  Goal: Absence of physical injury  Outcome: Progressing     
  Problem: Discharge Planning  Goal: Discharge to home or other facility with appropriate resources  Outcome: Progressing     Problem: Skin/Tissue Integrity  Goal: Absence of new skin breakdown  Description: 1.  Monitor for areas of redness and/or skin breakdown  2.  Assess vascular access sites hourly  3.  Every 4-6 hours minimum:  Change oxygen saturation probe site  4.  Every 4-6 hours:  If on nasal continuous positive airway pressure, respiratory therapy assess nares and determine need for appliance change or resting period.  Outcome: Progressing     Problem: Safety - Adult  Goal: Free from fall injury  Outcome: Progressing     Problem: ABCDS Injury Assessment  Goal: Absence of physical injury  Outcome: Progressing     Problem: Pain  Goal: Verbalizes/displays adequate comfort level or baseline comfort level  Outcome: Progressing     Problem: Neurosensory - Adult  Goal: Achieves stable or improved neurological status  Outcome: Progressing  Goal: Absence of seizures  Outcome: Progressing  Goal: Remains free of injury related to seizures activity  Outcome: Progressing  Goal: Achieves maximal functionality and self care  Outcome: Progressing     Problem: Respiratory - Adult  Goal: Achieves optimal ventilation and oxygenation  Outcome: Progressing     Problem: Cardiovascular - Adult  Goal: Maintains optimal cardiac output and hemodynamic stability  Outcome: Progressing  Goal: Absence of cardiac dysrhythmias or at baseline  Outcome: Progressing     Problem: Skin/Tissue Integrity - Adult  Goal: Skin integrity remains intact  Outcome: Progressing  Goal: Incisions, wounds, or drain sites healing without S/S of infection  Outcome: Progressing  Goal: Oral mucous membranes remain intact  Outcome: Progressing     Problem: Musculoskeletal - Adult  Goal: Return mobility to safest level of function  Outcome: Progressing  Goal: Maintain proper alignment of affected body part  Outcome: Progressing  Goal: Return ADL status to a 
  Problem: Discharge Planning  Goal: Discharge to home or other facility with appropriate resources  Outcome: Progressing     Problem: Skin/Tissue Integrity  Goal: Absence of new skin breakdown  Description: 1.  Monitor for areas of redness and/or skin breakdown  2.  Assess vascular access sites hourly  3.  Every 4-6 hours minimum:  Change oxygen saturation probe site  4.  Every 4-6 hours:  If on nasal continuous positive airway pressure, respiratory therapy assess nares and determine need for appliance change or resting period.  Outcome: Progressing     Problem: Safety - Adult  Goal: Free from fall injury  Outcome: Progressing     Problem: ABCDS Injury Assessment  Goal: Absence of physical injury  Outcome: Progressing     Problem: Physical Therapy - Adult  Goal: By Discharge: Performs mobility at highest level of function for planned discharge setting.  See evaluation for individualized goals.  Description: FUNCTIONAL STATUS PRIOR TO ADMISSION: Patient was modified independent using a single point cane for functional mobility. and The patient  required maximum assistance for basic and instrumental ADLs.    HOME SUPPORT PRIOR TO ADMISSION: The patient lived alone with family, HH, and PCAs to provide assistance.    Physical Therapy Goals  Initiated 11/27/2024  Pt stated goal: to go to rehab  Pt will be I with LE HEP in 7 days.  Pt will perform bed mobility with Modified [place] in 7 days.  Pt will perform transfers with Modified [place] in 7 days.   Pt will amb 25-50 feet with LRAD safely with Minimal Assist in 7 days.  Pt will verbalize and demonstrate compliance with fall precautions to include use of LRAD, increased lighting, removing tripping hazards in 7 days.   Pt will demonstrate improvement in standing balance from fair/poor to good/fair in 7 days.     11/30/2024 1247 by Nora Atwood, PTA  Outcome: Progressing  11/30/2024 1047 by Nora Atwood, VINCENT  Outcome: Progressing     Problem: 
  Problem: Physical Therapy - Adult  Goal: By Discharge: Performs mobility at highest level of function for planned discharge setting.  See evaluation for individualized goals.  Description: FUNCTIONAL STATUS PRIOR TO ADMISSION: Patient was modified independent using a single point cane for functional mobility. and The patient  required maximum assistance for basic and instrumental ADLs.    HOME SUPPORT PRIOR TO ADMISSION: The patient lived alone with family, HH, and PCAs to provide assistance.    Physical Therapy Goals  Initiated 11/27/2024  Pt stated goal: to go to rehab  Pt will be I with LE HEP in 7 days.  Pt will perform bed mobility with Modified [place] in 7 days.  Pt will perform transfers with Modified [place] in 7 days.   Pt will amb 25-50 feet with LRAD safely with Minimal Assist in 7 days.  Pt will verbalize and demonstrate compliance with fall precautions to include use of LRAD, increased lighting, removing tripping hazards in 7 days.   Pt will demonstrate improvement in standing balance from fair/poor to good/fair in 7 days.     Outcome: Progressing            PHYSICAL THERAPY TREATMENT     Patient: Francisca Portillo (59 y.o. female)  Date: 11/30/2024  Diagnosis: Acute kidney injury (HCC) [N17.9]  Acute renal failure, unspecified acute renal failure type (HCC) [N17.9]  Community acquired pneumonia, unspecified laterality [J18.9] Acute kidney injury (HCC)      Precautions: Bed Alarm, Fall Risk                      Recommendations for nursing mobility: Out of bed to chair for meals, Encourage HEP in prep for ADLs/mobility; see handout for details, Frequent repositioning to prevent skin breakdown, LE elevation for management of edema, Use of BSC for toileting , AD and gt belt for bed to chair , and Assist x2    In place during session: External Catheter and EKG/telemetry   Chart, physical therapy assessment, plan of care and goals were reviewed.  ASSESSMENT  Patient continues with skilled PT 
  Problem: Skin/Tissue Integrity  Goal: Absence of new skin breakdown  Description: 1.  Monitor for areas of redness and/or skin breakdown  2.  Assess vascular access sites hourly  3.  Every 4-6 hours minimum:  Change oxygen saturation probe site  4.  Every 4-6 hours:  If on nasal continuous positive airway pressure, respiratory therapy assess nares and determine need for appliance change or resting period.  Outcome: Progressing     Problem: Safety - Adult  Goal: Free from fall injury  Outcome: Progressing     Problem: ABCDS Injury Assessment  Goal: Absence of physical injury  Outcome: Progressing     
OCCUPATIONAL THERAPY EVALUATION  Patient: Francisca Portillo (59 y.o. female)  Date: 11/29/2024  Primary Diagnosis: Acute kidney injury (HCC) [N17.9]  Acute renal failure, unspecified acute renal failure type (HCC) [N17.9]  Community acquired pneumonia, unspecified laterality [J18.9]       Precautions: Ax2, Bed Alarm, Fall Risk                Recommendations for nursing mobility: Encourage HEP in prep for ADLs/mobility; see handout for details, Frequent repositioning to prevent skin breakdown, Use of bed/chair alarm for safety, and Assist x2    In place during session:External Catheter and EKG/telemetry   ASSESSMENT  Pt is a 59 y.o. female presenting to [facility] with c/o AMS, admitted 11/24/24 and currently being treated for ROSALIO, community-acquired PNA, sepsis, asthma, COPD. Head CT 11/24 shows no acute intracranial abnormality. Pt received semi-supine in bed upon arrival, AXO x person, type of place, and year, and agreeable to OT evaluation.     Based on current observations, pt presents with decreased  functional mobility, independence in ADLs, high-level IADLs, ROM, strength, sensation, body mechanics, activity tolerance, cognition, coordination, balance, posture, fine-motor control (see below for objective details and assist levels).     Overall, pt tolerates session fair with no c/o pain. Bed mobility completed with max Ax2 overall. Sit<>stand transfers and side steps taken at EOB with HW and mod-max Ax2 and additional time. Multimodal cuing provided for initiation and sequencing and hand placement during transfers. Pt demo'd understanding. Pt washed face with set up A using LUE while seated EOB. Set up A req'd for self-feeding while semi supine in bed. Pt demo's significantly decreased RUE strength/AROM and FM/GM coordination compared to LUE. Pt demo's decreased RUE sensation. Pt will benefit from continued skilled OT services to address current impairments and improve IND and safety with self cares and functional 
OCCUPATIONAL THERAPY TREATMENT  Patient: Francisca Portillo (59 y.o. female)  Date: 12/5/2024  Primary Diagnosis: Acute kidney injury (HCC) [N17.9]  Acute renal failure, unspecified acute renal failure type (HCC) [N17.9]  Community acquired pneumonia, unspecified laterality [J18.9]       Precautions: Bed Alarm, Fall Risk                Recommendations for nursing mobility: Encourage HEP in prep for ADLs/mobility; see handout for details, Frequent repositioning to prevent skin breakdown, Use of bed/chair alarm for safety, and Assist x2    In place during session: External Catheter  Chart, occupational therapy assessment, plan of care, and goals were reviewed.    ASSESSMENT  Patient continues with skilled OT services and is progressing towards goals.  Pt received semi-supine in bed upon arrival, AXO x 2 and agreeable to HEALY/PTA tx at this time.Pt presents with decreased speech. Pt cooperative and demonstrated fair effort during activities with additional time d/t decreased mobility.  Pt soiled in bed and req'd max A x2 to roll L/R and TA for toileting hygiene with multimodal cues for correct technique. Pt req'd mod/max A x2 for all bed mobility<>EOB with good sitting balance with vc's for correct technique. Set-up for simple grooming. Pt completed 1 STS with max A x2, standing for ~10 seconds with poor static balance. Pt req'd multimodal cues for weight shifting and posture. (See below for objective details and assist levels)     Overall, pt limited by large body habitus, generalized weakness, decreased R UE/LE function that interferes with performance in ADL's and functional tf's safely.  Will continue to progress. Current OT recommendations for discharge High intensity and comprehensive skilled occupational therapy in a multidisciplinary setting as patient is working towards tolerating up to 3 hours of therapy/day x 5-7 days/week.           Start of session End of session   SPO2 (%) 98 95   Heart Rate (BPM) 76 80     
PT attempted to see patient for skilled therapy session, however, upon entry into room patient requesting to stay in bed today due to her stomach not feeling well. Provided patient education on benefit of participating with PT. \"I will try tomorrow.\" Will continue to follow patient and attempt skilled therapy on a later date as appropriate.   
appropriate.      GOALS:    Problem: Physical Therapy - Adult  Goal: By Discharge: Performs mobility at highest level of function for planned discharge setting.  See evaluation for individualized goals.  Description: FUNCTIONAL STATUS PRIOR TO ADMISSION: Patient was modified independent using a single point cane for functional mobility. and The patient  required maximum assistance for basic and instrumental ADLs.    HOME SUPPORT PRIOR TO ADMISSION: The patient lived alone with family, HH, and PCAs to provide assistance.    Physical Therapy Goals  Initiated 11/27/2024  Pt stated goal: to go to rehab  Pt will be I with LE HEP in 7 days.  Pt will perform bed mobility with Modified [place] in 7 days.  Pt will perform transfers with Modified [place] in 7 days.   Pt will amb 25-50 feet with LRAD safely with Minimal Assist in 7 days.  Pt will verbalize and demonstrate compliance with fall precautions to include use of LRAD, increased lighting, removing tripping hazards in 7 days.   Pt will demonstrate improvement in standing balance from fair/poor to good/fair in 7 days.     Outcome: Progressing          PLAN :  Patient continues to benefit from skilled intervention to address functional impairments. Continue treatment per established plan of care to address goals.    Recommendation for discharge: (in order for the patient to meet his/her long term goals)  High intensity and comprehensive skilled physical therapy in a multidisciplinary setting as patient is working towards tolerating up to 3 hours of therapy/day x 5-7 days/week    Potential barriers for safe discharge: pts current support system is unable to meet their requirements for physical assistance, pt is a high fall risk, and pt is not safe to be alone.         SUBJECTIVE:   Patient supine in bed upon PT arrival, agreeable to work with PT    OBJECTIVE DATA SUMMARY:   Critical Behavior:  Orientation  Orientation Level: Oriented X4  Cognition  Overall 
discharge: pt has poor safety awareness, pt is a high fall risk, pt is not safe to be alone, and concern for pt safely navigating or managing the home environment.    IF patient discharges home will need the following DME:gait belt     SUBJECTIVE:   Patient stated “I peed when the nurses tried to stand me the first time.”    OBJECTIVE DATA SUMMARY:   Critical Behavior:  Orientation  Orientation Level: Oriented X4  Cognition  Overall Cognitive Status: Exceptions  Arousal/Alertness: Appropriate responses to stimuli  Following Commands: Follows one step commands with repetition;Follows one step commands with increased time  Attention Span: Appears intact  Memory: Decreased recall of recent events  Safety Judgement: Good awareness of safety precautions  Problem Solving: Decreased awareness of errors;Assistance required to correct errors made  Insights: Appears intact  Initiation: Requires cues for some  Sequencing: Requires cues for some    Functional Mobility Training:  Bed Mobility:  Bed Mobility Training  Bed Mobility Training: Yes  Overall Level of Assistance: Maximum assistance;Assist X2;Additional time  Interventions: Verbal cues;Safety awareness training;Visual cues;Tactile cues;Manual cues  Rolling: Maximum assistance;Assist X2;Additional time  Supine to Sit: Maximum assistance;Assist X2;Additional time  Sit to Supine: Maximum assistance;Assist X2;Additional time  Scooting: Maximum assistance;Assist X2;Additional time  Transfers:  Transfer Training  Transfer Training: Yes  Overall Level of Assistance: Maximum assistance;Assist X2;Additional time;Other (comment) (antonio HHA)  Interventions: Verbal cues;Visual cues;Safety awareness training;Weight shifting training/pressure relief;Tactile cues  Sit to Stand: Maximum assistance;Assist X2;Additional time;Other (comment) (antonio HHA and bed height raised)  Stand to Sit: Maximum assistance;Assist X2;Additional time  Stand Pivot Transfers: Maximum assistance;Assist 
Call bell within reach, and nsg updated     COMMUNICATION/COLLABORATION:   The patient’s plan of care was discussed with: Occupational therapist and Registered nurse PT/OT sessions occurred together for increased safety of pt and clinician.     Patient Education  Education Given To: Patient  Education Provided: Home Exercise Program  Education Provided Comments: PT POC and discharge recs, purpose of and safe use of vanessa walker, transfer techniques  Education Method: Verbal;Demonstration;Teach Back  Barriers to Learning: None  Education Outcome: Verbalized understanding;Demonstrated understanding;Continued education needed      Chelsea Lane, PTA  Minutes: 23           
position, Patient left in no apparent distress in bed, Call bell within reach, and OT in room , and nsg updated     COMMUNICATION/COLLABORATION:   The patient’s plan of care was discussed with: Occupational therapy assistant and Registered nurse    Patient Education  Education Given To: Patient  Education Provided: Role of Therapy;Plan of Care;Transfer Training;Fall Prevention Strategies;Energy Conservation  Education Method: Verbal;Demonstration;Teach Back  Barriers to Learning: None  Education Outcome: Verbalized understanding;Demonstrated understanding;Continued education needed        Latasha Chamorro, VINCENT  Minutes: 23            
details)    Range Of Motion:  AROM: Generally decreased, functional  PROM: Generally decreased, functional (impaired finger ext R hand)    Coordination:  Coordination: Generally decreased, functional (RUE/RLE coordination impairmens)     Tone & Sensation:   Tone: Normal  Sensation: Intact      Functional Mobility:  Bed Mobility:     Bed Mobility Training  Bed Mobility Training: Yes  Interventions: Verbal cues;Safety awareness training;Visual cues;Tactile cues  Rolling: Maximum assistance;Assist X1;Additional time;Adaptive equipment (use of bed rail)  Supine to Sit: Maximum assistance;Assist X1;Additional time  Sit to Supine: Maximum assistance;Assist X1;Additional time  Scooting: Maximum assistance;Assist X1;Additional time  Transfers:     Transfer Training  Transfer Training: Yes  Interventions: Verbal cues;Visual cues;Safety awareness training;Weight shifting training/pressure relief  Sit to Stand: Moderate assistance;Assist X1;Maximum assistance;Adaptive equipment;Additional time  Stand to Sit: Moderate assistance;Assist X1  Balance:               Balance  Sitting: Impaired  Sitting - Static: Fair (occasional)  Sitting - Dynamic: Fair (occasional)  Standing: Impaired  Standing - Static: Fair;Poor;Constant support  Standing - Dynamic: Poor;Constant support  Wheelchair Mobility:        Ambulation/Gait Training:                                Gait  Gait Training: Yes  Overall Level of Assistance: Moderate assistance;Assist X1;Adaptive equipment;Additional time  Distance (ft): 2 Feet  Assistive Device: Gait belt;Walker vanessa  Interventions: Safety awareness training;Verbal cues;Manual cues;Weight shifting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

## 2024-12-06 VITALS
TEMPERATURE: 98.6 F | HEIGHT: 63 IN | WEIGHT: 293 LBS | SYSTOLIC BLOOD PRESSURE: 116 MMHG | HEART RATE: 73 BPM | OXYGEN SATURATION: 92 % | DIASTOLIC BLOOD PRESSURE: 66 MMHG | BODY MASS INDEX: 51.91 KG/M2 | RESPIRATION RATE: 18 BRPM

## 2024-12-06 PROCEDURE — 6370000000 HC RX 637 (ALT 250 FOR IP): Performed by: INTERNAL MEDICINE

## 2024-12-06 PROCEDURE — 6360000002 HC RX W HCPCS

## 2024-12-06 PROCEDURE — 6370000000 HC RX 637 (ALT 250 FOR IP): Performed by: FAMILY MEDICINE

## 2024-12-06 PROCEDURE — 2580000003 HC RX 258: Performed by: FAMILY MEDICINE

## 2024-12-06 PROCEDURE — 6370000000 HC RX 637 (ALT 250 FOR IP): Performed by: PHYSICIAN ASSISTANT

## 2024-12-06 RX ADMIN — ENOXAPARIN SODIUM 40 MG: 100 INJECTION SUBCUTANEOUS at 09:09

## 2024-12-06 RX ADMIN — CLOPIDOGREL BISULFATE 75 MG: 75 TABLET ORAL at 09:09

## 2024-12-06 RX ADMIN — GABAPENTIN 100 MG: 100 CAPSULE ORAL at 09:09

## 2024-12-06 RX ADMIN — SODIUM CHLORIDE, PRESERVATIVE FREE 10 ML: 5 INJECTION INTRAVENOUS at 09:10

## 2024-12-06 RX ADMIN — OXYCODONE AND ACETAMINOPHEN 1 TABLET: 10; 325 TABLET ORAL at 07:02

## 2024-12-06 RX ADMIN — SERTRALINE HYDROCHLORIDE 200 MG: 50 TABLET ORAL at 09:09

## 2024-12-06 RX ADMIN — CILOSTAZOL 50 MG: 50 TABLET ORAL at 09:09

## 2024-12-06 RX ADMIN — FERROUS SULFATE TAB 325 MG (65 MG ELEMENTAL FE) 325 MG: 325 (65 FE) TAB at 09:09

## 2024-12-06 RX ADMIN — PANTOPRAZOLE SODIUM 40 MG: 40 TABLET, DELAYED RELEASE ORAL at 06:43

## 2024-12-06 RX ADMIN — BUMETANIDE 1 MG: 1 TABLET ORAL at 09:09

## 2024-12-06 RX ADMIN — ALPRAZOLAM 2 MG: 0.5 TABLET ORAL at 13:23

## 2024-12-06 ASSESSMENT — PAIN SCALES - GENERAL
PAINLEVEL_OUTOF10: 9
PAINLEVEL_OUTOF10: 0
PAINLEVEL_OUTOF10: 0

## 2024-12-06 ASSESSMENT — PAIN - FUNCTIONAL ASSESSMENT: PAIN_FUNCTIONAL_ASSESSMENT: PREVENTS OR INTERFERES SOME ACTIVE ACTIVITIES AND ADLS

## 2024-12-06 ASSESSMENT — PAIN DESCRIPTION - DESCRIPTORS: DESCRIPTORS: ACHING;SHARP

## 2024-12-06 ASSESSMENT — PAIN DESCRIPTION - ORIENTATION: ORIENTATION: LOWER;POSTERIOR

## 2024-12-06 ASSESSMENT — PAIN DESCRIPTION - ONSET: ONSET: ON-GOING

## 2024-12-06 ASSESSMENT — PAIN DESCRIPTION - FREQUENCY: FREQUENCY: CONTINUOUS

## 2024-12-06 ASSESSMENT — PAIN SCALES - WONG BAKER: WONGBAKER_NUMERICALRESPONSE: NO HURT

## 2024-12-06 ASSESSMENT — PAIN DESCRIPTION - LOCATION: LOCATION: BACK

## 2024-12-06 ASSESSMENT — PAIN DESCRIPTION - PAIN TYPE: TYPE: CHRONIC PAIN

## 2024-12-06 NOTE — CARE COORDINATION
Transition of Care Plan:    RUR: 19%   Prior Level of Functioning:   Disposition: [facility]. ([facility]). Accepting physician: Dr MICHELLE Kwok. Room#2108 RN to call report @ [phone]   CIELO:   If SNF or IPR: Date FOC offered: 27 Nov 2024  Date FOC received: 27 Nov 2024  Accepting facility: [facility]   Date authorization started with reference number:   Date authorization received and expires: 5 Dec 2024 recv'd   Follow up appointments: PCP & nephrology  DME needed:   Transportation at discharge: [facility] @ 14:00PM  IM/[facility] Medicare/ letter given: NA  Is patient a  and connected with [facility]? N/A  If yes, was  transfer form completed and [facility] notified? N/A  Caregiver Contact:   Discharge Caregiver contacted prior to discharge? Yes, spouse (Mustapha Baker).   Care Conference needed? N/A  Barriers to discharge: N/A      REGINALDO Acuna

## 2024-12-06 NOTE — PROGRESS NOTES
Nephrology follow-up          Patient: Francisca Portillo MRN: [id number]  SSN: [id number]    YOB: 1965  Age: 59 y.o.  Sex: female      Subjective:   The patient is seen at the bedside  She looks comfortable  No new complaints  Blood pressures are normal  On room air  Resolving ROSALIO  Hypokalemia  On IV fluids  Mild LE swelling.  Past Medical History:   Diagnosis Date    Anxiety     Arthritis     Asthma     Chronic mental illness     Depression     Falls     Fatigue     GERD (gastroesophageal reflux disease)     High blood pressure     Memory disorder     Obesity     SONIA (obstructive sleep apnea)     NO CPAP - NEEDS NEW MACHINE    Rash     SAH (subarachnoid hemorrhage) (HCC) 11/2023    Smoker      Past Surgical History:   Procedure Laterality Date    BRAIN ANEURYSM SURGERY  10/2023    Ruptured L PCOM--> coiled then surpass flow diversion 6/2024    COLONOSCOPY N/A 5/27/2022    COLONOSCOPY, EGD performed by Mamie Ndiaye MD at [facility] ENDOSCOPY    GYN      LAPAROSCOPY    INSERT ARTERIAL LINE  11/08/2023    INTRACRANIAL ANEURYSM REPAIR  11/2023    Coil embolization- ruptured L PCOM aneurysm    LAP GASTRIC BYPASS/PHAN-EN-Y  2005    LAP, PLACE ADJUST GASTR BAND  2003    OTHER SURGICAL HISTORY      lapband removal    TONSILLECTOMY      TUBAL LIGATION        Family History   Problem Relation Age of Onset    Cancer Mother         LUNG    Cancer Father         COLON    Lung Cancer Sister     Asthma Sister     Heart Disease Brother     Heart Attack Brother     No Known Problems Brother     No Known Problems Brother     Anesth Problems Neg Hx      Social History     Tobacco Use    Smoking status: Former     Types: Cigarettes    Smokeless tobacco: Never   Substance Use Topics    Alcohol use: Not Currently      Current Facility-Administered Medications   Medication Dose Route Frequency Provider Last Rate Last Admin    enoxaparin (LOVENOX) injection 40 mg  40 mg SubCUTAneous Daily Mary Cabral MD   40 mg at 
         Nephrology follow-up          Patient: Francisca Portillo MRN: [id number]  SSN: [id number]    YOB: 1965  Age: 59 y.o.  Sex: female      Subjective:   The patient is seen at the bedside  She looks comfortable  No new complaints  Blood pressures are lowish  On room air  Resolving ROSALIO  Improving  bilateral lower extremity swelling  On bumex 1 mg daily.  Off IV fluids  Past Medical History:   Diagnosis Date    Anxiety     Arthritis     Asthma     Chronic mental illness     Depression     Falls     Fatigue     GERD (gastroesophageal reflux disease)     High blood pressure     Memory disorder     Obesity     SONIA (obstructive sleep apnea)     NO CPAP - NEEDS NEW MACHINE    Rash     SAH (subarachnoid hemorrhage) ([facility]) 11/2023    Smoker      Past Surgical History:   Procedure Laterality Date    BRAIN ANEURYSM SURGERY  10/2023    Ruptured L PCOM--> coiled then surpass flow diversion 6/2024    COLONOSCOPY N/A 5/27/2022    COLONOSCOPY, EGD performed by Mamie Ndiaye MD at [facility] ENDOSCOPY    GYN      LAPAROSCOPY    INSERT ARTERIAL LINE  11/08/2023    INTRACRANIAL ANEURYSM REPAIR  11/2023    Coil embolization- ruptured L PCOM aneurysm    LAP GASTRIC BYPASS/PHAN-EN-Y  2005    LAP, PLACE ADJUST GASTR BAND  2003    OTHER SURGICAL HISTORY      lapband removal    TONSILLECTOMY      TUBAL LIGATION        Family History   Problem Relation Age of Onset    Cancer Mother         LUNG    Cancer Father         COLON    Lung Cancer Sister     Asthma Sister     Heart Disease Brother     Heart Attack Brother     No Known Problems Brother     No Known Problems Brother     Anesth Problems Neg Hx      Social History     Tobacco Use    Smoking status: Former     Types: Cigarettes    Smokeless tobacco: Never   Substance Use Topics    Alcohol use: Not Currently      Current Facility-Administered Medications   Medication Dose Route Frequency Provider Last Rate Last Admin    bumetanide (BUMEX) tablet 1 mg  1 mg Oral Daily Master 
         Nephrology follow-up          Patient: Francisca Portillo MRN: [id number]  SSN: [id number]    YOB: 1965  Age: 59 y.o.  Sex: female      Subjective:   The patient is seen at the bedside.  She looks comfortable  No new complaints  Blood pressures are good  On room air  Resolving ROSALIO  Improving  bilateral lower extremity swelling  On bumex 1 mg daily.  Off IV fluids  Past Medical History:   Diagnosis Date    Anxiety     Arthritis     Asthma     Chronic mental illness     Depression     Falls     Fatigue     GERD (gastroesophageal reflux disease)     High blood pressure     Memory disorder     Obesity     SONIA (obstructive sleep apnea)     NO CPAP - NEEDS NEW MACHINE    Rash     SAH (subarachnoid hemorrhage) ([facility]) 11/2023    Smoker      Past Surgical History:   Procedure Laterality Date    BRAIN ANEURYSM SURGERY  10/2023    Ruptured L PCOM--> coiled then surpass flow diversion 6/2024    COLONOSCOPY N/A 5/27/2022    COLONOSCOPY, EGD performed by Mamie Ndiaye MD at [facility] ENDOSCOPY    GYN      LAPAROSCOPY    INSERT ARTERIAL LINE  11/08/2023    INTRACRANIAL ANEURYSM REPAIR  11/2023    Coil embolization- ruptured L PCOM aneurysm    LAP GASTRIC BYPASS/PHAN-EN-Y  2005    LAP, PLACE ADJUST GASTR BAND  2003    OTHER SURGICAL HISTORY      lapband removal    TONSILLECTOMY      TUBAL LIGATION        Family History   Problem Relation Age of Onset    Cancer Mother         LUNG    Cancer Father         COLON    Lung Cancer Sister     Asthma Sister     Heart Disease Brother     Heart Attack Brother     No Known Problems Brother     No Known Problems Brother     Anesth Problems Neg Hx      Social History     Tobacco Use    Smoking status: Former     Types: Cigarettes    Smokeless tobacco: Never   Substance Use Topics    Alcohol use: Not Currently      Current Facility-Administered Medications   Medication Dose Route Frequency Provider Last Rate Last Admin    bumetanide (BUMEX) tablet 1 mg  1 mg Oral Daily Master 
    Hospitalist Progress Note               Daily Progress Note: 11/26/2024      Hospital Day: 3     Chief complaint:   Chief Complaint   Patient presents with    Altered Mental Status        Subjective:   Patient is seen and examined today during bedside rounds with the nurse.  Patient states that she feels well.  Does have some dry cough.    Assessment and plan    Acute kidney injury  Secondary to prerenal azotemia from hypotension/volume depletion  On admission BUN/creatinine 114/4.83  Resolving ROSALIO, BUN/creatinine 71/1.46.  BUN/creatinine 98/2.94 today  Baseline creatinine 0.9 on 11/1/2024.  Clinically volume down and hypotensive  Urinalysis 5-10 RBCs and sending UPCR  Total CPK 3299.  No evidence of hydronephrosis per renal ultrasound  Continue IV fluids for now, monitor renal function    Community-acquired pneumonia  Sepsis resolved  Tachycardia/low-grade fever/hypotension  IV fluids  Continue Rocephin and Zithromax day 3  X-ray with improvement of infiltrate in the right lower lung     Metabolic acidosis resolved  Normal anion gap  off bicarb drip     Hypokalemia  Potassium 2.7->3.0  Oral KCL 40 meq x 2 doses.     Hypotension  Blood pressures are low on arrival  Received normal saline 2 L boluses  Still soft blood pressure  Continue IV hydration  Agree to hold all blood pressure medications     Anemia  Hemoglobin 9.9  Follow-up iron studies and ferritin level    Asthma and COPD  Continue home medications    Hyperlipidemia  Continue atorvastatin      CODE STATUS full  DVT prophylaxis subcu heparin    Barriers to discharge    Medications reviewed  Current Facility-Administered Medications   Medication Dose Route Frequency    potassium chloride (KLOR-CON M) extended release tablet 40 mEq  40 mEq Oral Q4H    oxyCODONE-acetaminophen (PERCOCET)  MG per tablet 1 tablet  1 tablet Oral Q6H PRN    midodrine (PROAMATINE) tablet 5 mg  5 mg Oral TID PRN    0.9 % sodium chloride infusion   IntraVENous Continuous    
    Hospitalist Progress Note               Daily Progress Note: 11/27/2024      Hospital Day: 4     Chief complaint:   Chief Complaint   Patient presents with    Altered Mental Status        Subjective:   Patient is seen and examined today during bedside rounds with the nurse.     Assessment and plan    Acute kidney injury-resolved  Secondary to prerenal azotemia from hypotension/volume depletion  On admission BUN/creatinine 114/4.83  Resolving ROSALIO, BUN/creatinine 71/1.46.  BUN/creatinine 98/2.94 today  Baseline creatinine 0.9 on 11/1/2024.  Clinically volume down and hypotensive  Urinalysis 5-10 RBCs and sending UPCR  Total CPK 3299.  No evidence of hydronephrosis per renal ultrasound  Stop IV fluids today    Community-acquired pneumonia  Sepsis resolved  Tachycardia/low-grade fever/hypotension  IV fluids  Continue Rocephin and Zithromax day 4  X-ray with improvement of infiltrate in the right lower lung     Metabolic acidosis resolved  Normal anion gap  off bicarb drip     Hypokalemia  Potassium 2.7->3.4  Give additional 40 mill equivalents of potassium today     Hypotension  Blood pressure medications are still on hold     Anemia  Hemoglobin 9.9  Follow-up iron studies and ferritin level, ordered and are in process    Asthma and COPD  Continue home medications    Hyperlipidemia  Continue atorvastatin    Disposition  Pending insurance authorization to SNF      CODE STATUS full  DVT prophylaxis Lovenox    Barriers to discharge    Medications reviewed  Current Facility-Administered Medications   Medication Dose Route Frequency    oxyCODONE-acetaminophen (PERCOCET)  MG per tablet 1 tablet  1 tablet Oral Q6H PRN    midodrine (PROAMATINE) tablet 5 mg  5 mg Oral TID PRN    cyclobenzaprine (FLEXERIL) tablet 10 mg  10 mg Oral TID PRN    gabapentin (NEURONTIN) capsule 100 mg  100 mg Oral BID    ALPRAZolam (XANAX) tablet 2 mg  2 mg Oral TID PRN    atorvastatin (LIPITOR) tablet 20 mg  20 mg Oral Nightly    cilostazol 
    Hospitalist Progress Note               Daily Progress Note: 11/28/2024      Hospital Day: 5     Chief complaint:   Chief Complaint   Patient presents with    Altered Mental Status        Subjective:   Patient is seen and examined today during bedside rounds with the nurse.  He feels well and does not have much symptoms.  We are waiting for insurance authorization to approve rehab    Assessment and plan    Acute kidney injury-resolved  Secondary to prerenal azotemia from hypotension/volume depletion  On admission BUN/creatinine 114/4.83  Resolving ROSALIO, BUN/creatinine 71/1.46.  BUN/creatinine 98/2.94 today  Baseline creatinine 0.9 on 11/1/2024.  Clinically volume down and hypotensive  Urinalysis 5-10 RBCs and sending UPCR  Total CPK 3299.  No evidence of hydronephrosis per renal ultrasound  Stop IV fluids today    Community-acquired pneumonia  Sepsis resolved  Tachycardia/low-grade fever/hypotension  IV fluids  Continue Rocephin and Zithromax day 4  X-ray with improvement of infiltrate in the right lower lung     Metabolic acidosis resolved  Normal anion gap  off bicarb drip     Hypokalemia  Potassium 2.7->3.4  Give additional 40 mill equivalents of potassium today     Hypotension  Blood pressure medications are still on hold     Anemia  Hemoglobin 9.9  Follow-up iron studies and ferritin level, ordered and are in process    Asthma and COPD  Continue home medications    Hyperlipidemia  Continue atorvastatin    Disposition  Pending insurance authorization to SNF      CODE STATUS full  DVT prophylaxis Lovenox    Barriers to discharge    Medications reviewed  Current Facility-Administered Medications   Medication Dose Route Frequency    midodrine (PROAMATINE) tablet 5 mg  5 mg Oral TID WC    enoxaparin (LOVENOX) injection 40 mg  40 mg SubCUTAneous Daily    oxyCODONE-acetaminophen (PERCOCET)  MG per tablet 1 tablet  1 tablet Oral Q6H PRN    midodrine (PROAMATINE) tablet 5 mg  5 mg Oral TID PRN    
    Hospitalist Progress Note               Daily Progress Note: 11/29/2024      Hospital Day: 6     Chief complaint:   Chief Complaint   Patient presents with    Altered Mental Status        Subjective:   Patient is seen and examined today during bedside rounds with the nurse.  He feels well and does not have much symptoms.  We are waiting for insurance authorization to approve rehab.  Will need OT evaluation prior to start authorization.    Assessment and plan    Acute kidney injury-resolved  Secondary to prerenal azotemia from hypotension/volume depletion  On admission BUN/creatinine 114/4.83  Resolving ROSALIO, BUN/creatinine 71/1.46.  BUN/creatinine 98/2.94 today  Baseline creatinine 0.9 on 11/1/2024.  Clinically volume down and hypotensive  Urinalysis 5-10 RBCs and sending UPCR  Total CPK 3299.  No evidence of hydronephrosis per renal ultrasound  Stop IV fluids today    Community-acquired pneumonia  Sepsis resolved  Tachycardia/low-grade fever/hypotension  IV fluids  Continue Rocephin and Zithromax day 5, last day today  X-ray with improvement of infiltrate in the right lower lung     Metabolic acidosis resolved  Normal anion gap  off bicarb drip     Hypokalemia  Potassium 2.7->3.4  Check BMP today to see if she needs scheduled potassium     Hypotension  Blood pressure medications are still on hold     Anemia  Hemoglobin 9.9  Iron studies are normal    Asthma and COPD  Continue home medications    Hyperlipidemia  Continue atorvastatin    Disposition  Pending insurance authorization to SNF      CODE STATUS full  DVT prophylaxis Lovenox    Barriers to discharge    Medications reviewed  Current Facility-Administered Medications   Medication Dose Route Frequency    midodrine (PROAMATINE) tablet 5 mg  5 mg Oral TID WC    enoxaparin (LOVENOX) injection 40 mg  40 mg SubCUTAneous Daily    oxyCODONE-acetaminophen (PERCOCET)  MG per tablet 1 tablet  1 tablet Oral Q6H PRN    midodrine (PROAMATINE) tablet 5 mg  5 mg 
    Hospitalist Progress Note               Daily Progress Note: 12/1/2024      Hospital Day: 8     Chief complaint:   Chief Complaint   Patient presents with    Altered Mental Status        Subjective:   Patient is seen and examined today during bedside rounds with the nurse.  No new complaints today.  Was encouraged again to use her incentive spirometry    Assessment and plan    Acute kidney injury-resolved  Secondary to prerenal azotemia from hypotension/volume depletion  On admission BUN/creatinine 114/4.83  Resolving ROSALIO, BUN/creatinine 71/1.46.  BUN/creatinine 98/2.94 today  Baseline creatinine 0.9 on 11/1/2024.  Clinically volume down and hypotensive  Urinalysis 5-10 RBCs and sending UPCR  Total CPK 3299.  No evidence of hydronephrosis per renal ultrasound  IV fluids stopped  Continue Bumex 1 mg daily    Community-acquired pneumonia  Sepsis resolved  Tachycardia/low-grade fever/hypotension  Finish 5 days of Rocephin and Zithromax  X-ray with improvement of infiltrate in the right lower lung     Metabolic acidosis resolved  Normal anion gap  off bicarb drip     Hypokalemia  Potassium 2.7->3.4  She needs to be on scheduled potassium upon discharge     Hypotension  Blood pressure medications are still on hold     Anemia  Hemoglobin 9.9  Iron studies are normal    Asthma and COPD  Continue home medications    Hyperlipidemia  Continue atorvastatin    Disposition  Pending insurance authorization to SNF      CODE STATUS full  DVT prophylaxis Lovenox    Barriers to discharge    Medications reviewed  Current Facility-Administered Medications   Medication Dose Route Frequency    bumetanide (BUMEX) tablet 1 mg  1 mg Oral Daily    enoxaparin (LOVENOX) injection 40 mg  40 mg SubCUTAneous Daily    oxyCODONE-acetaminophen (PERCOCET)  MG per tablet 1 tablet  1 tablet Oral Q6H PRN    midodrine (PROAMATINE) tablet 5 mg  5 mg Oral TID PRN    cyclobenzaprine (FLEXERIL) tablet 10 mg  10 mg Oral TID PRN    gabapentin 
    Hospitalist Progress Note               Daily Progress Note: 2024      Hospital Day: 2     Chief complaint:   Chief Complaint   Patient presents with    Altered Mental Status        Brief HPI/ Hospital course to date:      --------  Patient is seen today for follow-up. Reports still having cough and feeling weak. No fever or chills. Saturating well on R.A..  Creatinine improved to 2.94 today, from 4.8 on admission. However hypokalemia, replacement ordered. BP improved with IVF, now on sodium bicarb 100mEq/L at 150cc/h        All ROS negative otherwise mentioned above.      BP (!) 100/56   Pulse 89   Temp 99 °F (37.2 °C) (Oral)   Resp 14   Ht 1.6 m (5' 2.99\")   Wt (!) 146.8 kg (323 lb 10.2 oz)   SpO2 94%   BMI 57.34 kg/m²  O2 Flow Rate (L/min): 4 L/min O2 Device: None (Room air)    Temp (24hrs), Av.8 °F (37.1 °C), Min:97.9 °F (36.6 °C), Max:99.9 °F (37.7 °C)    701 - 1900  In: -   Out: 750 [Urine:750]   1901 -  0700  In: 508.3 [P.O.:360]  Out: 1850 [Urine:1850]    Physical Exam:  General:  Awake and alert.    Lungs:   Clear to auscultation bilaterally.   Heart:  Regular rate and rhythm, S1, S2 normal, no murmur. No click, rub or gallop.   Abdomen:   Soft, non-tender. Bowel sounds normal. No masses,  No organomegaly.   Extremities: no  edema. Extremities normal, atraumatic, no cyanosis.    Pulses: 2+ and symmetric all extremities.   Skin: Skin color, texture, turgor normal. No rashes or lesions   Neurologic: CNII-XII intact.  Some slurred speech with right sided weakness from prior CVA and SAH       Assessment and Plan:  Community acquired pneumonia  -Patient started on Rocephin and Zithromax   - pulmonary on board      ROSALIO, improving  -Continue current IVF infusion   -nephrology on board   - US of kidneys no acute finding.     Hypokalemia  - Replacement ordered, recheck in a.m.     Sepsis, resolved  -Likely due to pneumonia  -On presentation  temperature of 99.9 °F, and 
  Physician Progress Note      PATIENT:               CAROLA COOK  CSN #:                  [id number]  :                       1965  ADMIT DATE:       2024 2:54 PM  DISCH DATE:  RESPONDING  PROVIDER #:        Mary Cabral MD          QUERY TEXT:    Pt admitted with Sepsis. Pt noted to have AMS. If possible, please document in   the progress notes and discharge summary if you are evaluating and / or   treating any of the following:    The medical record reflects the following:  Risk Factors: 59 year old female, PNA, ROSALIO, hypotension, dehydration  Clinical Indicators:  HP - brought to the ED today for altered mental   status, decreased ambulation, generalized weakness, congestion, decreased   appetite, and decreased urination, onset a couple of days ago.  Per patient's   son, the patient is normally awake, alert, and oriented.  He also states that   she normally has a slight slurring of words due to previous stroke, however,   he states that she is currently more confused and is slurring more words than   usual.  Sepsis with pneumonia and hypotension   Attending PN - General:  Awake and alert.  Per RN assessment, Pt A&Ox3  Treatment: IVFNS, IV Azithromycin, IV Ceftriaxone, IVF NS bolus x 3        Please email [email or web address] with any questions  Options provided:  -- Metabolic encephalopathy  -- Septic encephalopathy  -- Other - I will add my own diagnosis  -- Disagree - Not applicable / Not valid  -- Disagree - Clinically unable to determine / Unknown  -- Refer to Clinical Documentation Reviewer    PROVIDER RESPONSE TEXT:    This patient has metabolic encephalopathy.    Query created by: Vadnana Julio on 2024 7:00 AM      Electronically signed by:  Mary Cabral MD 2024 5:04 PM          
 successfully contacted Fr. Anton to inquire about Communion for Pt; he is out of town today but will see what he can do to connect Pt with a . Further  support available upon referral.    Electronically signed by  Issa Fernando MDiv  
4 Eyes Skin Assessment     NAME:  Francisca Portillo  YOB: 1965  MEDICAL RECORD NUMBER:  [id number]    The patient is being assessed for  Other Weekly    I agree that at least one RN has performed a thorough Head to Toe Skin Assessment on the patient. ALL assessment sites listed below have been assessed.      Areas assessed by both nurses:    Head, Face, Ears, Shoulders, Back, Chest, Arms, Elbows, Hands, Sacrum. Buttock, Coccyx, Ischium, Legs. Feet and Heels, and Under Medical Devices         Does the Patient have a Wound? Yes wound(s) were present on assessment. LDA wound assessment was Initiated and completed by RN       Marshall Prevention initiated by RN: No  Wound Care Orders initiated by RN: No    Pressure Injury (Stage 3,4, Unstageable, DTI, NWPT, and Complex wounds) if present, place Wound referral order by RN under : No    New Ostomies, if present place, Ostomy referral order under : No     Nurse 1 eSignature: Electronically signed by Marci Baeza RN on 12/4/24 at 5:26 PM EST    **SHARE this note so that the co-signing nurse can place an eSignature**    Nurse 2 eSignature: Electronically signed by Margaret Francis RN on 12/4/24 at 6:35 PM EST    
4 Eyes Skin Assessment     NAME:  Francisca Portillo  YOB: 1965  MEDICAL RECORD NUMBER:  [id number]    The patient is being assessed for  Admission    I agree that at least one RN has performed a thorough Head to Toe Skin Assessment on the patient. ALL assessment sites listed below have been assessed.      Areas assessed by both nurses:    Head, Face, Ears, Shoulders, Back, Chest, Arms, Elbows, Hands, Sacrum. Buttock, Coccyx, Ischium, Legs. Feet and Heels, and Under Medical Devices         Does the Patient have a Wound? Yes wound(s) were present on assessment. LDA wound assessment was Initiated and completed by RN       Marshall Prevention initiated by RN: Yes  Wound Care Orders initiated by RN: Yes    Pressure Injury (Stage 3,4, Unstageable, DTI, NWPT, and Complex wounds) if present, place Wound referral order by RN under : No    New Ostomies, if present place, Ostomy referral order under : No     Nurse 1 eSignature: Electronically signed by Pily Marte RN on 11/25/24 at 4:17 AM EST    **SHARE this note so that the co-signing nurse can place an eSignature**    Nurse 2 eSignature: Electronically signed by Nathalie Palacio RN on 11/25/24 at 4:35 AM EST   
4 Eyes Skin Assessment     NAME:  Francisca Portillo  YOB: 1965  MEDICAL RECORD NUMBER:  [id number]    The patient is being assessed for  Other weekly      I agree that at least one RN has performed a thorough Head to Toe Skin Assessment on the patient. ALL assessment sites listed below have been assessed.      Areas assessed by both nurses:    Shoulders, Back, Chest, Arms, Elbows, Hands, Sacrum. Buttock, Coccyx, Ischium, and Legs. Feet and Heels        Does the Patient have a Wound? Yes wound(s) were present on assessment. LDA wound assessment was Initiated and completed by RN       Marshall Prevention initiated by RN: Yes  Wound Care Orders initiated by RN: Yes    Pressure Injury (Stage 3,4, Unstageable, DTI, NWPT, and Complex wounds) if present, place Wound referral order by RN under : No    New Ostomies, if present place, Ostomy referral order under : No     Nurse 1 eSignature: Electronically signed by Jessica Unger RN on 11/27/24 at 4:38 PM EST    **SHARE this note so that the co-signing nurse can place an eSignature**    Nurse 2 eSignature: Electronically signed by VANESSA MCCARTHY RN on 11/27/24 at 4:40 PM EST    
Comprehensive Nutrition Assessment    Type and Reason for Visit:  LOS    Nutrition Recommendations/Plan:   Continue current diet order  RD to monitor po intake, weight, labs, GI status     Malnutrition Assessment:  Malnutrition Status:  Insufficient data (12/02/24 2002)    Context:  Acute Illness     Findings of the 6 clinical characteristics of malnutrition:  Energy Intake:  Unable to assess  Weight Loss:  No weight loss     Body Fat Loss:  Unable to assess     Muscle Mass Loss:  Unable to assess    Fluid Accumulation:  Unable to assess     Strength:  Not Performed    Nutrition Assessment:    Pt assessed for LOS. Pt admitted for ROSALIO. Pt currently ordered a cardiac diet. Weight gain noted this admission. Meds: lipitor, ferrous sulfate, protonix. Labs reviewed.    Nutrition Related Findings:    PO intakes not documented past 5 days. Last BM 12/1. Wound Type: None       Current Nutrition Intake & Therapies:    Average Meal Intake: Unable to assess  Average Supplements Intake: None Ordered  DIET ONE TIME MESSAGE;  DIET ONE TIME MESSAGE;  ADULT DIET; Regular; Low Fat/Low Chol/High Fiber/2 gm Na; iced tea with all meals    Anthropometric Measures:  Height: 160 cm (5' 3\")  Ideal Body Weight (IBW): 115 lbs (52 kg)       Current Body Weight: 155.1 kg (342 lb), 297.4 % IBW. Weight Source: Bed scale  Current BMI (kg/m2): 60.6     Weight Adjustment For: No Adjustment        BMI Categories: Obese Class 3 (BMI 40.0 or greater)  Last Weight Metrics:      12/2/2024     7:38 PM 12/2/2024     5:13 AM 12/1/2024     6:00 AM 11/30/2024     5:26 AM 11/29/2024     5:35 AM 11/28/2024     6:00 AM 11/27/2024     5:33 AM   Weight Loss Metrics   Height 5' 3\"         Weight - Scale  342 lbs 10 oz 341 lbs 11 oz 340 lbs 6 oz 338 lbs 7 oz 335 lbs 5 oz 337 lbs 5 oz   BMI (Calculated)  60.8 kg/m2 60.7 kg/m2 60.4 kg/m2 60.1 kg/m2 59.5 kg/m2 59.9 kg/m2     Estimated Daily Nutrient Needs:  Energy Requirements Based On: Kcal/kg  Weight Used for 
I have received the nephrology consultation.    ROSALIO  Pre renal from volume depletion  BUN/Cr 114/4.83 ( BL Cr 0.91)  CO2 19  K 3.4    Hypotension    Received NS 1000 cc as bolus  Will give another NS 1000 cc bolus    HCO3 drip  0.45%+NaHCO3 100 meq @ 150 cc/hr  Po KCL 40 meq x 1.  Renal US      
Notified Reasoner PA regarding blood pressure 88/66 map 74 hr 88, this is after 1000ml bolus. Orders recieved  
OCCUPATIONAL THERAPY TREATMENT  Patient: Francisca Portillo (59 y.o. female)  Date: 11/30/2024  Primary Diagnosis: Acute kidney injury (HCC) [N17.9]  Acute renal failure, unspecified acute renal failure type (HCC) [N17.9]  Community acquired pneumonia, unspecified laterality [J18.9]       Precautions: Bed Alarm, Fall Risk                Recommendations for nursing mobility: Out of bed to chair for meals, Encourage HEP in prep for ADLs/mobility; see handout for details, Frequent repositioning to prevent skin breakdown, AD and gt belt for bed to chair , and Assist x2    In place during session: External Catheter and EKG/telemetry   Chart, occupational therapy assessment, plan of care, and goals were reviewed.  ASSESSMENT  Patient continues with skilled OT services and is slowly progressing towards goals. Pt presented semi supine upon HEALY/PTA arrival, agreeable to session. Pt A&O x 4 with increase time. Pt found to be incontinent of B&B  Pt required max Ax2 to roll R<>L and was dependent for anterior &  posterior hygiene. Pt max A to get to EOB.  Pt with fair sitting balance.  Pt doffed/donned hospital gown with overall mod A.  Pt completed two stands with HHA x2.  Pt unable to move L LE upon first stand.  Pt able to side step to recliner on second stand.  Activity completed in prep for BSC transfers.  Pt max A x2 for functional mobility.  (See below for objective details and assist levels).     Overall pt tolerated session fair today with functional mobility; poor with anterior and posterior hygiene.  Current OT recommendations for discharge High intensity and comprehensive skilled occupational therapy in a multidisciplinary setting as patient is working towards tolerating up to 3 hours of therapy/day x 5-7 days/week. Will continue to benefit from skilled OT services, and will continue to progress as tolerated.       GOALS:    Problem: Occupational Therapy - Adult  Goal: By Discharge: Performs self-care activities at 
OCCUPATIONAL THERAPY TREATMENT  Patient: Francisca Portillo (59 y.o. female)  Date: 12/3/2024  Primary Diagnosis: Acute kidney injury (HCC) [N17.9]  Acute renal failure, unspecified acute renal failure type (HCC) [N17.9]  Community acquired pneumonia, unspecified laterality [J18.9]       Precautions: Bed Alarm, Fall Risk                Recommendations for nursing mobility: Encourage HEP in prep for ADLs/mobility; see handout for details, Frequent repositioning to prevent skin breakdown, Use of bed/chair alarm for safety, and Assist x2    In place during session: External Catheter and EKG/telemetry   Chart, occupational therapy assessment, plan of care, and goals were reviewed.  ASSESSMENT  Patient continues with skilled OT services and is slowly progressing towards goals. Pt presented semi supine upon HEALY/PT arrival, agreeable to session. Pt A&O x 4. Pt completed bed mobility to sit EOB requiring max Ax2.  Pt completed three sit to stands but was unable to advance feet for side stepping to HOB.  Activity completed in prep for BSC transfers.  Pt returned supine Max Ax2.  Pt completed UB dressing.  Pt left with HOB elevated and breakfast tray.  All needs met.  (See below for objective details and assist levels).     Overall pt tolerated session poorly today with bed mobility and sit to stands.  Pt was unable to take any steps this date.  Pt also required increase assistance doing/[facility] gown.  Current OT recommendations for discharge High intensity and comprehensive skilled occupational therapy in a multidisciplinary setting as patient is working towards tolerating up to 3 hours of therapy/day x 5-7 days/week. Will continue to benefit from skilled OT services, and will continue to progress as tolerated.      Start of Session   SPO2 (%) 97   Heart Rate (BPM) 80     GOALS:    Problem: Occupational Therapy - Adult  Goal: By Discharge: Performs self-care activities at highest level of function for planned 
PULMONARY NOTE  [facility]    Name: Francisca Portillo MRN: [id number]   : 1965 Hospital: [facility]   Date: 12/3/2024  Admission date: 2024 Hospital Day: 10       HPI:     Patient Active Problem List   Diagnosis    Arthritis    Abdominal pain, acute    SAH (subarachnoid hemorrhage) (HCC)    Communicating hydrocephalus (HCC)    Subarachnoid hemorrhage due to ruptured aneurysm (HCC)    TIA (transient ischemic attack)    Cerebral aneurysm    Sepsis (HCC)    Community acquired bilateral lower lobe pneumonia    Acute kidney injury (HCC)             [x] High complexity decision making was performed  [x] See my orders for details      Subjective/Initial History:     I was asked by Makayla Otto MD to see Francisca Portillo  a 59 y.o. female in consultation     Excerpts from admission 2024 or consult notes as follows:   59 your lady came in because of confusion also patient was having abdominal pain  shortness of breath and dyspnea significant past medical history of obstructive sleep apnea CVA anxiety asthma depression chronic mental illness subarachnoid hemorrhage cerebellar lesion with stent placement COPD.  Came to the emergency room she has a temperature of 99.9 saturation was 98% on room air sat on IV antibiotics chest x-ray shows right lower lobe infiltrate she has difficulty in the speech probably because of the previous stroke and cerebral lesion hemorrhage in the past and she has some right-sided weakness she was hypertensive so admitted and pulmonary consult was called, last night patient was hypotensive blood pressure was 88/66 received fluid challenge 1 L normal saline creatinine on admission was elevated      Allergies   Allergen Reactions    Adhesive Tape Other (See Comments)     BURNS SKIN    Nsaids Nausea And Vomiting and Other (See Comments)     BURNS STOMACH        MAR reviewed and pertinent medications noted or modified as needed     Current Facility-Administered 
PULMONARY NOTE  [facility]    Name: Francisca Portillo MRN: [id number]   : 1965 Hospital: [facility]   Date: 2024  Admission date: 2024 Hospital Day: 4       HPI:     Patient Active Problem List   Diagnosis    Arthritis    Abdominal pain, acute    SAH (subarachnoid hemorrhage) (HCC)    Communicating hydrocephalus (HCC)    Subarachnoid hemorrhage due to ruptured aneurysm (HCC)    TIA (transient ischemic attack)    Cerebral aneurysm    Sepsis (HCC)    Community acquired bilateral lower lobe pneumonia    Acute kidney injury (HCC)             [x] High complexity decision making was performed  [x] See my orders for details      Subjective/Initial History:     I was asked by Makayla Otto MD to see Francisca Portillo  a 59 y.o. female in consultation     Excerpts from admission 2024 or consult notes as follows:   59 your lady came in because of confusion also patient was having abdominal pain  shortness of breath and dyspnea significant past medical history of obstructive sleep apnea CVA anxiety asthma depression chronic mental illness subarachnoid hemorrhage cerebellar lesion with stent placement COPD.  Came to the emergency room she has a temperature of 99.9 saturation was 98% on room air sat on IV antibiotics chest x-ray shows right lower lobe infiltrate she has difficulty in the speech probably because of the previous stroke and cerebral lesion hemorrhage in the past and she has some right-sided weakness she was hypertensive so admitted and pulmonary consult was called, last night patient was hypotensive blood pressure was 88/66 received fluid challenge 1 L normal saline creatinine on admission was elevated      Allergies   Allergen Reactions    Adhesive Tape Other (See Comments)     BURNS SKIN    Nsaids Nausea And Vomiting and Other (See Comments)     BURNS STOMACH        MAR reviewed and pertinent medications noted or modified as needed     Current Facility-Administered 
PULMONARY NOTE  [facility]    Name: Francisca Portillo MRN: [id number]   : 1965 Hospital: [facility]   Date: 2024  Admission date: 2024 Hospital Day: 5       HPI:     Patient Active Problem List   Diagnosis    Arthritis    Abdominal pain, acute    SAH (subarachnoid hemorrhage) (HCC)    Communicating hydrocephalus (HCC)    Subarachnoid hemorrhage due to ruptured aneurysm (HCC)    TIA (transient ischemic attack)    Cerebral aneurysm    Sepsis (HCC)    Community acquired bilateral lower lobe pneumonia    Acute kidney injury (HCC)             [x] High complexity decision making was performed  [x] See my orders for details      Subjective/Initial History:     I was asked by Makayla Otto MD to see Francisca Portillo  a 59 y.o. female in consultation     Excerpts from admission 2024 or consult notes as follows:   59 your lady came in because of confusion also patient was having abdominal pain  shortness of breath and dyspnea significant past medical history of obstructive sleep apnea CVA anxiety asthma depression chronic mental illness subarachnoid hemorrhage cerebellar lesion with stent placement COPD.  Came to the emergency room she has a temperature of 99.9 saturation was 98% on room air sat on IV antibiotics chest x-ray shows right lower lobe infiltrate she has difficulty in the speech probably because of the previous stroke and cerebral lesion hemorrhage in the past and she has some right-sided weakness she was hypertensive so admitted and pulmonary consult was called, last night patient was hypotensive blood pressure was 88/66 received fluid challenge 1 L normal saline creatinine on admission was elevated      Allergies   Allergen Reactions    Adhesive Tape Other (See Comments)     BURNS SKIN    Nsaids Nausea And Vomiting and Other (See Comments)     BURNS STOMACH        MAR reviewed and pertinent medications noted or modified as needed     Current Facility-Administered 
PULMONARY NOTE  [facility]    Name: Francisca Portillo MRN: [id number]   : 1965 Hospital: [facility]   Date: 2024  Admission date: 2024 Hospital Day: 8       HPI:     Patient Active Problem List   Diagnosis    Arthritis    Abdominal pain, acute    SAH (subarachnoid hemorrhage) (HCC)    Communicating hydrocephalus (HCC)    Subarachnoid hemorrhage due to ruptured aneurysm (HCC)    TIA (transient ischemic attack)    Cerebral aneurysm    Sepsis (HCC)    Community acquired bilateral lower lobe pneumonia    Acute kidney injury (HCC)             [x] High complexity decision making was performed  [x] See my orders for details      Subjective/Initial History:     I was asked by Makayla Otto MD to see Francisca Portillo  a 59 y.o. female in consultation     Excerpts from admission 2024 or consult notes as follows:   59 your lady came in because of confusion also patient was having abdominal pain  shortness of breath and dyspnea significant past medical history of obstructive sleep apnea CVA anxiety asthma depression chronic mental illness subarachnoid hemorrhage cerebellar lesion with stent placement COPD.  Came to the emergency room she has a temperature of 99.9 saturation was 98% on room air sat on IV antibiotics chest x-ray shows right lower lobe infiltrate she has difficulty in the speech probably because of the previous stroke and cerebral lesion hemorrhage in the past and she has some right-sided weakness she was hypertensive so admitted and pulmonary consult was called, last night patient was hypotensive blood pressure was 88/66 received fluid challenge 1 L normal saline creatinine on admission was elevated      Allergies   Allergen Reactions    Adhesive Tape Other (See Comments)     BURNS SKIN    Nsaids Nausea And Vomiting and Other (See Comments)     BURNS STOMACH        MAR reviewed and pertinent medications noted or modified as needed     Current Facility-Administered 
PULMONARY NOTE  [facility]    Name: Francisca Portillo MRN: [id number]   : 1965 Hospital: [facility]   Date: 2024  Admission date: 2024 Hospital Day: 9       HPI:     Patient Active Problem List   Diagnosis    Arthritis    Abdominal pain, acute    SAH (subarachnoid hemorrhage) (HCC)    Communicating hydrocephalus (HCC)    Subarachnoid hemorrhage due to ruptured aneurysm (HCC)    TIA (transient ischemic attack)    Cerebral aneurysm    Sepsis (HCC)    Community acquired bilateral lower lobe pneumonia    Acute kidney injury (HCC)             [x] High complexity decision making was performed  [x] See my orders for details      Subjective/Initial History:     I was asked by Makayla Otto MD to see Francisca Portillo  a 59 y.o. female in consultation     Excerpts from admission 2024 or consult notes as follows:   59 your lady came in because of confusion also patient was having abdominal pain  shortness of breath and dyspnea significant past medical history of obstructive sleep apnea CVA anxiety asthma depression chronic mental illness subarachnoid hemorrhage cerebellar lesion with stent placement COPD.  Came to the emergency room she has a temperature of 99.9 saturation was 98% on room air sat on IV antibiotics chest x-ray shows right lower lobe infiltrate she has difficulty in the speech probably because of the previous stroke and cerebral lesion hemorrhage in the past and she has some right-sided weakness she was hypertensive so admitted and pulmonary consult was called, last night patient was hypotensive blood pressure was 88/66 received fluid challenge 1 L normal saline creatinine on admission was elevated      Allergies   Allergen Reactions    Adhesive Tape Other (See Comments)     BURNS SKIN    Nsaids Nausea And Vomiting and Other (See Comments)     BURNS STOMACH        MAR reviewed and pertinent medications noted or modified as needed     Current Facility-Administered 
Patient needs a new CRISTIAN, he just lost his CRISTIAN, is still restless, had Ativan recently for tremors and agitation. For CRISTIAN when he settles. He had an episode of anxiety and wheezing increased. SPO2 was 97%. A breathing treatment was given and Ativan. He pulled off his tape to his CRISTIAN and it was then removed by nurse.     
Peripheral IV placed on 11/29/24 in still in place. Two attempts were made on 12/3/24 to change IV site per policy but to no avail. Patient is a hard stick and was supposed to discharge on 12/4/24 but still pending auth.     Will advise day shift nurse to attempt if patient still not discharged today.   
Received Order for Telemetry     Francisca Portillo   1965   [id number]   Acute kidney injury (HCC) [N17.9]   Makayla Otto MD     Tele Box # [id number] placed on patient at  2233 pm  ER Room # T1  Admitting to Room 210  Verified with Primary Nurse Nathalie at  2322 pm    
Report called to CATIE Alejandro at [facility].  Patient/Facility provided with Discharge summary and instructions.  
Spiritual Health History and Assessment/Progress Note  [facility]    Follow-up,  ,  ,      Name: Francisca Portillo MRN: [id number]    Age: 59 y.o.     Sex: female   Language: English   Taoist: Christianity   Acute kidney injury (HCC)     Date: 11/26/2024            Total Time Calculated: 25 min              Spiritual Assessment began in [facility] [place]        Referral/Consult From: Rounding   Encounter Overview/Reason: Follow-up  Service Provided For: Patient and family together    Angelica, Belief, Meaning:   Patient identifies as spiritual, is connected with a angelica tradition or spiritual practice, and has beliefs or practices that help with coping during difficult times  Family/Friends Other: unable to assess      Importance and Influence:  Patient has spiritual/personal beliefs that influence decisions regarding their health  Family/Friends Other: unable to assess at this time    Community:  Patient is connected with a spiritual community and feels well-supported. Support system includes: Children and Extended family  Family/Friends Other: unable to fully assess at this time    Assessment and Plan of Care:     Patient Interventions include: Explored spiritual coping/struggle/distress and Affirmed coping skills/support systems  Family/Friends Interventions include: No family/friends present    Patient Plan of Care: Spiritual Care available upon further referral  Family/Friends Plan of Care: Spiritual Care available upon further referral     is providing follow up with patient per request for her paris .  notified her that her  was out of town at the time and unavailable presently. She was accepting of this and notes she would simply like a Bible & rosary and someone to pray with her.  met her request for prayer and provided her with a Bible and rosary as requested. She expressed her gratitude noting her wish to do devotional reading.      Electronically signed by 
Spiritual Health History and Assessment/Progress Note  [facility]    Initial Encounter,  ,  ,      Name: Francisca Portillo MRN: [id number]    Age: 59 y.o.     Sex: female   Language: English   Religious: Gnosticism   Acute kidney injury (HCC)     Date: 11/25/2024            Total Time Calculated: 17 min              Spiritual Assessment began in [facility] [place]            Encounter Overview/Reason: Initial Encounter  Service Provided For: Patient, Family, Patient and family together, Significant other    Angelica, Belief, Meaning:   Patient identifies as spiritual, is connected with a angelica tradition or spiritual practice, has beliefs or practices that help with coping during difficult times, and Other: R. Gnosticism  Family/Friends Other: unable to assess      Importance and Influence:  Patient has spiritual/personal beliefs that influence decisions regarding their health  Family/Friends have spiritual/personal beliefs that influence decisions regarding the patient's health    Community:  Patient is connected with a spiritual community and feels well-supported. Support system includes: Spouse/Partner, Children, and Angelica Community  Family/Friends No family/friends present    Assessment and Plan of Care:     Patient Interventions include: Facilitated expression of thoughts and feelings, Explored spiritual coping/struggle/distress, Engaged in theological reflection, Facilitated life review and/ or legacy, and Other: prayer, active listening, ministry of presence  Family/Friends Interventions include: Facilitated expression of thoughts and feelings, Facilitated life review and/or legacy, and Other: prayer, active listening, ministry of presence    Patient Plan of Care: Spiritual Care available upon further referral  Family/Friends Plan of Care: Spiritual Care available upon further referral    I met with the Pt, Ms. Francisca Portillo, and her son and boyfriend, while rounding. Pt requested prayer. She is [place] 
11/26/24 0848    oxyCODONE-acetaminophen (PERCOCET)  MG per tablet 1 tablet  1 tablet Oral Q6H PRN Darius Sams PA-C   1 tablet at 11/26/24 0542    midodrine (PROAMATINE) tablet 5 mg  5 mg Oral TID PRN Darius Sams PA-C   5 mg at 11/25/24 0654    0.9 % sodium chloride infusion   IntraVENous Continuous Murphy Thao  mL/hr at 11/26/24 0506 New Bag at 11/26/24 0506    cyclobenzaprine (FLEXERIL) tablet 10 mg  10 mg Oral TID PRN Lalo Isbell MD   10 mg at 11/26/24 0852    gabapentin (NEURONTIN) capsule 100 mg  100 mg Oral BID Lalo Isbell MD   100 mg at 11/26/24 0848    ALPRAZolam (XANAX) tablet 2 mg  2 mg Oral TID PRN Lalo Isbell MD   2 mg at 11/26/24 0852    atorvastatin (LIPITOR) tablet 20 mg  20 mg Oral Nightly Makayla Otto MD   20 mg at 11/25/24 2032    cilostazol (PLETAL) tablet 50 mg  50 mg Oral BID Makayla Otto MD   50 mg at 11/26/24 0847    clopidogrel (PLAVIX) tablet 75 mg  75 mg Oral Daily Makayla Otto MD   75 mg at 11/26/24 0847    ferrous sulfate (IRON 325) tablet 325 mg  325 mg Oral Daily with breakfast Makayla Otto MD   325 mg at 11/26/24 0848    pantoprazole (PROTONIX) tablet 40 mg  40 mg Oral QAM AC Makayla Otto MD   40 mg at 11/26/24 0519    sertraline (ZOLOFT) tablet 200 mg  200 mg Oral Daily Makayla Otto MD   200 mg at 11/26/24 0847    sodium chloride flush 0.9 % injection 5-40 mL  5-40 mL IntraVENous 2 times per day Makayla Otto MD   10 mL at 11/26/24 0848    sodium chloride flush 0.9 % injection 5-40 mL  5-40 mL IntraVENous PRN Makayla Otto MD        0.9 % sodium chloride infusion   IntraVENous PRN Makayla Otto MD        ondansetron (ZOFRAN-ODT) disintegrating tablet 4 mg  4 mg Oral Q8H PRN Makayla Otto MD        Or    ondansetron (ZOFRAN) injection 4 mg  4 mg IntraVENous Q6H PRN Makayla Otto MD        polyethylene glycol (GLYCOLAX) packet 17 g  17 g Oral Daily PRN Makayla Otto MD   17 g at 11/25/24 5011 
   acetaminophen (TYLENOL) tablet 650 mg  650 mg Oral Q6H PRN Makayla Otto MD        Or    acetaminophen (TYLENOL) suppository 650 mg  650 mg Rectal Q6H PRN Makayla Otto MD            Allergies   Allergen Reactions    Adhesive Tape Other (See Comments)     BURNS SKIN    Nsaids Nausea And Vomiting and Other (See Comments)     BURNS STOMACH       Review of Systems:  A comprehensive review of systems was negative except for that written in the History of Present Illness.    Objective:     Vitals:    12/02/24 0305 12/02/24 0513 12/02/24 0642 12/02/24 0818   BP: 132/69   134/71   Pulse: 69   76   Resp: 18  18 16   Temp: 98.2 °F (36.8 °C)   98.4 °F (36.9 °C)   TempSrc: Oral   Oral   SpO2: 97%   98%   Weight:  (!) 155.4 kg (342 lb 9.5 oz)     Height:            Physical Exam:  General: NAD  Eyes: sclera anicteric  Oral Cavity: No thrush or ulcers  Neck: no JVD  Chest: Fair bilateral air entry  Heart: normal sounds  Abdomen: soft and non tender   : no aldrich  Lower Extremities: trace edema  Skin: no rash  Neuro: intact  Psychiatric: non-depressed          Assessment/Plan:   Acute kidney injury  Secondary to prerenal azotemia from hypotension/volume depletion  On admission BUN/creatinine 114/4.83.  Resolved ROSALIO,  Baseline creatinine 0.9 on 11/1/2024.  Clinically improving LE swelling.  Urinalysis 5-10 RBCs.  Total CPK 3299.  No evidence of hydronephrosis per renal ultrasound  off IV hydration.  continue Bumex 1 mg oral daily.    Metabolic acidosis  Normal anion gap  Resolved  off bicarb drip    Hypokalemia  Potassium 3.0->3.6.  Received Oral KCL 40 meq x1.    Hypotension  Blood pressures are low on arrival  Received normal saline 2 L boluses  Still soft to low blood pressure  off IV hydration  Agree to hold all blood pressure medications  Better Bps.  Off Midodrine.     Renal osteodystrophy  Calcium is okay  Phosphorus 6.9->3.2.  Stopped sevelamer.  CPK 3200.    Pneumonia  Tachycardia/low-grade 
  Heart:  Regular rate and rhythm, S1, S2 normal, no murmur, click, rub or gallop.   Abdomen:   Soft, non-tender. Bowel sounds normal. No masses,  No organomegaly.   Extremities: Extremities normal, atraumatic, no cyanosis or edema.   Pulses: 2+ and symmetric all extremities.   Skin: Skin color, texture, turgor normal. No rashes or lesions   Neurologic: CNII-XII intact.  No gross focal deficits         Data Review:       Recent Days:  Recent Labs     11/28/24  0545   WBC 6.3   HGB 10.3*   HCT 33.0*        Recent Labs     11/28/24  0545 11/29/24  1014 11/30/24  0627    142 142   K 3.6 3.0* 3.6   * 109* 108   CO2 25 25 30   GLUCOSE 98 165* 96   BUN 33* 21* 17   CREATININE 0.84 0.89 0.77   CALCIUM 9.4 9.0 9.1   PHOS 2.3* 2.2* 3.2     No results for input(s): \"PHART\", \"IBY3CZN\", \"PO2ART\", \"SFA3TGO\", \"BEART\", \"AVJ1WROH\", \"HGBART\", \"NW5YNGVIW\", \"FIO2A\", \"Z2EVASTA\", \"OXYHEM\", \"CARBOXHGBART\", \"METHGBART\", \"G1BMGXTEY\", \"PHCORART\", \"TEMP\" in the last 72 hours.    Invalid input(s): \"VRD5WDMXA\"    24 Hour Results:  Recent Results (from the past 24 hour(s))   Renal Function Panel    Collection Time: 11/30/24  6:27 AM   Result Value Ref Range    Sodium 142 136 - 145 mmol/L    Potassium 3.6 3.5 - 5.1 mmol/L    Chloride 108 97 - 108 mmol/L    CO2 30 21 - 32 mmol/L    Anion Gap 4 2 - 12 mmol/L    Glucose 96 65 - 100 mg/dL    BUN 17 6 - 20 mg/dL    Creatinine 0.77 0.55 - 1.02 mg/dL    BUN/Creatinine Ratio 22 (H) 12 - 20      Est, Glom Filt Rate 90 >60 ml/min/1.73m2    Calcium 9.1 8.5 - 10.1 mg/dL    Phosphorus 3.2 2.6 - 4.7 mg/dL    Albumin 2.4 (L) 3.5 - 5.0 g/dL         XR CHEST PORTABLE   Final Result   1. Decrease in infiltrate in the right lower lobe. Continued follow-up is   suggested.      Electronically signed by YOBANI ESPINOZA      CT HEAD WO CONTRAST   Final Result      No acute intracranial abnormality.         Electronically signed by Dwain Vela      CT ABDOMEN PELVIS WO CONTRAST Additional 
g at 11/26/24 1554    acetaminophen (TYLENOL) tablet 650 mg  650 mg Oral Q6H PRN Makayla Otto MD   650 mg at 12/02/24 2034    Or    acetaminophen (TYLENOL) suppository 650 mg  650 mg Rectal Q6H PRN Makayla Otto MD            Allergies   Allergen Reactions    Adhesive Tape Other (See Comments)     BURNS SKIN    Nsaids Nausea And Vomiting and Other (See Comments)     BURNS STOMACH       Review of Systems:  A comprehensive review of systems was negative except for that written in the History of Present Illness.    Objective:     Vitals:    12/03/24 0729 12/03/24 0837 12/03/24 0917 12/03/24 0947   BP:  121/77     Pulse: 72 71     Resp:  18 15 14   Temp:  97.9 °F (36.6 °C)     TempSrc:  Oral     SpO2:  93%     Weight:       Height:            Physical Exam:  General: NAD  Eyes: sclera anicteric  Oral Cavity: No thrush or ulcers  Neck: no JVD  Chest: Fair bilateral air entry  Heart: normal sounds  Abdomen: soft and non tender   : no aldrich  Lower Extremities: trace edema  Skin: no rash  Neuro: intact  Psychiatric: non-depressed          Assessment/Plan:   Acute kidney injury  Secondary to prerenal azotemia from hypotension/volume depletion  On admission BUN/creatinine 114/4.83.  Resolved ROSALIO,  Baseline creatinine 0.9 on 11/1/2024.  Clinically improving LE swelling.  Urinalysis 5-10 RBCs.  Total CPK 3299.  No evidence of hydronephrosis per renal ultrasound  off IV hydration.  continue Bumex 1 mg oral daily.    Metabolic acidosis  Normal anion gap  Resolved  off bicarb drip    Hypokalemia  Potassium 3.0->3.6.  Received Oral KCL 40 meq x1.    Hypotension  Blood pressures are low on arrival  Received normal saline 2 L boluses  Still soft to low blood pressure  off IV hydration  Agree to hold all blood pressure medications  Better Bps.  Off Midodrine.     Renal osteodystrophy  Calcium is okay  Phosphorus 6.9->3.2.  Stopped sevelamer.  CPK 3200.    Pneumonia  Tachycardia/low-grade fever/hypotension  off IV fluids  IV 
g at 11/26/24 1554    acetaminophen (TYLENOL) tablet 650 mg  650 mg Oral Q6H PRN Makayla Otto MD   650 mg at 12/02/24 2034    Or    acetaminophen (TYLENOL) suppository 650 mg  650 mg Rectal Q6H PRN Makayla Otto MD            Allergies   Allergen Reactions    Adhesive Tape Other (See Comments)     BURNS SKIN    Nsaids Nausea And Vomiting and Other (See Comments)     BURNS STOMACH       Review of Systems:  A comprehensive review of systems was negative except for that written in the History of Present Illness.    Objective:     Vitals:    12/04/24 0313 12/04/24 0700 12/04/24 0825 12/04/24 0900   BP: 132/78  114/79    Pulse: 74 72 77    Resp: 18  18 17   Temp: 98.2 °F (36.8 °C)  97.9 °F (36.6 °C)    TempSrc: Oral  Oral    SpO2: 96%  97%    Weight:       Height:            Physical Exam:  General: NAD  Eyes: sclera anicteric  Oral Cavity: No thrush or ulcers  Neck: no JVD  Chest: Fair bilateral air entry  Heart: normal sounds  Abdomen: soft and non tender   : no aldrich  Lower Extremities: trace edema  Skin: no rash  Neuro: intact  Psychiatric: non-depressed          Assessment/Plan:   Acute kidney injury  Secondary to prerenal azotemia from hypotension/volume depletion  On admission BUN/creatinine 114/4.83.  Resolved ROSALIO,  Baseline creatinine 0.9 on 11/1/2024.  Clinically improving LE swelling.  Urinalysis 5-10 RBCs.  Total CPK 3299.  No evidence of hydronephrosis per renal ultrasound  off IV hydration.  continue Bumex 1 mg oral daily.    Metabolic acidosis  Normal anion gap  Resolved  off bicarb drip    Hypokalemia  Potassium 3.0->3.9.  Received Oral KCL 40 meq x1.    Hypotension  Blood pressures are low on arrival  Received normal saline 2 L boluses  Still soft to low blood pressure  off IV hydration  Agree to hold all blood pressure medications  Better Bps.  Off Midodrine.     Renal osteodystrophy  Calcium is okay  Phosphorus 6.9->3.2.  Stopped sevelamer.  CPK 3200.    Pneumonia  Tachycardia/low-grade 
fever/hypotension  off IV fluids  IV antibiotics    Anemia  Hemoglobin 9.9->10.3.  Tsat 22, Ferritin 123.  Continue FeSO4 325 mg daily.   No role for Epo.    Plan:       Signed By: Murphy Thao MD     December 5, 2024       
fever/hypotension  off IV fluids  IV antibiotics    Anemia  Hemoglobin 9.9->10.3.  Tsat 22, Ferritin 123.  Continue FeSO4 325 mg daily.   No role for Epo.    Plan:       Signed By: Murphy Thao MD     December 6, 2024       
okay  Phosphorus 6.9->3.2.  Stopped sevelamer.  CPK 3200.    Pneumonia  Tachycardia/low-grade fever/hypotension  off IV fluids  IV antibiotics    Anemia  Hemoglobin 9.9  Tsat 22, Ferritin 123.  No role for Epo.    Plan:       Signed By: Murphy Thao MD     November 29, 2024       
patient's board on functional status and mobility recommendations, and nsg updated     COMMUNICATION/EDUCATION:   The patient’s plan of care was discussed with: Physical therapy assistant and Registered nurse    Patient Education  Education Given To: Patient  Education Provided: Home Exercise Program;Plan of Care;Transfer Training;ADL Adaptive Strategies  Education Method: Demonstration;Verbal  Barriers to Learning: Cognition  Education Outcome: Demonstrated understanding;Continued education needed    Thank you for this referral.  CHUCKIE Mccallum  Minutes: 27    
tid.    Renal osteodystrophy  Calcium is okay  Phosphorus 6.9->3.2.  Stopped sevelamer.  CPK 3200.    Pneumonia  Tachycardia/low-grade fever/hypotension  DC IV fluids  IV antibiotics    Anemia  Hemoglobin 9.9  Tsat 22, Ferritin 123.  No role for Epo.    Plan:       Signed By: Murphy Thao MD     November 28, 2024       
Date: 11/24/2024  EXAM:  CT HEAD WO CONTRAST INDICATION: Altered mental status COMPARISON: 11/1/2024 CT TECHNIQUE: Noncontrast head CT. Coronal and sagittal reformats. CT dose reduction was achieved through use of a standardized protocol tailored for this examination and automatic exposure control for dose modulation. FINDINGS: The ventricles and sulci are age-appropriate without hydrocephalus. There is no mass effect or midline shift. There is no intracranial hemorrhage or extra-axial fluid collection. Aneurysm coils or clips are again demonstrated in the [place]. There is stable chronic encephalomalacia in the left frontotemporal region. There is no new abnormal parenchymal attenuation. The basal cisterns are patent. The osseous structures are intact. The visualized paranasal sinuses and mastoid air cells are clear.     No acute intracranial abnormality. Electronically signed by Dwain Vela    US RETROPERITONEAL COMPLETE    Result Date: 11/24/2024   EXAM: US RENAL-RETROPERITONEAL INDICATION: Acute kidney injury COMPARISON: CT 7/10/2024 FINDINGS: The patient is studied with coronal and axial real-time ultrasound. The right kidney measures 11.0 cm. The left kidney is not seen due to body habitus and limited mobility.  Renal contour and echogenicity are normal.  There is no mass or shadowing calculus.  There is no hydronephrosis.  The aorta and IVC are not seen due to bowel gas.  The urinary bladder is nondistended with a Arnold catheter in place.     Normal appearance of the right kidney without hydronephrosis. Nonvisualized left kidney due to body habitus and limited mobility. Electronically signed by Dwain Vela    XR CHEST 1 VIEW    Result Date: 11/24/2024  EXAM: XR CHEST 1 VIEW INDICATION: Fever/Suspected Infection/Sepsis COMPARISON: 7/10/2024 FINDINGS: A portable AP radiograph of the chest was obtained at 1517 hours patchy infiltrate in the right infrahilar region. The cardiac and mediastinal 
IV fluids initially called sepsis protocol her temperature was 99.9  On admission creatinine was 4.83 after giving IV fluid creatinine today is 2.94  She is not on any oxygen at home she has a history of sleep apnea but not on CPAP machine  Potassium still 3.0 we will replace  Supplemental O2 to keep sats > 93%  Aspiration precautions  Labs to follow electrolytes, renal function and and blood counts  Glucose monitoring and SSI  Bronchial hygiene with respiratory therapy techniques, bronchodilators  DVT, SUP prophylaxis  Pt needs IV fluids with additives and Drug therapy requiring intensive monitoring for toxicity  Prescription drug management with home med reconciliation reviewed     11/26 alert awake dysarthria daughter is at the bedside she does not have any oxygen at home by the CPAP machine she has a nebulizer at home she had a history of sleep apnea potassium was low we will replace BUN/creatinine significantly improved 1.46 today we will repeat chest x-ray at the admission chest x-ray shows right infrahilar infiltrate on Rocephin and Zithromax, physical therapy out of bed to chair        Segun Lee MD      
dysarthria no wheezing not on any inhalers she uses nebulizer machine at home  11/29 she is alert awake on room air saturation 93% last chest x-ray shows decrease in infiltrate of right lower lobe possible placement to [facility] inpatient rehab  11/30 now she is on room air she was on 4 L nasal cannula, asthma stable she needs nebulizer machine at home once discharged pressure improved 126/70 on midodrine  12/1 she is now on room air asthma stable she needs sleep study as an outpatient discussed with her she had a history of sleep apnea but not on CPAP machine  12/2 alert awake doing better on room air discussed with her to follow up with me as an outpatient so we can get Home sleep testing history of CVA with dysarthria possible central sleep apnea patient is on Bumex 1 mg oral daily renal function back to baseline  12/3 no new complaint okay to discharge follow-up as an outpatient for sleep study  12/4 will sign out awaiting for placement, will schedule for sleep study as an outpatient        Segun Lee MD      
x-ray shows improvement in the right lower lobe infiltrate she is on Rocephin and Zithromax, replace potassium 3.4 today physical therapy out of bed to chair  11/28 alert awake on room air, she has dysarthria no wheezing not on any inhalers she uses nebulizer machine at home  11/29 she is alert awake on room air saturation 93% last chest x-ray shows decrease in infiltrate of right lower lobe possible placement to [facility] inpatient rehab  11/30 now she is on room air she was on 4 L nasal cannula, asthma stable she needs nebulizer machine at home once discharged pressure improved 126/70 on midodrine        Segun Lee MD

## 2024-12-06 NOTE — DISCHARGE SUMMARY
Physician Discharge Summary     Patient ID:    Francisca Portillo  [id number]  59 y.o.  1965    Admit date: 11/24/2024    Discharge date : 12/2/2024      Final Diagnoses:   Acute kidney injury (HCC) [N17.9]  Acute renal failure, unspecified acute renal failure type (HCC) [N17.9]  Community acquired pneumonia, unspecified laterality [J18.9]      Reason for Hospitalization: Pneumonia, ROSALIO    Hospital Course:     Francisca Portillo is a 59 y.o female with a history of SONIA, prior CVA with residual right sided weakness, anxiety, arthritis, asthma, chronic mental illness, depression, GERD, hypertension, obesity, SAH, cerebral aneurysm with stent placement surgery, COPD, and a former smoker, who was brought to the ED today for altered mental status, decreased ambulation, generalized weakness, congestion, decreased appetite, and decreased urination, onset a couple of days ago. Per patient's son, the patient is normally awake, alert, and oriented. He also states that she normally has a slight slurring of words due to previous stroke, however, he states that she is currently more confused and is slurring more words than usual. The patient is also complaining of abdominal pain, but she is a poor historian at this time secondary to confusion.     In the ED, the patient was given Rocephin, Levaquin, and IV fluids.  Blood cultures were taken. Code sepsis was called by the ED physician. And a stroke scale was completed which showed a NIH of 9.      The patient denies any head pain, head trauma, chest pain, shortness of breath, or any other constitutional symptoms at this time    ROSALIO  Secondary to prerenal azotemia from hypotension/volume depletion  On admission BUN/creatinine 114/4.83  Resolving ROSALIO, BUN/creatinine 71/1.46.  BUN/creatinine 98/2.94 today  Baseline creatinine 0.9 on 11/1/2024.  Clinically volume down and hypotensive  Urinalysis 5-10 RBCs and sending UPCR  Total CPK 3299.  No evidence of 
                   Physician Discharge Summary     Patient ID:    Francisca Portillo  [id number]  59 y.o.  1965    Admit date: 11/24/2024    Discharge date : 12/5/2024      Final Diagnoses:   Acute kidney injury (HCC) [N17.9]  Acute renal failure, unspecified acute renal failure type (HCC) [N17.9]  Community acquired pneumonia, unspecified laterality [J18.9]      Reason for Hospitalization: Pneumonia, ROSALIO    Hospital Course:     Francisca Portillo is a 59 y.o female with a history of SONIA, prior CVA with residual right sided weakness, anxiety, arthritis, asthma, chronic mental illness, depression, GERD, hypertension, obesity, SAH, cerebral aneurysm with stent placement surgery, COPD, and a former smoker, who was brought to the ED today for altered mental status, decreased ambulation, generalized weakness, congestion, decreased appetite, and decreased urination, onset a couple of days ago. Per patient's son, the patient is normally awake, alert, and oriented. He also states that she normally has a slight slurring of words due to previous stroke, however, he states that she is currently more confused and is slurring more words than usual. The patient is also complaining of abdominal pain, but she is a poor historian at this time secondary to confusion.     In the ED, the patient was given Rocephin, Levaquin, and IV fluids.  Blood cultures were taken. Code sepsis was called by the ED physician. And a stroke scale was completed which showed a NIH of 9.      The patient denies any head pain, head trauma, chest pain, shortness of breath, or any other constitutional symptoms at this time    ROSALIO  Secondary to prerenal azotemia from hypotension/volume depletion  On admission BUN/creatinine 114/4.83  Resolving ROSALIO, BUN/creatinine 71/1.46.  BUN/creatinine 98/2.94 12/2  Baseline creatinine 0.9 on 11/1/2024.  Clinically volume down and hypotensive  Urinalysis 5-10 RBCs and sending UPCR  Total CPK 3299.  No evidence of 
                   Physician Discharge Summary     Patient ID:    Francisca Portillo  [id number]  59 y.o.  1965    Admit date: 11/24/2024    Discharge date : 12/6/2024      Final Diagnoses:   Acute kidney injury (HCC) [N17.9]  Acute renal failure, unspecified acute renal failure type (HCC) [N17.9]  Community acquired pneumonia, unspecified laterality [J18.9]      Reason for Hospitalization: Pneumonia, ROSALIO    Hospital Course:     Francisca Portillo is a 59 y.o female with a history of SONIA, prior CVA with residual right sided weakness, anxiety, arthritis, asthma, chronic mental illness, depression, GERD, hypertension, obesity, SAH, cerebral aneurysm with stent placement surgery, COPD, and a former smoker, who was brought to the ED today for altered mental status, decreased ambulation, generalized weakness, congestion, decreased appetite, and decreased urination, onset a couple of days ago. Per patient's son, the patient is normally awake, alert, and oriented. He also states that she normally has a slight slurring of words due to previous stroke, however, he states that she is currently more confused and is slurring more words than usual. The patient is also complaining of abdominal pain, but she is a poor historian at this time secondary to confusion.     In the ED, the patient was given Rocephin, Levaquin, and IV fluids.  Blood cultures were taken. Code sepsis was called by the ED physician. And a stroke scale was completed which showed a NIH of 9.      The patient denies any head pain, head trauma, chest pain, shortness of breath, or any other constitutional symptoms at this time    12/6 patient currently remains stable for discharge pending placement.    ROSALIO  Secondary to prerenal azotemia from hypotension/volume depletion  On admission BUN/creatinine 114/4.83  Resolving ROSALIO, BUN/creatinine 71/1.46.  BUN/creatinine 98/2.94 12/2  Baseline creatinine 0.9 on 11/1/2024.  Clinically volume down and 
tablet  Commonly known as: AMBIEN            STOP taking these medications      cyanocobalamin 1000 MCG/ML injection     ferrous sulfate 325 (65 Fe) MG EC tablet  Commonly known as: FE TABS 325     HYDROcodone-acetaminophen 5-325 MG per tablet  Commonly known as: NORCO     losartan 50 MG tablet  Commonly known as: COZAAR                Follow up Care:    Follow-up Information       Follow up With Specialties Details Why Contact Info    Bijan Martínez Jr., APRN - NP Nurse Practitioner Schedule an appointment as soon as possible for a visit in 1 week(s)  [address] [facility] [address] [phone]                   Diet:  regular diet    Disposition:  SNF    Advanced Directive:   FULL X   DNR      Discharge Exam:                     Vitals:    12/03/24 0917   BP:    Pulse:    Resp: 15   Temp:    SpO2:       General:  Alert, cooperative, no distress, appears stated age.   Lungs:   Clear to auscultation bilaterally.   Chest wall:  No tenderness or deformity.   Heart:  Regular rate and rhythm, S1, S2 normal, no murmur, click, rub or gallop.   Abdomen:   Soft, non-tender. Bowel sounds normal. No masses,  No organomegaly.   Extremities: Extremities normal, atraumatic, no cyanosis or edema.   Pulses: 2+ and symmetric all extremities.   Skin: Skin color, texture, turgor normal. No rashes or lesions   Neurologic: CNII-XII intact. No gross sensory or motor deficits             Significant Diagnostic Studies:   11/24/2024:  mg/dL (H; Ref range: 6 - 20 mg/dL); Calcium 8.6 mg/dL (Ref range: 8.5 - 10.1 mg/dL); Chloride 96 mmol/L (L; Ref range: 97 - 108 mmol/L); CO2 19 mmol/L (L; Ref range: 21 - 32 mmol/L); Creatinine 4.83 mg/dL (H; Ref range: 0.55 - 1.02 mg/dL); Glucose 123 mg/dL (H; Ref range: 65 - 100 mg/dL); Hematocrit 30.4 % (L; Ref range: 35.0 - 47.0 %); Hemoglobin 10.2 g/dL (L; Ref range: 11.5 - 16.0 g/dL); POC Creatinine 4.47 MG/DL (H; Ref range: 0.6 - 1.3 MG/DL); Potassium 3.4 mmol/L (L; Ref range: 
(L; Ref range: 3.5 - 5.1 mmol/L); Sodium 134 mmol/L (L; Ref range: 136 - 145 mmol/L)  11/25/2024:  mg/dL (H; Ref range: 6 - 20 mg/dL); BUN 98 mg/dL (H; Ref range: 6 - 20 mg/dL); BUN 80 mg/dL (H; Ref range: 6 - 20 mg/dL); Calcium 8.6 mg/dL (Ref range: 8.5 - 10.1 mg/dL); Calcium 8.9 mg/dL (Ref range: 8.5 - 10.1 mg/dL); Calcium 9.0 mg/dL (Ref range: 8.5 - 10.1 mg/dL); Chloride 98 mmol/L (Ref range: 97 - 108 mmol/L); Chloride 97 mmol/L (Ref range: 97 - 108 mmol/L); Chloride 100 mmol/L (Ref range: 97 - 108 mmol/L); CO2 24 mmol/L (Ref range: 21 - 32 mmol/L); CO2 24 mmol/L (Ref range: 21 - 32 mmol/L); CO2 26 mmol/L (Ref range: 21 - 32 mmol/L); Creatinine 2.95 mg/dL (H; Ref range: 0.55 - 1.02 mg/dL); Creatinine 2.94 mg/dL (H; Ref range: 0.55 - 1.02 mg/dL); Creatinine 1.90 mg/dL (H; Ref range: 0.55 - 1.02 mg/dL); Glucose 94 mg/dL (Ref range: 65 - 100 mg/dL); Glucose 97 mg/dL (Ref range: 65 - 100 mg/dL); Glucose 115 mg/dL (H; Ref range: 65 - 100 mg/dL); Hematocrit 31.0 % (L; Ref range: 35.0 - 47.0 %); Hemoglobin 10.2 g/dL (L; Ref range: 11.5 - 16.0 g/dL); Potassium 2.8 mmol/L (L; Ref range: 3.5 - 5.1 mmol/L); Potassium 2.7 mmol/L (LL; Ref range: 3.5 - 5.1 mmol/L); Potassium 3.1 mmol/L (L; Ref range: 3.5 - 5.1 mmol/L); Sodium 136 mmol/L (Ref range: 136 - 145 mmol/L); Sodium 135 mmol/L (L; Ref range: 136 - 145 mmol/L); Sodium 138 mmol/L (Ref range: 136 - 145 mmol/L)  No results for input(s): \"WBC\", \"HGB\", \"HCT\", \"PLT\" in the last 72 hours.  Recent Labs     12/02/24  1026      K 3.9      CO2 27   BUN 12   CREATININE 0.64   GLUCOSE 107*   CALCIUM 9.0     No results for input(s): \"AST\", \"ALT\", \"ALKPHOS\", \"BILITOT\", \"LABALBU\", \"GLOB\", \"GGT\", \"AMYLASE\", \"LIPASE\" in the last 72 hours.    Invalid input(s): \"GPT\", \"PROT\"  No results for input(s): \"INR\", \"PROTIME\", \"APTT\" in the last 72 hours.   No results for input(s): \"IRON\", \"TIBC\" in the last 72 hours.    Invalid input(s): \"PSAT\", \"FERR\"   No results for input(s):

## 2025-01-07 ENCOUNTER — TELEPHONE (OUTPATIENT)
Age: 60
End: 2025-01-07

## 2025-01-07 DIAGNOSIS — I67.1 CEREBRAL ANEURYSM: Primary | ICD-10-CM

## 2025-01-07 RX ORDER — CLOPIDOGREL BISULFATE 75 MG/1
75 TABLET ORAL DAILY
Qty: 30 TABLET | Refills: 1 | Status: SHIPPED | OUTPATIENT
Start: 2025-01-07

## 2025-01-07 NOTE — TELEPHONE ENCOUNTER
Patient's  (Mustapha) called with his wife in the background. They called because they are down to two clopidogrel pills and their pharmacy told them that they are out of refills.    Patient also asked when her next follow-up with Dr. Choi is?      I put them on hold and spoke to our nurse and then let them know that our nurse has put in a refill for 60 days, and let them know that she will be calling them soon to schedule their follow-up diagnostic angiogram for January.    They requested that Emily Gaspar call to Mustapha's number at [phone].    I assured them that I would let Emily Gaspar know their preference.

## 2025-01-14 ENCOUNTER — TELEPHONE (OUTPATIENT)
Age: 60
End: 2025-01-14

## 2025-01-14 NOTE — TELEPHONE ENCOUNTER
Message left for patient to return call to schedule follow up Diagnostic angiogram.  1/22/2025 arrival 8:00 am.

## 2025-02-04 ENCOUNTER — TELEPHONE (OUTPATIENT)
Age: 60
End: 2025-02-04

## 2025-02-04 DIAGNOSIS — I67.1 CEREBRAL ANEURYSM: Primary | ICD-10-CM

## 2025-02-04 NOTE — TELEPHONE ENCOUNTER
PT'S son ro called and he was returning your phone call from the other day  to have his mum produce scheduled (DSA). The nurse  said to call him back tomorrow   to have that scheduled. A good call back number is [phone] .

## 2025-02-07 DIAGNOSIS — I67.1 CEREBRAL ANEURYSM: ICD-10-CM

## 2025-02-07 NOTE — TELEPHONE ENCOUNTER
Spoke to sonJohnathan to confirm follow up Diagnostic Angiogram on 3/5/2025 at [facility].   Arrival time 7:00 am at Patient Registration. Main Hospital Entrance.  Blood work will be needed at least one week prior to procedure at a [facility]   [facility].  -No eating or drinking after midnight the night prior to Angiogram.  -Take all morning medications with sips of water the morning of the angiogram.  -You must have a  to drive you home upon discharge.  -Recommend you have someone with you for at least 24-48 hours post procedure.  -No metal or glue in hair.  Son stated patient was doing fine. No problems to report.  My chart message also sent.

## 2025-02-25 ENCOUNTER — TELEPHONE (OUTPATIENT)
Age: 60
End: 2025-02-25

## 2025-02-25 NOTE — TELEPHONE ENCOUNTER
Spoke to son Johnathan to confirm reschedule of Diagnostic Angiogram on 3/12/2025 at [facility].   Arrival time 8:00 am at Patient Registration. Main Hospital Entrance.  Blood work will be needed at least one week prior to procedure at a [facility]   [facility].  -No eating or drinking after midnight the night prior to Angiogram.  -Take all morning medications with sips of water the morning of the angiogram.  -You must have a  to drive you home upon discharge.  -Recommend you have someone with you for at least 24-48 hours post procedure.  -No metal or glue in hair.  Son stated understanding.

## 2025-03-07 LAB
ALBUMIN SERPL-MCNC: 4.1 G/DL (ref 3.8–4.9)
ALP SERPL-CCNC: 97 IU/L (ref 44–121)
ALT SERPL-CCNC: 12 IU/L (ref 0–32)
AST SERPL-CCNC: 16 IU/L (ref 0–40)
BILIRUB SERPL-MCNC: 0.3 MG/DL (ref 0–1.2)
BUN SERPL-MCNC: 10 MG/DL (ref 6–24)
BUN/CREAT SERPL: 14 (ref 9–23)
CALCIUM SERPL-MCNC: 9.1 MG/DL (ref 8.7–10.2)
CHLORIDE SERPL-SCNC: 97 MMOL/L (ref 96–106)
CO2 SERPL-SCNC: 27 MMOL/L (ref 20–29)
CREAT SERPL-MCNC: 0.72 MG/DL (ref 0.57–1)
EGFRCR SERPLBLD CKD-EPI 2021: 97 ML/MIN/1.73
ERYTHROCYTE [DISTWIDTH] IN BLOOD BY AUTOMATED COUNT: 13.4 % (ref 11.7–15.4)
GLOBULIN SER CALC-MCNC: 2.8 G/DL (ref 1.5–4.5)
GLUCOSE SERPL-MCNC: 104 MG/DL (ref 70–99)
HCT VFR BLD AUTO: 40.7 % (ref 34–46.6)
HGB BLD-MCNC: 13 G/DL (ref 11.1–15.9)
MCH RBC QN AUTO: 29.3 PG (ref 26.6–33)
MCHC RBC AUTO-ENTMCNC: 31.9 G/DL (ref 31.5–35.7)
MCV RBC AUTO: 92 FL (ref 79–97)
PLATELET # BLD AUTO: 326 X10E3/UL (ref 150–450)
POTASSIUM SERPL-SCNC: 3.4 MMOL/L (ref 3.5–5.2)
PROT SERPL-MCNC: 6.9 G/DL (ref 6–8.5)
RBC # BLD AUTO: 4.44 X10E6/UL (ref 3.77–5.28)
SODIUM SERPL-SCNC: 141 MMOL/L (ref 134–144)
WBC # BLD AUTO: 8.9 X10E3/UL (ref 3.4–10.8)

## 2025-03-10 DIAGNOSIS — I67.1 CEREBRAL ANEURYSM: ICD-10-CM

## 2025-03-10 RX ORDER — CLOPIDOGREL BISULFATE 75 MG/1
75 TABLET ORAL DAILY
Qty: 30 TABLET | Refills: 1 | Status: ON HOLD | OUTPATIENT
Start: 2025-03-10 | End: 2025-03-12 | Stop reason: HOSPADM

## 2025-03-12 ENCOUNTER — HOSPITAL ENCOUNTER (OUTPATIENT)
Facility: HOSPITAL | Age: 60
Discharge: HOME | End: 2025-03-12
Attending: PSYCHIATRY & NEUROLOGY | Admitting: PSYCHIATRY & NEUROLOGY
Payer: MEDICAID

## 2025-03-12 VITALS
SYSTOLIC BLOOD PRESSURE: 156 MMHG | TEMPERATURE: 98.5 F | OXYGEN SATURATION: 95 % | HEART RATE: 104 BPM | RESPIRATION RATE: 17 BRPM | DIASTOLIC BLOOD PRESSURE: 89 MMHG

## 2025-03-12 DIAGNOSIS — I67.1 CEREBRAL ANEURYSM: Primary | ICD-10-CM

## 2025-03-12 DIAGNOSIS — I67.1 CEREBRAL ANEURYSM: ICD-10-CM

## 2025-03-12 PROCEDURE — 36224 PLACE CATH CAROTD ART: CPT | Performed by: PSYCHIATRY & NEUROLOGY

## 2025-03-12 PROCEDURE — 99152 MOD SED SAME PHYS/QHP 5/>YRS: CPT

## 2025-03-12 PROCEDURE — 6360000002 HC RX W HCPCS: Performed by: PSYCHIATRY & NEUROLOGY

## 2025-03-12 PROCEDURE — 6360000004 HC RX CONTRAST MEDICATION: Performed by: PSYCHIATRY & NEUROLOGY

## 2025-03-12 RX ORDER — DULOXETIN HYDROCHLORIDE 60 MG/1
60 CAPSULE, DELAYED RELEASE ORAL EVERY MORNING
COMMUNITY

## 2025-03-12 RX ORDER — IOPAMIDOL 612 MG/ML
INJECTION, SOLUTION INTRAVASCULAR PRN
Status: COMPLETED | OUTPATIENT
Start: 2025-03-12 | End: 2025-03-12

## 2025-03-12 RX ORDER — CYANOCOBALAMIN 1000 UG/ML
INJECTION, SOLUTION INTRAMUSCULAR; SUBCUTANEOUS
COMMUNITY
Start: 2025-02-12

## 2025-03-12 RX ORDER — POTASSIUM CHLORIDE 1500 MG/1
20 TABLET, EXTENDED RELEASE ORAL DAILY
COMMUNITY

## 2025-03-12 RX ORDER — MIDAZOLAM HYDROCHLORIDE 2 MG/2ML
INJECTION, SOLUTION INTRAMUSCULAR; INTRAVENOUS PRN
Status: COMPLETED | OUTPATIENT
Start: 2025-03-12 | End: 2025-03-12

## 2025-03-12 RX ORDER — ALBUTEROL SULFATE 90 UG/1
INHALANT RESPIRATORY (INHALATION)
COMMUNITY

## 2025-03-12 RX ORDER — LOSARTAN POTASSIUM 50 MG/1
50 TABLET ORAL DAILY
COMMUNITY
Start: 2025-01-17

## 2025-03-12 RX ORDER — FENTANYL CITRATE 50 UG/ML
INJECTION, SOLUTION INTRAMUSCULAR; INTRAVENOUS PRN
Status: COMPLETED | OUTPATIENT
Start: 2025-03-12 | End: 2025-03-12

## 2025-03-12 RX ORDER — LIDOCAINE HYDROCHLORIDE 10 MG/ML
INJECTION, SOLUTION EPIDURAL; INFILTRATION; INTRACAUDAL; PERINEURAL PRN
Status: COMPLETED | OUTPATIENT
Start: 2025-03-12 | End: 2025-03-12

## 2025-03-12 RX ADMIN — LIDOCAINE HYDROCHLORIDE 8 ML: 10 INJECTION, SOLUTION EPIDURAL; INFILTRATION; INTRACAUDAL; PERINEURAL at 11:14

## 2025-03-12 RX ADMIN — MIDAZOLAM HYDROCHLORIDE 1 MG: 1 INJECTION, SOLUTION INTRAMUSCULAR; INTRAVENOUS at 11:07

## 2025-03-12 RX ADMIN — FENTANYL CITRATE 50 MCG: 50 INJECTION INTRAMUSCULAR; INTRAVENOUS at 11:06

## 2025-03-12 RX ADMIN — IOPAMIDOL 29 ML: 612 INJECTION, SOLUTION INTRAVENOUS at 11:20

## 2025-03-12 ASSESSMENT — PAIN SCALES - GENERAL
PAINLEVEL_OUTOF10: 4

## 2025-03-12 ASSESSMENT — PULMONARY FUNCTION TESTS
PIF_VALUE: 0

## 2025-03-12 ASSESSMENT — PAIN DESCRIPTION - ORIENTATION
ORIENTATION: LOWER

## 2025-03-12 ASSESSMENT — PAIN DESCRIPTION - DESCRIPTORS
DESCRIPTORS: ACHING

## 2025-03-12 ASSESSMENT — PAIN DESCRIPTION - PAIN TYPE
TYPE: CHRONIC PAIN

## 2025-03-12 ASSESSMENT — PAIN DESCRIPTION - LOCATION
LOCATION: BACK

## 2025-03-12 ASSESSMENT — PAIN DESCRIPTION - FREQUENCY
FREQUENCY: CONTINUOUS

## 2025-03-12 NOTE — PROGRESS NOTES
Patient arrived via wheelchair for diagnostic cerebral angiogram accompanied by family, procedural education completed by Dr. Choi and consent obtained    [id number]  Procedure:diagnostic cerebral angiogram    Sedation: 1 milligram versed and 50 micrograms fentanyl    Site: right groin    Discharge Details: discharge teaching completed with patient and family member, understanding verbalized, site clean, dry, and intact, patient discharged via wheelchair accompanied by family

## 2025-03-12 NOTE — H&P
Established Patient Visit         CONSULT INFORMATION:  Date of Service: 3/12/2025      PATIENT IDENTIFICATION:  Patient Name: Francisca Portillo  : 1965      CC: aneurysm    HISTORY OF PRESENT ILLNESS:  59 y.o. LH Black /  [2]female referred for aneurysm. 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 She had successful coil embolization. Hospital stay complicated by respiratory issues and severe diffuse vasospasm requiring mechanical angioplasty. She did suffer a left MCA stroke from this. She eventually had EVD weaned and was d/c to rehab about a month from admission. She was there several weeks and did very well. She is now at home. She uses a cane to ambulate. Had stroke like sz in , MRI was done. She denies diplopia. Her speech has improved significantly. She has weakness in her RUE.      Interim: no new issues since last visit.       Past Medical History:   Diagnosis Date    Anxiety     Arthritis     Asthma     Chronic mental illness     Depression     Falls     Fatigue     GERD (gastroesophageal reflux disease)     High blood pressure     Memory disorder     Obesity     SONIA (obstructive sleep apnea)     NO CPAP - NEEDS NEW MACHINE    Rash     SAH (subarachnoid hemorrhage) (HCC) 2023    Smoker        Past Surgical History:   Procedure Laterality Date    BRAIN ANEURYSM SURGERY  10/2023    Ruptured L PCOM--> coiled then surpass flow diversion 2024    COLONOSCOPY N/A 2022    COLONOSCOPY, EGD performed by Mamie Ndiaye MD at [facility] ENDOSCOPY    GYN      LAPAROSCOPY    INSERT ARTERIAL LINE  2023    INTRACRANIAL ANEURYSM REPAIR  2023    Coil embolization- ruptured L PCOM aneurysm    LAP GASTRIC BYPASS/PHAN-EN-Y      LAP, PLACE ADJUST GASTR BAND      OTHER SURGICAL HISTORY      lapband removal    TONSILLECTOMY      TUBAL LIGATION         No current facility-administered medications for this encounter.       Allergies    Allergen Reactions    Adhesive Tape Other (See Comments)     BURNS SKIN    Nsaids Nausea And Vomiting and Other (See Comments)     BURNS STOMACH         Social History  Social History     Socioeconomic History    Marital status: Single     Spouse name: Not on file    Number of children: Not on file    Years of education: Not on file    Highest education level: Not on file   Occupational History    Not on file   Tobacco Use    Smoking status: Former     Types: Cigarettes    Smokeless tobacco: Never   Vaping Use    Vaping status: Never Used   Substance and Sexual Activity    Alcohol use: Not Currently    Drug use: No    Sexual activity: Not on file   Other Topics Concern    Not on file   Social History Narrative    Not on file     Social Drivers of Health     Financial Resource Strain: Not on file   Food Insecurity: No Food Insecurity (11/25/2024)    Hunger Vital Sign     Worried About Running Out of Food in the Last Year: Never true     Ran Out of Food in the Last Year: Never true   Transportation Needs: No Transportation Needs (11/25/2024)    PRAPARE - Transportation     Lack of Transportation (Medical): No     Lack of Transportation (Non-Medical): No   Physical Activity: Not on file   Stress: Not on file   Social Connections: Not on file   Intimate Partner Violence: Not on file   Housing Stability: Low Risk  (11/25/2024)    Housing Stability Vital Sign     Unable to Pay for Housing in the Last Year: No     Number of Times Moved in the Last Year: 1     Homeless in the Last Year: No      Social History     Substance and Sexual Activity   Drug Use No     Family History   Problem Relation Age of Onset    Cancer Mother         LUNG    Cancer Father         COLON    Lung Cancer Sister     Asthma Sister     Heart Disease Brother     Heart Attack Brother     No Known Problems Brother     No Known Problems Brother     Anesth Problems Neg Hx           REVIEW OF SYSTEMS:  Neg except for above      OBJECTIVE:  There were no

## 2025-03-12 NOTE — BRIEF OP NOTE
Brief Procedure Note      Patient: Francisca Portillo MRN: [id number]     YOB: 1965  Age: 59 y.o.  Sex: female      Service: Neurointerventional Surgery    Date of Procedure: 3/12/2025    Pre-Procedure Diagnosis: L PCOM aneurysm    Post-Procedure Diagnosis: SAME    : Neel Choi DO    Assistant(s): N/A    Procedure(s):   Diagnostic cerebral angiogram      Vessels Studied:   Left ICA        Puncture Site: Right CFA--> 6 fr angioseal    Preliminary Findings:   Obliterated L PCOM aneurysm, stent patent    Plan:   - asa 81mg daily  - D/c plavix  - CTA head 2 years      Specimens: None    Implants: None    Drains: None    Anesthesia: Moderate Sedation    Estimated Blood Loss: 5 cc      Apparent Intraoperative Complications: None immediate    Patient Condition: Stable    Disposition: Same day recovery unit      Signed:   Neel Choi DO  [facility] Neurointerventional Surgery  [facility]

## 2025-03-12 NOTE — DISCHARGE INSTRUCTIONS
DRINK ALCOHOL, TAKE ANY MEDICATIONS UNLESS PRESCRIBED BY YOUR DOCTOR. IF YOU ARE A CAREGIVER, SOMEONE SHOULD TAKE THAT ROLE FOR 24 HOURS.       Side effects of sedation medications and other medications used today have been reviewed  Those side effects can include but are not limited to: dizziness, drowsiness, poor balance, fatigue, sleepiness. Take precautions at home to prevent falls, such as assistance with walking or stairs if allowed and /or when needed or position changes. Allergic or adverse reactions could include nausea, itching, hives, dizziness, or anything else out of the ordinary.       Should you experience any of these significant changes, please call [phone] between the hours of 7:30 am and 3:30 pm or [phone] after hours. After hours, ask the  to page the X-ray Technologist, and describe the problem to the technologist. If you are experiencing chest pain, shortness of breath, altered mental state, unusual bleeding or any other emergent symptom you should call [phone] immediately.

## 2025-03-17 ENCOUNTER — TELEPHONE (OUTPATIENT)
Age: 60
End: 2025-03-17

## 2025-03-17 DIAGNOSIS — I67.1 CEREBRAL ANEURYSM: Primary | ICD-10-CM

## 2025-03-17 NOTE — TELEPHONE ENCOUNTER
Mr. Baker called on behalf of the patient.  He is wanting to know if Dr. Choi can send a prescription for Aspirin.

## 2025-03-18 RX ORDER — ASPIRIN 81 MG/1
81 TABLET ORAL DAILY
Qty: 30 TABLET | Refills: 5 | Status: SHIPPED | OUTPATIENT
Start: 2025-03-18

## 2025-07-29 ENCOUNTER — TELEPHONE (OUTPATIENT)
Age: 60
End: 2025-07-29

## 2025-08-04 ENCOUNTER — TELEPHONE (OUTPATIENT)
Age: 60
End: 2025-08-04

## 2025-08-05 ENCOUNTER — OFFICE VISIT (OUTPATIENT)
Age: 60
End: 2025-08-05
Payer: MEDICAID

## 2025-08-05 VITALS
DIASTOLIC BLOOD PRESSURE: 84 MMHG | OXYGEN SATURATION: 99 % | HEART RATE: 72 BPM | BODY MASS INDEX: 60.73 KG/M2 | HEIGHT: 63 IN | TEMPERATURE: 97.9 F | SYSTOLIC BLOOD PRESSURE: 130 MMHG

## 2025-08-05 DIAGNOSIS — I67.1 CEREBRAL ANEURYSM: Primary | ICD-10-CM

## 2025-08-05 PROCEDURE — 99213 OFFICE O/P EST LOW 20 MIN: CPT | Performed by: PSYCHIATRY & NEUROLOGY

## (undated) DEVICE — MASK ANES INF SZ 2 PREM TAIL VLV INFL PRT UNSCENTED SGL PT

## (undated) DEVICE — MOUTHPIECE ENDOSCP 20X27MM --

## (undated) DEVICE — SINGLE-USE BIOPSY FORCEPS: Brand: RADIAL JAW 4

## (undated) DEVICE — THE ENDO CARRY-ON PROCEDURE KIT CONTAINS ALL OF THE SUPPLIES AND INFECTION PREVENTION PRODUCTS NEEDED FOR ENDOSCOPIC PROCEDURES: Brand: ENDO CARRY-ON PROCEDURE KIT

## (undated) DEVICE — FORCEPS BX L240CM JAW DIA2.8MM L CAP W/ NDL MIC MESH TOOTH